# Patient Record
Sex: FEMALE | Race: BLACK OR AFRICAN AMERICAN | NOT HISPANIC OR LATINO | Employment: OTHER | URBAN - METROPOLITAN AREA
[De-identification: names, ages, dates, MRNs, and addresses within clinical notes are randomized per-mention and may not be internally consistent; named-entity substitution may affect disease eponyms.]

---

## 2017-01-24 ENCOUNTER — TELEPHONE (OUTPATIENT)
Dept: INTERNAL MEDICINE | Facility: CLINIC | Age: 57
End: 2017-01-24

## 2017-01-24 NOTE — TELEPHONE ENCOUNTER
----- Message from Romel Haddad sent at 1/24/2017  8:29 AM CST -----  Contact: pt [phone]  States she is following up on messages left on yesterday rg getting an appt for lab results and pain in heel. I did offer to schedule and pt wanted another day prior to first avail and req to leave a message with nurse and can be reached at [phone]//thanks/[email or web address]

## 2017-01-25 ENCOUNTER — LAB VISIT (OUTPATIENT)
Dept: LAB | Facility: HOSPITAL | Age: 57
End: 2017-01-25
Attending: FAMILY MEDICINE
Payer: COMMERCIAL

## 2017-01-25 ENCOUNTER — OFFICE VISIT (OUTPATIENT)
Dept: INTERNAL MEDICINE | Facility: CLINIC | Age: 57
End: 2017-01-25
Payer: COMMERCIAL

## 2017-01-25 VITALS
BODY MASS INDEX: 30.55 KG/M2 | SYSTOLIC BLOOD PRESSURE: 100 MMHG | OXYGEN SATURATION: 99 % | TEMPERATURE: 98 F | HEIGHT: 62 IN | WEIGHT: 166 LBS | HEART RATE: 75 BPM | DIASTOLIC BLOOD PRESSURE: 80 MMHG

## 2017-01-25 DIAGNOSIS — M76.62 LEFT ACHILLES TENDINITIS: ICD-10-CM

## 2017-01-25 DIAGNOSIS — I10 ESSENTIAL HYPERTENSION: ICD-10-CM

## 2017-01-25 DIAGNOSIS — I10 ESSENTIAL HYPERTENSION: Primary | ICD-10-CM

## 2017-01-25 LAB
ANION GAP SERPL CALC-SCNC: 7 MMOL/L
BUN SERPL-MCNC: 17 MG/DL
CALCIUM SERPL-MCNC: 10.4 MG/DL
CHLORIDE SERPL-SCNC: 104 MMOL/L
CO2 SERPL-SCNC: 30 MMOL/L
CREAT SERPL-MCNC: 0.9 MG/DL
EST. GFR  (AFRICAN AMERICAN): >60 ML/MIN/1.73 M^2
EST. GFR  (NON AFRICAN AMERICAN): >60 ML/MIN/1.73 M^2
GLUCOSE SERPL-MCNC: 94 MG/DL
POTASSIUM SERPL-SCNC: 4.6 MMOL/L
SODIUM SERPL-SCNC: 141 MMOL/L

## 2017-01-25 PROCEDURE — 99999 PR PBB SHADOW E&M-EST. PATIENT-LVL III: CPT | Mod: PBBFAC,,, | Performed by: FAMILY MEDICINE

## 2017-01-25 PROCEDURE — 3079F DIAST BP 80-89 MM HG: CPT | Mod: S$GLB,,, | Performed by: FAMILY MEDICINE

## 2017-01-25 PROCEDURE — 36415 COLL VENOUS BLD VENIPUNCTURE: CPT

## 2017-01-25 PROCEDURE — 80048 BASIC METABOLIC PNL TOTAL CA: CPT

## 2017-01-25 PROCEDURE — 3074F SYST BP LT 130 MM HG: CPT | Mod: S$GLB,,, | Performed by: FAMILY MEDICINE

## 2017-01-25 PROCEDURE — 1159F MED LIST DOCD IN RCRD: CPT | Mod: S$GLB,,, | Performed by: FAMILY MEDICINE

## 2017-01-25 PROCEDURE — 99213 OFFICE O/P EST LOW 20 MIN: CPT | Mod: S$GLB,,, | Performed by: FAMILY MEDICINE

## 2017-01-25 RX ORDER — NAPROXEN 500 MG/1
500 TABLET ORAL 2 TIMES DAILY WITH MEALS
Qty: 28 TABLET | Refills: 0 | Status: SHIPPED | OUTPATIENT
Start: 2017-01-25 | End: 2017-02-08

## 2017-01-25 NOTE — PROGRESS NOTES
Subjective:       Patient ID: Clare Whitley is a 56 y.o. female.    Chief Complaint: Foot Pain    Foot Pain   This is a new problem. The current episode started in the past 7 days. The problem occurs constantly. The problem has been gradually worsening. Pertinent negatives include no abdominal pain, anorexia, arthralgias, change in bowel habit, chest pain, chills, congestion, coughing, diaphoresis, fatigue, fever, headaches, joint swelling, myalgias, nausea, neck pain, numbness, rash, sore throat, swollen glands, urinary symptoms, vertigo, visual change, vomiting or weakness. The symptoms are aggravated by walking. She has tried nothing for the symptoms.     Review of Systems   Constitutional: Negative for chills, diaphoresis, fatigue and fever.   HENT: Negative for congestion and sore throat.    Respiratory: Negative for cough.    Cardiovascular: Negative for chest pain.   Gastrointestinal: Negative for abdominal pain, anorexia, change in bowel habit, nausea and vomiting.   Musculoskeletal: Negative for arthralgias, joint swelling, myalgias and neck pain.   Skin: Negative for rash.   Neurological: Negative for vertigo, weakness, numbness and headaches.       Objective:      Physical Exam   Constitutional: She is oriented to person, place, and time. She appears well-developed and well-nourished. No distress.   HENT:   Head: Normocephalic and atraumatic.   Eyes: Conjunctivae and EOM are normal. Pupils are equal, round, and reactive to light. No scleral icterus.   Pulmonary/Chest: Effort normal.   Musculoskeletal:        Left ankle: Achilles tendon exhibits pain.        Feet:    Neurological: She is alert and oriented to person, place, and time. No cranial nerve deficit. Gait normal.   Psychiatric: She has a normal mood and affect.   Vitals reviewed.      Assessment:       1. Essential hypertension    2. Left Achilles tendinitis        Plan:   Clare was seen today for foot pain.    Diagnoses and all orders for  this visit:    Essential hypertension  Comments:  controlled, continue tenoretic  Orders:  -     Basic metabolic panel; Standing    Left Achilles tendinitis  Comments:  course of naproxen, if worse/persistent advised to call for podiatry referral  Orders:  -     naproxen (NAPROSYN) 500 MG tablet; Take 1 tablet (500 mg total) by mouth 2 (two) times daily with meals.

## 2017-01-25 NOTE — MR AVS SNAPSHOT
[facility]  [address] [address] [address]  Phone: [phone]  Fax: [phone]                  Clare Whitley   2017 8:40 AM   Office Visit    Description:  Female : 1960   Provider:  Chhaya Jennings MD   Department:  [facility]           Reason for Visit     Foot Pain           Diagnoses this Visit        Comments    Essential hypertension    -  Primary     Left Achilles tendinitis     course of naproxen, if worse/persistent advised to call for podiatry referral           To Do List           Future Appointments        Provider Department Dept Phone    2017 11:00 AM LAB, SAME DAY [facility] [phone]    2017 7:40 AM Chhaya Jennings MD [facility] [phone]      Goals (5 Years of Data)     None      Follow-Up and Disposition     Return in about 6 months (around 2017), or if symptoms worsen or fail to improve, for annual wellness exam.       These Medications        Disp Refills Start End    naproxen (NAPROSYN) 500 MG tablet 28 tablet 0 2017    Take 1 tablet (500 mg total) by mouth 2 (two) times daily with meals. - Oral    Pharmacy: [id number] [place], [place] - [address] [facility]. Ph #: [phone]         [facility]     [facility] On Call Nurse Care Line -  Assistance  Registered nurses in the [facility] provide clinical advisement, health education, appointment booking, and other advisory services.  Call for this free service at [phone].             Medications           Message regarding Medications     Verify the changes and/or additions to your medication regime listed below are the same as discussed with your clinician today.  If any of these changes or additions are incorrect, please notify your healthcare provider.        START taking these NEW medications        Refills    naproxen  "(NAPROSYN) 500 MG tablet 0    Sig: Take 1 tablet (500 mg total) by mouth 2 (two) times daily with meals.    Class: Normal    Route: Oral      STOP taking these medications     ibuprofen (ADVIL,MOTRIN) 800 MG tablet Take 1 tablet (800 mg total) by mouth 3 (three) times daily.           Verify that the below list of medications is an accurate representation of the medications you are currently taking.  If none reported, the list may be blank. If incorrect, please contact your healthcare provider. Carry this list with you in case of emergency.           Current Medications     atenolol-chlorthalidone (TENORETIC) 100-25 mg per tablet Take 1 tablet by mouth once daily. 1 Tablet Oral Every day    spironolactone (ALDACTONE) 50 MG tablet Take 1 tablet (50 mg total) by mouth once daily.    naproxen (NAPROSYN) 500 MG tablet Take 1 tablet (500 mg total) by mouth 2 (two) times daily with meals.           Clinical Reference Information           Vital Signs - Last Recorded  Most recent update: 1/25/2017  9:11 AM by Jacque Joaquin    BP Pulse Temp Ht Wt SpO2    100/80 (BP Location: Left arm, Patient Position: Sitting) 75 98 °F (36.7 °C) (Tympanic) 5' 2" (1.575 m) 75.3 kg (166 lb 0.1 oz) 99%    BMI                30.36 kg/m2          Blood Pressure          Most Recent Value    BP  100/80      Allergies as of 1/25/2017     No Known Drug Allergies      Immunizations Administered on Date of Encounter - 1/25/2017     None      Orders Placed During Today's Visit     Recurring Lab Work Interval Expires    Basic metabolic panel   6/12/2044      [facility] Sign-Up     Activating your [facility] account is as easy as 1-2-3!     1) Visit [email or web address], select Sign Up Now, enter this activation code and your date of birth, then select Next.  [email or web address]  Expires: 3/11/2017 10:01 AM      2) Create a username and password to use when you visit [facility] in the future and select a security question in case you lose your password and select " Next.    3) Enter your e-mail address and click Sign Up!    Additional Information  If you have questions, please e-mail [email or web address] or call [phone] to talk to our [facility] staff. Remember, [facility] is NOT to be used for urgent needs. For medical emergencies, dial 911.

## 2017-01-27 ENCOUNTER — TELEPHONE (OUTPATIENT)
Dept: INTERNAL MEDICINE | Facility: CLINIC | Age: 57
End: 2017-01-27

## 2017-01-27 NOTE — TELEPHONE ENCOUNTER
----- Message from Alicia Echeverria NP sent at 1/26/2017  2:09 PM CST -----  Please call/notify patient BMP within acceptable limits.

## 2017-03-09 ENCOUNTER — OFFICE VISIT (OUTPATIENT)
Dept: URGENT CARE | Facility: CLINIC | Age: 57
End: 2017-03-09
Payer: COMMERCIAL

## 2017-03-09 VITALS
SYSTOLIC BLOOD PRESSURE: 110 MMHG | WEIGHT: 171.75 LBS | HEART RATE: 88 BPM | DIASTOLIC BLOOD PRESSURE: 70 MMHG | OXYGEN SATURATION: 98 % | RESPIRATION RATE: 20 BRPM | TEMPERATURE: 98 F | BODY MASS INDEX: 31.6 KG/M2 | HEIGHT: 62 IN

## 2017-03-09 DIAGNOSIS — S39.012D STRAIN OF LUMBAR PARASPINAL MUSCLE, SUBSEQUENT ENCOUNTER: ICD-10-CM

## 2017-03-09 DIAGNOSIS — M76.62 ACHILLES TENDINITIS OF LEFT LOWER EXTREMITY: Primary | ICD-10-CM

## 2017-03-09 PROCEDURE — 99999 PR PBB SHADOW E&M-EST. PATIENT-LVL IV: CPT | Mod: PBBFAC,,, | Performed by: PHYSICIAN ASSISTANT

## 2017-03-09 PROCEDURE — 3074F SYST BP LT 130 MM HG: CPT | Mod: S$GLB,,, | Performed by: PHYSICIAN ASSISTANT

## 2017-03-09 PROCEDURE — 1160F RVW MEDS BY RX/DR IN RCRD: CPT | Mod: S$GLB,,, | Performed by: PHYSICIAN ASSISTANT

## 2017-03-09 PROCEDURE — 3078F DIAST BP <80 MM HG: CPT | Mod: S$GLB,,, | Performed by: PHYSICIAN ASSISTANT

## 2017-03-09 PROCEDURE — 99214 OFFICE O/P EST MOD 30 MIN: CPT | Mod: S$GLB,,, | Performed by: PHYSICIAN ASSISTANT

## 2017-03-09 RX ORDER — NAPROXEN 500 MG/1
500 TABLET ORAL 2 TIMES DAILY PRN
Qty: 30 TABLET | Refills: 0 | Status: SHIPPED | OUTPATIENT
Start: 2017-03-09 | End: 2017-03-19

## 2017-03-09 RX ORDER — CYCLOBENZAPRINE HCL 10 MG
10 TABLET ORAL NIGHTLY PRN
Qty: 10 TABLET | Refills: 0 | Status: SHIPPED | OUTPATIENT
Start: 2017-03-09 | End: 2017-03-29
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
## 2017-03-09 NOTE — PATIENT INSTRUCTIONS
Rest, Ice, Compression, Elevation as needed  Follow up with Ortho or Primary Care Physician if any worsening in symptoms or no improvement after 2 weeks.            Understanding Achilles Tendonitis    Achilles tendonitis is an overuse injury. It results in inflammation of the Achilles tendon. This tendon is found on the back of the ankle. It links the calf muscle to the heel bone. It helps you do pushing-off movements like running or standing on your toes.     How to say it  [id number]   What causes Achilles tendonitis?  Achilles tendonitis can happen if you do an activity like running, walking, or jumping too much. This overuse can strain, or pull, the tendon. It may lead to minor tearing of the tendon. An injury to the lower leg or foot can also cause it.  If you dont warm up before taking part in sports such as basketball, you are more likely to suffer from this condition. You are also more prone to it if you do too much of such an activity too quickly. Proper training and rest can help prevent it.  Symptoms of Achilles tendonitis  The main symptom of Achilles tendonitis is pain. This pain mostly happens when you move the ankle. The tendon may also feel stiff after a period of no activity, such as sleeping. It may also become swollen. You may hear a crackling sound when you move your ankle.  Treatment for Achilles tendonitis  Symptoms often get better after starting treatment. A full recovery may take several months. Treatments include:  · Rest. You should stop or change the activity that caused the injury. The tendon will then have time to heal.  · Cold or heat pack. These help reduce pain and swelling.  · Prescription or over-the-counter pain medicines. These help reduce pain and swelling.  · Shoe inserts. These devices can reduce strain on the Achilles tendon when you move. You may then feel less pain.  · Stretching and strengthening exercises. Certain exercises can help you regain  flexibility and strength in your Achilles tendon.  · Surgery. This option can fix the injured tendon. But you dont often need it unless other treatments dont work.     When to call your healthcare provider   Call your healthcare provider right away if you have any of these:  · Fever of 100.4°F (38°C) or higher, or as directed  · Pain that gets worse  · Symptoms that dont get better, or get worse  · New symptoms    Date Last Reviewed: 3/10/2016  © 2516-4207 [employer]. [address] [place] [address]. All rights reserved. This information is not intended as a substitute for professional medical care. Always follow your healthcare professional's instructions.        Back Pain (Acute or Chronic)    Back pain is one of the most common problems. The good news is that most people feel better in 1 to 2 weeks, and most of the rest in 1 to 2 months. Most people can remain active.  People experience and describe pain differently; not everyone is the same.  · The pain can be sharp, stabbing, shooting, aching, cramping or burning.  · Movement, standing, bending, lifting, sitting, or walking may worsen pain.  · It can be localized to one spot or area, or it can be more generalized.  · It can spread or radiate upwards, to the front, or go down your arms or legs (sciatica).  · It can cause muscle spasm.  Most of the time, mechanical problems with the muscles or spine cause the pain. Mechanical problems are usually caused by an injury to the muscles or ligaments. While illness can cause back pain, it is usually not caused by a serious illness. Mechanical problems include:   · Physical activity such as sports, exercise, work, or normal activity  · Overexertion, lifting, pushing, pulling incorrectly or too aggressively  · Sudden twisting, bending, or stretching from an accident, or accidental movement  · Poor posture  · Stretching or moving wrong, without noticing pain at the time  · Poor coordination,  lack of regular exercise (check with your doctor about this)  · Spinal disc disease or arthritis  · Stress  Pain can also be related to pregnancy, or illness like appendicitis, bladder or kidney infections, pelvic infections, and many other things.  Acute back pain usually gets better in 1 to 2 weeks. Back pain related to disk disease, arthritis in the spinal joints or spinal stenosis (narrowing of the spinal canal) can become chronic and last for months or years.  Unless you had a physical injury (for example, a car accident or fall) X-rays are usually not needed for the initial evaluation of back pain. If pain continues and does not respond to medical treatment, X-rays and other tests may be needed.  Home care  Try these home care recommendations:  · When in bed, try to find a position of comfort. A firm mattress is best. Try lying flat on your back with pillows under your knees. You can also try lying on your side with your knees bent up towards your chest and a pillow between your knees.  · At first, do not try to stretch out the sore spots. If there is a strain, it is not like the good soreness you get after exercising without an injury. In this case, stretching may make it worse.  · Avoid prolong sitting, long car rides, or travel. This puts more stress on the lower back than standing or walking.  · During the first 24 to 72 hours after an acute injury or flare up of chronic back pain, apply an ice pack to the painful area for 20 minutes and then remove it for 20 minutes. Do this over a period of 60 to 90 minutes or several times a day. This will reduce swelling and pain. Wrap the ice pack in a thin towel or plastic to protect your skin.  · You can start with ice, then switch to heat. Heat (hot shower, hot bath, or heating pad) reduces pain and works well for muscle spasms. Heat can be applied to the painful area for 20 minutes then remove it for 20 minutes. Do this over a period of 60 to 90 minutes or several  times a day. Do not sleep on a heating pad. It can lead to skin burns or tissue damage.  · You can alternate ice and heat therapy. Talk with your doctor about the best treatment for your back pain.  · Therapeutic massage can help relax the back muscles without stretching them.  · Be aware of safe lifting methods and do not lift anything without stretching first.  Medicines  Talk to your doctor before using medicine, especially if you have other medical problems or are taking other medicines.  · You may use over-the-counter medicine as directed on the bottle to control pain, unless another pain medicine was prescribed. If you have chronic conditions like diabetes, liver or kidney disease, stomach ulcers, or gastrointestinal bleeding, or are taking blood thinners, talk to your doctor before taking any medicine.  · Be careful if you are given a prescription medicines, narcotics, or medicine for muscle spasms. They can cause drowsiness, affect your coordination, reflexes, and judgement. Do not drive or operate heavy machinery.  Follow-up care  Follow up with your healthcare provider, or as advised.   A radiologist will review any X-rays that were taken. Your provide will notify you of any new findings that may affect your care.  Call 911  Call emergency services if any of the following occur:  · Trouble breathing  · Confusion  · Very drowsy or trouble awakening  · Fainting or loss of consciousness  · Rapid or very slow heart rate  · Loss of bowel or bladder control  When to seek medical advice  Call your healthcare provider right away if any of these occur:   · Pain becomes worse or spreads to your legs  · Weakness or numbness in one or both legs  · Numbness in the groin or genital area  Date Last Reviewed: 7/1/2016  © 9823-5642 [employer]. [address] [place] [address], [place] [address]. All rights reserved. This information is not intended as a substitute for professional medical care. Always follow your  healthcare professional's instructions.

## 2017-03-09 NOTE — MR AVS SNAPSHOT
[facility]  [address] [facility] [place] [address]  Phone: [phone]  Fax: [phone]                  Clare Whitley   3/9/2017 12:10 PM   Office Visit    Description:  Female : 1960   Provider:  Barb Nolan PA-C   Department:  [facility]           Reason for Visit     Back Pain           Diagnoses this Visit        Comments    Achilles tendinitis of left lower extremity    -  Primary     Strain of lumbar paraspinal muscle, subsequent encounter                To Do List           Future Appointments        Provider Department Dept Phone    2017 7:40 AM Chhaya Jennings MD [facility] [phone]      Goals (5 Years of Data)     None       These Medications        Disp Refills Start End    naproxen (NAPROSYN) 500 MG tablet 30 tablet 0 3/9/2017 3/19/2017    Take 1 tablet (500 mg total) by mouth 2 (two) times daily as needed (pain). - Oral    [facility] [facility] [place] [facility] [address] [facility]. Ph #: [phone]       cyclobenzaprine (FLEXERIL) 10 MG tablet 10 tablet 0 3/9/2017     Take 1 tablet (10 mg total) by mouth nightly as needed for Muscle spasms. - Oral    [id number] [facility] [place] [facility] [address] [facility]. Ph #: [phone]         [facility] On Call     [facility] On Call Nurse Care Line -  Assistance  Registered nurses in the [facility] provide clinical advisement, health education, appointment booking, and other advisory services.  Call for this free service at [phone].             Medications           Message regarding Medications     Verify the changes and/or additions to your medication regime listed below are the same as discussed with your clinician today.  If any of these changes or additions are incorrect, please notify your healthcare provider.        START taking these NEW medications        Refills    naproxen (NAPROSYN) 500 MG tablet  "0    Sig: Take 1 tablet (500 mg total) by mouth 2 (two) times daily as needed (pain).    Class: Normal    Route: Oral    cyclobenzaprine (FLEXERIL) 10 MG tablet 0    Sig: Take 1 tablet (10 mg total) by mouth nightly as needed for Muscle spasms.    Class: Normal    Route: Oral           Verify that the below list of medications is an accurate representation of the medications you are currently taking.  If none reported, the list may be blank. If incorrect, please contact your healthcare provider. Carry this list with you in case of emergency.           Current Medications     atenolol-chlorthalidone (TENORETIC) 100-25 mg per tablet Take 1 tablet by mouth once daily. 1 Tablet Oral Every day    spironolactone (ALDACTONE) 50 MG tablet Take 1 tablet (50 mg total) by mouth once daily.    cyclobenzaprine (FLEXERIL) 10 MG tablet Take 1 tablet (10 mg total) by mouth nightly as needed for Muscle spasms.    naproxen (NAPROSYN) 500 MG tablet Take 1 tablet (500 mg total) by mouth 2 (two) times daily as needed (pain).           Clinical Reference Information           Your Vitals Were     BP Pulse Temp Resp    110/70 (BP Location: Right arm, Patient Position: Sitting, BP Method: Manual) 88 98.2 °F (36.8 °C) (Tympanic) 20    Height Weight SpO2 BMI    5' 2.4" (1.585 m) 77.9 kg (171 lb 11.8 oz) 98% 31.01 kg/m2      Blood Pressure          Most Recent Value    BP  110/70      Allergies as of 3/9/2017     No Known Drug Allergies      Immunizations Administered on Date of Encounter - 3/9/2017     None      Orders Placed During Today's Visit      Normal Orders This Visit    Ambulatory referral to Orthopedics       [facility] Sign-Up     Activating your [facility] account is as easy as 1-2-3!     1) Visit [email or web address], select Sign Up Now, enter this activation code and your date of birth, then select Next.  [email or web address]  Expires: 3/11/2017 10:01 AM      2) Create a username and password to use when you visit [facility] in the future " and select a security question in case you lose your password and select Next.    3) Enter your e-mail address and click Sign Up!    Additional Information  If you have questions, please e-mail [email or web address] or call [phone] to talk to our [facility] staff. Remember, [facility] is NOT to be used for urgent needs. For medical emergencies, dial 911.         Instructions        Rest, Ice, Compression, Elevation as needed  Follow up with Ortho or Primary Care Physician if any worsening in symptoms or no improvement after 2 weeks.            Understanding Achilles Tendonitis    Achilles tendonitis is an overuse injury. It results in inflammation of the Achilles tendon. This tendon is found on the back of the ankle. It links the calf muscle to the heel bone. It helps you do pushing-off movements like running or standing on your toes.     How to say it  [id number]-[id number]-[id number]   What causes Achilles tendonitis?  Achilles tendonitis can happen if you do an activity like running, walking, or jumping too much. This overuse can strain, or pull, the tendon. It may lead to minor tearing of the tendon. An injury to the lower leg or foot can also cause it.  If you dont warm up before taking part in sports such as basketball, you are more likely to suffer from this condition. You are also more prone to it if you do too much of such an activity too quickly. Proper training and rest can help prevent it.  Symptoms of Achilles tendonitis  The main symptom of Achilles tendonitis is pain. This pain mostly happens when you move the ankle. The tendon may also feel stiff after a period of no activity, such as sleeping. It may also become swollen. You may hear a crackling sound when you move your ankle.  Treatment for Achilles tendonitis  Symptoms often get better after starting treatment. A full recovery may take several months. Treatments include:  · Rest. You should stop or change the activity that caused the injury. The  tendon will then have time to heal.  · Cold or heat pack. These help reduce pain and swelling.  · Prescription or over-the-counter pain medicines. These help reduce pain and swelling.  · Shoe inserts. These devices can reduce strain on the Achilles tendon when you move. You may then feel less pain.  · Stretching and strengthening exercises. Certain exercises can help you regain flexibility and strength in your Achilles tendon.  · Surgery. This option can fix the injured tendon. But you dont often need it unless other treatments dont work.     When to call your healthcare provider   Call your healthcare provider right away if you have any of these:  · Fever of 100.4°F (38°C) or higher, or as directed  · Pain that gets worse  · Symptoms that dont get better, or get worse  · New symptoms    Date Last Reviewed: 3/10/2016  © 6511-4005 [employer]. [address] [place] [address]. All rights reserved. This information is not intended as a substitute for professional medical care. Always follow your healthcare professional's instructions.        Back Pain (Acute or Chronic)    Back pain is one of the most common problems. The good news is that most people feel better in 1 to 2 weeks, and most of the rest in 1 to 2 months. Most people can remain active.  People experience and describe pain differently; not everyone is the same.  · The pain can be sharp, stabbing, shooting, aching, cramping or burning.  · Movement, standing, bending, lifting, sitting, or walking may worsen pain.  · It can be localized to one spot or area, or it can be more generalized.  · It can spread or radiate upwards, to the front, or go down your arms or legs (sciatica).  · It can cause muscle spasm.  Most of the time, mechanical problems with the muscles or spine cause the pain. Mechanical problems are usually caused by an injury to the muscles or ligaments. While illness can cause back pain, it is usually not caused by a  serious illness. Mechanical problems include:   · Physical activity such as sports, exercise, work, or normal activity  · Overexertion, lifting, pushing, pulling incorrectly or too aggressively  · Sudden twisting, bending, or stretching from an accident, or accidental movement  · Poor posture  · Stretching or moving wrong, without noticing pain at the time  · Poor coordination, lack of regular exercise (check with your doctor about this)  · Spinal disc disease or arthritis  · Stress  Pain can also be related to pregnancy, or illness like appendicitis, bladder or kidney infections, pelvic infections, and many other things.  Acute back pain usually gets better in 1 to 2 weeks. Back pain related to disk disease, arthritis in the spinal joints or spinal stenosis (narrowing of the spinal canal) can become chronic and last for months or years.  Unless you had a physical injury (for example, a car accident or fall) X-rays are usually not needed for the initial evaluation of back pain. If pain continues and does not respond to medical treatment, X-rays and other tests may be needed.  Home care  Try these home care recommendations:  · When in bed, try to find a position of comfort. A firm mattress is best. Try lying flat on your back with pillows under your knees. You can also try lying on your side with your knees bent up towards your chest and a pillow between your knees.  · At first, do not try to stretch out the sore spots. If there is a strain, it is not like the good soreness you get after exercising without an injury. In this case, stretching may make it worse.  · Avoid prolong sitting, long car rides, or travel. This puts more stress on the lower back than standing or walking.  · During the first 24 to 72 hours after an acute injury or flare up of chronic back pain, apply an ice pack to the painful area for 20 minutes and then remove it for 20 minutes. Do this over a period of 60 to 90 minutes or several times a day.  This will reduce swelling and pain. Wrap the ice pack in a thin towel or plastic to protect your skin.  · You can start with ice, then switch to heat. Heat (hot shower, hot bath, or heating pad) reduces pain and works well for muscle spasms. Heat can be applied to the painful area for 20 minutes then remove it for 20 minutes. Do this over a period of 60 to 90 minutes or several times a day. Do not sleep on a heating pad. It can lead to skin burns or tissue damage.  · You can alternate ice and heat therapy. Talk with your doctor about the best treatment for your back pain.  · Therapeutic massage can help relax the back muscles without stretching them.  · Be aware of safe lifting methods and do not lift anything without stretching first.  Medicines  Talk to your doctor before using medicine, especially if you have other medical problems or are taking other medicines.  · You may use over-the-counter medicine as directed on the bottle to control pain, unless another pain medicine was prescribed. If you have chronic conditions like diabetes, liver or kidney disease, stomach ulcers, or gastrointestinal bleeding, or are taking blood thinners, talk to your doctor before taking any medicine.  · Be careful if you are given a prescription medicines, narcotics, or medicine for muscle spasms. They can cause drowsiness, affect your coordination, reflexes, and judgement. Do not drive or operate heavy machinery.  Follow-up care  Follow up with your healthcare provider, or as advised.   A radiologist will review any X-rays that were taken. Your provide will notify you of any new findings that may affect your care.  Call 911  Call emergency services if any of the following occur:  · Trouble breathing  · Confusion  · Very drowsy or trouble awakening  · Fainting or loss of consciousness  · Rapid or very slow heart rate  · Loss of bowel or bladder control  When to seek medical advice  Call your healthcare provider right away if any of  these occur:   · Pain becomes worse or spreads to your legs  · Weakness or numbness in one or both legs  · Numbness in the groin or genital area  Date Last Reviewed: 7/1/2016 © 2000-2016 [employer]. [address] [place] [address], [place] [address]. All rights reserved. This information is not intended as a substitute for professional medical care. Always follow your healthcare professional's instructions.             Language Assistance Services     ATTENTION: Language assistance services are available, free of charge. Please call [phone].      ATENCIÓN: Si ashleyla paul, tiene a nix disposición servicios gratuitos de asistencia lingüística. Llame al [phone].     CHÚ Ý: N?u b?n nói Ti?ng Vi?t, có các d?ch v? h? tr? ngôn ng? mi?n phí dành cho b?n. G?i s? [phone].         [facility] complies with applicable Federal civil rights laws and does not discriminate on the basis of race, color, national origin, age, disability, or sex.

## 2017-03-29 ENCOUNTER — OFFICE VISIT (OUTPATIENT)
Dept: INTERNAL MEDICINE | Facility: CLINIC | Age: 57
End: 2017-03-29
Payer: COMMERCIAL

## 2017-03-29 ENCOUNTER — HOSPITAL ENCOUNTER (OUTPATIENT)
Dept: RADIOLOGY | Facility: HOSPITAL | Age: 57
Discharge: HOME | End: 2017-03-29
Attending: FAMILY MEDICINE
Payer: COMMERCIAL

## 2017-03-29 VITALS
WEIGHT: 173.31 LBS | BODY MASS INDEX: 31.89 KG/M2 | DIASTOLIC BLOOD PRESSURE: 88 MMHG | OXYGEN SATURATION: 98 % | SYSTOLIC BLOOD PRESSURE: 138 MMHG | HEART RATE: 84 BPM | HEIGHT: 62 IN | TEMPERATURE: 98 F

## 2017-03-29 DIAGNOSIS — R07.89 RIGHT-SIDED CHEST WALL PAIN: Primary | ICD-10-CM

## 2017-03-29 DIAGNOSIS — R07.89 RIGHT-SIDED CHEST WALL PAIN: ICD-10-CM

## 2017-03-29 DIAGNOSIS — B02.9 HERPES ZOSTER WITHOUT COMPLICATION: ICD-10-CM

## 2017-03-29 PROCEDURE — 3079F DIAST BP 80-89 MM HG: CPT | Mod: S$GLB,,, | Performed by: FAMILY MEDICINE

## 2017-03-29 PROCEDURE — 71020 XR CHEST PA AND LATERAL: CPT | Mod: TC,PO

## 2017-03-29 PROCEDURE — 99214 OFFICE O/P EST MOD 30 MIN: CPT | Mod: S$GLB,,, | Performed by: FAMILY MEDICINE

## 2017-03-29 PROCEDURE — 3075F SYST BP GE 130 - 139MM HG: CPT | Mod: S$GLB,,, | Performed by: FAMILY MEDICINE

## 2017-03-29 PROCEDURE — 71020 XR CHEST PA AND LATERAL: CPT | Mod: 26,,, | Performed by: RADIOLOGY

## 2017-03-29 PROCEDURE — 99999 PR PBB SHADOW E&M-EST. PATIENT-LVL III: CPT | Mod: PBBFAC,,, | Performed by: FAMILY MEDICINE

## 2017-03-29 PROCEDURE — 1160F RVW MEDS BY RX/DR IN RCRD: CPT | Mod: S$GLB,,, | Performed by: FAMILY MEDICINE

## 2017-03-29 RX ORDER — VALACYCLOVIR HYDROCHLORIDE 1 G/1
1000 TABLET, FILM COATED ORAL 3 TIMES DAILY
Qty: 21 TABLET | Refills: 0 | Status: SHIPPED | OUTPATIENT
Start: 2017-03-29 | End: 2017-04-03

## 2017-03-29 RX ORDER — TRAMADOL HYDROCHLORIDE 50 MG/1
50 TABLET ORAL 3 TIMES DAILY PRN
Qty: 30 TABLET | Refills: 0 | Status: SHIPPED | OUTPATIENT
Start: 2017-03-29 | End: 2017-04-08

## 2017-03-29 NOTE — PROGRESS NOTES
Subjective:   Patient ID: Clare Whitley is a 56 y.o. female.  Chief Complaint:  Chest Pain (pain rt rib cage)    HPI Comments: Presents for evaluation of unilateral right-sided chest/rib pain started this morning.  No fever.  No rash.  No shortness of breath.  Feels deep in chest.  No change with inspiration or expiration.  Note tenderness to palpation, just more sensitive.  No change with movement.  History of chickenpox.  Denies history of shingles.  No previous Zostavax.    Chest Pain    This is a new problem. The current episode started today. The onset quality is sudden. The problem occurs constantly. The problem has been unchanged. The pain is present in the lateral region. The pain is at a severity of 10/10. The pain is severe. The quality of the pain is described as stabbing and numbness. The pain does not radiate. Associated symptoms include nausea. Pertinent negatives include no abdominal pain, back pain, claudication, cough, diaphoresis, dizziness, exertional chest pressure, fever, headaches, hemoptysis, irregular heartbeat, leg pain, lower extremity edema, malaise/fatigue, near-syncope, numbness, orthopnea, palpitations, PND, shortness of breath, sputum production, syncope, vomiting or weakness. The pain is aggravated by nothing. She has tried nothing for the symptoms.   Her past medical history is significant for anxiety/panic attacks, hypertension and mitral valve prolapse.     Review of Systems   Constitutional: Negative for diaphoresis, fever and malaise/fatigue.   HENT: Negative.    Respiratory: Negative for cough, hemoptysis, sputum production and shortness of breath.    Cardiovascular: Positive for chest pain. Negative for palpitations, orthopnea, claudication, syncope, PND and near-syncope.   Gastrointestinal: Positive for nausea. Negative for abdominal pain and vomiting.   Genitourinary: Negative for flank pain.   Musculoskeletal: Negative for back pain.   Skin: Negative for rash.  "  Neurological: Negative for dizziness, weakness, numbness and headaches.       Current Outpatient Prescriptions:     atenolol-chlorthalidone (TENORETIC) 100-25 mg per tablet, Take 1 tablet by mouth once daily. 1 Tablet Oral Every day, Disp: 90 tablet, Rfl: 3    spironolactone (ALDACTONE) 50 MG tablet, Take 1 tablet (50 mg total) by mouth once daily., Disp: 90 tablet, Rfl: 3    tramadol (ULTRAM) 50 mg tablet, Take 1 tablet (50 mg total) by mouth 3 (three) times daily as needed for Pain., Disp: 30 tablet, Rfl: 0    valacyclovir (VALTREX) 1000 MG tablet, Take 1 tablet (1,000 mg total) by mouth 3 (three) times daily., Disp: 21 tablet, Rfl: 0    Objective:   /88  Pulse 84  Temp 97.5 °F (36.4 °C) (Tympanic)   Ht 5' 2" (1.575 m)  Wt 78.6 kg (173 lb 4.5 oz)  SpO2 98%  BMI 31.69 kg/m2    Physical Exam   Constitutional: She is oriented to person, place, and time. Vital signs are normal. She appears well-developed and well-nourished.  Non-toxic appearance. She does not have a sickly appearance. She does not appear ill. No distress.   Uncomfortable from pain.   Neck: No JVD present. No thyroid mass and no thyromegaly present.   Cardiovascular: Normal rate, regular rhythm and normal heart sounds.  Exam reveals no gallop and no friction rub.    No murmur heard.  Pulses:       Radial pulses are 2+ on the right side, and 2+ on the left side.   Pulmonary/Chest: Effort normal and breath sounds normal. She has no wheezes. She has no rhonchi. She has no rales. Chest wall is not dull to percussion. She exhibits no mass, no tenderness, no bony tenderness, no crepitus, no edema, no deformity, no swelling and no retraction.   Abdominal: Soft. She exhibits no distension. There is no tenderness. There is no rebound, no guarding and no CVA tenderness.   Musculoskeletal: Normal range of motion. She exhibits no edema.   Neurological: She is oriented to person, place, and time. She displays a negative Romberg sign. Coordination " and gait normal.   Skin: Skin is warm and dry. No rash noted.   Psychiatric: Her mood appears anxious.   Nursing note and vitals reviewed.    Assessment:     1. Right-sided chest wall pain    2. Herpes zoster without complication      Plan:   -     X-Ray Chest PA And Lateral; Future; Expected date: 3/29/17  -     valacyclovir (VALTREX) 1000 MG tablet; Take 1 tablet (1,000 mg total) by mouth 3 (three) times daily.  Dispense: 21 tablet; Refill: 0  -     tramadol (ULTRAM) 50 mg tablet; Take 1 tablet (50 mg total) by mouth 3 (three) times daily as needed for Pain.  Dispense: 30 tablet; Refill: 0    Pain in single dermatome pattern on right.  No rash present.  Pain unchanged with respiration or movement.  No respiratory difficulty.  Normal vitals.  Most likely diagnosis is early shingles.    Chest x-ray for any possible underlying pathology.   Image was negative.  No acute process or abnormality.  Valtrex for shingles.  Tramadol for pain.  Patient education on shingles.  Discussed the rash may develop.  Return to clinic next week as needed.

## 2017-03-29 NOTE — MR AVS SNAPSHOT
[facility]  [address]  [place] [address]  Phone: [phone]  Fax: [phone]                  Clare Whitley   3/29/2017 9:20 AM   Office Visit    Description:  Female : 1960   Provider:  Jason Stanley MD   Department:  [facility]           Reason for Visit     Chest Pain           Diagnoses this Visit        Comments    Right-sided chest wall pain    -  Primary     Herpes zoster without complication                To Do List           Future Appointments        Provider Department Dept Phone    3/29/2017 10:00 AM [facility] [phone]    2017 7:40 AM Chhaya Jennings MD [facility] Internal [facility] [phone]      Goals (5 Years of Data)     None       These Medications        Disp Refills Start End    valacyclovir (VALTREX) 1000 MG tablet 21 tablet 0 3/29/2017 2017    Take 1 tablet (1,000 mg total) by mouth 3 (three) times daily. - Oral    [id number] [place] [facility] [address] [facility]. Ph #: [phone]       tramadol (ULTRAM) 50 mg tablet 30 tablet 0 3/29/2017 2017    Take 1 tablet (50 mg total) by mouth 3 (three) times daily as needed for Pain. - Oral    [id number] [facility] [place] [facility] [address] [facility]. Ph #: [phone]         [facility] On Call     [facility] Nurse Care Line -  Assistance  Registered nurses in the [facility] provide clinical advisement, health education, appointment booking, and other advisory services.  Call for this free service at [phone].             Medications           Message regarding Medications     Verify the changes and/or additions to your medication regime listed below are the same as discussed with your clinician today.  If any of these changes or additions are incorrect, please notify your healthcare provider.        START taking these NEW medications         "Refills    valacyclovir (VALTREX) 1000 MG tablet 0    Sig: Take 1 tablet (1,000 mg total) by mouth 3 (three) times daily.    Class: Normal    Route: Oral    tramadol (ULTRAM) 50 mg tablet 0    Sig: Take 1 tablet (50 mg total) by mouth 3 (three) times daily as needed for Pain.    Class: Print    Route: Oral      STOP taking these medications     cyclobenzaprine (FLEXERIL) 10 MG tablet Take 1 tablet (10 mg total) by mouth nightly as needed for Muscle spasms.           Verify that the below list of medications is an accurate representation of the medications you are currently taking.  If none reported, the list may be blank. If incorrect, please contact your healthcare provider. Carry this list with you in case of emergency.           Current Medications     atenolol-chlorthalidone (TENORETIC) 100-25 mg per tablet Take 1 tablet by mouth once daily. 1 Tablet Oral Every day    spironolactone (ALDACTONE) 50 MG tablet Take 1 tablet (50 mg total) by mouth once daily.    tramadol (ULTRAM) 50 mg tablet Take 1 tablet (50 mg total) by mouth 3 (three) times daily as needed for Pain.    valacyclovir (VALTREX) 1000 MG tablet Take 1 tablet (1,000 mg total) by mouth 3 (three) times daily.           Clinical Reference Information           Your Vitals Were     BP Pulse Temp Height Weight BMI    138/88 84 97.5 °F (36.4 °C) (Tympanic) 5' 2" (1.575 m) 78.6 kg (173 lb 4.5 oz) 31.69 kg/m2      Blood Pressure          Most Recent Value    BP  138/88      Allergies as of 3/29/2017     No Known Drug Allergies      Immunizations Administered on Date of Encounter - 3/29/2017     None      Orders Placed During Today's Visit     Future Labs/Procedures Expected by Expires    X-Ray Chest PA And Lateral  3/29/2017 3/29/2018      [facility] Sign-Up     Activating your [facility] account is as easy as 1-2-3!     1) Visit [email or web address], select Sign Up Now, enter this activation code and your date of birth, then select Next.  [email or web address]  Expires: " 5/13/2017  9:29 AM      2) Create a username and password to use when you visit [facility] in the future and select a security question in case you lose your password and select Next.    3) Enter your e-mail address and click Sign Up!    Additional Information  If you have questions, please e-mail [email or web address] or call [phone] to talk to our [facility] staff. Remember, [facility] is NOT to be used for urgent needs. For medical emergencies, dial 911.         Language Assistance Services     ATTENTION: Language assistance services are available, free of charge. Please call [phone].      ATENCIÓN: Si habla paul, tiene a nix disposición servicios gratuitos de asistencia lingüística. Llame al [phone].     CHÚ Ý: N?u b?n nói Ti?ng Vi?t, có các d?ch v? h? tr? ngôn ng? mi?n phí dành cho b?n. G?i s? [phone].         [facility] complies with applicable Federal civil rights laws and does not discriminate on the basis of race, color, national origin, age, disability, or sex.

## 2017-03-29 NOTE — LETTER
March 29, 2017      [facility]  [address]  [place] [address]  Phone: [phone]  Fax: [phone]       Patient: Clare Whitley   YOB: 1960  Date of Visit: 03/29/2017    To Whom It May Concern:    Clare Fatima was at [facility] on 03/29/2017. She may return to work/school on 03/31/2017 with no restrictions. If you have any questions or concerns, or if I can be of further assistance, please do not hesitate to contact me.    Sincerely,    Jason Stanley MD

## 2017-03-31 ENCOUNTER — OFFICE VISIT (OUTPATIENT)
Dept: URGENT CARE | Facility: CLINIC | Age: 57
End: 2017-03-31
Payer: COMMERCIAL

## 2017-03-31 ENCOUNTER — TELEPHONE (OUTPATIENT)
Dept: INTERNAL MEDICINE | Facility: CLINIC | Age: 57
End: 2017-03-31

## 2017-03-31 ENCOUNTER — HOSPITAL ENCOUNTER (OUTPATIENT)
Dept: RADIOLOGY | Facility: HOSPITAL | Age: 57
Discharge: HOME | End: 2017-03-31
Attending: NURSE PRACTITIONER
Payer: COMMERCIAL

## 2017-03-31 VITALS
RESPIRATION RATE: 18 BRPM | BODY MASS INDEX: 29.48 KG/M2 | WEIGHT: 160.19 LBS | HEART RATE: 96 BPM | DIASTOLIC BLOOD PRESSURE: 74 MMHG | TEMPERATURE: 98 F | OXYGEN SATURATION: 98 % | HEIGHT: 62 IN | SYSTOLIC BLOOD PRESSURE: 110 MMHG

## 2017-03-31 DIAGNOSIS — R10.11 RIGHT UPPER QUADRANT ABDOMINAL PAIN: ICD-10-CM

## 2017-03-31 DIAGNOSIS — K81.0 ACUTE CHOLECYSTITIS: Primary | ICD-10-CM

## 2017-03-31 PROCEDURE — 3078F DIAST BP <80 MM HG: CPT | Mod: S$GLB,,, | Performed by: NURSE PRACTITIONER

## 2017-03-31 PROCEDURE — 99213 OFFICE O/P EST LOW 20 MIN: CPT | Mod: S$GLB,,, | Performed by: NURSE PRACTITIONER

## 2017-03-31 PROCEDURE — 1160F RVW MEDS BY RX/DR IN RCRD: CPT | Mod: S$GLB,,, | Performed by: NURSE PRACTITIONER

## 2017-03-31 PROCEDURE — 76705 ECHO EXAM OF ABDOMEN: CPT | Mod: TC

## 2017-03-31 PROCEDURE — 3074F SYST BP LT 130 MM HG: CPT | Mod: S$GLB,,, | Performed by: NURSE PRACTITIONER

## 2017-03-31 PROCEDURE — 99999 PR PBB SHADOW E&M-EST. PATIENT-LVL IV: CPT | Mod: PBBFAC,,, | Performed by: NURSE PRACTITIONER

## 2017-03-31 RX ORDER — AMOXICILLIN AND CLAVULANATE POTASSIUM 875; 125 MG/1; MG/1
1 TABLET, FILM COATED ORAL 2 TIMES DAILY
Qty: 20 TABLET | Refills: 0 | Status: SHIPPED | OUTPATIENT
Start: 2017-03-31 | End: 2017-04-10

## 2017-03-31 NOTE — MR AVS SNAPSHOT
[facility]  [address] [facility] [place] [address]  Phone: [phone]  Fax: [phone]                  Clare Whitley   3/31/2017 11:00 AM   Office Visit    Description:  Female : 1960   Provider:  Linwood Greco NP   Department:  [facility]           Reason for Visit     Flank Pain           Diagnoses this Visit        Comments    Right upper quadrant abdominal pain    -  Primary            To Do List           Future Appointments        Provider Department Dept Phone    2017 7:40 AM Chhaya Jennings MD '[facility] [phone]      Goals (5 Years of Data)     None      Follow-Up and Disposition     Return if symptoms worsen or fail to improve.      [facility]     [facility] On Call Nurse Care Line -  Assistance  Unless otherwise directed by your provider, please contact [facility], our nurse care line that is available for  assistance.     Registered nurses in the [facility] On Call Center provide: appointment scheduling, clinical advisement, health education, and other advisory services.  Call: [phone] (toll free)               Medications           Message regarding Medications     Verify the changes and/or additions to your medication regime listed below are the same as discussed with your clinician today.  If any of these changes or additions are incorrect, please notify your healthcare provider.             Verify that the below list of medications is an accurate representation of the medications you are currently taking.  If none reported, the list may be blank. If incorrect, please contact your healthcare provider. Carry this list with you in case of emergency.           Current Medications     atenolol-chlorthalidone (TENORETIC) 100-25 mg per tablet Take 1 tablet by mouth once daily. 1 Tablet Oral Every day    spironolactone (ALDACTONE) 50 MG tablet Take 1 tablet (50 mg total) by mouth once daily.    tramadol (ULTRAM) 50 mg  "tablet Take 1 tablet (50 mg total) by mouth 3 (three) times daily as needed for Pain.    valacyclovir (VALTREX) 1000 MG tablet Take 1 tablet (1,000 mg total) by mouth 3 (three) times daily.           Clinical Reference Information           Your Vitals Were     BP Pulse Temp Resp    110/74 (BP Location: Right arm, Patient Position: Sitting, BP Method: Manual) 96 97.6 °F (36.4 °C) (Tympanic) 18    Height Weight SpO2 BMI    5' 2.4" (1.585 m) 72.7 kg (160 lb 2.6 oz) 98% 28.92 kg/m2      Blood Pressure          Most Recent Value    BP  110/74      Allergies as of 3/31/2017     No Known Drug Allergies      Immunizations Administered on Date of Encounter - 3/31/2017     None      Orders Placed During Today's Visit     Future Labs/Procedures Expected by Expires    CBC W/ AUTO DIFFERENTIAL  3/31/2017 5/30/2018    Comprehensive metabolic panel  3/31/2017 5/30/2018    US Abdomen Limited  3/31/2017 3/31/2018      [facility] Sign-Up     Activating your [facility] account is as easy as 1-2-3!     1) Visit [email or web address], select Sign Up Now, enter this activation code and your date of birth, then select Next.  [email or web address]  Expires: 5/13/2017  9:29 AM      2) Create a username and password to use when you visit [facility] in the future and select a security question in case you lose your password and select Next.    3) Enter your e-mail address and click Sign Up!    Additional Information  If you have questions, please e-mail [email or web address] or call [phone] to talk to our [facility] staff. Remember, [facility] is NOT to be used for urgent needs. For medical emergencies, dial 911.         Instructions      Unknown Causes of Abdominal Pain (Female)    The exact cause of your abdominal (stomach) pain is not clear. This does not mean that this is something to worry about. Everyone likes to know the exact cause of the problem, but sometimes with abdominal pain, there is no clear-cut cause, and this could be a good thing. " The good news is that your symptoms can be treated, and you will feel better.   Your condition does not seem serious now; however, sometimes the signs of a serious problem may take more time to appear. For this reason, it is important for you to watch for any new symptoms, problems, or worsening of your condition.  Over the next few days, the abdominal pain may come and go, or be continuous. Other common symptoms can include nausea and vomiting. Sometimes it can be difficult to tell if you feel nauseous, you may just feel bad and not associate that feeling with nausea. Constipation, diarrhea, and a fever may go along with the pain.  The pain may continue even if treated correctly over the following days. Depending on how things go, sometimes the cause can become clear and may require further or different treatment. Additional evaluations, medications, or tests may also be needed.  Home care  Your healthcare provider may prescribe medicine for pain, symptoms, or an infection.  Follow the healthcare provider's instructions for taking these medicines.  General care  · Rest as much as you can until your next exam. No strenuous activities.  · Try to find positions that ease discomfort. A small pillow placed on the abdomen may help relieve pain.  · Something warm on your abdomen (such as a heating pad) may help, but be careful not to burn yourself.  Diet  · Do not force yourself to eat, especially if having cramps, vomiting, or diarrhea.  · Water is important so you do not get dehydrated. Soup may also be good. Sports drinks may also help, especially if they are not too acidic. Make sure you don't drink sugary drinks as this can make things worse. Take liquids in small amounts. Do not guzzle them.  · Caffeine sometimes makes the pain and cramping worse.  · Avoid dairy products if you have vomiting or diarrhea.  · Don't eat large amounts at a time. Wait a few minutes between bites.  · Eat a diet low in fiber (called a  low-residue diet). Foods allowed include refined breads, white rice, fruit and vegetable juices without pulp, tender meats. These foods will pass more easily through the intestine.  · Avoid whole-grain foods, whole fruits and vegetables, meats, seeds and nuts, fried or fatty foods, dairy, alcohol and spicy foods until your symptoms go away.  Follow-up care  Follow up with your healthcare provider, or as advised, if your pain does not begin to improve in the next 24 hours.  Call 911  Call 911 if any of these occur:  · Trouble breathing  · Confusion  · Fainting or loss of consciousness  · Rapid heart rate  · Seizure  When to seek medical advice  Call your healthcare provider right away if any of these occur:  · Pain gets worse or moves to the right lower abdomen  · New or worsening vomiting or diarrhea  · Swelling of the abdomen  · Unable to pass stool for more than 3 days  · Fever of 100.4ºF (38ºC) or higher, or as directed by your healthcare provider.  · Blood in vomit or bowel movements (dark red or black color)  · Jaundice (yellow color of eyes and skin)  · Weakness, dizziness  · Chest, arm, back, neck or jaw pain  · Unexpected vaginal bleeding or missed period  · Can't keep down liquids or water and are getting dehydrated  Date Last Reviewed: 12/30/2015  © 0033-8382 [employer]. [address] [place] [address], [place] [address]. All rights reserved. This information is not intended as a substitute for professional medical care. Always follow your healthcare professional's instructions.        What Is Appendicitis?    Your side may hurt so much that you called your healthcare provider. Or maybe you went straight to the hospital emergency room. If the symptoms came on quickly, you may have appendicitis. This is an infection of the appendix. Surgery can remove the infection and relieve your symptoms. Read on to learn more.  Your appendix  The appendix is a hollow structure about the size of your little finger.  It opens off the colon (large intestine). The purpose of the appendix is unclear. But if it becomes blocked, it may become infected.  Symptoms of appendicitis  Symptoms tend to appear quickly, often over a day or two. Symptoms can include:  · Pain that starts in the center of your belly and moves to your lower right side  · Increased pain and pressure on your side when you walk  · Vomiting, nausea, or decreased appetite  · Fever or fatigue  · Either diarrhea or constipation  How surgery helps  Medicine cant cure appendicitis. Surgery is needed to remove an infected appendix (an appendectomy). This is a very common procedure. Removing the appendix should not affect your long-term health. Its best to remove the appendix before it bursts. If an infected or burst appendix is not removed, it can cause severe health problems.   Date Last Reviewed: 7/1/2016  © 0377-1410 [employer] [address] [place] [address]. All rights reserved. This information is not intended as a substitute for professional medical care. Always follow your healthcare professional's instructions.             Language Assistance Services     ATTENTION: Language assistance services are available, free of charge. Please call [phone].      ATENCIÓN: Si habla español, tiene a nix disposición servicios gratuitos de asistencia lingüística. Llame al [phone].     DONTAE Ý: N?u b?n nói Ti?ng Vi?t, có các d?ch v? h? tr? ngôn ng? mi?n phí dành cho b?n. G?i s? [phone].         [facility] complies with applicable Federal civil rights laws and does not discriminate on the basis of race, color, national origin, age, disability, or sex.

## 2017-03-31 NOTE — TELEPHONE ENCOUNTER
Pt stated that she is still having pain but now it's lower and under her ribs. She stated that she never broke out in rash from shingles. Pt is in lobby to schedule an appointment. Offered appt with [facility] for this afternoon because no morning appts are available. Pt declined and will check in for urgent care.

## 2017-03-31 NOTE — PATIENT INSTRUCTIONS
Unknown Causes of Abdominal Pain (Female)    The exact cause of your abdominal (stomach) pain is not clear. This does not mean that this is something to worry about. Everyone likes to know the exact cause of the problem, but sometimes with abdominal pain, there is no clear-cut cause, and this could be a good thing. The good news is that your symptoms can be treated, and you will feel better.   Your condition does not seem serious now; however, sometimes the signs of a serious problem may take more time to appear. For this reason, it is important for you to watch for any new symptoms, problems, or worsening of your condition.  Over the next few days, the abdominal pain may come and go, or be continuous. Other common symptoms can include nausea and vomiting. Sometimes it can be difficult to tell if you feel nauseous, you may just feel bad and not associate that feeling with nausea. Constipation, diarrhea, and a fever may go along with the pain.  The pain may continue even if treated correctly over the following days. Depending on how things go, sometimes the cause can become clear and may require further or different treatment. Additional evaluations, medications, or tests may also be needed.  Home care  Your healthcare provider may prescribe medicine for pain, symptoms, or an infection.  Follow the healthcare provider's instructions for taking these medicines.  General care  · Rest as much as you can until your next exam. No strenuous activities.  · Try to find positions that ease discomfort. A small pillow placed on the abdomen may help relieve pain.  · Something warm on your abdomen (such as a heating pad) may help, but be careful not to burn yourself.  Diet  · Do not force yourself to eat, especially if having cramps, vomiting, or diarrhea.  · Water is important so you do not get dehydrated. Soup may also be good. Sports drinks may also help, especially if they are not too acidic. Make sure you don't drink  sugary drinks as this can make things worse. Take liquids in small amounts. Do not guzzle them.  · Caffeine sometimes makes the pain and cramping worse.  · Avoid dairy products if you have vomiting or diarrhea.  · Don't eat large amounts at a time. Wait a few minutes between bites.  · Eat a diet low in fiber (called a low-residue diet). Foods allowed include refined breads, white rice, fruit and vegetable juices without pulp, tender meats. These foods will pass more easily through the intestine.  · Avoid whole-grain foods, whole fruits and vegetables, meats, seeds and nuts, fried or fatty foods, dairy, alcohol and spicy foods until your symptoms go away.  Follow-up care  Follow up with your healthcare provider, or as advised, if your pain does not begin to improve in the next 24 hours.  Call 911  Call 911 if any of these occur:  · Trouble breathing  · Confusion  · Fainting or loss of consciousness  · Rapid heart rate  · Seizure  When to seek medical advice  Call your healthcare provider right away if any of these occur:  · Pain gets worse or moves to the right lower abdomen  · New or worsening vomiting or diarrhea  · Swelling of the abdomen  · Unable to pass stool for more than 3 days  · Fever of 100.4ºF (38ºC) or higher, or as directed by your healthcare provider.  · Blood in vomit or bowel movements (dark red or black color)  · Jaundice (yellow color of eyes and skin)  · Weakness, dizziness  · Chest, arm, back, neck or jaw pain  · Unexpected vaginal bleeding or missed period  · Can't keep down liquids or water and are getting dehydrated  Date Last Reviewed: 12/30/2015  © 4127-5852 [employer]. [address] [place] [address], [place] [address]. All rights reserved. This information is not intended as a substitute for professional medical care. Always follow your healthcare professional's instructions.        What Is Appendicitis?    Your side may hurt so much that you called your healthcare provider. Or maybe  you went straight to the hospital emergency room. If the symptoms came on quickly, you may have appendicitis. This is an infection of the appendix. Surgery can remove the infection and relieve your symptoms. Read on to learn more.  Your appendix  The appendix is a hollow structure about the size of your little finger. It opens off the colon (large intestine). The purpose of the appendix is unclear. But if it becomes blocked, it may become infected.  Symptoms of appendicitis  Symptoms tend to appear quickly, often over a day or two. Symptoms can include:  · Pain that starts in the center of your belly and moves to your lower right side  · Increased pain and pressure on your side when you walk  · Vomiting, nausea, or decreased appetite  · Fever or fatigue  · Either diarrhea or constipation  How surgery helps  Medicine cant cure appendicitis. Surgery is needed to remove an infected appendix (an appendectomy). This is a very common procedure. Removing the appendix should not affect your long-term health. Its best to remove the appendix before it bursts. If an infected or burst appendix is not removed, it can cause severe health problems.   Date Last Reviewed: 7/1/2016  © 6048-9670 [employer]. [address] [place] [address]. All rights reserved. This information is not intended as a substitute for professional medical care. Always follow your healthcare professional's instructions.

## 2017-03-31 NOTE — TELEPHONE ENCOUNTER
----- Message from Cat Hui sent at 3/31/2017  8:09 AM CDT -----  states that she hasn't had a shingle breakout yet altho given that reason for pain at appt, pls adv..[phone]

## 2017-03-31 NOTE — PROGRESS NOTES
Subjective:       Patient ID: Clare Whitley is a 56 y.o. female.    Chief Complaint: Flank Pain (right)    HPI Comments: Pt is a 56 year old female to clinic today with complaints of continued, but improving right flank pain that began last Wednesday. States she also experienced multiple episodes of emesis of yellow and brown substances. She was seen by Dr. Stanley and dx with shingles and rx'ed valtrex and ultram for symptoms. States she is here today for second opinion and would like lab work.     Flank Pain   This is a new problem. The current episode started in the past 7 days (wednesday morning). The problem occurs constantly. The problem is unchanged. Pain location: right flank. The quality of the pain is described as aching. The pain does not radiate. The pain is at a severity of 8/10. The pain is moderate. The pain is the same all the time. Associated symptoms include abdominal pain. Pertinent negatives include no bladder incontinence, bowel incontinence, chest pain, dysuria, fever, headaches, leg pain, numbness, paresis, paresthesias, pelvic pain, tingling, weakness or weight loss. Treatments tried: currently on valtrex and ultram for shingles per Dr. Stanley. The treatment provided mild relief.     Review of Systems   Constitutional: Negative for chills, diaphoresis, fatigue, fever and weight loss.   HENT: Negative for congestion and sore throat.    Eyes: Negative for pain.   Respiratory: Negative for cough, chest tightness, shortness of breath and wheezing.    Cardiovascular: Negative for chest pain and palpitations.   Gastrointestinal: Positive for abdominal pain, nausea and vomiting. Negative for abdominal distention, bowel incontinence, constipation and diarrhea.   Genitourinary: Positive for flank pain. Negative for bladder incontinence, dysuria and pelvic pain.   Musculoskeletal: Negative for back pain, myalgias and neck pain.   Skin: Negative for rash.   Neurological: Negative for dizziness,  tingling, weakness, light-headedness, numbness, headaches and paresthesias.       Objective:      Physical Exam   Constitutional: She is oriented to person, place, and time. She appears well-developed and well-nourished. No distress.   HENT:   Head: Normocephalic.   Right Ear: External ear normal.   Left Ear: External ear normal.   Nose: Nose normal.   Eyes: Pupils are equal, round, and reactive to light.   Cardiovascular: Normal rate, regular rhythm and normal heart sounds.  Exam reveals no gallop and no friction rub.    No murmur heard.  Pulmonary/Chest: Effort normal and breath sounds normal. No respiratory distress. She has no wheezes. She has no rales.   Abdominal: Soft. Bowel sounds are normal. She exhibits no distension and no mass. There is tenderness in the right upper quadrant. There is tenderness at McBurney's point. There is no rigidity, no rebound, no guarding and negative Currie's sign.   Musculoskeletal: Normal range of motion.   Neurological: She is alert and oriented to person, place, and time.   Skin: Skin is warm. No rash noted. She is not diaphoretic.   Psychiatric: She has a normal mood and affect. Her speech is normal and behavior is normal.   Nursing note and vitals reviewed.      Negative obturator and psoas signs.    Assessment:       1. Acute cholecystitis    2. Right upper quadrant abdominal pain        Plan:   Acute cholecystitis  -     Ambulatory referral to General Surgery  -     amoxicillin-clavulanate 875-125mg (AUGMENTIN) 875-125 mg per tablet; Take 1 tablet by mouth 2 (two) times daily.  Dispense: 20 tablet; Refill: 0    Right upper quadrant abdominal pain  -     US Abdomen Limited; Future; Expected date: 3/31/17  -     CBC W/ AUTO DIFFERENTIAL; Future; Expected date: 3/31/17  -     Comprehensive metabolic panel; Future; Expected date: 3/31/17      Recommend abd US to rule out appendix or gallbladder related pathology.  Recommend continue valtrex as symptoms were improving while on  medication.  Will notify of abnormal lab and test results.     Follow prescribed treatment plan as directed.  Stay hydrated and rest.  Report to ER if symptoms worsen.  Follow up with PCP in 2-3 days or sooner if symptoms do not improve.      Spoke with radiologist about US results.   Radiologist to hold pt in office until call back.  Spoke with Dr. Stanley about US findings and he advised putting pt on augmentin and having her F/U with general sx on Monday is pain controlled and stable. If pain uncontrolled, advised pt to go to ER.  Spoke with radiologist and explained to him to have pt give office a call to discuss tx plan.  Called and left message on pt cell phone at approximately 1350. Awaiting call back.   1415- called and spoke with pt. Explained use of augmentin and general sx consult as long as pain was under control and pt was not febrile or unable to tolerate symptoms, or for pt to go to ED for further eval and management.   Pt states she was not in a lot of pain and she would like to try the augmentin and appt was made with general sx for Monday at 0930. Pt states she would take abx and go to appt.  Encouraged pt to go to ED for any worsening sx or if unable to tolerate pain.   Pt understood and agreed.

## 2017-04-03 ENCOUNTER — OFFICE VISIT (OUTPATIENT)
Dept: SURGERY | Facility: CLINIC | Age: 57
DRG: 416 | End: 2017-04-03
Payer: COMMERCIAL

## 2017-04-03 ENCOUNTER — CLINICAL SUPPORT (OUTPATIENT)
Dept: CARDIOLOGY | Facility: CLINIC | Age: 57
DRG: 416 | End: 2017-04-03
Payer: COMMERCIAL

## 2017-04-03 ENCOUNTER — ANESTHESIA EVENT (OUTPATIENT)
Dept: SURGERY | Facility: HOSPITAL | Age: 57
DRG: 416 | End: 2017-04-03
Payer: COMMERCIAL

## 2017-04-03 VITALS
TEMPERATURE: 99 F | BODY MASS INDEX: 30.77 KG/M2 | DIASTOLIC BLOOD PRESSURE: 78 MMHG | HEART RATE: 100 BPM | WEIGHT: 170.44 LBS | SYSTOLIC BLOOD PRESSURE: 125 MMHG

## 2017-04-03 DIAGNOSIS — K80.00 CALCULUS OF GALLBLADDER WITH ACUTE CHOLECYSTITIS WITHOUT OBSTRUCTION: ICD-10-CM

## 2017-04-03 DIAGNOSIS — R10.11 ABDOMINAL PAIN, RIGHT UPPER QUADRANT: ICD-10-CM

## 2017-04-03 DIAGNOSIS — K80.00 CALCULUS OF GALLBLADDER WITH ACUTE CHOLECYSTITIS WITHOUT OBSTRUCTION: Primary | ICD-10-CM

## 2017-04-03 PROCEDURE — 93000 ELECTROCARDIOGRAM COMPLETE: CPT | Mod: S$GLB,,, | Performed by: NUCLEAR MEDICINE

## 2017-04-03 PROCEDURE — 99999 PR PBB SHADOW E&M-EST. PATIENT-LVL IV: CPT | Mod: PBBFAC,,, | Performed by: NURSE PRACTITIONER

## 2017-04-03 PROCEDURE — 99204 OFFICE O/P NEW MOD 45 MIN: CPT | Mod: 57,S$GLB,, | Performed by: NURSE PRACTITIONER

## 2017-04-03 NOTE — PRE ADMISSION SCREENING
Pre op instructions reviewed with patient per phone:    To confirm, Your surgeon has instructed you:  Surgery is scheduled 04/04/17 at 1300.      Please report to [facility] main lobby by 1130.      INSTRUCTIONS IMPORTANT!!!  ¨ Do not eat, drink, or smoke after 12 midnight, may have water and clear juice 3h prior to sx  . OK to brush teeth, no gum, candy or mints!    ¨ Take only these medicines with a small swallow of water-morning of surgery.  N/A    ____  Do not wear makeup, including mascara.  ____  No powder, lotions or creams to surgical area.  ____  Please remove all jewelry, including piercings and leave at home.  ____  No money or valuables needed. Please leave at home.  ____  Please bring identification and insurance information to hospital.  ____  If going home the same day, arrange for a ride home. You will not be able to   drive if Anesthesia was used.  ____  Children, under 12 years old, must remain in the waiting room with an adult.  They are not allowed in patient areas.  ____  Wear loose fitting clothing. Allow for dressings, bandages.  ____  Stop Aspirin, Ibuprofen, Motrin and Aleve at least 5-7 days before surgery, unless otherwise instructed by your doctor, or the nurse.   You MAY use Tylenol/acetaminophen until day of surgery.  ____  If you take diabetic medication, do not take am of surgery unless instructed by   Doctor.  ____ Stop taking any Fish Oil supplement or any Vitamins that contain Vitamin E at least 5 days prior to surgery.          Bathing Instructions-- The night before surgery and the morning prior to coming to the hospital:   -Do not shave the surgical area.   -Shower and wash your hair and body as usual with anti-bacterial  soap and shampoo.   -Rinse your hair and body completely.   -Use one packet of hibiclens to wash the surgical site (using your hand) gently for 5 minutes.  Do not scrub you skin too hard.   -Do not use hibiclens on your head,  face, or genitals.   -Do not wash with anti-bacterial soap after you use the hibiclens.   -Rinse your body thoroughly.   -Dry with clean, soft towel.  Do not use lotion, cream, deodorant, or powders on   the surgical site.    Use antibacterial soap in place of hibiclens if your surgery is on the head, face or genitals.         Surgical Site Infection    Prevention of surgical site infections:     -Keep incisions clean and dry.   -Do not soak/submerge incisions in water until completely healed.   -Do not apply lotions, powders, creams, or deodorants to site.   -Always make sure hands are cleaned with antibacterial soap/ alcohol-based   prior to touching the surgical site.  (This includes doctors, nurses, staff, and yourself.)    Signs and symptoms:   -Redness and pain around the area where you had surgery   -Drainage of cloudy fluid from your surgical wound   -Fever over 100.4  I have read or had read and explained to me, and understand the above information.

## 2017-04-03 NOTE — LETTER
April 3, 2017      Linwood Greco, MARIA DEL ROSARIO  [address]  [facility] [address]           [facility] Surgery  [address] [place] [address]  Phone: [phone]  Fax: [phone]          Patient: Clare Whitley   MR Number: [id number]   YOB: 1960   Date of Visit: 4/3/2017       Dear Linwood Greco:    Thank you for referring Clare Whitley to me for evaluation. Attached you will find relevant portions of my assessment and plan of care.    If you have questions, please do not hesitate to call me. I look forward to following Clare Whitley along with you.    Sincerely,    Baudilio Garcia MD    Enclosure  CC:  No Recipients    If you would like to receive this communication electronically, please contact [email or web address] or [phone] to request more information on [employer] access.    For providers and/or their staff who would like to refer a patient to [facility], please contact us through our one-stop-shop provider referral line, Heike Hudson, at [phone].    If you feel you have received this communication in error or would no longer like to receive these types of communications, please e-mail [email or web address]

## 2017-04-03 NOTE — PATIENT INSTRUCTIONS
Discharge Instructions for Gallstones  Gallstones form when liquid stored in the gallbladder hardens into pieces of stone-like material. Stones in the gallbladder may or may not cause symptoms. They can cause pain or infection. You and your healthcare provider will decide on the best treatment for you. Here's what you can do.  Home care  These home care steps can help you after being diagnosed with gallstones:  · Eat a low-fat diet.  ¨ Read food labels to be sure the foods you are choosing are low in fat.  ¨ Limit the use of high-fat meats, dairy products, animal fats, and vegetable oils.  · Keep all appointments with your healthcare provider. Your healthcare provider needs to monitor your condition.  · Discuss your treatment choices with your healthcare provider, including:  ¨ Surgery to remove the gallbladder and gallstones  ¨ Medicine to dissolve the stones. This is mainly for people who cannot have surgery. Take your medicines exactly as directed. Don't skip doses. Remember, it takes time for the medicine to take effect. Unfortunately, once the medicine is stopped, the gallstones usually come back.   ¨ ERCP (endoscopic retrograde cholangiopancreatography). A healthcare provider uses a thin tube with video and X-rays to locate stones and remove them from the common bile duct.  Follow-up care  Make a follow-up appointment as directed by our staff.     When to call your healthcare provider  Call your healthcare provider right away if you have any of the following:  · Severe pain in the upper belly, shoulder, or back  · Fever of 100.4°F (38°C) or higher, or as directed by your healthcare provider  · Nausea or vomiting  · Yellowing of your skin or eyes (jaundice)   Date Last Reviewed: 7/1/2016 © 2000-2016 [employer]. [address] [place] [address]. All rights reserved. This information is not intended as a substitute for professional medical care. Always follow your healthcare  professional's instructions.

## 2017-04-03 NOTE — MR AVS SNAPSHOT
[facility] General Surgery  [address] [facility] [address]  Phone: [phone]  Fax: [phone]                  Clare Whitley   4/3/2017 9:30 AM   Office Visit    Description:  Female : 1960   Provider:  Alia Yin NP   Department:  [facility] - General Surgery           Reason for Visit     Cholelithiasis           Diagnoses this Visit        Comments    Calculus of gallbladder with acute cholecystitis without obstruction    -  Primary     Abdominal pain, right upper quadrant                To Do List           Future Appointments        Provider Department Dept Phone    4/3/2017 11:15 AM EKG, [facility] CARDIO [facility] Special Cardiology [phone]    4/3/2017 2:30 PM LAB, SAME DAY [facility] [phone]    2017 9:40 AM Baudilio Garcia MD [facility] General Surgery [phone]    2017 7:40 AM Chhaya Jennings MD [facility] - Internal Medicine [phone]      Your Future Surgeries/Procedures     2017   Surgery with Baudilio Garcia MD   [facility]  ([facility])    [address] [facility] [address] [phone]              Goals (5 Years of Data)     None      [facility]     [facility] Nurse Care Line - 24/ Assistance  Unless otherwise directed by your provider, please contact [facility], our nurse care line that is available for  assistance.     Registered nurses in the [facility] provide: appointment scheduling, clinical advisement, health education, and other advisory services.  Call: [phone] (toll free)               Medications           Message regarding Medications     Verify the changes and/or additions to your medication regime listed below are the same as discussed with your clinician today.  If any of these changes or additions are incorrect, please notify your healthcare provider.        STOP taking these medications      valacyclovir (VALTREX) 1000 MG tablet Take 1 tablet (1,000 mg total) by mouth 3 (three) times daily.           Verify that the below list of medications is an accurate representation of the medications you are currently taking.  If none reported, the list may be blank. If incorrect, please contact your healthcare provider. Carry this list with you in case of emergency.           Current Medications     amoxicillin-clavulanate 875-125mg (AUGMENTIN) 875-125 mg per tablet Take 1 tablet by mouth 2 (two) times daily.    atenolol-chlorthalidone (TENORETIC) 100-25 mg per tablet Take 1 tablet by mouth once daily. 1 Tablet Oral Every day    spironolactone (ALDACTONE) 50 MG tablet Take 1 tablet (50 mg total) by mouth once daily.    tramadol (ULTRAM) 50 mg tablet Take 1 tablet (50 mg total) by mouth 3 (three) times daily as needed for Pain.           Clinical Reference Information           Your Vitals Were     BP Pulse Temp Weight BMI    125/78 100 99 °F (37.2 °C) (Oral) 77.3 kg (170 lb 6.7 oz) 30.77 kg/m2      Blood Pressure          Most Recent Value    BP  125/78      Allergies as of 4/3/2017     No Known Drug Allergies      Immunizations Administered on Date of Encounter - 4/3/2017     None      Orders Placed During Today's Visit      Normal Orders This Visit    Case Request Operating Room: CHOLECYSTECTOMY-LAPAROSCOPIC     Future Labs/Procedures Expected by Expires    CBC auto differential  4/3/2017 6/2/2018    Comprehensive metabolic panel  4/3/2017 6/2/2018    X-Ray Chest PA And Lateral  4/3/2017 4/3/2018    ECG 12 lead  As directed 4/3/2018      [facility] Sign-Up     Activating your [facility] account is as easy as 1-2-3!     1) Visit [email or web address], select Sign Up Now, enter this activation code and your date of birth, then select Next.  [email or web address]  Expires: 5/13/2017  9:29 AM      2) Create a username and password to use when you visit [facility] in the future and select a security question in case you lose your  password and select Next.    3) Enter your e-mail address and click Sign Up!    Additional Information  If you have questions, please e-mail [email or web address] or call [phone] to talk to our [facility] staff. Remember, [facility] is NOT to be used for urgent needs. For medical emergencies, dial 911.         Instructions      Discharge Instructions for Gallstones  Gallstones form when liquid stored in the gallbladder hardens into pieces of stone-like material. Stones in the gallbladder may or may not cause symptoms. They can cause pain or infection. You and your healthcare provider will decide on the best treatment for you. Here's what you can do.  Home care  These home care steps can help you after being diagnosed with gallstones:  · Eat a low-fat diet.  ¨ Read food labels to be sure the foods you are choosing are low in fat.  ¨ Limit the use of high-fat meats, dairy products, animal fats, and vegetable oils.  · Keep all appointments with your healthcare provider. Your healthcare provider needs to monitor your condition.  · Discuss your treatment choices with your healthcare provider, including:  ¨ Surgery to remove the gallbladder and gallstones  ¨ Medicine to dissolve the stones. This is mainly for people who cannot have surgery. Take your medicines exactly as directed. Don't skip doses. Remember, it takes time for the medicine to take effect. Unfortunately, once the medicine is stopped, the gallstones usually come back.   ¨ ERCP (endoscopic retrograde cholangiopancreatography). A healthcare provider uses a thin tube with video and X-rays to locate stones and remove them from the common bile duct.  Follow-up care  Make a follow-up appointment as directed by our staff.     When to call your healthcare provider  Call your healthcare provider right away if you have any of the following:  · Severe pain in the upper belly, shoulder, or back  · Fever of 100.4°F (38°C) or higher, or as directed by your healthcare  provider  · Nausea or vomiting  · Yellowing of your skin or eyes (jaundice)   Date Last Reviewed: 7/1/2016 © 2000-2016 [employer]. [address] [place] [address], [place] [address]. All rights reserved. This information is not intended as a substitute for professional medical care. Always follow your healthcare professional's instructions.             Language Assistance Services     ATTENTION: Language assistance services are available, free of charge. Please call [phone].      ATENCIÓN: Si habla michelleañol, tiene a nix disposición servicios gratuitos de asistencia lingüística. Llame al [phone].     CHÚ Ý: N?u b?n nói Ti?ng Vi?t, có các d?ch v? h? tr? ngôn ng? mi?n phí dành cho b?n. G?i s? [phone].         [facility] complies with applicable Federal civil rights laws and does not discriminate on the basis of race, color, national origin, age, disability, or sex.

## 2017-04-03 NOTE — PROGRESS NOTES
Patient ID: Clare Whitley is a 56 y.o. female.    Chief Complaint: Cholelithiasis    HPI: Patient presents for the evaluation of right upper quadrant abdominal pain and an abnormal ultrasound of the gallbladder.    Review of Systems   Constitutional: Positive for activity change, appetite change and unexpected weight change. Negative for chills, diaphoresis, fatigue and fever.   HENT: Negative.    Eyes: Negative.    Respiratory: Negative.    Cardiovascular: Negative.    Gastrointestinal: Positive for abdominal distention, abdominal pain, diarrhea, nausea and vomiting. Negative for anal bleeding, blood in stool, constipation and rectal pain.        Patient relates onset of right upper quadrant abdominal pain 5 days ago after eating ribs the night before. Rated the pain as a 10 and it radiated to the right upper back. Had bloating and dyspepsia, nausea and vomiting that day. Evaluated and told had shingles. No rash. Pain persisted intermittently RUQ. 8-10 range. Exacerbated by eating. Went back to clinic and had ultrasound that verified gallstones. Ate fried chicken last night and states has been up all night with RUQ pain and nausea. Has had nothing to eat today. No fever. Loose bowels intermittently.    Endocrine: Negative.    Genitourinary: Positive for flank pain and hematuria. Negative for decreased urine volume, difficulty urinating, dysuria, frequency, pelvic pain, urgency, vaginal bleeding, vaginal discharge and vaginal pain.   Musculoskeletal: Positive for back pain. Negative for arthralgias, gait problem, joint swelling, myalgias, neck pain and neck stiffness.   Skin: Negative.    Allergic/Immunologic: Negative.    Neurological: Negative.    Hematological: Negative.    Psychiatric/Behavioral: Negative.        Current Outpatient Prescriptions   Medication Sig Dispense Refill    amoxicillin-clavulanate 875-125mg (AUGMENTIN) 875-125 mg per tablet Take 1 tablet by mouth 2 (two) times daily. 20 tablet 0     "atenolol-chlorthalidone (TENORETIC) 100-25 mg per tablet Take 1 tablet by mouth once daily. 1 Tablet Oral Every day 90 tablet 3    spironolactone (ALDACTONE) 50 MG tablet Take 1 tablet (50 mg total) by mouth once daily. 90 tablet 3    tramadol (ULTRAM) 50 mg tablet Take 1 tablet (50 mg total) by mouth 3 (three) times daily as needed for Pain. 30 tablet 0     No current facility-administered medications for this visit.        Review of patient's allergies indicates:   Allergen Reactions    No known drug allergies        Past Medical History:   Diagnosis Date    Hypertension     Mitral regurgitation     "mild"; followed by Dr. Blackmon ([facility])    MVP (mitral valve prolapse)     "trivial"; followed by Dr. Blackmon ([facility])       Past Surgical History:   Procedure Laterality Date    LYMPH NODE BIOPSY Left     left neck     UTERINE FIBROID EMBOLIZATION  01/01/2010       Family History   Problem Relation Age of Onset    Hypertension Mother        Social History     Social History    Marital status:      Spouse name: Johnnie Whitley    Number of children: 3    Years of education: N/A     Occupational History    Rehab counselor associate [employer]     Works with disabled people     Social History Main Topics    Smoking status: Passive Smoke Exposure - Never Smoker    Smokeless tobacco: Never Used    Alcohol use No    Drug use: No    Sexual activity: Yes     Partners: Male     Birth control/ protection: None, Post-menopausal     Other Topics Concern    Not on file     Social History Narrative       Vitals:    04/03/17 0937   BP: 125/78   Pulse: 100   Temp: 99 °F (37.2 °C)       Physical Exam   Constitutional: She is oriented to person, place, and time. She appears well-developed and well-nourished.   Patient sitting on her left side with right side elevated in chair.    HENT:   Head: Normocephalic and atraumatic.   Right Ear: External ear normal.   Left Ear: External ear normal.   Eyes: Conjunctivae and EOM " are normal. Pupils are equal, round, and reactive to light. Right eye exhibits no discharge. Left eye exhibits no discharge. No scleral icterus.   Neck: Normal range of motion. Neck supple. No thyromegaly present.   Cardiovascular: Normal rate and regular rhythm.    Pulmonary/Chest: Effort normal and breath sounds normal.   Abdominal: Soft. She exhibits no mass. There is tenderness (right upper quadrant tenderness to palpation). There is guarding and positive Currie's sign. There is no rebound. No hernia.   Musculoskeletal: Normal range of motion. She exhibits no edema.   Lymphadenopathy:     She has no cervical adenopathy.   Neurological: She is alert and oriented to person, place, and time.   Skin: Skin is warm and dry. No rash noted. No erythema.   Psychiatric: She has a normal mood and affect. Her behavior is normal. Judgment and thought content normal.   Nursing note and vitals reviewed.    IMAGING: 3/31/17:  CLINICAL HISTORY:  rule out appendicitis and gall stones    FINDINGS: The liver demonstrates normal echotexture with no focal abnormality.  It measures 16.3 cm in length. The gallbladder contains shadowing stones near the neck.  The largest of these measures 2.5 x 1.6 x 1.7 cm.  There is pericholecystic fluid.  The gallbladder is distended.  Sonographic Currie's sign is positive.  The gallbladder wall thickness is 4.3 mm. The common bile duct is normal at 5.6 mm. The portal vein shows a normal waveform. The visualized portions of the pancreas are not unusual in appearance. The right kidney is normal at 9.1 cm. Focused images within the right lower quadrant demonstrate no findings to suggest acute appendicitis.   Impression    There are findings concerning for acute cholecystitis.    Encounter           CBC - 3/31/17- unremarkable, chemistries revealed mildly elevated bilirubin- 1.1 and total protein elevated.    Assessment & Plan:  1. Right upper abdominal pain X 5 days that has been intermittent but  persistent with nausea and intermittent emesis.  2. Abnormal gallbladder ultrasound- acute cholecystitis, largest gallstone 2.5 cm, Positive Currie's sign, pericholecystic fluid and gallbladder distention.   3. Explained imaging and clinical exam findings to pt using the krames lap mary book. Dr. Garcia explained again to pt.  4. Recommend laparoscopic cholecystectomy that may be an open cholecystectomy due to the potential for infection of the gallbladder per Dr. Garcia. 50% chance open vs laparoscopic  5. Explained risks vs benefits of the surgery using the pamphlet. infection, bleeding, bowel injury, common bile duct injury, diarrhea postop, persistent symptoms, hernia at trocar sites, blood clots in legs.   6. Explained pre, jie, and postop instructions. Discussed can shower 48 hours after surgery. Do not submerge wounds for at least 4-6 weeks after surgery. No heavy lifting or straining for 6 weeks after surgery. Encouraged pt to ambulate frequently and stay hydrated to avoid blood clots. Low fat diet for 2 weeks postop.   7. Consent reviewed and explained by Dr. Garcia. Pt verbalized understanding and was signed by pt after all questions answered. Scheduled for lap mary tomorrow.  8. EKG today and repeat cbc and chemistries. Contact information given incase of questions before tomorrow.

## 2017-04-04 ENCOUNTER — HOSPITAL ENCOUNTER (INPATIENT)
Facility: HOSPITAL | Age: 57
LOS: 4 days | Discharge: HOME | DRG: 416 | End: 2017-04-08
Attending: SURGERY | Admitting: SURGERY
Payer: COMMERCIAL

## 2017-04-04 ENCOUNTER — SURGERY (OUTPATIENT)
Age: 57
End: 2017-04-04

## 2017-04-04 ENCOUNTER — ANESTHESIA (OUTPATIENT)
Dept: SURGERY | Facility: HOSPITAL | Age: 57
DRG: 416 | End: 2017-04-04
Payer: COMMERCIAL

## 2017-04-04 DIAGNOSIS — K80.00 CALCULUS OF GALLBLADDER WITH ACUTE CHOLECYSTITIS WITHOUT OBSTRUCTION: ICD-10-CM

## 2017-04-04 PROCEDURE — 71000039 HC RECOVERY, EACH ADD'L HOUR: Performed by: SURGERY

## 2017-04-04 PROCEDURE — 47600 CHOLECYSTECTOMY: CPT | Mod: ,,, | Performed by: SURGERY

## 2017-04-04 PROCEDURE — 25000003 PHARM REV CODE 250: Performed by: SURGERY

## 2017-04-04 PROCEDURE — 25000003 PHARM REV CODE 250: Performed by: NURSE ANESTHETIST, CERTIFIED REGISTERED

## 2017-04-04 PROCEDURE — 27201423 OPTIME MED/SURG SUP & DEVICES STERILE SUPPLY: Performed by: SURGERY

## 2017-04-04 PROCEDURE — 37000008 HC ANESTHESIA 1ST 15 MINUTES: Performed by: SURGERY

## 2017-04-04 PROCEDURE — 63600175 PHARM REV CODE 636 W HCPCS: Performed by: ANESTHESIOLOGY

## 2017-04-04 PROCEDURE — 94799 UNLISTED PULMONARY SVC/PX: CPT

## 2017-04-04 PROCEDURE — 47600 CHOLECYSTECTOMY: CPT | Mod: 80,,, | Performed by: SURGERY

## 2017-04-04 PROCEDURE — 63600175 PHARM REV CODE 636 W HCPCS: Performed by: SURGERY

## 2017-04-04 PROCEDURE — 99900035 HC TECH TIME PER 15 MIN (STAT)

## 2017-04-04 PROCEDURE — 63600175 PHARM REV CODE 636 W HCPCS: Performed by: NURSE ANESTHETIST, CERTIFIED REGISTERED

## 2017-04-04 PROCEDURE — 0FT40ZZ RESECTION OF GALLBLADDER, OPEN APPROACH: ICD-10-PCS | Performed by: SURGERY

## 2017-04-04 PROCEDURE — 36000707: Performed by: SURGERY

## 2017-04-04 PROCEDURE — 94761 N-INVAS EAR/PLS OXIMETRY MLT: CPT

## 2017-04-04 PROCEDURE — 88304 TISSUE EXAM BY PATHOLOGIST: CPT | Mod: 26,,, | Performed by: PATHOLOGY

## 2017-04-04 PROCEDURE — 0FJ44ZZ INSPECTION OF GALLBLADDER, PERCUTANEOUS ENDOSCOPIC APPROACH: ICD-10-PCS | Performed by: SURGERY

## 2017-04-04 PROCEDURE — 94770 HC EXHALED C02 TEST: CPT

## 2017-04-04 PROCEDURE — 37000009 HC ANESTHESIA EA ADD 15 MINS: Performed by: SURGERY

## 2017-04-04 PROCEDURE — 36000708 HC OR TIME LEV III 1ST 15 MIN: Performed by: SURGERY

## 2017-04-04 PROCEDURE — 36000709 HC OR TIME LEV III EA ADD 15 MIN: Performed by: SURGERY

## 2017-04-04 PROCEDURE — 88304 TISSUE EXAM BY PATHOLOGIST: CPT | Performed by: PATHOLOGY

## 2017-04-04 PROCEDURE — 36000706: Performed by: SURGERY

## 2017-04-04 PROCEDURE — 71000033 HC RECOVERY, INTIAL HOUR: Performed by: SURGERY

## 2017-04-04 PROCEDURE — 25000003 PHARM REV CODE 250: Performed by: ANESTHESIOLOGY

## 2017-04-04 PROCEDURE — 27000221 HC OXYGEN, UP TO 24 HOURS

## 2017-04-04 PROCEDURE — 11000001 HC ACUTE MED/SURG PRIVATE ROOM

## 2017-04-04 RX ORDER — SODIUM CHLORIDE, SODIUM LACTATE, POTASSIUM CHLORIDE, CALCIUM CHLORIDE 600; 310; 30; 20 MG/100ML; MG/100ML; MG/100ML; MG/100ML
INJECTION, SOLUTION INTRAVENOUS CONTINUOUS
Status: DISCONTINUED | OUTPATIENT
Start: 2017-04-04 | End: 2017-04-04

## 2017-04-04 RX ORDER — GLYCOPYRROLATE 0.2 MG/ML
INJECTION INTRAMUSCULAR; INTRAVENOUS
Status: DISCONTINUED | OUTPATIENT
Start: 2017-04-04 | End: 2017-04-04

## 2017-04-04 RX ORDER — MEPERIDINE HYDROCHLORIDE 50 MG/ML
12.5 INJECTION INTRAMUSCULAR; INTRAVENOUS; SUBCUTANEOUS ONCE AS NEEDED
Status: DISCONTINUED | OUTPATIENT
Start: 2017-04-04 | End: 2017-04-04 | Stop reason: HOSPADM

## 2017-04-04 RX ORDER — HYDROMORPHONE HCL IN 0.9% NACL 6 MG/30 ML
PATIENT CONTROLLED ANALGESIA SYRINGE INTRAVENOUS CONTINUOUS
Status: DISCONTINUED | OUTPATIENT
Start: 2017-04-04 | End: 2017-04-06

## 2017-04-04 RX ORDER — LIDOCAINE HCL/PF 100 MG/5ML
SYRINGE (ML) INTRAVENOUS
Status: DISCONTINUED | OUTPATIENT
Start: 2017-04-04 | End: 2017-04-04

## 2017-04-04 RX ORDER — PROPOFOL 10 MG/ML
VIAL (ML) INTRAVENOUS
Status: DISCONTINUED | OUTPATIENT
Start: 2017-04-04 | End: 2017-04-04

## 2017-04-04 RX ORDER — FENTANYL CITRATE 50 UG/ML
INJECTION, SOLUTION INTRAMUSCULAR; INTRAVENOUS
Status: DISCONTINUED | OUTPATIENT
Start: 2017-04-04 | End: 2017-04-04

## 2017-04-04 RX ORDER — ROCURONIUM BROMIDE 10 MG/ML
INJECTION, SOLUTION INTRAVENOUS
Status: DISCONTINUED | OUTPATIENT
Start: 2017-04-04 | End: 2017-04-04

## 2017-04-04 RX ORDER — CLONIDINE HYDROCHLORIDE 0.1 MG/1
0.1 TABLET ORAL EVERY 6 HOURS PRN
Status: DISCONTINUED | OUTPATIENT
Start: 2017-04-04 | End: 2017-04-08 | Stop reason: HOSPADM

## 2017-04-04 RX ORDER — ONDANSETRON 2 MG/ML
INJECTION INTRAMUSCULAR; INTRAVENOUS
Status: DISCONTINUED | OUTPATIENT
Start: 2017-04-04 | End: 2017-04-04

## 2017-04-04 RX ORDER — OXYCODONE HYDROCHLORIDE 5 MG/1
5 TABLET ORAL
Status: DISCONTINUED | OUTPATIENT
Start: 2017-04-04 | End: 2017-04-04 | Stop reason: HOSPADM

## 2017-04-04 RX ORDER — DIPHENHYDRAMINE HYDROCHLORIDE 50 MG/ML
25 INJECTION INTRAMUSCULAR; INTRAVENOUS EVERY 4 HOURS PRN
Status: DISCONTINUED | OUTPATIENT
Start: 2017-04-04 | End: 2017-04-08 | Stop reason: HOSPADM

## 2017-04-04 RX ORDER — NALOXONE HCL 0.4 MG/ML
0.02 VIAL (ML) INJECTION
Status: DISCONTINUED | OUTPATIENT
Start: 2017-04-04 | End: 2017-04-06

## 2017-04-04 RX ORDER — ONDANSETRON 8 MG/1
8 TABLET, ORALLY DISINTEGRATING ORAL EVERY 8 HOURS PRN
Status: DISCONTINUED | OUTPATIENT
Start: 2017-04-04 | End: 2017-04-08 | Stop reason: HOSPADM

## 2017-04-04 RX ORDER — MORPHINE SULFATE 10 MG/ML
2 INJECTION INTRAMUSCULAR; INTRAVENOUS; SUBCUTANEOUS EVERY 5 MIN PRN
Status: DISCONTINUED | OUTPATIENT
Start: 2017-04-04 | End: 2017-04-04 | Stop reason: HOSPADM

## 2017-04-04 RX ORDER — PHENYLEPHRINE HYDROCHLORIDE 10 MG/ML
INJECTION INTRAVENOUS
Status: DISCONTINUED | OUTPATIENT
Start: 2017-04-04 | End: 2017-04-04

## 2017-04-04 RX ORDER — SODIUM CHLORIDE 9 MG/ML
3 INJECTION, SOLUTION INTRAMUSCULAR; INTRAVENOUS; SUBCUTANEOUS
Status: DISCONTINUED | OUTPATIENT
Start: 2017-04-04 | End: 2017-04-04 | Stop reason: HOSPADM

## 2017-04-04 RX ORDER — METOPROLOL TARTRATE 50 MG/1
50 TABLET ORAL 2 TIMES DAILY
Status: DISCONTINUED | OUTPATIENT
Start: 2017-04-04 | End: 2017-04-08 | Stop reason: HOSPADM

## 2017-04-04 RX ORDER — DEXAMETHASONE SODIUM PHOSPHATE 4 MG/ML
INJECTION, SOLUTION INTRA-ARTICULAR; INTRALESIONAL; INTRAMUSCULAR; INTRAVENOUS; SOFT TISSUE
Status: DISCONTINUED | OUTPATIENT
Start: 2017-04-04 | End: 2017-04-04

## 2017-04-04 RX ORDER — NEOSTIGMINE METHYLSULFATE 1 MG/ML
INJECTION, SOLUTION INTRAVENOUS
Status: DISCONTINUED | OUTPATIENT
Start: 2017-04-04 | End: 2017-04-04

## 2017-04-04 RX ORDER — ACETAMINOPHEN 10 MG/ML
1000 INJECTION, SOLUTION INTRAVENOUS EVERY 8 HOURS
Status: COMPLETED | OUTPATIENT
Start: 2017-04-04 | End: 2017-04-05

## 2017-04-04 RX ORDER — CHLORTHALIDONE 25 MG/1
25 TABLET ORAL DAILY
Status: DISCONTINUED | OUTPATIENT
Start: 2017-04-04 | End: 2017-04-08 | Stop reason: HOSPADM

## 2017-04-04 RX ORDER — SPIRONOLACTONE 25 MG/1
50 TABLET ORAL DAILY
Status: DISCONTINUED | OUTPATIENT
Start: 2017-04-05 | End: 2017-04-08 | Stop reason: HOSPADM

## 2017-04-04 RX ORDER — BUPIVACAINE HYDROCHLORIDE 2.5 MG/ML
INJECTION, SOLUTION INFILTRATION; PERINEURAL
Status: DISCONTINUED | OUTPATIENT
Start: 2017-04-04 | End: 2017-04-04 | Stop reason: HOSPADM

## 2017-04-04 RX ORDER — MIDAZOLAM HYDROCHLORIDE 1 MG/ML
INJECTION, SOLUTION INTRAMUSCULAR; INTRAVENOUS
Status: DISCONTINUED | OUTPATIENT
Start: 2017-04-04 | End: 2017-04-04

## 2017-04-04 RX ORDER — SODIUM CHLORIDE, SODIUM LACTATE, POTASSIUM CHLORIDE, CALCIUM CHLORIDE 600; 310; 30; 20 MG/100ML; MG/100ML; MG/100ML; MG/100ML
INJECTION, SOLUTION INTRAVENOUS CONTINUOUS
Status: DISCONTINUED | OUTPATIENT
Start: 2017-04-04 | End: 2017-04-08 | Stop reason: HOSPADM

## 2017-04-04 RX ORDER — CEFAZOLIN SODIUM 2 G/50ML
2 SOLUTION INTRAVENOUS
Status: COMPLETED | OUTPATIENT
Start: 2017-04-04 | End: 2017-04-04

## 2017-04-04 RX ORDER — ENOXAPARIN SODIUM 100 MG/ML
40 INJECTION SUBCUTANEOUS
Status: DISCONTINUED | OUTPATIENT
Start: 2017-04-05 | End: 2017-04-08 | Stop reason: HOSPADM

## 2017-04-04 RX ORDER — SUCCINYLCHOLINE CHLORIDE 20 MG/ML
INJECTION INTRAMUSCULAR; INTRAVENOUS
Status: DISCONTINUED | OUTPATIENT
Start: 2017-04-04 | End: 2017-04-04

## 2017-04-04 RX ADMIN — SUCCINYLCHOLINE CHLORIDE 100 MG: 20 INJECTION, SOLUTION INTRAMUSCULAR; INTRAVENOUS at 12:04

## 2017-04-04 RX ADMIN — PHENYLEPHRINE HYDROCHLORIDE 50 MCG: 10 INJECTION INTRAVENOUS at 12:04

## 2017-04-04 RX ADMIN — ROCURONIUM BROMIDE 35 MG: 10 INJECTION, SOLUTION INTRAVENOUS at 12:04

## 2017-04-04 RX ADMIN — BUPIVACAINE 266 MG: 13.3 INJECTION, SUSPENSION, LIPOSOMAL INFILTRATION at 01:04

## 2017-04-04 RX ADMIN — MORPHINE SULFATE 2 MG: 10 INJECTION, SOLUTION INTRAMUSCULAR; INTRAVENOUS at 02:04

## 2017-04-04 RX ADMIN — CEFAZOLIN SODIUM 2 G: 2 SOLUTION INTRAVENOUS at 11:04

## 2017-04-04 RX ADMIN — LIDOCAINE HYDROCHLORIDE 60 MG: 20 INJECTION, SOLUTION INTRAVENOUS at 12:04

## 2017-04-04 RX ADMIN — SODIUM CHLORIDE, SODIUM LACTATE, POTASSIUM CHLORIDE, AND CALCIUM CHLORIDE: .6; .31; .03; .02 INJECTION, SOLUTION INTRAVENOUS at 03:04

## 2017-04-04 RX ADMIN — ACETAMINOPHEN 1000 MG: 10 INJECTION, SOLUTION INTRAVENOUS at 10:04

## 2017-04-04 RX ADMIN — GLYCOPYRROLATE 0.8 MG: 0.2 INJECTION, SOLUTION INTRAMUSCULAR; INTRAVENOUS at 01:04

## 2017-04-04 RX ADMIN — ONDANSETRON 4 MG: 2 INJECTION, SOLUTION INTRAMUSCULAR; INTRAVENOUS at 01:04

## 2017-04-04 RX ADMIN — PROPOFOL 160 MG: 10 INJECTION, EMULSION INTRAVENOUS at 12:04

## 2017-04-04 RX ADMIN — DEXAMETHASONE SODIUM PHOSPHATE 4 MG: 4 INJECTION, SOLUTION INTRA-ARTICULAR; INTRALESIONAL; INTRAMUSCULAR; INTRAVENOUS; SOFT TISSUE at 12:04

## 2017-04-04 RX ADMIN — SODIUM CHLORIDE, SODIUM LACTATE, POTASSIUM CHLORIDE, AND CALCIUM CHLORIDE: 600; 310; 30; 20 INJECTION, SOLUTION INTRAVENOUS at 01:04

## 2017-04-04 RX ADMIN — MIDAZOLAM HYDROCHLORIDE 2 MG: 1 INJECTION, SOLUTION INTRAMUSCULAR; INTRAVENOUS at 11:04

## 2017-04-04 RX ADMIN — ROCURONIUM BROMIDE 5 MG: 10 INJECTION, SOLUTION INTRAVENOUS at 12:04

## 2017-04-04 RX ADMIN — Medication: at 03:04

## 2017-04-04 RX ADMIN — CHLORTHALIDONE 25 MG: 25 TABLET ORAL at 10:04

## 2017-04-04 RX ADMIN — BUPIVACAINE HYDROCHLORIDE 30 ML: 2.5 INJECTION, SOLUTION INFILTRATION; PERINEURAL at 12:04

## 2017-04-04 RX ADMIN — FENTANYL CITRATE 50 MCG: 50 INJECTION, SOLUTION INTRAMUSCULAR; INTRAVENOUS at 01:04

## 2017-04-04 RX ADMIN — METOPROLOL TARTRATE 50 MG: 50 TABLET ORAL at 10:04

## 2017-04-04 RX ADMIN — NEOSTIGMINE METHYLSULFATE 5 MG: 1 INJECTION INTRAVENOUS at 01:04

## 2017-04-04 RX ADMIN — SODIUM CHLORIDE, SODIUM LACTATE, POTASSIUM CHLORIDE, AND CALCIUM CHLORIDE: 600; 310; 30; 20 INJECTION, SOLUTION INTRAVENOUS at 11:04

## 2017-04-04 RX ADMIN — FENTANYL CITRATE 50 MCG: 50 INJECTION, SOLUTION INTRAMUSCULAR; INTRAVENOUS at 12:04

## 2017-04-04 NOTE — INTERVAL H&P NOTE
The patient has been examined and the H&P has been reviewed:    I concur with the findings and no changes have occurred since H&P was written.     Labs reviewed    The risks, benefits and complications of laparoscopic cholecystectomy were discussed.  These included infection, bleeding, abscess and bile leak.  The need for open conversion was dicussed.  The rare possibility of a common bile duct injury and the need for additional procedures and/or surgery was reviewed.  All question were answered.  The patient has agreed to proceed.  Consent was obtained.        Anesthesia/Surgery risks, benefits and alternative options discussed and understood by patient/family.          Active Hospital Problems    Diagnosis  POA    Calculus of gallbladder with acute cholecystitis without obstruction [K80.00]  Yes      Resolved Hospital Problems    Diagnosis Date Resolved POA   No resolved problems to display.

## 2017-04-04 NOTE — PLAN OF CARE
Pt resting on stretcher s/p lap mary turned open performed under general anesthesia by Dr. Garcia. Respirations even and unlabored on room air and tolerating well with O2 sats of 96%. Abd site remains c/d/i. Chadwick intact draining yellow urine. VSS. See flow sheet for detailed assessment. Will cont to monitor.

## 2017-04-04 NOTE — IP AVS SNAPSHOT
[facility]  [id number] [place] [address] [address] [place] [id number]           Patient Discharge Instructions   Our goal is to set you up for success. This packet includes information on your condition, medications, and your home care.  It will help you care for yourself to prevent having to return to the hospital.     Please ask your nurse if you have any questions.      There are many details to remember when preparing to leave the hospital. Here is what you will need to do:    1. Take your medicine. If you are prescribed medications, review your Medication List on the following pages. You may have new medications to  at the pharmacy and others that you'll need to stop taking. Review the instructions for how and when to take your medications. Talk with your doctor or nurses if you are unsure of what to do.     2. Go to your follow-up appointments. Specific follow-up information is listed in the following pages. Your may be contacted by a nurse or clinical provider about future appointments. Be sure we have all of the phone numbers to reach you. Please contact your provider's office if you are unable to make an appointment.     3. Watch for warning signs. Your doctor or nurse will give you detailed warning signs to watch for and when to call for assistance. These instructions may also include educational information about your condition. If you experience any of warning signs to your health, call your doctor.           [facility]  Unless otherwise directed by your provider, please   contact [facility], our nurse care line   that is available for 24/7 assistance.     [phone] (toll-free)     Registered nurses in the [facility]   provide: appointment scheduling, clinical advisement, health education, and other advisory services.                  ** Verify the list of medication(s) below is accurate and up to date. Carry this with you in case of emergency. If your  medications have changed, please notify your healthcare provider.             Medication List      START taking these medications        Additional Info                      hydrocodone-acetaminophen 5-325mg 5-325 mg per tablet   Commonly known as:  NORCO   Quantity:  30 tablet   Refills:  0   Dose:  1 tablet    Last time this was given:  1 tablet on 4/7/2017  3:22 PM   Instructions:  Take 1 tablet by mouth every 4 (four) hours as needed.     Begin Date    AM    Noon    PM    Bedtime         CONTINUE taking these medications        Additional Info                      amoxicillin-clavulanate 875-125mg 875-125 mg per tablet   Commonly known as:  AUGMENTIN   Quantity:  20 tablet   Refills:  0   Dose:  1 tablet    Instructions:  Take 1 tablet by mouth 2 (two) times daily.     Begin Date    AM    Noon    PM    Bedtime       atenolol-chlorthalidone 100-25 mg per tablet   Commonly known as:  TENORETIC   Quantity:  90 tablet   Refills:  3   Dose:  1 tablet    Instructions:  Take 1 tablet by mouth once daily. 1 Tablet Oral Every day     Begin Date    AM    Noon    PM    Bedtime       spironolactone 50 MG tablet   Commonly known as:  ALDACTONE   Quantity:  90 tablet   Refills:  3   Dose:  50 mg    Last time this was given:  50 mg on 4/8/2017  9:31 AM   Instructions:  Take 1 tablet (50 mg total) by mouth once daily.     Begin Date    AM    Noon    PM    Bedtime       tramadol 50 mg tablet   Commonly known as:  ULTRAM   Quantity:  30 tablet   Refills:  0   Dose:  50 mg    Instructions:  Take 1 tablet (50 mg total) by mouth 3 (three) times daily as needed for Pain.     Begin Date    AM    Noon    PM    Bedtime            Where to Get Your Medications      These medications were sent to [id number] [place] - [address]  [address] [place], [place] [address]     Phone:  [phone]     hydrocodone-acetaminophen 5-325mg 5-325 mg per tablet                  Please bring  to all follow up appointments:    1. A copy of your discharge instructions.  2. All medicines you are currently taking in their original bottles.  3. Identification and insurance card.    Please arrive 15 minutes ahead of scheduled appointment time.    Please call 24 hours in advance if you must reschedule your appointment and/or time.        Your Scheduled Appointments     Apr 24, 2017  9:40 AM CDT   Post OP with MD Ronaldo Sarmiento - General Surgery ([facility])    [address] [place] [id number] [phone]            Jul 26, 2017  7:40 AM CDT   Established Patient Visit with MD Ronaldo Lerner - Internal Medicine ([facility])    [address] [place] [id number] [phone]              Follow-up Information     Follow up with Baudilio Garcia MD.    Specialty:  General Surgery    Why:  post op appt, or Ms. Childress on a thursday    Contact information:    [address]  [facility] [id number] [phone]          Discharge Instructions     Future Orders    Call MD for:  persistent nausea and vomiting or diarrhea     Call MD for:  redness, tenderness, or signs of infection (pain, swelling, redness, odor or green/yellow discharge around incision site)     Call MD for:  severe uncontrolled pain     Call MD for:  temperature >100.4     Diet general     Questions:    Total calories:      Fat restriction, if any:      Protein restriction, if any:      Na restriction, if any:      Fluid restriction:      Additional restrictions:      Lifting restrictions     Comments:    No lifting more than 30 pounds for 4 weeks    No dressing needed     Comments:    Ok to shower        Discharge Instructions       Please call for any fever, increase in pain, nausea or vomiting or redness or drainage from incision(s).    No lifting more than 30 pounds for 4 weeks    May shower   If you become constipated from the pain medication you can use over the  "counter laxatives,  Miralax or Glycolax, or Magnesium Citrate for severe constipation.      Discharge References/Attachments     CHOLECYSTECTOMY (OPEN), DISCHARGE INSTRUCTIONS (ENGLISH)        Primary Diagnosis     Your primary diagnosis was:  Calculus Of Gallbladder With Acute Cholecystitis      Admission Information     Date & Time Provider Department CSN    4/4/2017 10:07 AM Baudilio Garcia MD [facility] [id number]      Care Providers     Provider Role Specialty Primary office phone    Baudilio Garcia MD Attending Provider General Surgery [phone]    Baudilio Garcia MD Surgeon  General Surgery [phone]      Your Vitals Were     BP Pulse Temp Resp Height Weight    119/69 (BP Location: Right arm, Patient Position: Sitting, BP Method: Automatic) 80 98.3 °F (36.8 °C) (Oral) 18 5' 2" (1.575 m) 76.8 kg (169 lb 5 oz)    SpO2 BMI             99% 30.97 kg/m2         Recent Lab Values        9/19/2007                           8:47 AM           A1C 4.9                       Allergies as of 4/8/2017        Reactions    No Known Drug Allergies       Advance Directives     An advance directive is a document which, in the event you are no longer able to make decisions for yourself, tells your healthcare team what kind of treatment you do or do not want to receive, or who you would like to make those decisions for you.  If you do not currently have an advance directive, [facility] encourages you to create one.  For more information call:  [phone]), [phone] ([phone]),  or log on to [email or web address].        Smoking Cessation     If you would like to quit smoking:   You may be eligible for free services if you are a [place] resident and started smoking cigarettes before September 1, 1988.  Call the Smoking Cessation Trust (SCT) toll free at (270) 863-5166 or (337) 840-1700.   Call 0-800-QUIT-NOW if you do not meet the above criteria.   Contact us via email: " [email or web address]   View our website for more information: [email or web address]        Language Assistance Services     ATTENTION: Language assistance services are available, free of charge. Please call [phone].      ATENCIÓN: Si sonja miller, tiene a nix disposición servicios gratuitos de asistencia lingüística. Llame al [phone].     CHÚ Ý: N?u b?n nói Ti?ng Vi?t, có các d?ch v? h? tr? ngôn ng? mi?n phí dành cho b?n. G?i s? [phone].        [facility] Sign-Up     Activating your [facility] account is as easy as 1-2-3!     1) Visit [email or web address], select Sign Up Now, enter this activation code and your date of birth, then select Next.  [email or web address]  Expires: 5/13/2017  9:29 AM      2) Create a username and password to use when you visit [facility] in the future and select a security question in case you lose your password and select Next.    3) Enter your e-mail address and click Sign Up!    Additional Information  If you have questions, please e-mail [email or web address] or call [phone] to talk to our [facility] staff. Remember, [facility] is NOT to be used for urgent needs. For medical emergencies, dial 911.          [facility] complies with applicable Federal civil rights laws and does not discriminate on the basis of race, color, national origin, age, disability, or sex.

## 2017-04-04 NOTE — ANESTHESIA RELEASE NOTE
"Anesthesia Release from PACU Note    Patient: Clare Whitley    Procedure(s) Performed: Procedure(s) (LRB):  CHOLECYSTECTOMY-LAPAROSCOPIC (N/A)  CHOLECYSTECTOMY (N/A)    Anesthesia type: general    Post pain: Adequate analgesia    Post assessment: no apparent anesthetic complications, tolerated procedure well and no evidence of recall    Last Vitals:   Visit Vitals    BP (!) 175/96 (BP Location: Right arm, Patient Position: Sitting, BP Method: Automatic)    Pulse 107    Temp 36.9 °C (98.4 °F) (Oral)    Resp 18    Ht 5' 2" (1.575 m)    Wt 76.8 kg (169 lb 5 oz)    SpO2 96%    Breastfeeding No    BMI 30.97 kg/m2       Post vital signs: stable    Level of consciousness: responds to stimulation    Nausea/Vomiting: no nausea/no vomiting    Complications: none    Airway Patency: patent    Respiratory: unassisted    Cardiovascular: stable and blood pressure at baseline    Hydration: euvolemic  "

## 2017-04-04 NOTE — PLAN OF CARE
Problem: Patient Care Overview  Goal: Plan of Care Review  Patient situated in room without incident. Patient is AAOx4. Patient's pain is being controlled by dilaudid PCA. 12hr chart check completed. Will continue to monitor.

## 2017-04-04 NOTE — ANESTHESIA PREPROCEDURE EVALUATION
04/04/2017  Clare Whitley is a 56 y.o., female.    [facility] Anesthesia Evaluation    I have reviewed the Patient Summary Reports.    I have reviewed the Nursing Notes.   I have reviewed the Medications.     Review of Systems  Anesthesia Hx:  History of prior surgery of interest to airway management or planning: Denies Family Hx of Anesthesia complications.   Denies Personal Hx of Anesthesia complications.   Social:  Non-Smoker    Cardiovascular:   Hypertension Mitral valve prolapse, MV regurg  Pt reports that she sees Dr. Hand yearly.  He said that she does not have MVP.   Pulmonary:  Pulmonary Normal        Physical Exam  General:  Well nourished    Airway/Jaw/Neck:  Airway Findings: Mallampati: I     Dental:  Dental Findings: Upper Dentures, Lower Dentures   Chest/Lungs:  Chest/Lungs Findings: Clear to auscultation, Normal Respiratory Rate     Heart/Vascular:  Heart Findings: Rate: Normal  Sounds: Normal  Other Heart Findings: No heart murmur.             Anesthesia Plan  Type of Anesthesia, risks & benefits discussed:  Anesthesia Type:  general  Patient's Preference:   Intra-op Monitoring Plan:   Intra-op Monitoring Plan Comments:   Post Op Pain Control Plan:   Post Op Pain Control Plan Comments:   Induction:   IV  Beta Blocker:  Patient is on a Beta-Blocker and has received one dose within the past 24 hours (No further documentation required).       Informed Consent: Patient understands risks and agrees with Anesthesia plan.  Questions answered.   ASA Score: 2     Day of Surgery Review of History & Physical: I have interviewed and examined the patient. I have reviewed the patient's H&P dated:  There are no significant changes.          Ready For Surgery From Anesthesia Perspective.

## 2017-04-04 NOTE — OP NOTE
[facility]  Surgery Department  Operative Note    SUMMARY     Date of Procedure: 4/4/2017     Procedure: Procedure(s) (LRB):  CHOLECYSTECTOMY-LAPAROSCOPIC (N/A)  CHOLECYSTECTOMY (N/A)     Surgeon(s) and Role:     * Baudilio Garcia MD - Primary        Assisting Surgeon: Dmitri Hurt M.D. first assist      Pre-Operative Diagnosis: Calculus of gallbladder with acute cholecystitis without obstruction [K80.00]    Post-Operative Diagnosis: Post-Op Diagnosis Codes:     * Calculus of gallbladder with acute cholecystitis without obstruction [K80.00]    Anesthesia: General    Technical Procedures Used: Attempted laparoscopic/open cholecystectomy    Description of the Findings of the Procedure:     She was brought into the operating room placed on the operative table supine position.  General endotracheal anesthesia was induced.  IV antibiotics were administered.  Pneumatic compression devices were placed on lower extremity.  The abdomen was clipped, prepped and draped in the standard manner.    A timeout was performed.    A small incision was made above the umbilicus.  The fascia was identified and elevated with Jewett clamps.  A varies needle was inserted and pneumoperitoneum established to 15 mmHg.  A 5 mm XL trocar was placed using a visualization technique.  Additional trochars placed as follows a 11 mm in the epigastrium and 25 mm in the right upper quadrant.    The gallbladder was noted to be inflamed and distended.  Was aspirated and 30 cc of clear bile was removed.  The fundus of the gallbladder was then grasped and retracted cephalad.  Adhesions to the omentum were taken down using blunt dissection electrocautery.  The infundibulum was difficult to identify as they know of the gallbladder was inflamed.  Attempts were made to dissect out the infundibulum however due to the amount of inflammation and the bleeding from the inflamed tissue and the decision was made to convert to an open procedure.  The  trochars were removed and the abdomen was desufflated.    A right subcostal incision was made.  Subcutaneous tissues were dissected using electrocautery.  The fascia was identified and opened.  The rectus muscle was divided and then the awl and external oblique as well as transversalis posterior fascia was opened.  A laparotomy pack was placed behind the liver.  A self-retaining retractor was inserted.  The right upper quadrant was irrigated.    The fundus the gallbladder was retracted inferiorly and the gallbladder was  from the liver.  This dissection was carried out laterally and then medially to allow us to separate the gallbladder completely from the liver bed.  The cystic artery was identified and clipped with 2 clips proximally 1 clip distally.    The remainder of the lower aspect of the gallbladder was dissected out such that the cystic duct was the only structure remaining.  Stones were milked from the cystic duct.  The cystic duct was noted to be dilated and as such was divided with the T5 stapler.    The right upper quadrant was irrigated.  All laparotomy packs were removed.  Culture of lidocaine and Marcaine was infiltrated The posterior fascia and peritoneum were closed with #1 PDS in a running fashion.  Fascia was closed with #1 PDS in a running fashion.  The skin was closed with staples.    Sponginess but counts were correct      Significant Surgical Tasks Conducted by the Assistant(s), if Applicable: Assistance with gallbladder removal and closure    Complications: No    Estimated Blood Loss (EBL): 150 mL  IV fluids 1100  Marcaine 0.25% 30 mL's  Experal 266 mg           Implants: * No implants in log *    Specimens:   Specimen (12h ago through future)    Start     Ordered    04/04/17 1308  Specimen to Pathology - Surgery  Once     Comments:  1) Gallbladder with Stones (perm)    Dx: Cholelithiasis    04/04/17 1312                  Condition: Stable    Disposition: PACU - hemodynamically  stable.    Attestation: I performed the procedure.

## 2017-04-04 NOTE — TRANSFER OF CARE
"Anesthesia Transfer of Care Note    Patient: Clare Whitley    Procedure(s) Performed: Procedure(s) (LRB):  CHOLECYSTECTOMY-LAPAROSCOPIC (N/A)    Patient location: PACU    Anesthesia Type: general    Transport from OR: Transported from OR on room air with adequate spontaneous ventilation    Post pain: adequate analgesia    Post assessment: no apparent anesthetic complications    Post vital signs: stable    Level of consciousness: responds to stimulation    Nausea/Vomiting: no nausea/vomiting    Complications: none          Last vitals:   Visit Vitals    BP (!) 175/96 (BP Location: Right arm, Patient Position: Sitting, BP Method: Automatic)    Pulse 107    Temp 36.9 °C (98.4 °F) (Oral)    Resp 18    Ht 5' 2" (1.575 m)    Wt 76.8 kg (169 lb 5 oz)    SpO2 96%    Breastfeeding No    BMI 30.97 kg/m2     "

## 2017-04-04 NOTE — H&P (VIEW-ONLY)
Patient ID: Clare Whitley is a 56 y.o. female.    Chief Complaint: Cholelithiasis    HPI: Patient presents for the evaluation of right upper quadrant abdominal pain and an abnormal ultrasound of the gallbladder.    Review of Systems   Constitutional: Positive for activity change, appetite change and unexpected weight change. Negative for chills, diaphoresis, fatigue and fever.   HENT: Negative.    Eyes: Negative.    Respiratory: Negative.    Cardiovascular: Negative.    Gastrointestinal: Positive for abdominal distention, abdominal pain, diarrhea, nausea and vomiting. Negative for anal bleeding, blood in stool, constipation and rectal pain.        Patient relates onset of right upper quadrant abdominal pain 5 days ago after eating ribs the night before. Rated the pain as a 10 and it radiated to the right upper back. Had bloating and dyspepsia, nausea and vomiting that day. Evaluated and told had shingles. No rash. Pain persisted intermittently RUQ. 8-10 range. Exacerbated by eating. Went back to clinic and had ultrasound that verified gallstones. Ate fried chicken last night and states has been up all night with RUQ pain and nausea. Has had nothing to eat today. No fever. Loose bowels intermittently.    Endocrine: Negative.    Genitourinary: Positive for flank pain and hematuria. Negative for decreased urine volume, difficulty urinating, dysuria, frequency, pelvic pain, urgency, vaginal bleeding, vaginal discharge and vaginal pain.   Musculoskeletal: Positive for back pain. Negative for arthralgias, gait problem, joint swelling, myalgias, neck pain and neck stiffness.   Skin: Negative.    Allergic/Immunologic: Negative.    Neurological: Negative.    Hematological: Negative.    Psychiatric/Behavioral: Negative.        Current Outpatient Prescriptions   Medication Sig Dispense Refill    amoxicillin-clavulanate 875-125mg (AUGMENTIN) 875-125 mg per tablet Take 1 tablet by mouth 2 (two) times daily. 20 tablet 0     "atenolol-chlorthalidone (TENORETIC) 100-25 mg per tablet Take 1 tablet by mouth once daily. 1 Tablet Oral Every day 90 tablet 3    spironolactone (ALDACTONE) 50 MG tablet Take 1 tablet (50 mg total) by mouth once daily. 90 tablet 3    tramadol (ULTRAM) 50 mg tablet Take 1 tablet (50 mg total) by mouth 3 (three) times daily as needed for Pain. 30 tablet 0     No current facility-administered medications for this visit.        Review of patient's allergies indicates:   Allergen Reactions    No known drug allergies        Past Medical History:   Diagnosis Date    Hypertension     Mitral regurgitation     "mild"; followed by Dr. Blackmon ([facility]    MVP (mitral valve prolapse)     "trivial"; followed by Dr. Blackmon ([facility]       Past Surgical History:   Procedure Laterality Date    LYMPH NODE BIOPSY Left     left neck     UTERINE FIBROID EMBOLIZATION  01/01/2010       Family History   Problem Relation Age of Onset    Hypertension Mother        Social History     Social History    Marital status:      Spouse name: Johnnie Whitley    Number of children: 3    Years of education: N/A     Occupational History    Rehab counselor associate [employer]     Works with disabled people     Social History Main Topics    Smoking status: Passive Smoke Exposure - Never Smoker    Smokeless tobacco: Never Used    Alcohol use No    Drug use: No    Sexual activity: Yes     Partners: Male     Birth control/ protection: None, Post-menopausal     Other Topics Concern    Not on file     Social History Narrative       Vitals:    04/03/17 0937   BP: 125/78   Pulse: 100   Temp: 99 °F (37.2 °C)       Physical Exam   Constitutional: She is oriented to person, place, and time. She appears well-developed and well-nourished.   Patient sitting on her left side with right side elevated in chair.    HENT:   Head: Normocephalic and atraumatic.   Right Ear: External ear normal.   Left Ear: External ear normal.   Eyes: Conjunctivae and EOM " are normal. Pupils are equal, round, and reactive to light. Right eye exhibits no discharge. Left eye exhibits no discharge. No scleral icterus.   Neck: Normal range of motion. Neck supple. No thyromegaly present.   Cardiovascular: Normal rate and regular rhythm.    Pulmonary/Chest: Effort normal and breath sounds normal.   Abdominal: Soft. She exhibits no mass. There is tenderness (right upper quadrant tenderness to palpation). There is guarding and positive Currie's sign. There is no rebound. No hernia.   Musculoskeletal: Normal range of motion. She exhibits no edema.   Lymphadenopathy:     She has no cervical adenopathy.   Neurological: She is alert and oriented to person, place, and time.   Skin: Skin is warm and dry. No rash noted. No erythema.   Psychiatric: She has a normal mood and affect. Her behavior is normal. Judgment and thought content normal.   Nursing note and vitals reviewed.    IMAGING: 3/31/17:  CLINICAL HISTORY:  rule out appendicitis and gall stones    FINDINGS: The liver demonstrates normal echotexture with no focal abnormality.  It measures 16.3 cm in length. The gallbladder contains shadowing stones near the neck.  The largest of these measures 2.5 x 1.6 x 1.7 cm.  There is pericholecystic fluid.  The gallbladder is distended.  Sonographic Currie's sign is positive.  The gallbladder wall thickness is 4.3 mm. The common bile duct is normal at 5.6 mm. The portal vein shows a normal waveform. The visualized portions of the pancreas are not unusual in appearance. The right kidney is normal at 9.1 cm. Focused images within the right lower quadrant demonstrate no findings to suggest acute appendicitis.   Impression    There are findings concerning for acute cholecystitis.    Encounter           CBC - 3/31/17- unremarkable, chemistries revealed mildly elevated bilirubin- 1.1 and total protein elevated.    Assessment & Plan:  1. Right upper abdominal pain X 5 days that has been intermittent but  persistent with nausea and intermittent emesis.  2. Abnormal gallbladder ultrasound- acute cholecystitis, largest gallstone 2.5 cm, Positive Currie's sign, pericholecystic fluid and gallbladder distention.   3. Explained imaging and clinical exam findings to pt using the krames lap mary book. Dr. Garcia explained again to pt.  4. Recommend laparoscopic cholecystectomy that may be an open cholecystectomy due to the potential for infection of the gallbladder per Dr. Garcia. 50% chance open vs laparoscopic  5. Explained risks vs benefits of the surgery using the pamphlet. infection, bleeding, bowel injury, common bile duct injury, diarrhea postop, persistent symptoms, hernia at trocar sites, blood clots in legs.   6. Explained pre, jie, and postop instructions. Discussed can shower 48 hours after surgery. Do not submerge wounds for at least 4-6 weeks after surgery. No heavy lifting or straining for 6 weeks after surgery. Encouraged pt to ambulate frequently and stay hydrated to avoid blood clots. Low fat diet for 2 weeks postop.   7. Consent reviewed and explained by Dr. Garcia. Pt verbalized understanding and was signed by pt after all questions answered. Scheduled for lap mary tomorrow.  8. EKG today and repeat cbc and chemistries. Contact information given incase of questions before tomorrow.

## 2017-04-05 LAB
ALBUMIN SERPL BCP-MCNC: 2.9 G/DL
ALP SERPL-CCNC: 45 U/L
ALT SERPL W/O P-5'-P-CCNC: 110 U/L
ANION GAP SERPL CALC-SCNC: 11 MMOL/L
AST SERPL-CCNC: 100 U/L
BASOPHILS # BLD AUTO: 0.01 K/UL
BASOPHILS NFR BLD: 0.1 %
BILIRUB SERPL-MCNC: 1.1 MG/DL
BUN SERPL-MCNC: 11 MG/DL
CALCIUM SERPL-MCNC: 9.4 MG/DL
CHLORIDE SERPL-SCNC: 100 MMOL/L
CO2 SERPL-SCNC: 27 MMOL/L
CREAT SERPL-MCNC: 0.8 MG/DL
DIFFERENTIAL METHOD: ABNORMAL
EOSINOPHIL # BLD AUTO: 0 K/UL
EOSINOPHIL NFR BLD: 0.1 %
ERYTHROCYTE [DISTWIDTH] IN BLOOD BY AUTOMATED COUNT: 14.2 %
EST. GFR  (AFRICAN AMERICAN): >60 ML/MIN/1.73 M^2
EST. GFR  (NON AFRICAN AMERICAN): >60 ML/MIN/1.73 M^2
GLUCOSE SERPL-MCNC: 80 MG/DL
HCT VFR BLD AUTO: 34.6 %
HGB BLD-MCNC: 12.6 G/DL
LYMPHOCYTES # BLD AUTO: 1.4 K/UL
LYMPHOCYTES NFR BLD: 19.9 %
MCH RBC QN AUTO: 28.4 PG
MCHC RBC AUTO-ENTMCNC: 36.4 %
MCV RBC AUTO: 78 FL
MONOCYTES # BLD AUTO: 0.8 K/UL
MONOCYTES NFR BLD: 10.7 %
NEUTROPHILS # BLD AUTO: 4.8 K/UL
NEUTROPHILS NFR BLD: 69.2 %
PLATELET # BLD AUTO: 192 K/UL
PMV BLD AUTO: 10.8 FL
POTASSIUM SERPL-SCNC: 4.3 MMOL/L
PROT SERPL-MCNC: 7.3 G/DL
RBC # BLD AUTO: 4.43 M/UL
SODIUM SERPL-SCNC: 138 MMOL/L
WBC # BLD AUTO: 6.98 K/UL

## 2017-04-05 PROCEDURE — 97161 PT EVAL LOW COMPLEX 20 MIN: CPT

## 2017-04-05 PROCEDURE — 11000001 HC ACUTE MED/SURG PRIVATE ROOM

## 2017-04-05 PROCEDURE — 94799 UNLISTED PULMONARY SVC/PX: CPT

## 2017-04-05 PROCEDURE — 36415 COLL VENOUS BLD VENIPUNCTURE: CPT

## 2017-04-05 PROCEDURE — G8978 MOBILITY CURRENT STATUS: HCPCS | Mod: CH

## 2017-04-05 PROCEDURE — 80053 COMPREHEN METABOLIC PANEL: CPT

## 2017-04-05 PROCEDURE — 99900035 HC TECH TIME PER 15 MIN (STAT)

## 2017-04-05 PROCEDURE — 94770 HC EXHALED C02 TEST: CPT

## 2017-04-05 PROCEDURE — 63600175 PHARM REV CODE 636 W HCPCS: Performed by: SURGERY

## 2017-04-05 PROCEDURE — G8980 MOBILITY D/C STATUS: HCPCS | Mod: CH

## 2017-04-05 PROCEDURE — 94761 N-INVAS EAR/PLS OXIMETRY MLT: CPT

## 2017-04-05 PROCEDURE — 97116 GAIT TRAINING THERAPY: CPT

## 2017-04-05 PROCEDURE — 85025 COMPLETE CBC W/AUTO DIFF WBC: CPT

## 2017-04-05 PROCEDURE — 96372 THER/PROPH/DIAG INJ SC/IM: CPT

## 2017-04-05 PROCEDURE — 27000221 HC OXYGEN, UP TO 24 HOURS

## 2017-04-05 PROCEDURE — G8979 MOBILITY GOAL STATUS: HCPCS | Mod: CH

## 2017-04-05 PROCEDURE — 25000003 PHARM REV CODE 250: Performed by: SURGERY

## 2017-04-05 RX ORDER — ACETAMINOPHEN 325 MG/1
650 TABLET ORAL EVERY 6 HOURS PRN
Status: DISCONTINUED | OUTPATIENT
Start: 2017-04-05 | End: 2017-04-08 | Stop reason: HOSPADM

## 2017-04-05 RX ADMIN — Medication: at 10:04

## 2017-04-05 RX ADMIN — ENOXAPARIN SODIUM 40 MG: 100 INJECTION SUBCUTANEOUS at 04:04

## 2017-04-05 RX ADMIN — METOPROLOL TARTRATE 50 MG: 50 TABLET ORAL at 08:04

## 2017-04-05 RX ADMIN — ACETAMINOPHEN 1000 MG: 10 INJECTION, SOLUTION INTRAVENOUS at 02:04

## 2017-04-05 RX ADMIN — SODIUM CHLORIDE, SODIUM LACTATE, POTASSIUM CHLORIDE, AND CALCIUM CHLORIDE: .6; .31; .03; .02 INJECTION, SOLUTION INTRAVENOUS at 10:04

## 2017-04-05 RX ADMIN — CHLORTHALIDONE 25 MG: 25 TABLET ORAL at 08:04

## 2017-04-05 RX ADMIN — ACETAMINOPHEN 1000 MG: 10 INJECTION, SOLUTION INTRAVENOUS at 05:04

## 2017-04-05 RX ADMIN — SPIRONOLACTONE 50 MG: 25 TABLET ORAL at 08:04

## 2017-04-05 NOTE — SUBJECTIVE & OBJECTIVE
Interval History: mild pain, no nausea  Pain controled with PCA    Medications:  Continuous Infusions:   hydromorphone in 0.9 % NaCl 6 mg/30 ml      lactated ringers 100 mL/hr at 04/04/17 1515     Scheduled Meds:   acetaminophen  1,000 mg Intravenous Q8H    chlorthalidone  25 mg Oral Daily    enoxparin  40 mg Subcutaneous Q24H    metoprolol tartrate  50 mg Oral BID    spironolactone  50 mg Oral Daily     PRN Meds:cloNIDine, diphenhydrAMINE, naloxone, ondansetron, promethazine (PHENERGAN) IVPB     Review of patient's allergies indicates:   Allergen Reactions    No known drug allergies      Objective:     Vital Signs (Most Recent):  Temp: 98.2 °F (36.8 °C) (04/05/17 0734)  Pulse: 60 (04/05/17 0815)  Resp: 15 (04/05/17 0734)  BP: 135/63 (04/05/17 0815)  SpO2: 97 % (04/05/17 0734) Vital Signs (24h Range):  Temp:  [97.6 °F (36.4 °C)-98.8 °F (37.1 °C)] 98.2 °F (36.8 °C)  Pulse:  [] 60  Resp:  [12-24] 15  SpO2:  [94 %-100 %] 97 %  BP: (117-175)/(58-96) 135/63     Weight: 76.8 kg (169 lb 5 oz)  Body mass index is 30.97 kg/(m^2).    Intake/Output - Last 3 Shifts       04/03 0700 - 04/04 0659 04/04 0700 - 04/05 0659 04/05 0700 - 04/06 0659    P.O.  0     I.V. (mL/kg)  2446 (31.8) 0 (0)    IV Piggyback  200     Total Intake(mL/kg)  2646 (34.5) 0 (0)    Urine (mL/kg/hr)  2000     Stool  0     Blood  150     Total Output   2150      Net   +496 0           Stool Occurrence  0 x           Physical Exam   Constitutional:   overweight   HENT:   Head: Normocephalic.   Eyes: No scleral icterus.   Neck: No JVD present. No tracheal deviation present. No thyromegaly present.   Cardiovascular: Normal rate, regular rhythm and normal heart sounds.    Pulmonary/Chest: Effort normal and breath sounds normal.   Abdominal: Soft. Bowel sounds are normal. She exhibits no distension. There is no tenderness.   ruq incision is dressed and dry   Neurological: She is alert.   Skin: Skin is warm and dry.   Psychiatric: She has a normal  mood and affect. Her behavior is normal. Judgment and thought content normal.   Vitals reviewed.      Significant Labs:  CBC:   Recent Labs  Lab 04/05/17  0725   WBC 6.98   RBC 4.43   HGB 12.6   HCT 34.6*      MCV 78*   MCH 28.4   MCHC 36.4*     CMP:   Recent Labs  Lab 04/03/17  1122   GLU 94   CALCIUM 9.4   ALBUMIN 3.6   PROT 8.6*      K 3.4*   CO2 27      BUN 11   CREATININE 0.8   ALKPHOS 26*   ALT 27   AST 29   BILITOT 0.7         Significant Diagnostics:  none

## 2017-04-05 NOTE — PLAN OF CARE
Problem: Patient Care Overview  Goal: Plan of Care Review  Outcome: Ongoing (interventions implemented as appropriate)  POC reviewed with patient. Indications for medications and possible side effects explained. Pt verbalized understanding. Dilaudid administered via PCA pump. Pt reports mild pain, rates at 2/10. Pt NPO, no nausea or vomiting reported. IVF infusing. Dressing dry and intact. Bowel sounds active. Pt doing IS independently. VS stable. Pt resting quietly. Respirations even and unlabored. Daughter at bedside. Will continue to monitor.

## 2017-04-05 NOTE — PROGRESS NOTES
[facility]  General Surgery  Progress Note    Subjective:     History of Present Illness:    Acute cholecystitis       Post-Op Info:  Procedure(s) (LRB):  CHOLECYSTECTOMY-LAPAROSCOPIC (N/A)  CHOLECYSTECTOMY (N/A)   1 Day Post-Op     Interval History: mild pain, no nausea  Pain controled with PCA    Medications:  Continuous Infusions:   hydromorphone in 0.9 % NaCl 6 mg/30 ml      lactated ringers 100 mL/hr at 04/04/17 1515     Scheduled Meds:   acetaminophen  1,000 mg Intravenous Q8H    chlorthalidone  25 mg Oral Daily    enoxparin  40 mg Subcutaneous Q24H    metoprolol tartrate  50 mg Oral BID    spironolactone  50 mg Oral Daily     PRN Meds:cloNIDine, diphenhydrAMINE, naloxone, ondansetron, promethazine (PHENERGAN) IVPB     Review of patient's allergies indicates:   Allergen Reactions    No known drug allergies      Objective:     Vital Signs (Most Recent):  Temp: 98.2 °F (36.8 °C) (04/05/17 0734)  Pulse: 60 (04/05/17 0815)  Resp: 15 (04/05/17 0734)  BP: 135/63 (04/05/17 0815)  SpO2: 97 % (04/05/17 0734) Vital Signs (24h Range):  Temp:  [97.6 °F (36.4 °C)-98.8 °F (37.1 °C)] 98.2 °F (36.8 °C)  Pulse:  [] 60  Resp:  [12-24] 15  SpO2:  [94 %-100 %] 97 %  BP: (117-175)/(58-96) 135/63     Weight: 76.8 kg (169 lb 5 oz)  Body mass index is 30.97 kg/(m^2).    Intake/Output - Last 3 Shifts       04/03 0700 - 04/04 0659 04/04 0700 - 04/05 0659 04/05 0700 - 04/06 0659    P.O.  0     I.V. (mL/kg)  2446 (31.8) 0 (0)    IV Piggyback  200     Total Intake(mL/kg)  2646 (34.5) 0 (0)    Urine (mL/kg/hr)  2000     Stool  0     Blood  150     Total Output   2150      Net   +496 0           Stool Occurrence  0 x           Physical Exam   Constitutional:   overweight   HENT:   Head: Normocephalic.   Eyes: No scleral icterus.   Neck: No JVD present. No tracheal deviation present. No thyromegaly present.   Cardiovascular: Normal rate, regular rhythm and normal heart sounds.    Pulmonary/Chest: Effort normal  and breath sounds normal.   Abdominal: Soft. Bowel sounds are normal. She exhibits no distension. There is no tenderness.   ruq incision is dressed and dry   Neurological: She is alert.   Skin: Skin is warm and dry.   Psychiatric: She has a normal mood and affect. Her behavior is normal. Judgment and thought content normal.   Vitals reviewed.      Significant Labs:  CBC:   Recent Labs  Lab 04/05/17  0725   WBC 6.98   RBC 4.43   HGB 12.6   HCT 34.6*      MCV 78*   MCH 28.4   MCHC 36.4*     CMP:   Recent Labs  Lab 04/03/17  1122   GLU 94   CALCIUM 9.4   ALBUMIN 3.6   PROT 8.6*      K 3.4*   CO2 27      BUN 11   CREATININE 0.8   ALKPHOS 26*   ALT 27   AST 29   BILITOT 0.7         Significant Diagnostics:  none    Assessment/Plan:     Essential hypertension  Continue home medicaiton    Calculus of gallbladder with acute cholecystitis without obstruction  Open cholecystectomy pod 1  Clear liquid  Decrease ivf  Remove chisholm  Consult physical therapy      Baudilio Garcia MD  General Surgery  [facility]

## 2017-04-05 NOTE — PT/OT/SLP EVAL
"Physical Therapy  Evaluation    Clare Whitley   MRN: [id number]   Admitting Diagnosis: <principal problem not specified>    PT Received On: 17  PT Start Time: 1046     PT Stop Time: 1110    PT Total Time (min): 24 min       Billable Minutes:  Evaluation 14 and Gait [id number]    Diagnosis: <principal problem not specified>      Past Medical History:   Diagnosis Date    Hypertension     Mitral regurgitation     "mild"; followed by Dr. Blackmon ([facility])    MVP (mitral valve prolapse)     "trivial"; followed by Dr. Blackmon ([facility])    Sickle cell trait       Past Surgical History:   Procedure Laterality Date    LYMPH NODE BIOPSY Left     left neck     UTERINE FIBROID EMBOLIZATION  2010       Referring physician: JACKSON  Date referred to PT: 2017      General Precautions: Standard,    Orthopedic Precautions:     Braces:              Patient History:  Lives With: spouse  Living Arrangements: house  Home Layout: Able to live on 1st floor  Transportation Available: car  Living Environment Comment: PT WAS IND AT HOME, WORKS AND DRIVES  Equipment Currently Used at Home: none  DME owned (not currently used): none    Previous Level of Function:  Ambulation Skills: independent  Transfer Skills: independent  ADL Skills: independent  Work/Leisure Activity: independent    Subjective:  Communicated with NURSE SHAKESIA AND EPIC CHART REVIEW prior to session.  PT AGREED TO EVAL AND TX  Chief Complaint: PAIN  Patient goals: GO HOME    Pain Ratin/10         Location: abdomen     Pain Rating Post-Intervention: 2/10    Objective:   Patient found with: peripheral IV, oxygen, PCA     Cognitive Exam:  Oriented to: Person, Place, Time and Situation    Follows Commands/attention: Follows multistep  commands  Communication: clear/fluent  Safety awareness/insight to disability: intact    Physical Exam:  Postural examination/scapula alignment: No postural abnormalities identified    Sensation:   Intact    Upper Extremity " Range of Motion:  Right Upper Extremity: WNL  Left Upper Extremity: WNL    Upper Extremity Strength:  Right Upper Extremity: WNL  Left Upper Extremity: WNL    Lower Extremity Range of Motion:  Right Lower Extremity: WNL  Left Lower Extremity: WNL    Lower Extremity Strength:  Right Lower Extremity: WNL  Left Lower Extremity: WNL      Functional Mobility:  Bed Mobility:  Scooting/Bridging: Independent  Supine to Sit: Independent  Sit to Supine: Independent    Transfers:  Sit <> Stand Assistance: Independent  Sit <> Stand Assistive Device: No Assistive Device  Bed <> Chair Technique: Stand Pivot  Bed <> Chair Assistance: Independent  Bed <> Chair Assistive Device: No Assistive Device    Gait:   Gait Distance: PT GT TRAINED IND X 450' WITH NO LOB  Assistance 1: Independent  Gait Assistive Device: No device  Gait Pattern: swing-through gait    Balance:   Static Sit: NORMAL: No deviations seen in posture held statically  Dynamic Sit: NORMAL: No deviations seen in posture held dynamically  Static Stand: NORMAL: No deviations seen in posture held statically  Dynamic stand: NORMAL: No deviations seen in posture held dynamically    Therapeutic Activities and Exercises:  PT IND WITH ALL GROSS FUNC MOBILITY AND WILL BE D/C TO MOBILITY PROGRAM    AM-PAC 6 CLICK MOBILITY  How much help from another person does this patient currently need?   1 = Unable, Total/Dependent Assistance  2 = A lot, Maximum/Moderate Assistance  3 = A little, Minimum/Contact Guard/Supervision  4 = None, Modified [place]/Independent          AM-PAC Raw Score CMS G-Code Modifier Level of Impairment Assistance   6 % Total / Unable   7 - 9 CM 80 - 100% Maximal Assist   10 - 14 CL 60 - 80% Moderate Assist   15 - 19 CK 40 - 60% Moderate Assist   20 - 22 CJ 20 - 40% Minimal Assist   23 CI 1-20% SBA / CGA   24 CH 0% Independent/ Mod I     Patient left supine with call button in reach.    Assessment:   Clare Whitley is a 56 y.o. female with a medical  diagnosis of <principal problem not specified> and presents with NO GROSS FUNC DEFICITS AND WILL BE D/C TO MOBILITY PROGRAM.    Discharge recommendations: Discharge Facility/Level Of Care Needs: home     Barriers to discharge:      Equipment recommendations: Equipment Needed After Discharge: none     PLAN:    D/C FROM P.T.     Functional Assessment Tool Used: MARLON  PAC  Score:   Functional Limitation: Mobility: Walking and moving around  Mobility: Walking and Moving Around Current Status ():   Mobility: Walking and Moving Around Goal Status ():   Mobility: Walking and Moving Around Discharge Status ():      Lilliana Garcia, PT  04/05/2017

## 2017-04-05 NOTE — PLAN OF CARE
Met with patient. Patient has been employed with vocational rehab for 36 years. No anticipated discharge needs.      04/05/17 1045   Discharge Assessment   Assessment Type Discharge Planning Assessment   Confirmed/corrected address and phone number on facesheet? Yes   Assessment information obtained from? Patient;Medical Record   Prior to hospitilization cognitive status: Alert/Oriented   Prior to hospitalization functional status: Independent   Current cognitive status: Alert/Oriented   Current Functional Status: Independent   Arrived From home or self-care   Lives With spouse   Able to Return to Prior Arrangements yes   Is patient able to care for self after discharge? Yes   Patient's perception of discharge disposition home or selfcare   Readmission Within The Last 30 Days no previous admission in last 30 days   Patient currently being followed by outpatient case management? No   Patient currently receives home health services? No   Does the patient currently use HME? No   Patient currently receives private duty nursing? No   Equipment Currently Used at Home none   Do you have any problems affording any of your prescribed medications? No   Is the patient taking medications as prescribed? yes   Do you have any financial concerns preventing you from receiving the healthcare you need? No   Does the patient have transportation to healthcare appointments? Yes   Transportation Available car   On Dialysis? No   Discharge Plan A Home   Discharge Plan B Home with family   Patient/Family In Agreement With Plan yes

## 2017-04-05 NOTE — ANESTHESIA POSTPROCEDURE EVALUATION
"Anesthesia Post Evaluation    Patient: Clare Whitley    Procedure(s) Performed: Procedure(s) (LRB):  CHOLECYSTECTOMY-LAPAROSCOPIC (N/A)  CHOLECYSTECTOMY (N/A)    Final Anesthesia Type: general  Patient location during evaluation: PACU  Post-procedure vital signs: reviewed and stable  Pain management: adequate  Airway patency: patent  PONV status at discharge: No PONV  Anesthetic complications: no      Cardiovascular status: blood pressure returned to baseline  Respiratory status: unassisted  Hydration status: euvolemic  Follow-up not needed.        Visit Vitals    BP (!) 117/58    Pulse 81    Temp 37.1 °C (98.8 °F) (Temporal)    Resp (!) 23    Ht 5' 2" (1.575 m)    Wt 76.8 kg (169 lb 5 oz)    SpO2 99%    Breastfeeding No    BMI 30.97 kg/m2       Pain/Ghassan Score: Pain Assessment Performed: Yes (4/4/2017  4:41 PM)  Presence of Pain: complains of pain/discomfort (4/4/2017  4:41 PM)  Pain Rating Prior to Med Admin: 5 (4/4/2017  4:20 PM)  Ghassan Score: 8 (4/4/2017  4:00 PM)      "

## 2017-04-06 PROCEDURE — 94799 UNLISTED PULMONARY SVC/PX: CPT

## 2017-04-06 PROCEDURE — 25000003 PHARM REV CODE 250: Performed by: SURGERY

## 2017-04-06 PROCEDURE — 96372 THER/PROPH/DIAG INJ SC/IM: CPT

## 2017-04-06 PROCEDURE — 99900035 HC TECH TIME PER 15 MIN (STAT)

## 2017-04-06 PROCEDURE — 11000001 HC ACUTE MED/SURG PRIVATE ROOM

## 2017-04-06 PROCEDURE — 63600175 PHARM REV CODE 636 W HCPCS: Performed by: SURGERY

## 2017-04-06 RX ORDER — HYDROCODONE BITARTRATE AND ACETAMINOPHEN 5; 325 MG/1; MG/1
1 TABLET ORAL EVERY 4 HOURS PRN
Status: DISCONTINUED | OUTPATIENT
Start: 2017-04-06 | End: 2017-04-08 | Stop reason: HOSPADM

## 2017-04-06 RX ORDER — HYDROCODONE BITARTRATE AND ACETAMINOPHEN 7.5; 325 MG/1; MG/1
1 TABLET ORAL EVERY 4 HOURS PRN
Status: DISCONTINUED | OUTPATIENT
Start: 2017-04-06 | End: 2017-04-08 | Stop reason: HOSPADM

## 2017-04-06 RX ORDER — MORPHINE SULFATE 2 MG/ML
3 INJECTION, SOLUTION INTRAMUSCULAR; INTRAVENOUS
Status: DISCONTINUED | OUTPATIENT
Start: 2017-04-06 | End: 2017-04-08 | Stop reason: HOSPADM

## 2017-04-06 RX ADMIN — METOPROLOL TARTRATE 50 MG: 50 TABLET ORAL at 08:04

## 2017-04-06 RX ADMIN — CHLORTHALIDONE 25 MG: 25 TABLET ORAL at 08:04

## 2017-04-06 RX ADMIN — SPIRONOLACTONE 50 MG: 25 TABLET ORAL at 08:04

## 2017-04-06 RX ADMIN — HYDROCODONE BITARTRATE AND ACETAMINOPHEN 1 TABLET: 7.5; 325 TABLET ORAL at 08:04

## 2017-04-06 RX ADMIN — ENOXAPARIN SODIUM 40 MG: 100 INJECTION SUBCUTANEOUS at 05:04

## 2017-04-06 RX ADMIN — HYDROCODONE BITARTRATE AND ACETAMINOPHEN 1 TABLET: 7.5; 325 TABLET ORAL at 05:04

## 2017-04-06 RX ADMIN — METOPROLOL TARTRATE 50 MG: 50 TABLET ORAL at 09:04

## 2017-04-06 RX ADMIN — SODIUM CHLORIDE, SODIUM LACTATE, POTASSIUM CHLORIDE, AND CALCIUM CHLORIDE: .6; .31; .03; .02 INJECTION, SOLUTION INTRAVENOUS at 05:04

## 2017-04-06 RX ADMIN — HYDROCODONE BITARTRATE AND ACETAMINOPHEN 1 TABLET: 5; 325 TABLET ORAL at 09:04

## 2017-04-06 NOTE — PLAN OF CARE
Problem: Patient Care Overview  Goal: Plan of Care Review  PT IS IND WITH GROSS FUNC MOBILITY AND WILL BE D/C TO MOBILITY PROGRAM.   Outcome: Ongoing (interventions implemented as appropriate)  Patient is AAOx4. Patient remained free from falls and injury throughout this shift. Patient educated on side effects and indications for medications ordered. Will continue to monitor. 12hr chart check completed.

## 2017-04-06 NOTE — PLAN OF CARE
Problem: Patient Care Overview  Goal: Plan of Care Review  PT IS IND WITH GROSS FUNC MOBILITY AND WILL BE D/C TO MOBILITY PROGRAM.   Outcome: Ongoing (interventions implemented as appropriate)  POC reviewed with patient. Pt reports adequate pain control with administration of ordered Dilaludid via PCA pump. Pt rates pain at 2-3/10. Dressing dry and intact. Pt on clear liquids; tolerating well. No c/o nausea, vomiting or abdominal pain. Passing flatus. VS stable. Respirations even and unlabored. Daughter at bedside. Will continue to monitor.     24 hour chart check complete.

## 2017-04-06 NOTE — SUBJECTIVE & OBJECTIVE
Interval History:   Incisional pain, tolerated clear liquids    Medications:  Continuous Infusions:   hydromorphone in 0.9 % NaCl 6 mg/30 ml      lactated ringers 75 mL/hr at 04/05/17 2210     Scheduled Meds:   chlorthalidone  25 mg Oral Daily    enoxparin  40 mg Subcutaneous Q24H    metoprolol tartrate  50 mg Oral BID    spironolactone  50 mg Oral Daily     PRN Meds:acetaminophen, cloNIDine, diphenhydrAMINE, naloxone, ondansetron, promethazine (PHENERGAN) IVPB     Review of patient's allergies indicates:   Allergen Reactions    No known drug allergies      Objective:     Vital Signs (Most Recent):  Temp: 98.1 °F (36.7 °C) (04/06/17 0325)  Pulse: 61 (04/06/17 0325)  Resp: 19 (04/06/17 0325)  BP: 114/73 (04/06/17 0325)  SpO2: 98 % (04/06/17 0325) Vital Signs (24h Range):  Temp:  [97.6 °F (36.4 °C)-98.2 °F (36.8 °C)] 98.1 °F (36.7 °C)  Pulse:  [53-75] 61  Resp:  [16-20] 19  SpO2:  [95 %-99 %] 98 %  BP: (100-135)/(59-73) 114/73     Weight: 76.8 kg (169 lb 5 oz)  Body mass index is 30.97 kg/(m^2).    Intake/Output - Last 3 Shifts       04/04 0700 - 04/05 0659 04/05 0700 - 04/06 0659 04/06 0700 - 04/07 0659    P.O. 0 1020     I.V. (mL/kg) 2446 (31.8) 1391.9 (18.1) 3 (0)    IV Piggyback 200      Total Intake(mL/kg) 2646 (34.5) 2411.9 (31.4) 3 (0)    Urine (mL/kg/hr) 3500 2700 (1.5)     Stool 0      Blood 150      Total Output 3650 2700      Net -1004 -288.1 +3           Urine Occurrence  3 x     Stool Occurrence 0 x 0 x           Physical Exam   Constitutional:   Over weight   HENT:   Head: Normocephalic.   Eyes: No scleral icterus.   Neck: Normal range of motion. Neck supple.   Cardiovascular: Normal rate.    Pulmonary/Chest: Effort normal and breath sounds normal.   Abdominal: Soft. Bowel sounds are normal. She exhibits no distension. There is tenderness (incisional).   Incision is dressed, no draiange   Neurological: She is alert.   Skin: Skin is warm and dry.   Vitals reviewed.      Significant  Labs:  none    Significant Diagnostics:  none

## 2017-04-06 NOTE — PLAN OF CARE
Problem: Patient Care Overview  Goal: Plan of Care Review  PT IS IND WITH GROSS FUNC MOBILITY AND WILL BE D/C TO MOBILITY PROGRAM.   Outcome: Ongoing (interventions implemented as appropriate)  Pt remain free from fall this shift. Pain controlled per dilaudid pca pump. Pt ambulated in hallway with pt tolerated well. 12 hr chart check completed.

## 2017-04-06 NOTE — ASSESSMENT & PLAN NOTE
Open cholecystectomy pod   full liquid diet  Discontinue pca and ivf  Oral and iv narcotics for pain control

## 2017-04-06 NOTE — NURSING
New PCA syringe administered. Previous syringe started in PACU, unable to waste in pyxis. Wasted 13.2 mLs, witnessed by YULIA Rizvi.

## 2017-04-06 NOTE — PROGRESS NOTES
[facility]  General Surgery  Progress Note    Subjective:     History of Present Illness:       Post-Op Info:  Procedure(s) (LRB):  CHOLECYSTECTOMY (N/A)  CHOLECYSTECTOMY-LAPAROSCOPIC (N/A)   2 Days Post-Op     Interval History:   Incisional pain, tolerated clear liquids    Medications:  Continuous Infusions:   hydromorphone in 0.9 % NaCl 6 mg/30 ml      lactated ringers 75 mL/hr at 04/05/17 2210     Scheduled Meds:   chlorthalidone  25 mg Oral Daily    enoxparin  40 mg Subcutaneous Q24H    metoprolol tartrate  50 mg Oral BID    spironolactone  50 mg Oral Daily     PRN Meds:acetaminophen, cloNIDine, diphenhydrAMINE, naloxone, ondansetron, promethazine (PHENERGAN) IVPB     Review of patient's allergies indicates:   Allergen Reactions    No known drug allergies      Objective:     Vital Signs (Most Recent):  Temp: 98.1 °F (36.7 °C) (04/06/17 0325)  Pulse: 61 (04/06/17 0325)  Resp: 19 (04/06/17 0325)  BP: 114/73 (04/06/17 0325)  SpO2: 98 % (04/06/17 0325) Vital Signs (24h Range):  Temp:  [97.6 °F (36.4 °C)-98.2 °F (36.8 °C)] 98.1 °F (36.7 °C)  Pulse:  [53-75] 61  Resp:  [16-20] 19  SpO2:  [95 %-99 %] 98 %  BP: (100-135)/(59-73) 114/73     Weight: 76.8 kg (169 lb 5 oz)  Body mass index is 30.97 kg/(m^2).    Intake/Output - Last 3 Shifts       04/04 0700 - 04/05 0659 04/05 0700 - 04/06 0659 04/06 0700 - 04/07 0659    P.O. 0 1020     I.V. (mL/kg) 2446 (31.8) 1391.9 (18.1) 3 (0)    IV Piggyback 200      Total Intake(mL/kg) 2646 (34.5) 2411.9 (31.4) 3 (0)    Urine (mL/kg/hr) 3500 2700 (1.5)     Stool 0      Blood 150      Total Output 3650 2700      Net -1004 -288.1 +3           Urine Occurrence  3 x     Stool Occurrence 0 x 0 x           Physical Exam   Constitutional:   Over weight   HENT:   Head: Normocephalic.   Eyes: No scleral icterus.   Neck: Normal range of motion. Neck supple.   Cardiovascular: Normal rate.    Pulmonary/Chest: Effort normal and breath sounds normal.   Abdominal: Soft. Bowel  sounds are normal. She exhibits no distension. There is tenderness (incisional).   Incision is dressed, no draiange   Neurological: She is alert.   Skin: Skin is warm and dry.   Vitals reviewed.      Significant Labs:  none    Significant Diagnostics:  none    Assessment/Plan:     * Calculus of gallbladder with acute cholecystitis without obstruction  Open cholecystectomy pod   full liquid diet  Discontinue pca and ivf  Oral and iv narcotics for pain control      Essential hypertension  Continue home medicaiton      Baudilio Garcia MD  General Surgery  [facility]

## 2017-04-07 PROCEDURE — 94799 UNLISTED PULMONARY SVC/PX: CPT

## 2017-04-07 PROCEDURE — 63600175 PHARM REV CODE 636 W HCPCS: Performed by: SURGERY

## 2017-04-07 PROCEDURE — 11000001 HC ACUTE MED/SURG PRIVATE ROOM

## 2017-04-07 PROCEDURE — 25000003 PHARM REV CODE 250: Performed by: SURGERY

## 2017-04-07 RX ORDER — BISACODYL 5 MG
10 TABLET, DELAYED RELEASE (ENTERIC COATED) ORAL 2 TIMES DAILY
Status: DISCONTINUED | OUTPATIENT
Start: 2017-04-07 | End: 2017-04-08 | Stop reason: HOSPADM

## 2017-04-07 RX ADMIN — SODIUM CHLORIDE, SODIUM LACTATE, POTASSIUM CHLORIDE, AND CALCIUM CHLORIDE: .6; .31; .03; .02 INJECTION, SOLUTION INTRAVENOUS at 05:04

## 2017-04-07 RX ADMIN — METOPROLOL TARTRATE 50 MG: 50 TABLET ORAL at 08:04

## 2017-04-07 RX ADMIN — HYDROCODONE BITARTRATE AND ACETAMINOPHEN 1 TABLET: 5; 325 TABLET ORAL at 03:04

## 2017-04-07 RX ADMIN — CHLORTHALIDONE 25 MG: 25 TABLET ORAL at 08:04

## 2017-04-07 RX ADMIN — ENOXAPARIN SODIUM 40 MG: 100 INJECTION SUBCUTANEOUS at 05:04

## 2017-04-07 RX ADMIN — SPIRONOLACTONE 50 MG: 25 TABLET ORAL at 08:04

## 2017-04-07 RX ADMIN — BISACODYL 10 MG: 5 TABLET, COATED ORAL at 08:04

## 2017-04-07 RX ADMIN — SODIUM CHLORIDE, SODIUM LACTATE, POTASSIUM CHLORIDE, AND CALCIUM CHLORIDE: .6; .31; .03; .02 INJECTION, SOLUTION INTRAVENOUS at 03:04

## 2017-04-07 NOTE — PROGRESS NOTES
[facility]  General Surgery  Progress Note    Subjective:     History of Present Illness:    admitted for cholecystectomy       Post-Op Info:  Procedure(s) (LRB):  CHOLECYSTECTOMY (N/A)  CHOLECYSTECTOMY-LAPAROSCOPIC (N/A)   3 Days Post-Op     Interval History: tolerated full liquids but feels a bit bloated    Medications:  Continuous Infusions:   lactated ringers 75 mL/hr at 04/07/17 0330     Scheduled Meds:   bisacodyl  10 mg Oral BID    chlorthalidone  25 mg Oral Daily    enoxparin  40 mg Subcutaneous Q24H    metoprolol tartrate  50 mg Oral BID    spironolactone  50 mg Oral Daily     PRN Meds:acetaminophen, cloNIDine, diphenhydrAMINE, hydrocodone-acetaminophen 5-325mg, hydrocodone-acetaminophen 7.5-325mg, morphine, ondansetron, promethazine (PHENERGAN) IVPB     Review of patient's allergies indicates:   Allergen Reactions    No known drug allergies      Objective:     Vital Signs (Most Recent):  Temp: 98.2 °F (36.8 °C) (04/07/17 0826)  Pulse: 85 (04/07/17 0826)  Resp: 18 (04/07/17 0826)  BP: 119/70 (04/07/17 0826)  SpO2: 95 % (04/07/17 0826) Vital Signs (24h Range):  Temp:  [97.9 °F (36.6 °C)-98.7 °F (37.1 °C)] 98.2 °F (36.8 °C)  Pulse:  [62-91] 85  Resp:  [16-18] 18  SpO2:  [92 %-99 %] 95 %  BP: (102-119)/(51-74) 119/70     Weight: 76.8 kg (169 lb 5 oz)  Body mass index is 30.97 kg/(m^2).    Intake/Output - Last 3 Shifts       04/05 0700 - 04/06 0659 04/06 0700 - 04/07 0659 04/07 0700 - 04/08 0659    P.O. 1020 720     I.V. (mL/kg) 1391.9 (18.1) 1916.1 (24.9)     IV Piggyback       Total Intake(mL/kg) 2411.9 (31.4) 2636.1 (34.3)     Urine (mL/kg/hr) 2700 (1.5) 4 (0)     Stool  0 (0)     Blood       Total Output 2700 4      Net -288.1 +2632.1             Urine Occurrence 3 x 2 x     Stool Occurrence 0 x 0 x           Physical Exam   Constitutional: No distress.   Over weight   HENT:   Head: Normocephalic and atraumatic.   Eyes: No scleral icterus.   Neck: Normal range of motion.    Cardiovascular: Normal rate, regular rhythm and normal heart sounds.    Pulmonary/Chest: Breath sounds normal.   Abdominal: Soft. Bowel sounds are normal. Distention: minimal. There is tenderness (inciaional).   Incision is clean   Neurological: She is alert.   Skin: Skin is warm and dry.   Psychiatric: She has a normal mood and affect. Her behavior is normal. Judgment and thought content normal.   Vitals reviewed.      Significant Labs:  none    Significant Diagnostics:  none    Assessment/Plan:     * Calculus of gallbladder with acute cholecystitis without obstruction  Open cholecystectomy pod 3  full liquid diet  Oral and iv narcotics for pain control  Dulcolax  Likely regular diet and discharge in am      Essential hypertension  Continue home medicaiton      Baudilio Garcia MD  General Surgery  [facility]

## 2017-04-07 NOTE — PLAN OF CARE
Problem: Patient Care Overview  Goal: Plan of Care Review  PT IS IND WITH GROSS FUNC MOBILITY AND WILL BE D/C TO MOBILITY PROGRAM.   Outcome: Ongoing (interventions implemented as appropriate)  Pt remained free of injury during shift, stable condition, pain adequately controlled with PRN oral medication, dressing CDI, receiving IV fluids, no acute distress, and will continue to monitor. 24hr chart review performed.

## 2017-04-07 NOTE — PLAN OF CARE
Problem: Patient Care Overview  Goal: Plan of Care Review  PT IS IND WITH GROSS FUNC MOBILITY AND WILL BE D/C TO MOBILITY PROGRAM.   Outcome: Ongoing (interventions implemented as appropriate)  Remains injury free. Pain managed appropriately. Tolerating diet. Ambulating without difficulty. Bowel movement today. Will monitor.

## 2017-04-07 NOTE — ASSESSMENT & PLAN NOTE
Open cholecystectomy pod 3  full liquid diet  Oral and iv narcotics for pain control  Dulcolax  Likely regular diet and discharge in am

## 2017-04-07 NOTE — SUBJECTIVE & OBJECTIVE
Interval History: tolerated full liquids but feels a bit bloated    Medications:  Continuous Infusions:   lactated ringers 75 mL/hr at 04/07/17 0330     Scheduled Meds:   bisacodyl  10 mg Oral BID    chlorthalidone  25 mg Oral Daily    enoxparin  40 mg Subcutaneous Q24H    metoprolol tartrate  50 mg Oral BID    spironolactone  50 mg Oral Daily     PRN Meds:acetaminophen, cloNIDine, diphenhydrAMINE, hydrocodone-acetaminophen 5-325mg, hydrocodone-acetaminophen 7.5-325mg, morphine, ondansetron, promethazine (PHENERGAN) IVPB     Review of patient's allergies indicates:   Allergen Reactions    No known drug allergies      Objective:     Vital Signs (Most Recent):  Temp: 98.2 °F (36.8 °C) (04/07/17 0826)  Pulse: 85 (04/07/17 0826)  Resp: 18 (04/07/17 0826)  BP: 119/70 (04/07/17 0826)  SpO2: 95 % (04/07/17 0826) Vital Signs (24h Range):  Temp:  [97.9 °F (36.6 °C)-98.7 °F (37.1 °C)] 98.2 °F (36.8 °C)  Pulse:  [62-91] 85  Resp:  [16-18] 18  SpO2:  [92 %-99 %] 95 %  BP: (102-119)/(51-74) 119/70     Weight: 76.8 kg (169 lb 5 oz)  Body mass index is 30.97 kg/(m^2).    Intake/Output - Last 3 Shifts       04/05 0700 - 04/06 0659 04/06 0700 - 04/07 0659 04/07 0700 - 04/08 0659    P.O. 1020 720     I.V. (mL/kg) 1391.9 (18.1) 1916.1 (24.9)     IV Piggyback       Total Intake(mL/kg) 2411.9 (31.4) 2636.1 (34.3)     Urine (mL/kg/hr) 2700 (1.5) 4 (0)     Stool  0 (0)     Blood       Total Output 2700 4      Net -288.1 +2632.1             Urine Occurrence 3 x 2 x     Stool Occurrence 0 x 0 x           Physical Exam   Constitutional: No distress.   Over weight   HENT:   Head: Normocephalic and atraumatic.   Eyes: No scleral icterus.   Neck: Normal range of motion.   Cardiovascular: Normal rate, regular rhythm and normal heart sounds.    Pulmonary/Chest: Breath sounds normal.   Abdominal: Soft. Bowel sounds are normal. Distention: minimal. There is tenderness (inciaional).   Incision is clean   Neurological: She is alert.   Skin:  Skin is warm and dry.   Psychiatric: She has a normal mood and affect. Her behavior is normal. Judgment and thought content normal.   Vitals reviewed.      Significant Labs:  none    Significant Diagnostics:  none

## 2017-04-08 VITALS
BODY MASS INDEX: 31.16 KG/M2 | SYSTOLIC BLOOD PRESSURE: 119 MMHG | HEIGHT: 62 IN | DIASTOLIC BLOOD PRESSURE: 69 MMHG | RESPIRATION RATE: 18 BRPM | OXYGEN SATURATION: 99 % | TEMPERATURE: 98 F | WEIGHT: 169.31 LBS | HEART RATE: 80 BPM

## 2017-04-08 PROCEDURE — 99900035 HC TECH TIME PER 15 MIN (STAT)

## 2017-04-08 PROCEDURE — 25000003 PHARM REV CODE 250: Performed by: SURGERY

## 2017-04-08 PROCEDURE — 94799 UNLISTED PULMONARY SVC/PX: CPT

## 2017-04-08 RX ORDER — HYDROCODONE BITARTRATE AND ACETAMINOPHEN 5; 325 MG/1; MG/1
1 TABLET ORAL EVERY 4 HOURS PRN
Qty: 30 TABLET | Refills: 0 | Status: SHIPPED | OUTPATIENT
Start: 2017-04-08 | End: 2017-06-05

## 2017-04-08 RX ADMIN — CHLORTHALIDONE 25 MG: 25 TABLET ORAL at 09:04

## 2017-04-08 RX ADMIN — SODIUM CHLORIDE, SODIUM LACTATE, POTASSIUM CHLORIDE, AND CALCIUM CHLORIDE: .6; .31; .03; .02 INJECTION, SOLUTION INTRAVENOUS at 05:04

## 2017-04-08 RX ADMIN — SPIRONOLACTONE 50 MG: 25 TABLET ORAL at 09:04

## 2017-04-08 RX ADMIN — HYDROCODONE BITARTRATE AND ACETAMINOPHEN 1 TABLET: 7.5; 325 TABLET ORAL at 02:04

## 2017-04-08 RX ADMIN — METOPROLOL TARTRATE 50 MG: 50 TABLET ORAL at 09:04

## 2017-04-08 NOTE — PROGRESS NOTES
[facility]  General Surgery  Progress Note    Subjective:     History of Present Illness:       Post-Op Info:  Procedure(s) (LRB):  CHOLECYSTECTOMY (N/A)  CHOLECYSTECTOMY-LAPAROSCOPIC (N/A)   4 Days Post-Op     Interval History:  Much less Bloated, pain improved  Tolerated her diet    Medications:  Continuous Infusions:   lactated ringers 75 mL/hr at 04/08/17 0529     Scheduled Meds:   bisacodyl  10 mg Oral BID    chlorthalidone  25 mg Oral Daily    enoxparin  40 mg Subcutaneous Q24H    metoprolol tartrate  50 mg Oral BID    spironolactone  50 mg Oral Daily     PRN Meds:acetaminophen, cloNIDine, diphenhydrAMINE, hydrocodone-acetaminophen 5-325mg, hydrocodone-acetaminophen 7.5-325mg, morphine, ondansetron, promethazine (PHENERGAN) IVPB     Review of patient's allergies indicates:   Allergen Reactions    No known drug allergies      Objective:     Vital Signs (Most Recent):  Temp: 98.8 °F (37.1 °C) (04/08/17 0433)  Pulse: 75 (04/08/17 0931)  Resp: 16 (04/08/17 0433)  BP: (!) 119/57 (04/08/17 0931)  SpO2: (!) 92 % (04/08/17 0433) Vital Signs (24h Range):  Temp:  [98.2 °F (36.8 °C)-99.2 °F (37.3 °C)] 98.8 °F (37.1 °C)  Pulse:  [75-88] 75  Resp:  [16-18] 16  SpO2:  [92 %-95 %] 92 %  BP: (107-130)/(57-76) 119/57     Weight: 76.8 kg (169 lb 5 oz)  Body mass index is 30.97 kg/(m^2).    Intake/Output - Last 3 Shifts       04/06 0700 - 04/07 0659 04/07 0700 - 04/08 0659 04/08 0700 - 04/09 0659    P.O. 720 480     I.V. (mL/kg) 1916.1 (24.9) 1762.5 (22.9)     Total Intake(mL/kg) 2636.1 (34.3) 2242.5 (29.2)     Urine (mL/kg/hr) 4 (0)      Stool 0 (0)      Total Output 4        Net +2632.1 +2242.5             Urine Occurrence 2 x 3 x     Stool Occurrence 0 x 1 x           Physical Exam    Significant Labs:  none    Significant Diagnostics:  none    Assessment/Plan:     * Calculus of gallbladder with acute cholecystitis without obstruction  Open cholecystectomy pod 3  Regular diet  Oral and iv narcotics for  pain control  Regular diet  Discharge if she tolerates her diet    Essential hypertension  Continue home medicaiton      Baudilio Garcia MD  General Surgery  [facility]

## 2017-04-08 NOTE — PROGRESS NOTES
[facility]  General Surgery  Progress Note    Subjective:     History of Present Illness:       Post-Op Info:  Procedure(s) (LRB):  CHOLECYSTECTOMY (N/A)  CHOLECYSTECTOMY-LAPAROSCOPIC (N/A)   4 Days Post-Op     Interval History: much less bloated, mild pain    Medications:  Continuous Infusions:   lactated ringers 75 mL/hr at 04/08/17 0529     Scheduled Meds:   bisacodyl  10 mg Oral BID    chlorthalidone  25 mg Oral Daily    enoxparin  40 mg Subcutaneous Q24H    metoprolol tartrate  50 mg Oral BID    spironolactone  50 mg Oral Daily     PRN Meds:acetaminophen, cloNIDine, diphenhydrAMINE, hydrocodone-acetaminophen 5-325mg, hydrocodone-acetaminophen 7.5-325mg, morphine, ondansetron, promethazine (PHENERGAN) IVPB     Review of patient's allergies indicates:   Allergen Reactions    No known drug allergies      Objective:     Vital Signs (Most Recent):  Temp: 98.8 °F (37.1 °C) (04/08/17 0433)  Pulse: 75 (04/08/17 0931)  Resp: 16 (04/08/17 0433)  BP: (!) 119/57 (04/08/17 0931)  SpO2: (!) 92 % (04/08/17 0433) Vital Signs (24h Range):  Temp:  [98.2 °F (36.8 °C)-99.2 °F (37.3 °C)] 98.8 °F (37.1 °C)  Pulse:  [75-88] 75  Resp:  [16-18] 16  SpO2:  [92 %-95 %] 92 %  BP: (107-130)/(57-76) 119/57     Weight: 76.8 kg (169 lb 5 oz)  Body mass index is 30.97 kg/(m^2).    Intake/Output - Last 3 Shifts       04/06 0700 - 04/07 0659 04/07 0700 - 04/08 0659 04/08 0700 - 04/09 0659    P.O. 720 480     I.V. (mL/kg) 1916.1 (24.9) 1762.5 (22.9)     Total Intake(mL/kg) 2636.1 (34.3) 2242.5 (29.2)     Urine (mL/kg/hr) 4 (0)      Stool 0 (0)      Total Output 4        Net +2632.1 +2242.5             Urine Occurrence 2 x 3 x     Stool Occurrence 0 x 1 x           Physical Exam   Constitutional: She is oriented to person, place, and time.   overweight   HENT:   Head: Normocephalic.   Eyes: No scleral icterus.   Neck: Normal range of motion.   Cardiovascular: Normal rate and normal heart sounds.    Pulmonary/Chest: Effort  normal and breath sounds normal.   Abdominal: Soft. Bowel sounds are normal. She exhibits no distension. There is tenderness (incisional).   incision is clean   Neurological: She is alert and oriented to person, place, and time.   Skin: Skin is warm and dry.   Psychiatric: She has a normal mood and affect. Her behavior is normal. Thought content normal.   Vitals reviewed.      Significant Labs:  none    Significant Diagnostics:  none    Assessment/Plan:     * Calculus of gallbladder with acute cholecystitis without obstruction  Open cholecystectomy pod 3  Regular diet  Oral and iv narcotics for pain control  Regular diet  Discharge if she tolerates her diet      Baudilio Garcia MD  General Surgery  [facility]

## 2017-04-08 NOTE — PLAN OF CARE
Problem: Patient Care Overview  Goal: Plan of Care Review  PT IS IND WITH GROSS FUNC MOBILITY AND WILL BE D/C TO MOBILITY PROGRAM.   Outcome: Outcome(s) achieved Date Met:  04/08/17  Pt being discharged today. Discharge instruction given verbalized understanding. Tolerate regular diet no c/o nausea or vomiting. Iv removed cath tip.  Intact. Denies pain. 12 hr chart check completed.

## 2017-04-08 NOTE — ASSESSMENT & PLAN NOTE
Open cholecystectomy pod 3  Regular diet  Oral and iv narcotics for pain control  Regular diet  Discharge if she tolerates her diet

## 2017-04-08 NOTE — SUBJECTIVE & OBJECTIVE
Interval History:  Much less Bloated, pain improved  Tolerated her diet    Medications:  Continuous Infusions:   lactated ringers 75 mL/hr at 04/08/17 0529     Scheduled Meds:   bisacodyl  10 mg Oral BID    chlorthalidone  25 mg Oral Daily    enoxparin  40 mg Subcutaneous Q24H    metoprolol tartrate  50 mg Oral BID    spironolactone  50 mg Oral Daily     PRN Meds:acetaminophen, cloNIDine, diphenhydrAMINE, hydrocodone-acetaminophen 5-325mg, hydrocodone-acetaminophen 7.5-325mg, morphine, ondansetron, promethazine (PHENERGAN) IVPB     Review of patient's allergies indicates:   Allergen Reactions    No known drug allergies      Objective:     Vital Signs (Most Recent):  Temp: 98.8 °F (37.1 °C) (04/08/17 0433)  Pulse: 75 (04/08/17 0931)  Resp: 16 (04/08/17 0433)  BP: (!) 119/57 (04/08/17 0931)  SpO2: (!) 92 % (04/08/17 0433) Vital Signs (24h Range):  Temp:  [98.2 °F (36.8 °C)-99.2 °F (37.3 °C)] 98.8 °F (37.1 °C)  Pulse:  [75-88] 75  Resp:  [16-18] 16  SpO2:  [92 %-95 %] 92 %  BP: (107-130)/(57-76) 119/57     Weight: 76.8 kg (169 lb 5 oz)  Body mass index is 30.97 kg/(m^2).    Intake/Output - Last 3 Shifts       04/06 0700 - 04/07 0659 04/07 0700 - 04/08 0659 04/08 0700 - 04/09 0659    P.O. 720 480     I.V. (mL/kg) 1916.1 (24.9) 1762.5 (22.9)     Total Intake(mL/kg) 2636.1 (34.3) 2242.5 (29.2)     Urine (mL/kg/hr) 4 (0)      Stool 0 (0)      Total Output 4        Net +2632.1 +2242.5             Urine Occurrence 2 x 3 x     Stool Occurrence 0 x 1 x           Physical Exam    Significant Labs:  none    Significant Diagnostics:  none

## 2017-04-08 NOTE — DISCHARGE INSTRUCTIONS
Please call for any fever, increase in pain, nausea or vomiting or redness or drainage from incision(s).    No lifting more than 30 pounds for 4 weeks    May shower   If you become constipated from the pain medication you can use over the counter laxatives,  Miralax or Glycolax, or Magnesium Citrate for severe constipation.

## 2017-04-08 NOTE — SUBJECTIVE & OBJECTIVE
Interval History: much less bloated, mild pain    Medications:  Continuous Infusions:   lactated ringers 75 mL/hr at 04/08/17 0529     Scheduled Meds:   bisacodyl  10 mg Oral BID    chlorthalidone  25 mg Oral Daily    enoxparin  40 mg Subcutaneous Q24H    metoprolol tartrate  50 mg Oral BID    spironolactone  50 mg Oral Daily     PRN Meds:acetaminophen, cloNIDine, diphenhydrAMINE, hydrocodone-acetaminophen 5-325mg, hydrocodone-acetaminophen 7.5-325mg, morphine, ondansetron, promethazine (PHENERGAN) IVPB     Review of patient's allergies indicates:   Allergen Reactions    No known drug allergies      Objective:     Vital Signs (Most Recent):  Temp: 98.8 °F (37.1 °C) (04/08/17 0433)  Pulse: 75 (04/08/17 0931)  Resp: 16 (04/08/17 0433)  BP: (!) 119/57 (04/08/17 0931)  SpO2: (!) 92 % (04/08/17 0433) Vital Signs (24h Range):  Temp:  [98.2 °F (36.8 °C)-99.2 °F (37.3 °C)] 98.8 °F (37.1 °C)  Pulse:  [75-88] 75  Resp:  [16-18] 16  SpO2:  [92 %-95 %] 92 %  BP: (107-130)/(57-76) 119/57     Weight: 76.8 kg (169 lb 5 oz)  Body mass index is 30.97 kg/(m^2).    Intake/Output - Last 3 Shifts       04/06 0700 - 04/07 0659 04/07 0700 - 04/08 0659 04/08 0700 - 04/09 0659    P.O. 720 480     I.V. (mL/kg) 1916.1 (24.9) 1762.5 (22.9)     Total Intake(mL/kg) 2636.1 (34.3) 2242.5 (29.2)     Urine (mL/kg/hr) 4 (0)      Stool 0 (0)      Total Output 4        Net +2632.1 +2242.5             Urine Occurrence 2 x 3 x     Stool Occurrence 0 x 1 x           Physical Exam   Constitutional: She is oriented to person, place, and time.   overweight   HENT:   Head: Normocephalic.   Eyes: No scleral icterus.   Neck: Normal range of motion.   Cardiovascular: Normal rate and normal heart sounds.    Pulmonary/Chest: Effort normal and breath sounds normal.   Abdominal: Soft. Bowel sounds are normal. She exhibits no distension. There is tenderness (incisional).   incision is clean   Neurological: She is alert and oriented to person, place, and time.    Skin: Skin is warm and dry.   Psychiatric: She has a normal mood and affect. Her behavior is normal. Thought content normal.   Vitals reviewed.      Significant Labs:  none    Significant Diagnostics:  none

## 2017-04-08 NOTE — PLAN OF CARE
Problem: Patient Care Overview  Goal: Plan of Care Review  PT IS IND WITH GROSS FUNC MOBILITY AND WILL BE D/C TO MOBILITY PROGRAM.   Outcome: Ongoing (interventions implemented as appropriate)  Pt verbalized understanding of plan of care. Hand washing/ hand sanitizers encouraged.Fall precautions maintained.  Bed locked and low.  Call light and personal items within reach. SR up x 2. Complains of mild discomfort, refused pain medication, distraction and relaxation used for pain control.

## 2017-04-09 PROBLEM — K80.00 CALCULUS OF GALLBLADDER WITH ACUTE CHOLECYSTITIS WITHOUT OBSTRUCTION: Status: RESOLVED | Noted: 2017-04-04 | Resolved: 2017-04-09

## 2017-04-09 NOTE — DISCHARGE SUMMARY
[facility] -   General Surgery  Discharge Summary      Patient Name: Clare Whitley  MRN: [id number]  Admission Date: 4/4/2017  Hospital Length of Stay: 4 days  Discharge Date and Time: 4/8/2017  3:45 PM  Attending Physician: No att. providers found   Discharging Provider: Baudilio Garcia MD  Primary Care Provider: Chhaya Jennings MD    HPI:        Procedure(s) (LRB):  CHOLECYSTECTOMY (N/A)  CHOLECYSTECTOMY-LAPAROSCOPIC (N/A)      Indwelling Lines/Drains at time of discharge:   Lines/Drains/Airways          No matching active lines, drains, or airways        Hospital Course: Patient was admitted by the general surgical service.  She was taken to the operating room where she underwent a attempted laparoscopic/open cholecystectomy.    Postop she was taken to the recovery room and then the floor.    The patient was nothing by mouth with a Chadwick catheter in place until postop day 1.  The Chadwick catheter was removed.  A clear liquid diet was as she did on postop day 2.    On postop day 3 she was advanced to a full liquid diet.  She has some mild bloating.  This was treated with Dulcolax.  The patient had improvement in her bloating, was evaluated and advanced to a regular diet.  With this being tolerated she was discharged home.    The patient's postoperative pain was controlled with pca, iv acetaminophen and Exparel      Consults:     Significant Diagnostic Studies: Labs: BMP: No results for input(s): GLU, NA, K, CL, CO2, BUN, CREATININE, CALCIUM, MG in the last 48 hours., CMP No results for input(s): NA, K, CL, CO2, GLU, BUN, CREATININE, CALCIUM, PROT, ALBUMIN, BILITOT, ALKPHOS, AST, ALT, ANIONGAP, ESTGFRAFRICA, EGFRNONAA in the last 48 hours. and CBC No results for input(s): WBC, HGB, HCT, PLT in the last 48 hours.  Radiology: ultrasound showing acute cholecystitis    Pending Diagnostic Studies:     None        Final Active Diagnoses:    Diagnosis Date Noted POA    PRINCIPAL PROBLEM:  Calculus of  gallbladder with acute cholecystitis without obstruction [K80.00] 04/04/2017 Yes    Essential hypertension [I10] 06/28/2013 Yes     Chronic      Problems Resolved During this Admission:    Diagnosis Date Noted Date Resolved POA      Discharged Condition: stable    Disposition: Home or Self Care    Follow Up:  Follow-up Information     Follow up with Baudilio Garcia MD.    Specialty:  General Surgery    Why:  post op appt, or Ms. Childress on a thursday    Contact information:    [address] [address] [phone]          Patient Instructions:     Diet general     Lifting restrictions   Order Comments: No lifting more than 30 pounds for 4 weeks     Call MD for:  temperature >100.4     Call MD for:  persistent nausea and vomiting or diarrhea     Call MD for:  severe uncontrolled pain     Call MD for:  redness, tenderness, or signs of infection (pain, swelling, redness, odor or green/yellow discharge around incision site)     No dressing needed   Order Comments: Ok to shower       Medications:  Reconciled Home Medications:   Discharge Medication List as of 4/8/2017  3:25 PM      START taking these medications    Details   hydrocodone-acetaminophen 5-325mg (NORCO) 5-325 mg per tablet Take 1 tablet by mouth every 4 (four) hours as needed., Starting 4/8/2017, Until Discontinued, Normal         CONTINUE these medications which have NOT CHANGED    Details   amoxicillin-clavulanate 875-125mg (AUGMENTIN) 875-125 mg per tablet Take 1 tablet by mouth 2 (two) times daily., Starting 3/31/2017, Until Mon 4/10/17, Normal      atenolol-chlorthalidone (TENORETIC) 100-25 mg per tablet Take 1 tablet by mouth once daily. 1 Tablet Oral Every day, Starting 7/5/2016, Until Wed 7/5/17, Normal      spironolactone (ALDACTONE) 50 MG tablet Take 1 tablet (50 mg total) by mouth once daily., Starting 7/5/2016, Until Discontinued, Normal      tramadol (ULTRAM) 50 mg tablet Take 1 tablet (50 mg total) by mouth 3 (three) times  daily as needed for Pain., Starting 3/29/2017, Until Sat 4/8/17, Print           Time spent on the discharge of patient: 5 minutes    Baudilio Garcia MD  General Surgery  [facility]

## 2017-04-24 ENCOUNTER — OFFICE VISIT (OUTPATIENT)
Dept: SURGERY | Facility: CLINIC | Age: 57
End: 2017-04-24
Payer: COMMERCIAL

## 2017-04-24 VITALS
TEMPERATURE: 99 F | BODY MASS INDEX: 30.24 KG/M2 | DIASTOLIC BLOOD PRESSURE: 68 MMHG | SYSTOLIC BLOOD PRESSURE: 106 MMHG | HEART RATE: 77 BPM | WEIGHT: 165.38 LBS

## 2017-04-24 DIAGNOSIS — Z90.49 STATUS POST CHOLECYSTECTOMY: Primary | ICD-10-CM

## 2017-04-24 PROCEDURE — 99024 POSTOP FOLLOW-UP VISIT: CPT | Mod: S$GLB,,, | Performed by: SURGERY

## 2017-04-24 PROCEDURE — 99999 PR PBB SHADOW E&M-EST. PATIENT-LVL III: CPT | Mod: PBBFAC,,, | Performed by: SURGERY

## 2017-04-24 NOTE — MR AVS SNAPSHOT
[facility]  [address] [facility] [address]  Phone: [phone]  Fax: [phone]                  Clare Whitley   2017 9:40 AM   Office Visit    Description:  Female : 1960   Provider:  Baudilio Garcia MD   Department:  [facility]           Reason for Visit     Post-op Evaluation           Diagnoses this Visit        Comments    Status post cholecystectomy    -  Primary            To Do List           Future Appointments        Provider Department Dept Phone    2017 9:20 AM Baudilio Garcia MD [facility] [phone]    2017 7:40 AM Chhaya Jennings MD [facility] [phone]      Goals (5 Years of Data)     None      [facility]     [facility] On Call Nurse Care Line -  Assistance  Unless otherwise directed by your provider, please contact [facility], our nurse care line that is available for  assistance.     Registered nurses in the [facility] provide: appointment scheduling, clinical advisement, health education, and other advisory services.  Call: [phone] (toll free)               Medications           Message regarding Medications     Verify the changes and/or additions to your medication regime listed below are the same as discussed with your clinician today.  If any of these changes or additions are incorrect, please notify your healthcare provider.             Verify that the below list of medications is an accurate representation of the medications you are currently taking.  If none reported, the list may be blank. If incorrect, please contact your healthcare provider. Carry this list with you in case of emergency.           Current Medications     atenolol-chlorthalidone (TENORETIC) 100-25 mg per tablet Take 1 tablet by mouth once daily. 1 Tablet Oral Every day    hydrocodone-acetaminophen 5-325mg (NORCO) 5-325 mg per tablet Take 1 tablet by mouth every 4  (four) hours as needed.    spironolactone (ALDACTONE) 50 MG tablet Take 1 tablet (50 mg total) by mouth once daily.           Clinical Reference Information           Your Vitals Were     BP Pulse Temp Weight BMI    106/68 77 98.6 °F (37 °C) 75 kg (165 lb 5.5 oz) 30.24 kg/m2      Blood Pressure          Most Recent Value    BP  106/68      Allergies as of 4/24/2017     No Known Drug Allergies      Immunizations Administered on Date of Encounter - 4/24/2017     None      [facility] Sign-Up     Activating your [facility] account is as easy as 1-2-3!     1) Visit [email or web address], select Sign Up Now, enter this activation code and your date of birth, then select Next.  [email or web address]  Expires: 5/13/2017  9:29 AM      2) Create a username and password to use when you visit [facility] in the future and select a security question in case you lose your password and select Next.    3) Enter your e-mail address and click Sign Up!    Additional Information  If you have questions, please e-mail [email or web address] or call [phone] to talk to our [facility] staff. Remember, [facility] is NOT to be used for urgent needs. For medical emergencies, dial 911.         Instructions    It is okay to shower and get the area wet.  If after the little paper strips are removed there is adhesive remaining being you can use fingernail polish remover or mineral to get it off.    You may return to work on May 15, 2017 without restrictions.    We will see you back in 4-6 weeks.    It is okay to start driving as long as you're not using the pain medicine on a regular basis       Language Assistance Services     ATTENTION: Language assistance services are available, free of charge. Please call [phone].      ATENCIÓN: Si sonja paul, tiene a nix disposición servicios gratuitos de asistencia lingüística. Llame al [phone].     CHÚ Ý: N?u b?n nói Ti?ng Vi?t, có các d?ch v? h? tr? ngôn ng? mi?n phí dành cho b?n. G?i s?  [phone].         [facility] complies with applicable Federal civil rights laws and does not discriminate on the basis of race, color, national origin, age, disability, or sex.

## 2017-04-24 NOTE — PATIENT INSTRUCTIONS
It is okay to shower and get the area wet.  If after the little paper strips are removed there is adhesive remaining being you can use fingernail polish remover or mineral to get it off.    You may return to work on May 15, 2017 without restrictions.    We will see you back in 4-6 weeks.    It is okay to start driving as long as you're not using the pain medicine on a regular basis

## 2017-04-24 NOTE — PROGRESS NOTES
Subjective:       Patient ID: Clare Whitley is a 56 y.o. female.    Open cholecystectomy    Chief Complaint: Post-op Evaluation    HPI Comments: Returns after open cholecystectomy.  Having some mild incisional pain.  Tolerating a diet.  No issues with constipation or diarrhea.  A bit anxious about returning to work    Review of Systems   Gastrointestinal:        Incisional pain otherwise negative       Objective:      Physical Exam   Constitutional: She appears well-developed and well-nourished. No distress.   Abdominal: Soft. Bowel sounds are normal.   Right subcostal incision is healing well.  There is no erythema or drainage.  Staples removed, Steri-Strips placed   Vitals reviewed.      Pathology was consistent with cholelithiasis and chronic cholecystitis  Assessment:      recovering well after open cholecystectomy   Plan:     no lifting more than 20 pounds for another 2 weeks.  May return to work on May 15 without restriction.    Follow-up in 4-6 weeks

## 2017-04-27 ENCOUNTER — TELEPHONE (OUTPATIENT)
Dept: SURGERY | Facility: CLINIC | Age: 57
End: 2017-04-27

## 2017-04-27 NOTE — TELEPHONE ENCOUNTER
----- Message from Tobias Andrade sent at 4/27/2017 10:04 AM CDT -----  Contact: pt  She's calling in regards to the letter for her employer, please advise, [phone] (cell)

## 2017-05-01 ENCOUNTER — TELEPHONE (OUTPATIENT)
Dept: SURGERY | Facility: CLINIC | Age: 57
End: 2017-05-01

## 2017-05-01 NOTE — TELEPHONE ENCOUNTER
----- Message from Bree Tai sent at 5/1/2017  9:24 AM CDT -----  Contact: Patient  Patient called to speak with the nurse; she never received the letter to return to work that was supposed to be emailed to her last week.     Email: [email or web address]     She can be contacted at [phone].    Thanks,  Bree

## 2017-05-01 NOTE — TELEPHONE ENCOUNTER
Spoke to patient she is aware that her excuse was emailed to the provided e-mail address, she verbalized understanding.

## 2017-06-05 ENCOUNTER — OFFICE VISIT (OUTPATIENT)
Dept: SURGERY | Facility: CLINIC | Age: 57
End: 2017-06-05
Payer: COMMERCIAL

## 2017-06-05 VITALS
SYSTOLIC BLOOD PRESSURE: 110 MMHG | HEART RATE: 74 BPM | TEMPERATURE: 98 F | DIASTOLIC BLOOD PRESSURE: 68 MMHG | WEIGHT: 174.19 LBS | BODY MASS INDEX: 31.85 KG/M2

## 2017-06-05 DIAGNOSIS — Z90.49 STATUS POST CHOLECYSTECTOMY: Primary | ICD-10-CM

## 2017-06-05 PROCEDURE — 99999 PR PBB SHADOW E&M-EST. PATIENT-LVL III: CPT | Mod: PBBFAC,,, | Performed by: SURGERY

## 2017-06-05 PROCEDURE — 99024 POSTOP FOLLOW-UP VISIT: CPT | Mod: S$GLB,,, | Performed by: SURGERY

## 2017-06-05 NOTE — PROGRESS NOTES
Subjective:       Patient ID: Clare Whitley is a 56 y.o. female.    Returns approximately 2 months after an open cholecystectomy    Chief Complaint: No chief complaint on file.    Patient is status post open cholecystectomy for chronic cholecystitis.    She states that the incisional pain is resolved.  She has resumed her normal activity    She is tolerating a diet no diarrhea or constipation    She has some occasional back pain      Review of Systems   Gastrointestinal: Negative.        Objective:      Physical Exam   Constitutional: She appears well-developed and well-nourished. No distress.   Eyes: No scleral icterus.   Cardiovascular: Normal rate, regular rhythm and normal heart sounds.    Pulmonary/Chest: Effort normal and breath sounds normal.   Abdominal: Soft. Bowel sounds are normal. She exhibits no distension.   The right subcostal incision is healing well.  Is no evidence of a hernia   Vitals reviewed.      Assessment:     open cholecystectomy, patient has recovered from her surgery    Plan:       activity as tolerated, follow-up with surgery as needed

## 2017-06-05 NOTE — PATIENT INSTRUCTIONS
There are no limits on your activity.    We will see you back as needed    If your back is bothering you you can try over-the-counter medicines like Tylenol, Aleve or Motrin.  If he gets any worse please let your primary care doctor know

## 2017-07-12 ENCOUNTER — PATIENT OUTREACH (OUTPATIENT)
Dept: ADMINISTRATIVE | Facility: HOSPITAL | Age: 57
End: 2017-07-12

## 2017-07-12 NOTE — PROGRESS NOTES
Assisted to schedule lab, prior to dr orellana. States she has had pap, mammo in the past year. Requested copy of report.

## 2017-07-12 NOTE — LETTER
July 12, 2017    Dr Dank Joe MD  [address], [address], [place] [address]             [facility]  [address]  [place] [address]  Phone: [phone] July 12, 2017     Patient: Clare Whitley    YOB: 1960   Date of Visit: 7/12/2017         To whom it may concern       We are seeing Clare JONATHAN Castaneda.O.B is 1960, at [facility]. Chhaya Jennings MD is their primary care physician. To help with our [facility] maintenance records could you please send the following:     Most recent Mammogram Result.  Last exam received:     Please send fax to [phone].    Thank-you in advance for your assistance. If you have any questions or concerns, please don't hesitate to contact me at [phone].     Kimber CHENEY LPN Care Coordination   [facility]  Phone [phone] ext [phone],  Fax [phone]

## 2017-07-18 ENCOUNTER — LAB VISIT (OUTPATIENT)
Dept: LAB | Facility: HOSPITAL | Age: 57
End: 2017-07-18
Attending: FAMILY MEDICINE
Payer: COMMERCIAL

## 2017-07-18 DIAGNOSIS — Z13.29 THYROID DISORDER SCREEN: ICD-10-CM

## 2017-07-18 DIAGNOSIS — Z13.220 SCREENING FOR LIPOID DISORDERS: ICD-10-CM

## 2017-07-18 DIAGNOSIS — Z00.00 ROUTINE GENERAL MEDICAL EXAMINATION AT A HEALTH CARE FACILITY: ICD-10-CM

## 2017-07-18 LAB
ALBUMIN SERPL BCP-MCNC: 3.9 G/DL
ALP SERPL-CCNC: 29 U/L
ALT SERPL W/O P-5'-P-CCNC: 28 U/L
ANION GAP SERPL CALC-SCNC: 13 MMOL/L
AST SERPL-CCNC: 29 U/L
BILIRUB SERPL-MCNC: 0.9 MG/DL
BUN SERPL-MCNC: 17 MG/DL
CALCIUM SERPL-MCNC: 10 MG/DL
CHLORIDE SERPL-SCNC: 104 MMOL/L
CHOLEST/HDLC SERPL: 2.7 {RATIO}
CO2 SERPL-SCNC: 23 MMOL/L
CREAT SERPL-MCNC: 0.9 MG/DL
EST. GFR  (AFRICAN AMERICAN): >60 ML/MIN/1.73 M^2
EST. GFR  (NON AFRICAN AMERICAN): >60 ML/MIN/1.73 M^2
GLUCOSE SERPL-MCNC: 87 MG/DL
HDL/CHOLESTEROL RATIO: 37.5 %
HDLC SERPL-MCNC: 152 MG/DL
HDLC SERPL-MCNC: 57 MG/DL
LDLC SERPL CALC-MCNC: 83.8 MG/DL
NONHDLC SERPL-MCNC: 95 MG/DL
POTASSIUM SERPL-SCNC: 3.6 MMOL/L
PROT SERPL-MCNC: 8.7 G/DL
SODIUM SERPL-SCNC: 140 MMOL/L
TRIGL SERPL-MCNC: 56 MG/DL
TSH SERPL DL<=0.005 MIU/L-ACNC: 0.84 UIU/ML

## 2017-07-18 PROCEDURE — 80053 COMPREHEN METABOLIC PANEL: CPT

## 2017-07-18 PROCEDURE — 80061 LIPID PANEL: CPT

## 2017-07-18 PROCEDURE — 36415 COLL VENOUS BLD VENIPUNCTURE: CPT

## 2017-07-18 PROCEDURE — 84443 ASSAY THYROID STIM HORMONE: CPT

## 2017-07-26 ENCOUNTER — OFFICE VISIT (OUTPATIENT)
Dept: INTERNAL MEDICINE | Facility: CLINIC | Age: 57
End: 2017-07-26
Payer: COMMERCIAL

## 2017-07-26 VITALS
RESPIRATION RATE: 16 BRPM | WEIGHT: 179 LBS | BODY MASS INDEX: 32.94 KG/M2 | DIASTOLIC BLOOD PRESSURE: 84 MMHG | HEART RATE: 69 BPM | SYSTOLIC BLOOD PRESSURE: 125 MMHG | OXYGEN SATURATION: 99 % | HEIGHT: 62 IN | TEMPERATURE: 98 F

## 2017-07-26 DIAGNOSIS — M62.838 MUSCLE SPASMS OF NECK: ICD-10-CM

## 2017-07-26 DIAGNOSIS — Z12.11 COLON CANCER SCREENING: ICD-10-CM

## 2017-07-26 DIAGNOSIS — M62.830 MUSCLE SPASM OF BACK: ICD-10-CM

## 2017-07-26 DIAGNOSIS — I10 ESSENTIAL HYPERTENSION: ICD-10-CM

## 2017-07-26 DIAGNOSIS — Z00.00 ROUTINE GENERAL MEDICAL EXAMINATION AT A HEALTH CARE FACILITY: Primary | ICD-10-CM

## 2017-07-26 PROCEDURE — 99396 PREV VISIT EST AGE 40-64: CPT | Mod: S$GLB,,, | Performed by: FAMILY MEDICINE

## 2017-07-26 PROCEDURE — 99999 PR PBB SHADOW E&M-EST. PATIENT-LVL III: CPT | Mod: PBBFAC,,, | Performed by: FAMILY MEDICINE

## 2017-07-26 RX ORDER — SPIRONOLACTONE 50 MG/1
50 TABLET, FILM COATED ORAL DAILY
Qty: 90 TABLET | Refills: 3 | Status: SHIPPED | OUTPATIENT
Start: 2017-07-26 | End: 2018-12-11 | Stop reason: SDUPTHER

## 2017-07-26 RX ORDER — CYCLOBENZAPRINE HCL 5 MG
5-10 TABLET ORAL 3 TIMES DAILY PRN
Qty: 30 TABLET | Refills: 1 | Status: SHIPPED | OUTPATIENT
Start: 2017-07-26 | End: 2017-08-05

## 2017-07-26 RX ORDER — NAPROXEN 500 MG/1
500 TABLET ORAL 2 TIMES DAILY WITH MEALS
Qty: 28 TABLET | Refills: 0 | Status: SHIPPED | OUTPATIENT
Start: 2017-07-26 | End: 2017-08-09

## 2017-07-26 RX ORDER — ATENOLOL AND CHLORTHALIDONE TABLET 100; 25 MG/1; MG/1
1 TABLET ORAL DAILY
Qty: 90 TABLET | Refills: 3 | Status: SHIPPED | OUTPATIENT
Start: 2017-07-26 | End: 2018-12-11 | Stop reason: ALTCHOICE
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
## 2017-09-05 ENCOUNTER — TELEPHONE (OUTPATIENT)
Dept: INTERNAL MEDICINE | Facility: CLINIC | Age: 57
End: 2017-09-05

## 2017-09-05 NOTE — TELEPHONE ENCOUNTER
----- Message from Suzanna Levy sent at 9/5/2017 10:14 AM CDT -----  Contact: Pt  Pt called and stated she needed to speak to the nurse. She stated she had gallbladder surgery in April 2017 and now has itching in her hands and feet. She also stated she needs to know should she schedule an appt.  She can be reached at [phone].    Thanks,  TF

## 2017-09-05 NOTE — TELEPHONE ENCOUNTER
Called Clare Whitley and left a voicemail on 09/05/2017 at 10:41 AM to offer an appointment. Patient advised to return call

## 2017-10-16 ENCOUNTER — OFFICE VISIT (OUTPATIENT)
Dept: URGENT CARE | Facility: CLINIC | Age: 57
End: 2017-10-16
Payer: COMMERCIAL

## 2017-10-16 VITALS
WEIGHT: 178 LBS | BODY MASS INDEX: 32.76 KG/M2 | DIASTOLIC BLOOD PRESSURE: 74 MMHG | HEIGHT: 62 IN | HEART RATE: 84 BPM | TEMPERATURE: 98 F | SYSTOLIC BLOOD PRESSURE: 114 MMHG
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
## 2017-10-16 DIAGNOSIS — S61.451A HUMAN BITE OF RIGHT HAND, INITIAL ENCOUNTER: Primary | ICD-10-CM

## 2017-10-16 PROCEDURE — 99214 OFFICE O/P EST MOD 30 MIN: CPT | Mod: S$GLB,,, | Performed by: NURSE PRACTITIONER

## 2017-10-16 PROCEDURE — 99999 PR PBB SHADOW E&M-EST. PATIENT-LVL III: CPT | Mod: PBBFAC,,, | Performed by: NURSE PRACTITIONER

## 2017-10-16 RX ORDER — MUPIROCIN 20 MG/G
OINTMENT TOPICAL 3 TIMES DAILY
Qty: 30 G | Refills: 0 | Status: SHIPPED | OUTPATIENT
Start: 2017-10-16 | End: 2017-10-26

## 2017-10-16 RX ORDER — AMOXICILLIN AND CLAVULANATE POTASSIUM 875; 125 MG/1; MG/1
1 TABLET, FILM COATED ORAL 2 TIMES DAILY
Qty: 20 TABLET | Refills: 0 | Status: SHIPPED | OUTPATIENT
Start: 2017-10-16 | End: 2017-10-26

## 2017-10-16 NOTE — PATIENT INSTRUCTIONS
Human Bite  The mouth has bacteria (germs) that can cause a very severe infection. If the tooth of another person has cut your skin, there is a chance of a serious infection developing within the first few days. Bites to the hand are especially prone to infection of the skin (such as cellulitis). Diseases (such as hepatitis B or C, or herpes simplex virus) may also be transmitted through human bites.  Human bite wounds may be either sutured closed or left open to heal depending on location, length of time since the bite, severity, signs of infection, and other concerns. Your doctor may want to do blood tests, a wound culture, X-ray, ultrasound, or others. Your doctor will explain if you need any of these and discuss your results.  Home care  The following will help you care for your wound at home:  1. Most skin wounds heal within 10 days. However, a human bite wound has a higher risk of getting infected. Look at the bite area each day for the next 4 days for signs of infection (listed below).  2. For certain types of wounds, an antibiotic will be prescribed. This will depend on several factors such as severity, surrounding structure injury, depth, location, and others. Take all antibiotics and medicines as directed until they are all gone.  3. If the bite is on the hand, arm, foot, or leg, limit the use of that extremity and keep it elevated for the first 24 hours.  4. You may be given a tetanus shot if needed.  5. Don't suck on the wound. This may introduce more bacteria.  Follow-up care  Follow up with your healthcare provider, or this facility as directed.  When to seek medical advice  Call your healthcare provider right away if you have any of these:  · Spreading redness  · Increased pain or swelling  · Fever of 100.4º F (38º C) or higher, or as directed by your healthcare provider  · Colored fluid or pus draining from the wound  · Any signs of nerve or tendon damage, such as inability to bend a joint or feel  an area of skin  Date Last Reviewed: 6/1/2016  © 7745-4719 [employer]. [address] [place] [address], [place] [address]. All rights reserved. This information is not intended as a substitute for professional medical care. Always follow your healthcare professional's instructions.
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
## 2017-10-17 ENCOUNTER — TELEPHONE (OUTPATIENT)
Dept: URGENT CARE | Facility: CLINIC | Age: 57
End: 2017-10-17

## 2017-10-17 NOTE — TELEPHONE ENCOUNTER
----- Message from Treasure Pacheco sent at 10/17/2017  2:39 PM CDT -----  Pt at [phone]//states she was seen in Urgent Care yesterday//10/16/17//for a swollen eye//is calling to ask if possible to get a dr excuse for her employer//would like to return to work on Monday/10-23-17//will //please call//lei/nicolás

## 2017-10-17 NOTE — TELEPHONE ENCOUNTER
Spoke to patient. Notified her that the provider can write her an excuse for only 2 days. Informed her that If she feels that she needs to follow up or needs more time off to see her pcp. She agreed and I assisted in scheduling and appt with internal medicine. Voiced understanding.

## 2017-10-18 ENCOUNTER — OFFICE VISIT (OUTPATIENT)
Dept: INTERNAL MEDICINE | Facility: CLINIC | Age: 57
End: 2017-10-18
Payer: COMMERCIAL

## 2017-10-18 VITALS
SYSTOLIC BLOOD PRESSURE: 120 MMHG | OXYGEN SATURATION: 98 % | BODY MASS INDEX: 32.86 KG/M2 | DIASTOLIC BLOOD PRESSURE: 68 MMHG | HEART RATE: 80 BPM | WEIGHT: 178.56 LBS | TEMPERATURE: 98 F | HEIGHT: 62 IN

## 2017-10-18 DIAGNOSIS — M54.50 ACUTE BILATERAL LOW BACK PAIN WITHOUT SCIATICA: Primary | ICD-10-CM

## 2017-10-18 PROCEDURE — 99214 OFFICE O/P EST MOD 30 MIN: CPT | Mod: S$GLB,,, | Performed by: NURSE PRACTITIONER

## 2017-10-18 PROCEDURE — 99999 PR PBB SHADOW E&M-EST. PATIENT-LVL IV: CPT | Mod: PBBFAC,,, | Performed by: NURSE PRACTITIONER

## 2017-10-18 RX ORDER — NAPROXEN 500 MG/1
500 TABLET ORAL 2 TIMES DAILY WITH MEALS
Qty: 30 TABLET | Refills: 0 | Status: SHIPPED | OUTPATIENT
Start: 2017-10-18 | End: 2018-01-29

## 2017-10-18 RX ORDER — CYCLOBENZAPRINE HCL 5 MG
5 TABLET ORAL 3 TIMES DAILY PRN
Qty: 30 TABLET | Refills: 0 | Status: SHIPPED | OUTPATIENT
Start: 2017-10-18 | End: 2017-10-28
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
## 2017-10-18 NOTE — LETTER
October 18, 2017      [facility]  [id number] [place] [address]  Phone: [phone]  Fax: [phone]       Patient: Clare Whitley   YOB: 1960  Date of Visit: 10/18/2017    To Whom It May Concern:    Susana Whitley  was at [facility] on 10/18/2017. She may return to work/school on 10/23/17 with no restrictions. If you have any questions or concerns, or if I can be of further assistance, please do not hesitate to contact me.    Sincerely,    Alicia Echeverria NP

## 2017-11-13 ENCOUNTER — OFFICE VISIT (OUTPATIENT)
Dept: INTERNAL MEDICINE | Facility: CLINIC | Age: 57
End: 2017-11-13
Payer: COMMERCIAL

## 2017-11-13 VITALS
DIASTOLIC BLOOD PRESSURE: 80 MMHG | SYSTOLIC BLOOD PRESSURE: 122 MMHG | HEART RATE: 89 BPM | HEIGHT: 62 IN | TEMPERATURE: 99 F | WEIGHT: 181.69 LBS | BODY MASS INDEX: 33.43 KG/M2 | OXYGEN SATURATION: 97 %

## 2017-11-13 DIAGNOSIS — B96.89 ACUTE BACTERIAL SINUSITIS: Primary | ICD-10-CM

## 2017-11-13 DIAGNOSIS — J01.90 ACUTE BACTERIAL SINUSITIS: Primary | ICD-10-CM

## 2017-11-13 DIAGNOSIS — R05.9 COUGH: ICD-10-CM

## 2017-11-13 PROCEDURE — 99999 PR PBB SHADOW E&M-EST. PATIENT-LVL III: CPT | Mod: PBBFAC,,, | Performed by: FAMILY MEDICINE

## 2017-11-13 PROCEDURE — 99214 OFFICE O/P EST MOD 30 MIN: CPT | Mod: S$GLB,,, | Performed by: FAMILY MEDICINE

## 2017-11-13 RX ORDER — AMOXICILLIN 875 MG/1
875 TABLET, FILM COATED ORAL EVERY 12 HOURS
Qty: 28 TABLET | Refills: 0 | Status: SHIPPED | OUTPATIENT
Start: 2017-11-13 | End: 2017-11-27

## 2017-11-13 RX ORDER — BENZONATATE 100 MG/1
100 CAPSULE ORAL 3 TIMES DAILY PRN
Qty: 30 CAPSULE | Refills: 1 | Status: SHIPPED | OUTPATIENT
Start: 2017-11-13 | End: 2017-11-23

## 2017-11-13 NOTE — PROGRESS NOTES
Subjective:       Patient ID: Clare Whitley is a 57 y.o. female.    Chief Complaint: Cough (chest congestion.)    Cough   This is a new problem. The current episode started in the past 7 days. The problem has been gradually worsening. The cough is productive of sputum. Associated symptoms include chest pain (sore with coughing), nasal congestion and postnasal drip. Pertinent negatives include no chills, ear pain, fever, headaches, rhinorrhea, sore throat, shortness of breath or wheezing. Treatments tried: partial old antibiotic prescription.     Review of Systems   Constitutional: Negative for chills and fever.   HENT: Positive for postnasal drip and voice change (resolving). Negative for ear pain, rhinorrhea and sore throat.    Respiratory: Positive for cough. Negative for shortness of breath and wheezing.    Cardiovascular: Positive for chest pain (sore with coughing).   Neurological: Negative for headaches.       Objective:      Physical Exam   Constitutional: She is oriented to person, place, and time. She appears well-developed and well-nourished. No distress.   HENT:   Head: Normocephalic and atraumatic.   Right Ear: Tympanic membrane and ear canal normal.   Left Ear: Tympanic membrane and ear canal normal.   Nose: Mucosal edema and rhinorrhea present.   Mouth/Throat: Oropharynx is clear and moist and mucous membranes are normal.   Beefy, red appearance to nasal mucosa with yellow discharge.   Eyes: Conjunctivae and EOM are normal. Pupils are equal, round, and reactive to light.   Neck: Normal range of motion. Neck supple.   Cardiovascular: Normal rate and regular rhythm.  Exam reveals no gallop and no friction rub.    No murmur heard.  Pulmonary/Chest: Effort normal and breath sounds normal. No respiratory distress. She has no wheezes. She has no rales.   Lymphadenopathy:     She has no cervical adenopathy.   Neurological: She is alert and oriented to person, place, and time.   Skin: Skin is warm and dry. No  rash noted.   Vitals reviewed.      Assessment:       1. Acute bacterial sinusitis    2. Cough        Plan:     Problem List Items Addressed This Visit     None      Visit Diagnoses     Acute bacterial sinusitis    -  Primary    Relevant Medications    amoxicillin (AMOXIL) 875 MG tablet    Cough        Relevant Medications    benzonatate (TESSALON) 100 MG capsule

## 2017-11-13 NOTE — PATIENT INSTRUCTIONS
Acute Sinusitis  Acute sinusitis is inflammation (irritation and swelling) of the sinuses. It is often due to a bacterial or viral infection of the sinuses. This may follow a cold or other upper respiratory illness. Your doctor can help you find relief. Read on to learn more.    What Is Acute Sinusitis?  Sinuses are air-filled spaces in the skull behind the face. They are kept moist and clean by a lining of mucosa. Things such as pollen, smoke, and chemical fumes can irritate the mucosa. It can then become inflamed (swell up). As a response to irritation, the mucosa makes more mucus and other fluids. Tiny hairlike cilia cover the mucosa. Cilia help transport mucus toward the opening of the sinus. Too much mucus may cause the cilia to stop working. This blocks the sinus opening. A buildup of fluid in the sinuses then leads to symptoms such as pain and pressure. It an also encourage growth of bacteria in the sinuses.  Common Symptoms of Acute Sinusitis  You may have:  · Facial pain  · Headache  · Fever  · Postnasal drip  · Nasal congestion  · Redness of facial skin over sinus  Diagnosis of Acute Sinusitis  The doctor will ask about your symptoms and medical history. An evaluation will be done. A culture (sample of mucus) is sometimes taken to check for bacteria. X-rays may be taken to view fluid in the sinuses.  Treatment of Acute Sinusitis  Treatment is designed to unblock the sinus opening and help the cilia work again. Antihistamine and decongestant medications may be prescribed. These can reduce inflammation and decrease fluid production. If a bacterial infection is present, it can be treated with antibiotic medication. This medication should be taken until it is gone, even if you feel better. Note that antibiotics will not help a viral infection.  © 3825-4359 [employer], [address] [place] [address], [place] [address]. All rights reserved. This information is not intended as a substitute for professional medical  care. Always follow your healthcare professional's instructions.

## 2018-01-15 ENCOUNTER — PATIENT OUTREACH (OUTPATIENT)
Dept: ADMINISTRATIVE | Facility: HOSPITAL | Age: 58
End: 2018-01-15

## 2018-01-25 ENCOUNTER — LAB VISIT (OUTPATIENT)
Dept: LAB | Facility: HOSPITAL | Age: 58
End: 2018-01-25
Attending: FAMILY MEDICINE
Payer: COMMERCIAL

## 2018-01-25 DIAGNOSIS — Z00.00 ROUTINE GENERAL MEDICAL EXAMINATION AT A HEALTH CARE FACILITY: ICD-10-CM

## 2018-01-25 DIAGNOSIS — Z13.29 THYROID DISORDER SCREEN: ICD-10-CM

## 2018-01-25 DIAGNOSIS — Z13.220 SCREENING FOR LIPOID DISORDERS: ICD-10-CM

## 2018-01-25 LAB
ALBUMIN SERPL BCP-MCNC: 3.9 G/DL
ALP SERPL-CCNC: 25 U/L
ALT SERPL W/O P-5'-P-CCNC: 26 U/L
ANION GAP SERPL CALC-SCNC: 10 MMOL/L
AST SERPL-CCNC: 24 U/L
BASOPHILS # BLD AUTO: 0.04 K/UL
BASOPHILS NFR BLD: 0.7 %
BILIRUB SERPL-MCNC: 0.9 MG/DL
BUN SERPL-MCNC: 14 MG/DL
CALCIUM SERPL-MCNC: 10 MG/DL
CHLORIDE SERPL-SCNC: 104 MMOL/L
CHOLEST SERPL-MCNC: 134 MG/DL
CHOLEST/HDLC SERPL: 2.3 {RATIO}
CO2 SERPL-SCNC: 27 MMOL/L
CREAT SERPL-MCNC: 0.8 MG/DL
DIFFERENTIAL METHOD: ABNORMAL
EOSINOPHIL # BLD AUTO: 0.6 K/UL
EOSINOPHIL NFR BLD: 10.9 %
ERYTHROCYTE [DISTWIDTH] IN BLOOD BY AUTOMATED COUNT: 14 %
EST. GFR  (AFRICAN AMERICAN): >60 ML/MIN/1.73 M^2
EST. GFR  (NON AFRICAN AMERICAN): >60 ML/MIN/1.73 M^2
GLUCOSE SERPL-MCNC: 96 MG/DL
HCT VFR BLD AUTO: 38.9 %
HDLC SERPL-MCNC: 58 MG/DL
HDLC SERPL: 43.3 %
HGB BLD-MCNC: 14 G/DL
IMM GRANULOCYTES # BLD AUTO: 0.01 K/UL
IMM GRANULOCYTES NFR BLD AUTO: 0.2 %
LDLC SERPL CALC-MCNC: 67.6 MG/DL
LYMPHOCYTES # BLD AUTO: 2.1 K/UL
LYMPHOCYTES NFR BLD: 39.2 %
MCH RBC QN AUTO: 27.7 PG
MCHC RBC AUTO-ENTMCNC: 36 G/DL
MCV RBC AUTO: 77 FL
MONOCYTES # BLD AUTO: 0.4 K/UL
MONOCYTES NFR BLD: 6.8 %
NEUTROPHILS # BLD AUTO: 2.3 K/UL
NEUTROPHILS NFR BLD: 42.2 %
NONHDLC SERPL-MCNC: 76 MG/DL
NRBC BLD-RTO: 0 /100 WBC
PLATELET # BLD AUTO: 168 K/UL
PMV BLD AUTO: 12.5 FL
POTASSIUM SERPL-SCNC: 3.9 MMOL/L
PROT SERPL-MCNC: 8.5 G/DL
RBC # BLD AUTO: 5.05 M/UL
SODIUM SERPL-SCNC: 141 MMOL/L
TRIGL SERPL-MCNC: 42 MG/DL
TSH SERPL DL<=0.005 MIU/L-ACNC: 0.46 UIU/ML
WBC # BLD AUTO: 5.41 K/UL

## 2018-01-25 PROCEDURE — 80061 LIPID PANEL: CPT

## 2018-01-25 PROCEDURE — 85025 COMPLETE CBC W/AUTO DIFF WBC: CPT

## 2018-01-25 PROCEDURE — 80053 COMPREHEN METABOLIC PANEL: CPT

## 2018-01-25 PROCEDURE — 36415 COLL VENOUS BLD VENIPUNCTURE: CPT

## 2018-01-25 PROCEDURE — 84443 ASSAY THYROID STIM HORMONE: CPT

## 2018-01-29 ENCOUNTER — OFFICE VISIT (OUTPATIENT)
Dept: INTERNAL MEDICINE | Facility: CLINIC | Age: 58
End: 2018-01-29
Payer: COMMERCIAL

## 2018-01-29 VITALS
HEART RATE: 76 BPM | SYSTOLIC BLOOD PRESSURE: 114 MMHG | BODY MASS INDEX: 32.17 KG/M2 | HEIGHT: 62 IN | WEIGHT: 174.81 LBS | TEMPERATURE: 99 F | DIASTOLIC BLOOD PRESSURE: 78 MMHG

## 2018-01-29 DIAGNOSIS — I10 ESSENTIAL HYPERTENSION: Primary | Chronic | ICD-10-CM

## 2018-01-29 DIAGNOSIS — Z12.11 COLON CANCER SCREENING: ICD-10-CM

## 2018-01-29 PROCEDURE — 99999 PR PBB SHADOW E&M-EST. PATIENT-LVL III: CPT | Mod: PBBFAC,,, | Performed by: FAMILY MEDICINE

## 2018-01-29 PROCEDURE — 99213 OFFICE O/P EST LOW 20 MIN: CPT | Mod: S$GLB,,, | Performed by: FAMILY MEDICINE

## 2018-01-29 NOTE — PROGRESS NOTES
Subjective:       Patient ID: Clare Whitley is a 57 y.o. female.    Chief Complaint: Follow-up    Patient presents to clinic today for followup of hypertension.      Review of Systems   Constitutional: Negative for chills, fatigue, fever and unexpected weight change.   Eyes: Negative for visual disturbance.   Respiratory: Negative for shortness of breath.    Cardiovascular: Negative for chest pain.   Musculoskeletal: Negative for myalgias.   Neurological: Negative for headaches.       Objective:      Physical Exam   Constitutional: She is oriented to person, place, and time. She appears well-developed and well-nourished. No distress.   HENT:   Head: Normocephalic and atraumatic.   Eyes: Conjunctivae and EOM are normal. Pupils are equal, round, and reactive to light. No scleral icterus.   Cardiovascular: Normal rate and regular rhythm.  Exam reveals no gallop and no friction rub.    No murmur heard.  Pulmonary/Chest: Effort normal and breath sounds normal.   Neurological: She is alert and oriented to person, place, and time. No cranial nerve deficit. Gait normal.   Psychiatric: She has a normal mood and affect.   Vitals reviewed.      Assessment:       1. Essential hypertension    2. Colon cancer screening        Plan:     Problem List Items Addressed This Visit     Essential hypertension - Primary (Chronic)    Current Assessment & Plan     Controlled, continue atenolol-chlorthalidone and spironolactone           Other Visit Diagnoses     Colon cancer screening        Relevant Orders    Fecal Immunochemical Test (iFOBT)          Health Maintenance reviewed/updated. Declines flu vaccine, expresses understanding of risks/benefits. Declines colonoscopy, but agrees to fitkit. Denies personal history of polyps.  Female health screenings per OB/GYN- Dr. Joe.  FitKit was given to patient on 1/29/2018 7:47 AM

## 2018-03-20 ENCOUNTER — OFFICE VISIT (OUTPATIENT)
Dept: INTERNAL MEDICINE | Facility: CLINIC | Age: 58
End: 2018-03-20
Payer: COMMERCIAL

## 2018-03-20 VITALS
OXYGEN SATURATION: 94 % | TEMPERATURE: 98 F | BODY MASS INDEX: 32.94 KG/M2 | HEIGHT: 62 IN | RESPIRATION RATE: 18 BRPM | HEART RATE: 89 BPM | SYSTOLIC BLOOD PRESSURE: 136 MMHG | WEIGHT: 179 LBS | DIASTOLIC BLOOD PRESSURE: 84 MMHG

## 2018-03-20 DIAGNOSIS — J02.0 ACUTE STREPTOCOCCAL PHARYNGITIS: Primary | ICD-10-CM

## 2018-03-20 PROCEDURE — 3079F DIAST BP 80-89 MM HG: CPT | Mod: CPTII,S$GLB,, | Performed by: FAMILY MEDICINE

## 2018-03-20 PROCEDURE — 99214 OFFICE O/P EST MOD 30 MIN: CPT | Mod: S$GLB,,, | Performed by: FAMILY MEDICINE

## 2018-03-20 PROCEDURE — 99999 PR PBB SHADOW E&M-EST. PATIENT-LVL III: CPT | Mod: PBBFAC,,, | Performed by: FAMILY MEDICINE

## 2018-03-20 PROCEDURE — 3075F SYST BP GE 130 - 139MM HG: CPT | Mod: CPTII,S$GLB,, | Performed by: FAMILY MEDICINE

## 2018-03-20 RX ORDER — AMOXICILLIN 875 MG/1
875 TABLET, FILM COATED ORAL EVERY 12 HOURS
Qty: 20 TABLET | Refills: 0 | Status: SHIPPED | OUTPATIENT
Start: 2018-03-20 | End: 2018-03-30

## 2018-03-20 NOTE — MEDICAL/APP STUDENT
"Subjective:       Patient ID: Clare Whitley is a 57 y.o. female.    Chief Complaint:   HPI   Sore throat since Sunday, headache began Sunday evening or Monday morning. Chills yesterday evening, does not own thermometer, could not take temp. Nausea this morning, no vomiting. Muscle aches "all over" yesterday evening. Daughter diagnosed with strep throat last week at urgent care clinic, currently taking antibiotics. Patient was exposed to daughter prior to her starting antibiotics.    Review of Systems    As above  Objective:      Physical Exam    Oropharynx beefy red, no exudate noted.  No cervical lymph nodes palpated.   Heart RRR  Bilateral breath sounds clear to auscultation, no dyspnea.    Assessment:       Streptococcus pharyngitis  Plan:     Amoxicillin 875 mg PO twice daily x 10 days  "

## 2018-03-20 NOTE — PATIENT INSTRUCTIONS
Pharyngitis: Strep (Presumed)    You have pharyngitis (sore throat). The cause is thought to be the streptococcus, or strep, bacterium. Strep throat infection can cause throat pain that is worse when swallowing, aching all over, headache, and fever. The infection may be spread by coughing, kissing, or touching others after touching your mouth or nose. Antibiotic medications are given to treat the infection.  Home care  · Rest at home. Drink plenty of fluids to avoid dehydration.  · No work or school for the first 2 days of taking the antibiotics. After this time, you will not be contagious. You can then return to work or school if you are feeling better.   · The antibiotic medication must be taken for the full 10 days, even if you feel better. This is very important to ensure the infection is treated. It is also important to prevent drug-resistant organisms from developing. If you were given an antibiotic shot, no more antibiotics are needed.  · You may use acetaminophen or ibuprofen to control pain or fever, unless another medicine was prescribed for this. If you have chronic liver or kidney disease or ever had a stomach ulcer or GI bleeding, talk with your doctor before using these medicines.  · Throat lozenges or a throat-numbing sprays can help reduce throat pain. Gargling with warm salt water can also help. Dissolve 1/2 teaspoon of salt in 1 8 ounce glass of warm water.   · Avoid salty or spicy foods, which can irritate the throat.  Follow-up care  Follow up with your healthcare provider or our staff if you are not improving over the next week.  When to seek medical advice  Call your healthcare provider right away if any of these occur:  · Fever as directed by your doctor.   · New or worsening ear pain, sinus pain, or headache  · Painful lumps in the back of neck  · Stiff neck  · Lymph nodes are getting larger  · Inability to swallow liquids, excessive drooling, or inability to open mouth wide due to throat  pain  · Signs of dehydration (very dark urine or no urine, sunken eyes, dizziness)  · Trouble breathing or noisy breathing  · Muffled voice  · New rash  Date Last Reviewed: 4/13/2015  © 9028-8878 [employer]. [address] [place] [address], [place] [address]. All rights reserved. This information is not intended as a substitute for professional medical care. Always follow your healthcare professional's instructions.        Self-Care for Sore Throats    Sore throats happen for many reasons, such as colds, allergies, and infections caused by viruses or bacteria. In any case, your throat becomes red and sore. Your goal for self-care is to reduce your discomfort while giving your throat a chance to heal.  Moisten and soothe your throat  Tips include the following:  · Try a sip of water first thing after waking up.  · Keep your throat moist by drinking 6 or more glasses of clear liquids every day.  · Run a cool-air humidifier in your room overnight.  · Avoid cigarette smoke.   · Suck on throat lozenges, cough drops, hard candy, ice chips, or frozen fruit-juice bars. Use the sugar-free versions if your diet or medical condition requires them.  Gargle to ease irritation  Gargling every hour or 2 can ease irritation. Try gargling with 1 of these solutions:  · 1/4 teaspoon of salt in 1/2 cup of warm water  · An over-the-counter anesthetic gargle  Use medicine for more relief  Over-the-counter medicine can reduce sore throat symptoms. Ask your pharmacist if you have questions about which medicine to use:  · Ease pain with anesthetic sprays. Aspirin or an aspirin substitute also helps. Remember, never give aspirin to anyone 18 or younger, or if you are already taking blood thinners.   · For sore throats caused by allergies, try antihistamines to block the allergic reaction.  · Remember: unless a sore throat is caused by a bacterial infection, antibiotics wont help you.  Prevent future sore throats  Prevention tips  include the following:  · Stop smoking or reduce contact with secondhand smoke. Smoke irritates the tender throat lining.  · Limit contact with pets and with allergy-causing substances, such as pollen and mold.  · When youre around someone with a sore throat or cold, wash your hands often to keep viruses or bacteria from spreading.  · Dont strain your vocal cords.  Call your healthcare provider  Contact your healthcare provider if you have:  · A temperature over 101°F (38.3°C)  · White spots on the throat  · Great difficulty swallowing  · Trouble breathing  · A skin rash  · Recent exposure to someone else with strep bacteria  · Severe hoarseness and swollen glands in the neck or jaw   Date Last Reviewed: 8/1/2016  © 1940-9370 [employer]. [address] [place] [address], [place] [address]. All rights reserved. This information is not intended as a substitute for professional medical care. Always follow your healthcare professional's instructions.

## 2018-03-20 NOTE — LETTER
March 20, 2018                   [facility]  Internal Medicine  [address] [place] [address]  Phone: [phone]  Fax: [phone]   March 20, 2018     Patient: Clare Whitley   YOB: 1960   Date of Visit: 3/20/2018       To Whom it May Concern:    Clare Whitley was seen in my clinic on 3/20/2018. She may return to work on Thursday, March 22nd.    If you have any questions or concerns, please don't hesitate to call.    Sincerely,         Chhaya Jennings MD

## 2018-03-26 NOTE — PROGRESS NOTES
Subjective:       Patient ID: Clare Whitley is a 57 y.o. female.    Chief Complaint: Sore Throat (Symptoms started Friday. Daughter being treated for Strep); Headache; and Back Pain    Patient presents to clinic today reporting sore throat, headache and bodyaches since Sunday. She reports nausea this morning and chills last night. +strep exposure.      Review of Systems   Constitutional: Positive for chills. Negative for fatigue, fever and unexpected weight change.   HENT: Positive for sore throat.    Eyes: Negative for visual disturbance.   Respiratory: Negative for shortness of breath.    Cardiovascular: Negative for chest pain.   Gastrointestinal: Positive for nausea. Negative for vomiting.   Musculoskeletal: Positive for back pain. Negative for myalgias.   Neurological: Positive for headaches.       Objective:      Physical Exam   Constitutional: She is oriented to person, place, and time. She appears well-developed and well-nourished. No distress.   HENT:   Head: Normocephalic and atraumatic.   Right Ear: Tympanic membrane and ear canal normal.   Left Ear: Tympanic membrane and ear canal normal.   Nose: No mucosal edema or rhinorrhea.   Mouth/Throat: Uvula is midline and mucous membranes are normal. Oropharyngeal exudate and posterior oropharyngeal erythema present. No posterior oropharyngeal edema.   Post nasal drip noted   Eyes: Conjunctivae and EOM are normal. Pupils are equal, round, and reactive to light. Right eye exhibits no discharge. Left eye exhibits no discharge.   Neck: Normal range of motion. Neck supple.   Cardiovascular: Normal rate and regular rhythm.  Exam reveals no gallop and no friction rub.    No murmur heard.  Pulmonary/Chest: Effort normal and breath sounds normal. No respiratory distress. She has no wheezes. She has no rales.   Lymphadenopathy:     She has no cervical adenopathy.   Neurological: She is alert and oriented to person, place, and time.   Skin: Skin is warm and dry. No rash  noted.   Vitals reviewed.      Assessment:       1. Acute streptococcal pharyngitis        Plan:     Problem List Items Addressed This Visit     None      Visit Diagnoses     Acute streptococcal pharyngitis    -  Primary    Relevant Medications    amoxicillin (AMOXIL) 875 MG tablet

## 2018-06-07 ENCOUNTER — OFFICE VISIT (OUTPATIENT)
Dept: INTERNAL MEDICINE | Facility: CLINIC | Age: 58
End: 2018-06-07
Payer: COMMERCIAL

## 2018-06-07 VITALS
HEIGHT: 62 IN | DIASTOLIC BLOOD PRESSURE: 86 MMHG | SYSTOLIC BLOOD PRESSURE: 112 MMHG | OXYGEN SATURATION: 95 % | HEART RATE: 89 BPM | WEIGHT: 179.88 LBS | BODY MASS INDEX: 33.1 KG/M2 | TEMPERATURE: 98 F

## 2018-06-07 DIAGNOSIS — B96.89 ACUTE BACTERIAL SINUSITIS: ICD-10-CM

## 2018-06-07 DIAGNOSIS — J01.90 ACUTE BACTERIAL SINUSITIS: ICD-10-CM

## 2018-06-07 DIAGNOSIS — J02.9 SORE THROAT: Primary | ICD-10-CM

## 2018-06-07 LAB — DEPRECATED S PYO AG THROAT QL EIA: NEGATIVE

## 2018-06-07 PROCEDURE — 3079F DIAST BP 80-89 MM HG: CPT | Mod: CPTII,S$GLB,, | Performed by: FAMILY MEDICINE

## 2018-06-07 PROCEDURE — 99999 PR PBB SHADOW E&M-EST. PATIENT-LVL III: CPT | Mod: PBBFAC,,, | Performed by: FAMILY MEDICINE

## 2018-06-07 PROCEDURE — 3008F BODY MASS INDEX DOCD: CPT | Mod: CPTII,S$GLB,, | Performed by: FAMILY MEDICINE

## 2018-06-07 PROCEDURE — 87081 CULTURE SCREEN ONLY: CPT

## 2018-06-07 PROCEDURE — 99214 OFFICE O/P EST MOD 30 MIN: CPT | Mod: S$GLB,,, | Performed by: FAMILY MEDICINE

## 2018-06-07 PROCEDURE — 87880 STREP A ASSAY W/OPTIC: CPT

## 2018-06-07 PROCEDURE — 3074F SYST BP LT 130 MM HG: CPT | Mod: CPTII,S$GLB,, | Performed by: FAMILY MEDICINE

## 2018-06-07 RX ORDER — AMOXICILLIN 875 MG/1
875 TABLET, FILM COATED ORAL EVERY 12 HOURS
Qty: 20 TABLET | Refills: 0 | Status: SHIPPED | OUTPATIENT
Start: 2018-06-07 | End: 2018-12-11 | Stop reason: ALTCHOICE

## 2018-06-07 NOTE — PROGRESS NOTES
Subjective:       Patient ID: Clare Whitley is a 57 y.o. female.    Chief Complaint: Sore Throat (wtih cough) and Headache    Patient presents to clinic reporting 2 day history of sore throat, headache and cough. + chills. No fever. +runny nose and congestion, patient reports this has been going on for several days to a few weeks. Tylenol helped.      Review of Systems   Constitutional: Positive for chills. Negative for fever.   HENT: Positive for congestion, rhinorrhea, sinus pressure and sore throat. Negative for ear pain.    Respiratory: Positive for cough. Negative for shortness of breath.    Skin: Negative for rash.   Neurological: Positive for headaches.       Objective:      Physical Exam   Constitutional: She is oriented to person, place, and time. She appears well-developed and well-nourished. No distress.   HENT:   Head: Normocephalic and atraumatic.   Right Ear: Tympanic membrane and ear canal normal.   Left Ear: Tympanic membrane and ear canal normal.   Nose: Mucosal edema and rhinorrhea present.   Mouth/Throat: Oropharynx is clear and moist and mucous membranes are normal.   Beefy, red appearance to nasal mucosa with yellow discharge.   Eyes: Conjunctivae and EOM are normal. Pupils are equal, round, and reactive to light.   Neck: Normal range of motion. Neck supple.   Cardiovascular: Normal rate and regular rhythm.  Exam reveals no gallop and no friction rub.    No murmur heard.  Pulmonary/Chest: Effort normal and breath sounds normal. No respiratory distress. She has no wheezes. She has no rales.   Lymphadenopathy:     She has no cervical adenopathy.   Neurological: She is alert and oriented to person, place, and time.   Skin: Skin is warm and dry. No rash noted.   Vitals reviewed.      Assessment:       1. Sore throat    2. Acute bacterial sinusitis        Plan:     Problem List Items Addressed This Visit     None      Visit Diagnoses     Sore throat    -  Primary    Relevant Orders    THROAT SCREEN,  RAPID (Completed)    Acute bacterial sinusitis        Relevant Medications    amoxicillin (AMOXIL) 875 MG tablet

## 2018-06-07 NOTE — PATIENT INSTRUCTIONS
Acute Sinusitis  Acute sinusitis is inflammation (irritation and swelling) of the sinuses. It is often due to a bacterial or viral infection of the sinuses. This may follow a cold or other upper respiratory illness. Your doctor can help you find relief. Read on to learn more.    What Is Acute Sinusitis?  Sinuses are air-filled spaces in the skull behind the face. They are kept moist and clean by a lining of mucosa. Things such as pollen, smoke, and chemical fumes can irritate the mucosa. It can then become inflamed (swell up). As a response to irritation, the mucosa makes more mucus and other fluids. Tiny hairlike cilia cover the mucosa. Cilia help transport mucus toward the opening of the sinus. Too much mucus may cause the cilia to stop working. This blocks the sinus opening. A buildup of fluid in the sinuses then leads to symptoms such as pain and pressure. It an also encourage growth of bacteria in the sinuses.  Common Symptoms of Acute Sinusitis  You may have:  · Facial pain  · Headache  · Fever  · Postnasal drip  · Nasal congestion  · Redness of facial skin over sinus  Diagnosis of Acute Sinusitis  The doctor will ask about your symptoms and medical history. An evaluation will be done. A culture (sample of mucus) is sometimes taken to check for bacteria. X-rays may be taken to view fluid in the sinuses.  Treatment of Acute Sinusitis  Treatment is designed to unblock the sinus opening and help the cilia work again. Antihistamine and decongestant medications may be prescribed. These can reduce inflammation and decrease fluid production. If a bacterial infection is present, it can be treated with antibiotic medication. This medication should be taken until it is gone, even if you feel better. Note that antibiotics will not help a viral infection.  © 2453-0544 [employer], [address] [place] [address], [place] [address]. All rights reserved. This information is not intended as a substitute for professional medical  care. Always follow your healthcare professional's instructions.

## 2018-06-09 LAB — BACTERIA THROAT CULT: NORMAL

## 2018-06-13 LAB
CHOL/HDLC RATIO: 2.5
CHOLEST SERPL-MSCNC: 130 MG/DL (ref 0–200)
HDLC SERPL-MCNC: 53 MG/DL
LDLC SERPL CALC-MCNC: 64 MG/DL
TRIGL SERPL-MCNC: 63 MG/DL (ref 40–160)
VLDL CHOLESTEROL: 13 MG/DL

## 2018-06-15 ENCOUNTER — DOCUMENTATION ONLY (OUTPATIENT)
Dept: ADMINISTRATIVE | Facility: HOSPITAL | Age: 58
End: 2018-06-15

## 2018-08-22 ENCOUNTER — PATIENT OUTREACH (OUTPATIENT)
Dept: ADMINISTRATIVE | Facility: HOSPITAL | Age: 58
End: 2018-08-22

## 2018-08-22 NOTE — PROGRESS NOTES
Attempted to call patient as a reminder to drop off fit kit. Patient not available, left voicemail.

## 2018-12-03 LAB — HIV AG/AB 4TH GEN: NON REACTIVE

## 2018-12-04 LAB
CHOLEST SERPL-MSCNC: 103 MG/DL (ref 0–200)
HDLC SERPL-MCNC: 41 MG/DL
LDLC SERPL CALC-MCNC: 53 MG/DL
NON HDL CHOL. (LDL+VLDL): 62
TRIGLYCERIDE, BF: 45

## 2018-12-11 ENCOUNTER — OFFICE VISIT (OUTPATIENT)
Dept: INTERNAL MEDICINE | Facility: CLINIC | Age: 58
End: 2018-12-11
Payer: COMMERCIAL

## 2018-12-11 VITALS
BODY MASS INDEX: 32.25 KG/M2 | OXYGEN SATURATION: 98 % | DIASTOLIC BLOOD PRESSURE: 74 MMHG | WEIGHT: 175.25 LBS | SYSTOLIC BLOOD PRESSURE: 110 MMHG | TEMPERATURE: 99 F | HEIGHT: 62 IN | HEART RATE: 84 BPM

## 2018-12-11 DIAGNOSIS — I10 ESSENTIAL HYPERTENSION: ICD-10-CM

## 2018-12-11 PROBLEM — E87.6 HYPOKALEMIA: Status: ACTIVE | Noted: 2018-12-03

## 2018-12-11 PROBLEM — R07.9 CHEST PAIN IN ADULT: Status: ACTIVE | Noted: 2018-12-03

## 2018-12-11 PROBLEM — R53.1 RIGHT SIDED WEAKNESS: Status: ACTIVE | Noted: 2018-12-03

## 2018-12-11 PROCEDURE — 3078F DIAST BP <80 MM HG: CPT | Mod: CPTII,S$GLB,, | Performed by: FAMILY MEDICINE

## 2018-12-11 PROCEDURE — 3008F BODY MASS INDEX DOCD: CPT | Mod: CPTII,S$GLB,, | Performed by: FAMILY MEDICINE

## 2018-12-11 PROCEDURE — 99214 OFFICE O/P EST MOD 30 MIN: CPT | Mod: S$GLB,,, | Performed by: FAMILY MEDICINE

## 2018-12-11 PROCEDURE — 99999 PR PBB SHADOW E&M-EST. PATIENT-LVL III: CPT | Mod: PBBFAC,,, | Performed by: FAMILY MEDICINE

## 2018-12-11 PROCEDURE — 3074F SYST BP LT 130 MM HG: CPT | Mod: CPTII,S$GLB,, | Performed by: FAMILY MEDICINE

## 2018-12-11 RX ORDER — SPIRONOLACTONE 50 MG/1
50 TABLET, FILM COATED ORAL DAILY
Qty: 90 TABLET | Refills: 1 | Status: SHIPPED | OUTPATIENT
Start: 2018-12-11 | End: 2021-01-07

## 2018-12-11 RX ORDER — SPIRONOLACTONE 25 MG/1
25 TABLET ORAL
COMMUNITY
End: 2018-12-11 | Stop reason: ALTCHOICE

## 2018-12-11 RX ORDER — ATENOLOL 25 MG/1
25 TABLET ORAL DAILY
Qty: 90 TABLET | Refills: 1 | Status: SHIPPED | OUTPATIENT
Start: 2018-12-11 | End: 2019-05-20 | Stop reason: ALTCHOICE

## 2018-12-11 RX ORDER — ATENOLOL 25 MG/1
25 TABLET ORAL
COMMUNITY
End: 2018-12-11 | Stop reason: SDUPTHER

## 2018-12-11 RX ORDER — ASPIRIN 81 MG/1
81 TABLET ORAL
COMMUNITY
Start: 2018-12-04 | End: 2019-01-03

## 2018-12-11 NOTE — PATIENT INSTRUCTIONS
For Caregivers: Coping Tips  Caregivers often feel they must tend to their loved ones needs full time. But burning yourself out doesnt help anyone and can negatively affect your own health. You cant take good care of someone else without taking good care of yourself as well. Its not selfish. Its essential. Take a break. Eat right. Get out and exercise. Most of all, accept that you cant do everything yourself.    Give yourself a break  All of the things you do are not equally important. Set priorities. That way you wont be busy all the time. Look after your health. Go for a walk each chance you get. Take a long bath. Lift your spirits by having lunch with a friend. Or do nothing for an hour. Just nap or relax.  Accept help  Knowing you can count on others can be a relief. Accept help when its offered. And be willing to ask for help when you need it. Those who care about you really do want to help.  If you feel depressed  Over time, after a serious health event, stress should gradually lessen. But your life may have changed. Realizing this may cause grief, both for you and your loved one. Contact your healthcare provider if either of you shows signs of depression. Treatment can help you find hope--even when you think nothing can help.  Common signs of depression  · Feeling down most of the time  · Feeling guilty or helpless  · Losing pleasure in things you used to enjoy, like reading, exercise, or social events  · Sleeping less or more than normal  · Having a big rise or fall in appetite or weight  · Feeling restless or irritable  · Feeling tired, weak, or low in energy  · Having trouble focusing, remembering, or making decisions  · Feeling angry or agitated can be a sign as well. This may be the only sign more common in men.   Date Last Reviewed: 3/1/2017  © 3861-9328 [employer]. [address] [place] [address], [place] [address]. All rights reserved. This information is not intended as a substitute  for professional medical care. Always follow your healthcare professional's instructions.

## 2018-12-17 NOTE — PROGRESS NOTES
Subjective:       Patient ID: Clare Whitley is a 58 y.o. female.    Chief Complaint: Follow-up (Hospital f/u)    Patient presents to clinic today for hospital follow up. She was recently admitted at [facility] for right sided numbness/weakness. Evaluation revealed negative head CT, negative MRI/MRA brain. Carotid U/S showed 0-19% stenosis bilaterally. Echo showed normal EF with no significant valvular abnormalities. . She was started on lipitor and high dose aspirin. At discharge she was advised to continue 81 mg aspirin and d/c lipitor. She was advised to follow up with Dr. Blackmon, Cardiology at discharged. She was advised to resume home atenolol and spironolactone at discharge and follow up with PCP. Patient reports she has been doing okay since discharge. She reports she has been under increased stress due to her  having had a stroke and he is in rehab. Patient is otherwise without concerns today.        Review of Systems   Constitutional: Negative for chills, fatigue, fever and unexpected weight change.   Eyes: Negative for visual disturbance.   Respiratory: Negative for shortness of breath.    Cardiovascular: Negative for chest pain.   Musculoskeletal: Negative for myalgias.   Neurological: Negative for headaches.       Objective:      Physical Exam   Constitutional: She is oriented to person, place, and time. She appears well-developed and well-nourished. No distress.   HENT:   Head: Normocephalic and atraumatic.   Eyes: Conjunctivae and EOM are normal. Pupils are equal, round, and reactive to light. No scleral icterus.   Cardiovascular: Normal rate and regular rhythm. Exam reveals no gallop and no friction rub.   No murmur heard.  Pulmonary/Chest: Effort normal and breath sounds normal.   Neurological: She is alert and oriented to person, place, and time. No cranial nerve deficit. Gait normal.   Psychiatric: She has a normal mood and affect.   Vitals reviewed.      Assessment:       1. Essential  hypertension        Plan:     Problem List Items Addressed This Visit     Essential hypertension (Chronic)    Current Assessment & Plan     Controlled, continue current meds         Relevant Medications    spironolactone (ALDACTONE) 50 MG tablet    atenolol (TENORMIN) 25 MG tablet    Other Relevant Orders    Hypertension Digital Medicine (HDMP) Enrollment Order (Completed)    Hypertension Digital Medicine (HDMP): Assign Onboarding Questionnaires (Completed)        [facility] records reviewed.  Discussed with patient that her work up was negative. Discussed it seems she had TIA vs anxiety induced condition. Advised importance of controlled BP, lipids to decrease risk of TIA/stroke. She will continue 81 mg aspirin as well. Also discussed importance of stress management. She will schedule follow up with Dr. Blackmon, Cardiology.   Health Maintenance reviewed/updated.  Female health screenings per OB/GYN- Dr. Joe, Release signed for records.

## 2019-02-25 ENCOUNTER — OFFICE VISIT (OUTPATIENT)
Dept: INTERNAL MEDICINE | Facility: CLINIC | Age: 59
End: 2019-02-25
Payer: COMMERCIAL

## 2019-02-25 VITALS
DIASTOLIC BLOOD PRESSURE: 70 MMHG | TEMPERATURE: 101 F | SYSTOLIC BLOOD PRESSURE: 138 MMHG | WEIGHT: 174.19 LBS | OXYGEN SATURATION: 96 % | RESPIRATION RATE: 18 BRPM | BODY MASS INDEX: 31.85 KG/M2 | HEART RATE: 105 BPM

## 2019-02-25 DIAGNOSIS — R50.9 FEVER, UNSPECIFIED FEVER CAUSE: ICD-10-CM

## 2019-02-25 DIAGNOSIS — R05.9 COUGH: ICD-10-CM

## 2019-02-25 DIAGNOSIS — R68.89 FLU-LIKE SYMPTOMS: Primary | ICD-10-CM

## 2019-02-25 PROCEDURE — 3075F PR MOST RECENT SYSTOLIC BLOOD PRESS GE 130-139MM HG: ICD-10-PCS | Mod: CPTII,S$GLB,, | Performed by: FAMILY MEDICINE

## 2019-02-25 PROCEDURE — 3008F PR BODY MASS INDEX (BMI) DOCUMENTED: ICD-10-PCS | Mod: CPTII,S$GLB,, | Performed by: FAMILY MEDICINE

## 2019-02-25 PROCEDURE — 3078F DIAST BP <80 MM HG: CPT | Mod: CPTII,S$GLB,, | Performed by: FAMILY MEDICINE

## 2019-02-25 PROCEDURE — 3078F PR MOST RECENT DIASTOLIC BLOOD PRESSURE < 80 MM HG: ICD-10-PCS | Mod: CPTII,S$GLB,, | Performed by: FAMILY MEDICINE

## 2019-02-25 PROCEDURE — 99213 OFFICE O/P EST LOW 20 MIN: CPT | Mod: S$GLB,,, | Performed by: FAMILY MEDICINE

## 2019-02-25 PROCEDURE — 99999 PR PBB SHADOW E&M-EST. PATIENT-LVL III: ICD-10-PCS | Mod: PBBFAC,,, | Performed by: FAMILY MEDICINE

## 2019-02-25 PROCEDURE — 3008F BODY MASS INDEX DOCD: CPT | Mod: CPTII,S$GLB,, | Performed by: FAMILY MEDICINE

## 2019-02-25 PROCEDURE — 99213 PR OFFICE/OUTPT VISIT, EST, LEVL III, 20-29 MIN: ICD-10-PCS | Mod: S$GLB,,, | Performed by: FAMILY MEDICINE

## 2019-02-25 PROCEDURE — 99999 PR PBB SHADOW E&M-EST. PATIENT-LVL III: CPT | Mod: PBBFAC,,, | Performed by: FAMILY MEDICINE

## 2019-02-25 PROCEDURE — 3075F SYST BP GE 130 - 139MM HG: CPT | Mod: CPTII,S$GLB,, | Performed by: FAMILY MEDICINE

## 2019-02-25 RX ORDER — OSELTAMIVIR PHOSPHATE 75 MG/1
75 CAPSULE ORAL 2 TIMES DAILY
Qty: 10 CAPSULE | Refills: 0 | Status: SHIPPED | OUTPATIENT
Start: 2019-02-25 | End: 2019-03-02

## 2019-02-25 NOTE — PROGRESS NOTES
"Clare Whitley  02/25/2019  [id number]    Chhaya Jennings MD  Patient Care Team:  Chhaya Jennings MD as PCP - General (Family Medicine)  Dank Joe MD (Obstetrics and Gynecology)  Delmy Myles LPN as Care Coordinator (Internal Medicine)  Garry Hand MD as Consulting Physician (Cardiology)  Has the patient seen any provider outside of the [facility] network since the last visit? (yes). If yes, HIPPA forms completed and records requested.        Visit Type:an urgent visit for a new problem    Chief Complaint:  Chief Complaint   Patient presents with    Generalized Body Aches    Headache    Cough       History of Present Illness:  Urgent visit  PCP Dr. Jennings, last visit in Dec 2018/    She is here c/o flu like illness.        History:  Past Medical History:   Diagnosis Date    Hypertension     Mitral regurgitation     "mild"; followed by Dr. Hand ([facility])    MVP (mitral valve prolapse)     "trivial"; followed by Dr. Hand ([facility])    Sickle cell trait      Past Surgical History:   Procedure Laterality Date    CHOLECYSTECTOMY      CHOLECYSTECTOMY N/A 4/4/2017    [facility]    CHOLECYSTECTOMY-LAPAROSCOPIC N/A 4/4/2017    [facility]    LYMPH NODE BIOPSY Left     left neck     UTERINE FIBROID EMBOLIZATION  01/01/2010     Family History   Problem Relation Age of Onset    Hypertension Mother      Social History     Socioeconomic History    Marital status:      Spouse name: Johnnie Whitley    Number of children: 3    Years of education: Not on file    Highest education level: Not on file   Social Needs    Financial resource strain: Not on file    Food insecurity - worry: Not on file    Food insecurity - inability: Not on file    Transportation needs - medical: Not on file    Transportation needs - non-medical: Not on file   Occupational History    Occupation: Rehab counselor associate     Employer: [employer] "     Comment: Works with disabled people   Tobacco Use    Smoking status: Passive Smoke Exposure - Never Smoker    Smokeless tobacco: Never Used   Substance and Sexual Activity    Alcohol use: No    Drug use: No    Sexual activity: Yes     Partners: Male     Birth control/protection: None, Post-menopausal   Other Topics Concern    Not on file   Social History Narrative    Not on file     Patient Active Problem List   Diagnosis    Essential hypertension    Chest pain in adult    Hypokalemia    Right sided weakness     Review of patient's allergies indicates:   Allergen Reactions    No known drug allergies        The following were reviewed at this visit: active problem list, medication list, allergies, family history, social history, and health maintenance.    Medications:  Current Outpatient Medications on File Prior to Visit   Medication Sig Dispense Refill    atenolol (TENORMIN) 25 MG tablet Take 1 tablet (25 mg total) by mouth once daily. 90 tablet 1    spironolactone (ALDACTONE) 50 MG tablet Take 1 tablet (50 mg total) by mouth once daily. 90 tablet 1     No current facility-administered medications on file prior to visit.        Medications have been reviewed and reconciled with patient at this visit.  Barriers to medications present (no)    Adverse reactions to current medications (no)    Over the counter medications reviewed (Yes ), and if needed added to active Medication list at this visit.     Exam:  Wt Readings from Last 3 Encounters:   02/25/19 79 kg (174 lb 2.6 oz)   12/11/18 79.5 kg (175 lb 4.3 oz)   06/07/18 81.6 kg (179 lb 14.3 oz)     Temp Readings from Last 3 Encounters:   02/25/19 (!) 101.1 °F (38.4 °C) (Tympanic)   12/11/18 98.8 °F (37.1 °C) (Tympanic)   06/07/18 98.4 °F (36.9 °C) (Tympanic)     BP Readings from Last 3 Encounters:   02/25/19 138/70   12/11/18 110/74   06/07/18 112/86     Pulse Readings from Last 3 Encounters:   02/25/19 105   12/11/18 84   06/07/18 89     Body mass  index is 31.85 kg/m².      Review of Systems   Constitutional: Positive for chills and fever.   HENT: Negative for sinus pain and sore throat.    Respiratory: Positive for cough and sputum production. Negative for stridor.    Neurological: Positive for weakness and headaches.     Physical Exam   Constitutional: She is oriented to person, place, and time. She appears well-developed and well-nourished. She appears distressed.   HENT:   Head: Normocephalic.   Right Ear: External ear normal.   Left Ear: External ear normal.   Mouth/Throat: Oropharynx is clear and moist.   Eyes: EOM are normal. Pupils are equal, round, and reactive to light.   Neck: Normal range of motion.   Cardiovascular: Normal rate, regular rhythm and normal heart sounds.   Pulmonary/Chest: Breath sounds normal. No respiratory distress.   Neurological: She is alert and oriented to person, place, and time.   Vitals reviewed.      Laboratory Reviewed ({N/A)  Lab Results   Component Value Date    WBC 5.41 01/25/2018    HGB 14.0 01/25/2018    HCT 38.9 01/25/2018     01/25/2018    CHOL 130 06/13/2018    TRIG 63 06/13/2018    HDL 53 06/13/2018    ALT 26 01/25/2018    AST 24 01/25/2018     01/25/2018    K 3.9 01/25/2018     01/25/2018    CREATININE 0.8 01/25/2018    BUN 14 01/25/2018    CO2 27 01/25/2018    TSH 0.458 01/25/2018    HGBA1C 4.9 09/19/2007       Clare was seen today for generalized body aches, headache and cough.    Diagnoses and all orders for this visit:    Flu-like symptoms  -     oseltamivir (TAMIFLU) 75 MG capsule; Take 1 capsule (75 mg total) by mouth 2 (two) times daily. for 5 days    Cough  -     oseltamivir (TAMIFLU) 75 MG capsule; Take 1 capsule (75 mg total) by mouth 2 (two) times daily. for 5 days    Fever, unspecified fever cause  -     oseltamivir (TAMIFLU) 75 MG capsule; Take 1 capsule (75 mg total) by mouth 2 (two) times daily. for 5 days    Patient sx less than 2 days  Fever and flu like Sx  WIll not swab,  will go ahead and Tx with tamiflu  Encouraged fluids, and OTC cold and flu meds    Restart ASA daily once better per Dr. Jennings last notes                Care Plan/Goals: Reviewed  (N/A)  Goals     None          Follow up: No Follow-up on file.    After visit summary was printed and given to patient upon discharge today.  Patient goals and care plan are included in After Visit Summary.

## 2019-02-26 ENCOUNTER — TELEPHONE (OUTPATIENT)
Dept: INTERNAL MEDICINE | Facility: CLINIC | Age: 59
End: 2019-02-26

## 2019-02-26 NOTE — TELEPHONE ENCOUNTER
----- Message from Patricia Thomas sent at 2/26/2019 12:29 PM CST -----  Contact: self  Type:  Needs Medical Advice    Who Called: pt  Symptoms (please be specific): cough, congestion   How long has patient had these symptoms: two days  Pharmacy name and phone #:  n/a  Would the patient rather a call back or a response via [facility]? Call back   Best Call Back Number: [phone]  Additional Information: pt states she needs excuse extended to return to work the week of 03/04.      Thanks,  Patricia Thomas

## 2019-02-26 NOTE — TELEPHONE ENCOUNTER
Called and spoke with patient she said she is still feeling bad. She has a pounding in her head that she has been experiencing and it won't go anyway. She said she didn't sleep well because of this. She asked if you could extend her excuse until 3/4. She said her boss doesn't want her to come into work until she is better because she doesn't want to chance someone else getting sick.

## 2019-02-26 NOTE — TELEPHONE ENCOUNTER
Her  has flu. She most likely have flu.  She can take the time off, until well. No fever for 24 hours without use of Tylenol or Motrin.

## 2019-03-01 ENCOUNTER — DOCUMENTATION ONLY (OUTPATIENT)
Dept: ADMINISTRATIVE | Facility: HOSPITAL | Age: 59
End: 2019-03-01

## 2019-05-07 LAB
CHOL/HDLC RATIO: 2.1
CHOLEST SERPL-MSCNC: 138 MG/DL (ref 0–200)
HDLC SERPL-MCNC: 66 MG/DL
LDLC SERPL CALC-MCNC: 60 MG/DL
TRIGL SERPL-MCNC: 62 MG/DL
VLDL CHOLESTEROL: 12 MG/DL

## 2019-05-08 ENCOUNTER — PATIENT OUTREACH (OUTPATIENT)
Dept: ADMINISTRATIVE | Facility: HOSPITAL | Age: 59
End: 2019-05-08

## 2019-05-16 ENCOUNTER — TELEPHONE (OUTPATIENT)
Dept: INTERNAL MEDICINE | Facility: CLINIC | Age: 59
End: 2019-05-16

## 2019-05-16 NOTE — TELEPHONE ENCOUNTER
Spoke with pt appt scheduled for 5/20/19.   Pt states since being on atenolol 25mg she started with finger swelling.

## 2019-05-16 NOTE — TELEPHONE ENCOUNTER
----- Message from Alexa Rojas sent at 5/16/2019 10:45 AM CDT -----  Contact: pt  She's calling in regards to medication       Pt wants to know if its the correct MG     pls call pt back at  [phone]

## 2019-05-20 ENCOUNTER — OFFICE VISIT (OUTPATIENT)
Dept: INTERNAL MEDICINE | Facility: CLINIC | Age: 59
End: 2019-05-20
Payer: COMMERCIAL

## 2019-05-20 VITALS
HEART RATE: 80 BPM | SYSTOLIC BLOOD PRESSURE: 120 MMHG | BODY MASS INDEX: 33.19 KG/M2 | OXYGEN SATURATION: 98 % | TEMPERATURE: 99 F | WEIGHT: 181.44 LBS | DIASTOLIC BLOOD PRESSURE: 86 MMHG

## 2019-05-20 DIAGNOSIS — I10 ESSENTIAL HYPERTENSION: ICD-10-CM

## 2019-05-20 DIAGNOSIS — Z12.11 COLON CANCER SCREENING: Primary | ICD-10-CM

## 2019-05-20 PROCEDURE — 3074F PR MOST RECENT SYSTOLIC BLOOD PRESSURE < 130 MM HG: ICD-10-PCS | Mod: CPTII,S$GLB,, | Performed by: FAMILY MEDICINE

## 2019-05-20 PROCEDURE — 3008F BODY MASS INDEX DOCD: CPT | Mod: CPTII,S$GLB,, | Performed by: FAMILY MEDICINE

## 2019-05-20 PROCEDURE — 99999 PR PBB SHADOW E&M-EST. PATIENT-LVL III: CPT | Mod: PBBFAC,,, | Performed by: FAMILY MEDICINE

## 2019-05-20 PROCEDURE — 3074F SYST BP LT 130 MM HG: CPT | Mod: CPTII,S$GLB,, | Performed by: FAMILY MEDICINE

## 2019-05-20 PROCEDURE — 99999 PR PBB SHADOW E&M-EST. PATIENT-LVL III: ICD-10-PCS | Mod: PBBFAC,,, | Performed by: FAMILY MEDICINE

## 2019-05-20 PROCEDURE — 99214 OFFICE O/P EST MOD 30 MIN: CPT | Mod: S$GLB,,, | Performed by: FAMILY MEDICINE

## 2019-05-20 PROCEDURE — 3079F DIAST BP 80-89 MM HG: CPT | Mod: CPTII,S$GLB,, | Performed by: FAMILY MEDICINE

## 2019-05-20 PROCEDURE — 99214 PR OFFICE/OUTPT VISIT, EST, LEVL IV, 30-39 MIN: ICD-10-PCS | Mod: S$GLB,,, | Performed by: FAMILY MEDICINE

## 2019-05-20 PROCEDURE — 3079F PR MOST RECENT DIASTOLIC BLOOD PRESSURE 80-89 MM HG: ICD-10-PCS | Mod: CPTII,S$GLB,, | Performed by: FAMILY MEDICINE

## 2019-05-20 PROCEDURE — 3008F PR BODY MASS INDEX (BMI) DOCUMENTED: ICD-10-PCS | Mod: CPTII,S$GLB,, | Performed by: FAMILY MEDICINE

## 2019-05-20 RX ORDER — ATENOLOL AND CHLORTHALIDONE TABLET 50; 25 MG/1; MG/1
1 TABLET ORAL DAILY
Qty: 30 TABLET | Refills: 1 | Status: SHIPPED | OUTPATIENT
Start: 2019-05-20 | End: 2019-07-23

## 2019-05-20 NOTE — PROGRESS NOTES
Subjective:       Patient ID: Clare Whitley is a 58 y.o. female.    Chief Complaint: hand swelling (blood pressure has been spiking and heart rate) and Palpitations (in the mornings mostly when doing things)    Patient presents to clinic today reporting bilateral hand swelling and palpitations in the morning since she stopped atenolol-chlorthalidone last week. She reports she had some left over so continued it after hospital stay in December 2018. She reports going to ER last Friday for these symptoms and had normal EKG, CXR. She has not seen Dr. Blackmon regarding these symptoms. She reports elevated blood pressure readings as well.    Review of Systems   Constitutional: Negative for chills, fatigue, fever and unexpected weight change.   Eyes: Negative for visual disturbance.   Respiratory: Negative for shortness of breath.    Cardiovascular: Positive for palpitations. Negative for chest pain.   Musculoskeletal: Negative for myalgias.   Neurological: Negative for headaches.       Objective:      Physical Exam   Constitutional: She is oriented to person, place, and time. She appears well-developed and well-nourished. No distress.   HENT:   Head: Normocephalic and atraumatic.   Eyes: Pupils are equal, round, and reactive to light. Conjunctivae and EOM are normal. No scleral icterus.   Cardiovascular: Normal rate and regular rhythm. Exam reveals no gallop and no friction rub.   No murmur heard.  Pulmonary/Chest: Effort normal and breath sounds normal.   Musculoskeletal: She exhibits no edema.   Neurological: She is alert and oriented to person, place, and time. No cranial nerve deficit. Gait normal.   Psychiatric: She has a normal mood and affect.   Vitals reviewed.      Assessment:       1. Colon cancer screening    2. Essential hypertension        Plan:     Problem List Items Addressed This Visit     Essential hypertension (Chronic)    Current Assessment & Plan     Controlled, patient reports elevated readings since  stopping atenolol-chlorthalidone 100-50 mg daily; BP normal today on atenolol 25 mg daily; after discussion, will resume lower dose of combination medication, which should improve hand edema and palpitations. She will see Dr. Blackmon regarding palpitations and return for routine visit in June.          Relevant Medications    atenolol-chlorthalidone (TENORETIC) 50-25 mg Tab      Other Visit Diagnoses     Colon cancer screening    -  Primary    Relevant Orders    Fecal Immunochemical Test (iFOBT)          Health Maintenance reviewed/updated.

## 2019-05-20 NOTE — PATIENT INSTRUCTIONS
STOP atenolol  Start atenolol-chlorthalidone  Follow up with me or Dr. Blackmon if your symptoms do not improve

## 2019-05-21 NOTE — ASSESSMENT & PLAN NOTE
Controlled, patient reports elevated readings since stopping atenolol-chlorthalidone 100-50 mg daily; BP normal today on atenolol 25 mg daily; after discussion, will resume lower dose of combination medication, which should improve hand edema and palpitations. She will see Dr. Blackmon regarding palpitations and return for routine visit in June.

## 2019-05-29 ENCOUNTER — PATIENT OUTREACH (OUTPATIENT)
Dept: ADMINISTRATIVE | Facility: HOSPITAL | Age: 59
End: 2019-05-29

## 2019-05-29 NOTE — PROGRESS NOTES
Chart audit completed.  Confirmed she does have fitkit. States she plans to complete and return it this week.

## 2019-06-03 ENCOUNTER — APPOINTMENT (OUTPATIENT)
Dept: LAB | Facility: HOSPITAL | Age: 59
End: 2019-06-03
Attending: FAMILY MEDICINE
Payer: COMMERCIAL

## 2019-06-12 ENCOUNTER — OFFICE VISIT (OUTPATIENT)
Dept: INTERNAL MEDICINE | Facility: CLINIC | Age: 59
End: 2019-06-12
Payer: COMMERCIAL

## 2019-06-12 ENCOUNTER — LAB VISIT (OUTPATIENT)
Dept: LAB | Facility: HOSPITAL | Age: 59
End: 2019-06-12
Attending: FAMILY MEDICINE
Payer: COMMERCIAL

## 2019-06-12 VITALS
BODY MASS INDEX: 33.1 KG/M2 | HEART RATE: 78 BPM | SYSTOLIC BLOOD PRESSURE: 128 MMHG | OXYGEN SATURATION: 96 % | DIASTOLIC BLOOD PRESSURE: 86 MMHG | WEIGHT: 181 LBS | TEMPERATURE: 99 F

## 2019-06-12 DIAGNOSIS — J30.2 SEASONAL ALLERGIC RHINITIS, UNSPECIFIED TRIGGER: ICD-10-CM

## 2019-06-12 DIAGNOSIS — Z13.29 THYROID DISORDER SCREEN: ICD-10-CM

## 2019-06-12 DIAGNOSIS — I10 ESSENTIAL HYPERTENSION: Chronic | ICD-10-CM

## 2019-06-12 DIAGNOSIS — Z00.00 ROUTINE GENERAL MEDICAL EXAMINATION AT A HEALTH CARE FACILITY: Primary | ICD-10-CM

## 2019-06-12 DIAGNOSIS — Z00.00 ROUTINE GENERAL MEDICAL EXAMINATION AT A HEALTH CARE FACILITY: ICD-10-CM

## 2019-06-12 LAB
ALBUMIN SERPL BCP-MCNC: 3.8 G/DL (ref 3.5–5.2)
ALP SERPL-CCNC: 29 U/L (ref 55–135)
ALT SERPL W/O P-5'-P-CCNC: 24 U/L (ref 10–44)
ANION GAP SERPL CALC-SCNC: 12 MMOL/L (ref 8–16)
AST SERPL-CCNC: 24 U/L (ref 10–40)
BASOPHILS # BLD AUTO: 0.02 K/UL (ref 0–0.2)
BASOPHILS NFR BLD: 0.4 % (ref 0–1.9)
BILIRUB SERPL-MCNC: 0.6 MG/DL (ref 0.1–1)
BUN SERPL-MCNC: 14 MG/DL (ref 6–20)
CALCIUM SERPL-MCNC: 9.9 MG/DL (ref 8.7–10.5)
CHLORIDE SERPL-SCNC: 107 MMOL/L (ref 95–110)
CO2 SERPL-SCNC: 24 MMOL/L (ref 23–29)
CREAT SERPL-MCNC: 0.8 MG/DL (ref 0.5–1.4)
DIFFERENTIAL METHOD: ABNORMAL
EOSINOPHIL # BLD AUTO: 0.3 K/UL (ref 0–0.5)
EOSINOPHIL NFR BLD: 5.5 % (ref 0–8)
ERYTHROCYTE [DISTWIDTH] IN BLOOD BY AUTOMATED COUNT: 14.5 % (ref 11.5–14.5)
EST. GFR  (AFRICAN AMERICAN): >60 ML/MIN/1.73 M^2
EST. GFR  (NON AFRICAN AMERICAN): >60 ML/MIN/1.73 M^2
GLUCOSE SERPL-MCNC: 96 MG/DL (ref 70–110)
HCT VFR BLD AUTO: 40.4 % (ref 37–48.5)
HGB BLD-MCNC: 13.9 G/DL (ref 12–16)
LYMPHOCYTES # BLD AUTO: 1.8 K/UL (ref 1–4.8)
LYMPHOCYTES NFR BLD: 34.4 % (ref 18–48)
MCH RBC QN AUTO: 27.4 PG (ref 27–31)
MCHC RBC AUTO-ENTMCNC: 34.4 G/DL (ref 32–36)
MCV RBC AUTO: 80 FL (ref 82–98)
MONOCYTES # BLD AUTO: 0.5 K/UL (ref 0.3–1)
MONOCYTES NFR BLD: 9.4 % (ref 4–15)
NEUTROPHILS # BLD AUTO: 2.6 K/UL (ref 1.8–7.7)
NEUTROPHILS NFR BLD: 50.3 % (ref 38–73)
PLATELET # BLD AUTO: 191 K/UL (ref 150–350)
PMV BLD AUTO: 11.6 FL (ref 9.2–12.9)
POTASSIUM SERPL-SCNC: 4.1 MMOL/L (ref 3.5–5.1)
PROT SERPL-MCNC: 8 G/DL (ref 6–8.4)
RBC # BLD AUTO: 5.08 M/UL (ref 4–5.4)
SODIUM SERPL-SCNC: 143 MMOL/L (ref 136–145)
TSH SERPL DL<=0.005 MIU/L-ACNC: 0.69 UIU/ML (ref 0.4–4)
WBC # BLD AUTO: 5.11 K/UL (ref 3.9–12.7)

## 2019-06-12 PROCEDURE — 99999 PR PBB SHADOW E&M-EST. PATIENT-LVL III: ICD-10-PCS | Mod: PBBFAC,,, | Performed by: FAMILY MEDICINE

## 2019-06-12 PROCEDURE — 99396 PR PREVENTIVE VISIT,EST,40-64: ICD-10-PCS | Mod: S$GLB,,, | Performed by: FAMILY MEDICINE

## 2019-06-12 PROCEDURE — 80053 COMPREHEN METABOLIC PANEL: CPT

## 2019-06-12 PROCEDURE — 36415 COLL VENOUS BLD VENIPUNCTURE: CPT

## 2019-06-12 PROCEDURE — 3079F PR MOST RECENT DIASTOLIC BLOOD PRESSURE 80-89 MM HG: ICD-10-PCS | Mod: CPTII,S$GLB,, | Performed by: FAMILY MEDICINE

## 2019-06-12 PROCEDURE — 3074F PR MOST RECENT SYSTOLIC BLOOD PRESSURE < 130 MM HG: ICD-10-PCS | Mod: CPTII,S$GLB,, | Performed by: FAMILY MEDICINE

## 2019-06-12 PROCEDURE — 84443 ASSAY THYROID STIM HORMONE: CPT

## 2019-06-12 PROCEDURE — 3074F SYST BP LT 130 MM HG: CPT | Mod: CPTII,S$GLB,, | Performed by: FAMILY MEDICINE

## 2019-06-12 PROCEDURE — 99396 PREV VISIT EST AGE 40-64: CPT | Mod: S$GLB,,, | Performed by: FAMILY MEDICINE

## 2019-06-12 PROCEDURE — 85025 COMPLETE CBC W/AUTO DIFF WBC: CPT

## 2019-06-12 PROCEDURE — 99999 PR PBB SHADOW E&M-EST. PATIENT-LVL III: CPT | Mod: PBBFAC,,, | Performed by: FAMILY MEDICINE

## 2019-06-12 PROCEDURE — 3079F DIAST BP 80-89 MM HG: CPT | Mod: CPTII,S$GLB,, | Performed by: FAMILY MEDICINE

## 2019-06-12 RX ORDER — LORATADINE 10 MG/1
10 TABLET ORAL DAILY
Refills: 0 | COMMUNITY
Start: 2019-06-12 | End: 2019-07-23

## 2019-06-12 RX ORDER — FLUTICASONE PROPIONATE 50 MCG
2 SPRAY, SUSPENSION (ML) NASAL DAILY
COMMUNITY
Start: 2019-06-12 | End: 2019-07-23

## 2019-06-12 NOTE — ASSESSMENT & PLAN NOTE
Advised to try Claritin and Flonase daily; if symptoms worsen or do not improve advised follow-up; patient expressed understanding

## 2019-06-12 NOTE — ASSESSMENT & PLAN NOTE
Controlled; has been taking atenolol 50 mg daily and spironolactone 50 mg daily; she plans to resume atenolol-chlorthalidone 50-25 daily as her diastolic is borderline and she is having swelling in her fingers

## 2019-07-22 ENCOUNTER — TELEPHONE (OUTPATIENT)
Dept: INTERNAL MEDICINE | Facility: CLINIC | Age: 59
End: 2019-07-22

## 2019-07-22 NOTE — TELEPHONE ENCOUNTER
----- Message from Khoi Weems sent at 7/22/2019  9:04 AM CDT -----  Contact: [phone]  .Type:  Needs Medical Advice    Who Called: Clare Whitley    Symptoms (please be specific): Swelling in fingers /blood pressure changes   How long has patient had these symptoms: Couple of weeks   Pharmacy name and phone #:  .    [id number] - [place] - [address] [facility]  [address] [address]  Phone: [phone] Fax: [phone]      Would the patient rather a call back or a response via [facility]? Call back   Best Call Back Number: . [phone]   Additional Information: pt states medication that she takes now for blood pressure is causing symptoms of swelling and pressure changes.

## 2019-07-23 ENCOUNTER — OFFICE VISIT (OUTPATIENT)
Dept: INTERNAL MEDICINE | Facility: CLINIC | Age: 59
End: 2019-07-23
Payer: COMMERCIAL

## 2019-07-23 VITALS
WEIGHT: 181.69 LBS | SYSTOLIC BLOOD PRESSURE: 128 MMHG | HEIGHT: 62 IN | HEART RATE: 89 BPM | BODY MASS INDEX: 33.43 KG/M2 | OXYGEN SATURATION: 97 % | DIASTOLIC BLOOD PRESSURE: 90 MMHG | TEMPERATURE: 99 F

## 2019-07-23 DIAGNOSIS — I10 ESSENTIAL HYPERTENSION: Primary | ICD-10-CM

## 2019-07-23 DIAGNOSIS — H53.9 TRANSIENT VISUAL DISTURBANCE: ICD-10-CM

## 2019-07-23 DIAGNOSIS — R00.2 PALPITATIONS: ICD-10-CM

## 2019-07-23 PROCEDURE — 99999 PR PBB SHADOW E&M-EST. PATIENT-LVL IV: ICD-10-PCS | Mod: PBBFAC,,, | Performed by: FAMILY MEDICINE

## 2019-07-23 PROCEDURE — 3074F SYST BP LT 130 MM HG: CPT | Mod: CPTII,S$GLB,, | Performed by: FAMILY MEDICINE

## 2019-07-23 PROCEDURE — 3080F DIAST BP >= 90 MM HG: CPT | Mod: CPTII,S$GLB,, | Performed by: FAMILY MEDICINE

## 2019-07-23 PROCEDURE — 99214 PR OFFICE/OUTPT VISIT, EST, LEVL IV, 30-39 MIN: ICD-10-PCS | Mod: S$GLB,,, | Performed by: FAMILY MEDICINE

## 2019-07-23 PROCEDURE — 3074F PR MOST RECENT SYSTOLIC BLOOD PRESSURE < 130 MM HG: ICD-10-PCS | Mod: CPTII,S$GLB,, | Performed by: FAMILY MEDICINE

## 2019-07-23 PROCEDURE — 3008F BODY MASS INDEX DOCD: CPT | Mod: CPTII,S$GLB,, | Performed by: FAMILY MEDICINE

## 2019-07-23 PROCEDURE — 3080F PR MOST RECENT DIASTOLIC BLOOD PRESSURE >= 90 MM HG: ICD-10-PCS | Mod: CPTII,S$GLB,, | Performed by: FAMILY MEDICINE

## 2019-07-23 PROCEDURE — 3008F PR BODY MASS INDEX (BMI) DOCUMENTED: ICD-10-PCS | Mod: CPTII,S$GLB,, | Performed by: FAMILY MEDICINE

## 2019-07-23 PROCEDURE — 99214 OFFICE O/P EST MOD 30 MIN: CPT | Mod: S$GLB,,, | Performed by: FAMILY MEDICINE

## 2019-07-23 PROCEDURE — 99999 PR PBB SHADOW E&M-EST. PATIENT-LVL IV: CPT | Mod: PBBFAC,,, | Performed by: FAMILY MEDICINE

## 2019-07-23 RX ORDER — ASPIRIN 81 MG/1
81 TABLET ORAL DAILY
COMMUNITY

## 2019-07-23 RX ORDER — ATENOLOL AND CHLORTHALIDONE TABLET 100; 25 MG/1; MG/1
1 TABLET ORAL DAILY
Qty: 30 TABLET | Refills: 5 | Status: SHIPPED | OUTPATIENT
Start: 2019-07-23 | End: 2019-08-23

## 2019-07-23 NOTE — PATIENT INSTRUCTIONS
Patient was advised to seek immediate medical attention for severe headache, vision changes, chest pain, shortness of breath, speech difficulty, altered mental status, tingling/numbness/weakness or other focal neurologic deficits.

## 2019-07-23 NOTE — PROGRESS NOTES
Subjective:       Patient ID: Clare Whitley is a 58 y.o. female.    Chief Complaint: Hypertension    Patient presents to clinic today reporting swelling in her hands over the last few days.  She does report that she ran out of her atenolol-chlorthalidone around the time that this started.  She has been taking leftover plain atenolol 50 mg daily.  She reports home blood pressure readings after last visit have been in the 140s over 90s.  She also has been having issues with palpitations and has seen Dr. Zamarripa, cardiology regarding this.  She reports recently having what sounds like a Holter monitor, but has not received results.  She reports in the past her blood pressure and palpitations were better controlled when she was on atenolol 100-chlorthalidone 25 mg daily.  She wishes to resume this.  In addition, patient reports being seen at the [facility] Emergency room about a week ago for visual disturbance.  She reports having a CT scan of the head.  She denies any recurrent symptoms.  She desires to follow up with Neurology.  Patient is otherwise without concerns today.    Review of Systems   Constitutional: Negative for chills, fatigue, fever and unexpected weight change.   Eyes: Negative for visual disturbance.   Respiratory: Negative for shortness of breath.    Cardiovascular: Negative for chest pain.   Musculoskeletal: Negative for myalgias.   Neurological: Negative for dizziness, syncope, facial asymmetry, speech difficulty, weakness, numbness and headaches.       Objective:      Physical Exam   Constitutional: She is oriented to person, place, and time. She appears well-developed and well-nourished. No distress.   HENT:   Head: Normocephalic and atraumatic.   Eyes: Pupils are equal, round, and reactive to light. Conjunctivae and EOM are normal. No scleral icterus.   Cardiovascular: Normal rate and regular rhythm. Exam reveals no gallop and no friction rub.   No murmur heard.  Pulmonary/Chest: Effort  normal and breath sounds normal.   Neurological: She is alert and oriented to person, place, and time. No cranial nerve deficit. Gait normal.   Psychiatric: She has a normal mood and affect.   Vitals reviewed.      Assessment:       1. Essential hypertension    2. Palpitations    3. Transient visual disturbance        Plan:     Problem List Items Addressed This Visit     Essential hypertension - Primary (Chronic)    Current Assessment & Plan     Above goal; d/c plain atenolol; resume atenolol-chlorthalidone at increased dose of 100-25 mg daily; discussed stroke precautions; she will follow up in 1 month for reassessment         Relevant Medications    atenolol-chlorthalidone (TENORETIC) 100-25 mg per tablet    Palpitations    Current Assessment & Plan     Followed by Dr. Blackmon, cardiology; patient reports recent workup, but has not yet received results; should have improvement with palpitations with increased dose of atenolol; advised patient to notify Dr. Blackmon regarding medication adjustment; she expressed understanding.         Transient visual disturbance    Current Assessment & Plan     KENNETH signed for ER records from [facility]; scheduled with Neurology; patient continues to take 81 mg aspirin daily; discussed stroke precautions; patient expressed understanding         Relevant Orders    Ambulatory referral to Neurology

## 2019-07-23 NOTE — ASSESSMENT & PLAN NOTE
Followed by Dr. Blackmon, cardiology; patient reports recent workup, but has not yet received results; should have improvement with palpitations with increased dose of atenolol; advised patient to notify Dr. Blackmon regarding medication adjustment; she expressed understanding.

## 2019-07-23 NOTE — ASSESSMENT & PLAN NOTE
KENNETH signed for ER records from [facility]; scheduled with Neurology; patient continues to take 81 mg aspirin daily; discussed stroke precautions; patient expressed understanding

## 2019-07-23 NOTE — ASSESSMENT & PLAN NOTE
Above goal; d/c plain atenolol; resume atenolol-chlorthalidone at increased dose of 100-25 mg daily; discussed stroke precautions; she will follow up in 1 month for reassessment

## 2019-08-23 ENCOUNTER — OFFICE VISIT (OUTPATIENT)
Dept: INTERNAL MEDICINE | Facility: CLINIC | Age: 59
End: 2019-08-23
Payer: COMMERCIAL

## 2019-08-23 VITALS
TEMPERATURE: 99 F | WEIGHT: 175.69 LBS | DIASTOLIC BLOOD PRESSURE: 84 MMHG | BODY MASS INDEX: 32.14 KG/M2 | HEART RATE: 65 BPM | OXYGEN SATURATION: 96 % | SYSTOLIC BLOOD PRESSURE: 110 MMHG

## 2019-08-23 DIAGNOSIS — R20.0 RIGHT ARM NUMBNESS: ICD-10-CM

## 2019-08-23 DIAGNOSIS — J30.2 SEASONAL ALLERGIC RHINITIS, UNSPECIFIED TRIGGER: ICD-10-CM

## 2019-08-23 DIAGNOSIS — I10 ESSENTIAL HYPERTENSION: Primary | Chronic | ICD-10-CM

## 2019-08-23 DIAGNOSIS — R00.2 PALPITATIONS: ICD-10-CM

## 2019-08-23 DIAGNOSIS — H53.9 TRANSIENT VISUAL DISTURBANCE: ICD-10-CM

## 2019-08-23 PROCEDURE — 99214 PR OFFICE/OUTPT VISIT, EST, LEVL IV, 30-39 MIN: ICD-10-PCS | Mod: S$GLB,,, | Performed by: FAMILY MEDICINE

## 2019-08-23 PROCEDURE — 99999 PR PBB SHADOW E&M-EST. PATIENT-LVL III: CPT | Mod: PBBFAC,,, | Performed by: FAMILY MEDICINE

## 2019-08-23 PROCEDURE — 99214 OFFICE O/P EST MOD 30 MIN: CPT | Mod: S$GLB,,, | Performed by: FAMILY MEDICINE

## 2019-08-23 PROCEDURE — 99999 PR PBB SHADOW E&M-EST. PATIENT-LVL III: ICD-10-PCS | Mod: PBBFAC,,, | Performed by: FAMILY MEDICINE

## 2019-08-23 PROCEDURE — 3008F PR BODY MASS INDEX (BMI) DOCUMENTED: ICD-10-PCS | Mod: CPTII,S$GLB,, | Performed by: FAMILY MEDICINE

## 2019-08-23 PROCEDURE — 3074F SYST BP LT 130 MM HG: CPT | Mod: CPTII,S$GLB,, | Performed by: FAMILY MEDICINE

## 2019-08-23 PROCEDURE — 3079F PR MOST RECENT DIASTOLIC BLOOD PRESSURE 80-89 MM HG: ICD-10-PCS | Mod: CPTII,S$GLB,, | Performed by: FAMILY MEDICINE

## 2019-08-23 PROCEDURE — 3079F DIAST BP 80-89 MM HG: CPT | Mod: CPTII,S$GLB,, | Performed by: FAMILY MEDICINE

## 2019-08-23 PROCEDURE — 3008F BODY MASS INDEX DOCD: CPT | Mod: CPTII,S$GLB,, | Performed by: FAMILY MEDICINE

## 2019-08-23 PROCEDURE — 3074F PR MOST RECENT SYSTOLIC BLOOD PRESSURE < 130 MM HG: ICD-10-PCS | Mod: CPTII,S$GLB,, | Performed by: FAMILY MEDICINE

## 2019-08-23 RX ORDER — LORATADINE 10 MG/1
10 TABLET ORAL DAILY
Refills: 0 | COMMUNITY
Start: 2019-08-23 | End: 2021-01-07

## 2019-08-23 RX ORDER — FLUTICASONE PROPIONATE 50 MCG
2 SPRAY, SUSPENSION (ML) NASAL DAILY
COMMUNITY
Start: 2019-08-23 | End: 2021-01-07

## 2019-08-23 RX ORDER — ATENOLOL AND CHLORTHALIDONE TABLET 50; 25 MG/1; MG/1
1 TABLET ORAL DAILY
Qty: 30 TABLET | Refills: 11 | Status: SHIPPED | OUTPATIENT
Start: 2019-08-23 | End: 2021-01-07 | Stop reason: SDUPTHER

## 2019-08-23 NOTE — PROGRESS NOTES
Subjective:       Patient ID: Clare Whitley is a 58 y.o. female.    Chief Complaint: Blood Pressure Check (coughing, and numbness on right side)    Patient presents to clinic today for follow-up of blood pressure.  Patient reports she had to take half a tablet of the medication as the full dose made her feel weak.  She has been taking half a tablet since last visit essentially.  Patient also reports 3 episodes over the last couple of weeks of her right arm feeling numb and heavy.  She denies current symptoms.  She denies neck pain. She is already scheduled with Neurology in mid October for further evaluation after experiencing transient visual changes.  She was seen at [facility] Emergency Room after that episode and reports her workup was negative.  Patient also reports mucus production that is bothersome.  Patient is otherwise without concerns today.    Review of Systems   Constitutional: Negative for chills, fatigue, fever and unexpected weight change.   HENT: Positive for congestion and postnasal drip. Negative for rhinorrhea, sneezing and sore throat.    Eyes: Negative for visual disturbance.   Respiratory: Positive for cough (occasionally due to mucous). Negative for shortness of breath.    Cardiovascular: Positive for palpitations (reports symptoms continue, she saw Dr. Blackmon and wore heart monitor for several days; reports work up okay). Negative for chest pain.   Musculoskeletal: Negative for myalgias.   Neurological: Positive for numbness. Negative for dizziness, tremors, syncope, facial asymmetry, speech difficulty, weakness, light-headedness and headaches.       Objective:      Physical Exam   Constitutional: She is oriented to person, place, and time. She appears well-developed and well-nourished. No distress.   HENT:   Head: Normocephalic and atraumatic.   Eyes: Pupils are equal, round, and reactive to light. Conjunctivae and EOM are normal. No scleral icterus.   Cardiovascular: Normal rate and  regular rhythm. Exam reveals no gallop and no friction rub.   No murmur heard.  Pulmonary/Chest: Effort normal and breath sounds normal.   Neurological: She is alert and oriented to person, place, and time. She has normal strength. No cranial nerve deficit or sensory deficit. Coordination and gait normal.   Psychiatric: She has a normal mood and affect.   Vitals reviewed.      Assessment:       1. Essential hypertension    2. Transient visual disturbance    3. Right arm numbness    4. Seasonal allergic rhinitis, unspecified trigger    5. Palpitations        Plan:     Problem List Items Addressed This Visit     Essential hypertension - Primary (Chronic)    Current Assessment & Plan     Controlled; change to atenolol 50-HCTZ 25 daily as patient reports still having some swelling.          Relevant Medications    atenolol-chlorthalidone (TENORETIC) 50-25 mg Tab    Palpitations    Current Assessment & Plan     Followed by Dr. Blackmon, Cardiology; advised avoidance of caffeine and stress management; if persistent notify Dr. Blackmon         Right arm numbness    Current Assessment & Plan     MRI brain ordered; advised to seek immediate medical attention in the ER if this occurs again; discussed stroke precautions; scheduled with Neurology in October.         Relevant Orders    MRI Brain W WO Contrast    Seasonal allergic rhinitis    Relevant Medications    loratadine (CLARITIN) 10 mg tablet    fluticasone propionate (FLONASE) 50 mcg/actuation nasal spray    Transient visual disturbance    Current Assessment & Plan     MRI ordered; keep appointment with Neurology; discussed stroke precautions         Relevant Orders    MRI Brain W WO Contrast

## 2019-08-23 NOTE — ASSESSMENT & PLAN NOTE
Followed by Dr. Blackmon, Cardiology; advised avoidance of caffeine and stress management; if persistent notify Dr. Blackmon

## 2019-08-23 NOTE — PATIENT INSTRUCTIONS
Seek immediate medical attention for severe headache, vision changes, chest pain, shortness of breath, speech difficulty, altered mental status, tingling/numbness/weakness or other focal neurologic deficits.    Nasal Allergy  Nasal Allergy, also called Allergic Rhinitis occurs after exposure to pollen, molds, mildew, animal dander (scales from animal skin, hair and feathers), dust, smoke and fumes. (These are called allergens). When pollen causes a nasal allergy it is commonly called Hay Fever.  When these particles contact the lining of the nose, eyes, eyelids, sinuses or throat, they cause the cells to release a chemical called histamine. Histamine may cause a watery discharge from the eyes or nose. It may also cause violent sneezing, nasal congestion, itching of the eyes, nose, throat and mouth.  Prevention:  Nasal allergy cannot be cured but symptoms can be reduced. Avoid or reduce exposure to the allergen when possible, by the following measures:  POLLEN  · Stay indoors on hot windy days during pollen season  · Keep windows and doors closed  · Use an air conditioner with an electrostatic filter  DUST, MOLD & MILDEW  Follow these measures, especially in the bedroom:  · When cleaning use vacuum , oiled mops and damp cloths; dont stir up the dust.  · Once a week clean the walls, woodwork and floors with a damp mop and vacuum carpets.  · Once a year clean the bed frame and springs (do this outside).  · Cover the box springs with plastic. Do not use mattress pads.  · Remove stuffed chairs and rugs from the bedroom.  · Discard old moldy books, furniture and bedding.  · Use synthetic fabrics for furniture, curtains and bedding. Avoid quilts, comforters, and stuffed toys.  ANIMAL DANDER  · Remove all indoor pets (except fish and reptiles).  · Avoid all contact with furry animals.  · Avoid down-stuffed pillows and coats.  · Some persons are also sensitive to wool and should avoid it.  OTHER  IRRITANTS  · Do not smoke and avoid the smoke of others.  · Some persons are sensitive to cosmetic powder, baby powder and powdered laundry detergents. Therefore, these powders should be avoided.  Home Care:  · DECONGESTANT pills and sprays (Sudafed, NeoSynephrine, Afrin), reduce tissue swelling and watery discharge. Overuse of nasal decongestant sprays may make symptoms worse. Do not use these more often than recommended.  · ANTIHISTAMINES block the release of histamine during the allergic response. Antihistamines are more effective when taken BEFORE symptoms develop. Unless a prescription antihistamine was prescribed, you may take CLARITIN (loratadine). (Claritin is an over-the-counter antihistamine that does not cause drowsiness.)  · STEROID nasal sprays (Beconase, Vancenase, Nasalide) or oral steroids (Prednisone) may also be prescribed for more severe symptoms. These help to reduce the local inflammation which adds to the allergic response.  · If you have ASTHMA, pollen season may make your asthma symptoms worse. It is important that you use your asthma medicines as directed during this time to prevent or treat attacks. Some persons with asthma have a worsening of their asthma symptoms when taking antihistamines. If you notice this, stop the antihistamines and notify your doctor.  Follow Up  with your doctor or as directed by our staff if your symptoms are not improving with the treatment advised.  Get Prompt Medical Attention  if any of the following occur:  · Facial or sinus pain or colored drainage from the nose  · Severe headache or ear pain  · Fever of 100.4°F (38ºC) or higher, or as directed by your healthcare provider  · Wheezing or trouble breathing (If you already know you have asthma, return if your asthma symptoms do not respond to the usual doses of your medicine)  · Cough with lots of colored sputum (mucus)  © 20007345-5554 [facility], [address] [place] [address], [place] [address]. All rights  reserved. This information is not intended as a substitute for professional medical care. Always follow your healthcare professional's instructions.

## 2019-08-23 NOTE — ASSESSMENT & PLAN NOTE
MRI brain ordered; advised to seek immediate medical attention in the ER if this occurs again; discussed stroke precautions; scheduled with Neurology in October.

## 2019-09-06 ENCOUNTER — HOSPITAL ENCOUNTER (OUTPATIENT)
Dept: RADIOLOGY | Facility: HOSPITAL | Age: 59
Discharge: HOME | End: 2019-09-06
Attending: FAMILY MEDICINE
Payer: COMMERCIAL

## 2019-09-06 ENCOUNTER — TELEPHONE (OUTPATIENT)
Dept: INTERNAL MEDICINE | Facility: CLINIC | Age: 59
End: 2019-09-06

## 2019-09-06 DIAGNOSIS — R20.0 RIGHT ARM NUMBNESS: ICD-10-CM

## 2019-09-06 DIAGNOSIS — H53.9 TRANSIENT VISUAL DISTURBANCE: ICD-10-CM

## 2019-09-06 DIAGNOSIS — R90.89 ABNORMAL FINDING ON MRI OF BRAIN: Primary | ICD-10-CM

## 2019-09-06 PROCEDURE — A9585 GADOBUTROL INJECTION: HCPCS | Performed by: FAMILY MEDICINE

## 2019-09-06 PROCEDURE — 25500020 PHARM REV CODE 255: Performed by: FAMILY MEDICINE

## 2019-09-06 PROCEDURE — 70553 MRI BRAIN STEM W/O & W/DYE: CPT | Mod: TC

## 2019-09-06 RX ORDER — GADOBUTROL 604.72 MG/ML
10 INJECTION INTRAVENOUS
Status: COMPLETED | OUTPATIENT
Start: 2019-09-06 | End: 2019-09-06

## 2019-09-06 RX ORDER — ACETAZOLAMIDE 250 MG/1
250 TABLET ORAL 2 TIMES DAILY
Qty: 60 TABLET | Refills: 0 | Status: SHIPPED | OUTPATIENT
Start: 2019-09-06 | End: 2019-10-09 | Stop reason: SDUPTHER

## 2019-09-06 RX ADMIN — GADOBUTROL 7 ML: 604.72 INJECTION INTRAVENOUS at 01:09

## 2019-09-06 NOTE — TELEPHONE ENCOUNTER
When you contact patient, also ask her if her vision is normal at present or if she is having a problem. I discussed with Dr. Harris, Neurology. He recommends she see Ophthalmology as well. I will enter referral, please assist with scheduling an appointment asap. Neurology also recommends starting diamox 250 mg bid if she is losing vision. Let me know and I'll send. In addition, he recommends MR venogram as well. Please check with Radiology on how to order MR venogram in Epic, so we can get study scheduled asap. Thank you.

## 2019-09-06 NOTE — TELEPHONE ENCOUNTER
Please notify patient that her MRI shows findings that may indicate idiopathic intracranial hypertension (increased pressure in the brain). She needs to see Neurology for further evaluation. If she has progressive neurologic symptoms, she needs to seek immediate medical attention in the emergency room. Please contact Neurology department to see which provider can see her soonest and ask provider to review MRI and let me know if there is anything else we need to do in the meantime.

## 2019-09-06 NOTE — TELEPHONE ENCOUNTER
Spoke with pt voiced understanding of results.   States she is having blurred vision.   Checked with radiology and they unsure what MR Venogram is.   Pt notified rx sent to pharmacy.

## 2019-09-09 ENCOUNTER — TELEPHONE (OUTPATIENT)
Dept: INTERNAL MEDICINE | Facility: CLINIC | Age: 59
End: 2019-09-09

## 2019-09-09 ENCOUNTER — OFFICE VISIT (OUTPATIENT)
Dept: OPHTHALMOLOGY | Facility: CLINIC | Age: 59
End: 2019-09-09
Payer: COMMERCIAL

## 2019-09-09 ENCOUNTER — TELEPHONE (OUTPATIENT)
Dept: NEUROLOGY | Facility: CLINIC | Age: 59
End: 2019-09-09

## 2019-09-09 DIAGNOSIS — H53.9 TRANSIENT VISUAL DISTURBANCE: Primary | ICD-10-CM

## 2019-09-09 DIAGNOSIS — H52.202 HYPEROPIA WITH ASTIGMATISM AND PRESBYOPIA, LEFT: ICD-10-CM

## 2019-09-09 DIAGNOSIS — H52.4 HYPEROPIA WITH ASTIGMATISM AND PRESBYOPIA, LEFT: ICD-10-CM

## 2019-09-09 DIAGNOSIS — H52.02 HYPEROPIA WITH ASTIGMATISM AND PRESBYOPIA, LEFT: ICD-10-CM

## 2019-09-09 PROCEDURE — 99999 PR PBB SHADOW E&M-EST. PATIENT-LVL I: CPT | Mod: PBBFAC,,, | Performed by: OPTOMETRIST

## 2019-09-09 PROCEDURE — 92004 PR EYE EXAM, NEW PATIENT,COMPREHESV: ICD-10-PCS | Mod: S$GLB,,, | Performed by: OPTOMETRIST

## 2019-09-09 PROCEDURE — 99999 PR PBB SHADOW E&M-EST. PATIENT-LVL I: ICD-10-PCS | Mod: PBBFAC,,, | Performed by: OPTOMETRIST

## 2019-09-09 PROCEDURE — 92015 PR REFRACTION: ICD-10-PCS | Mod: S$GLB,,, | Performed by: OPTOMETRIST

## 2019-09-09 PROCEDURE — 92004 COMPRE OPH EXAM NEW PT 1/>: CPT | Mod: S$GLB,,, | Performed by: OPTOMETRIST

## 2019-09-09 PROCEDURE — 92015 DETERMINE REFRACTIVE STATE: CPT | Mod: S$GLB,,, | Performed by: OPTOMETRIST

## 2019-09-09 NOTE — LETTER
September 9, 2019      Chhaya Jennings MD  [address] [place] [address] [address] [place] [address]           [facility]  [address] [place] [address] [address] [place] [address]  Phone: [phone]  Fax: [phone]          Patient: Clare Whitley   MR Number: [id number]   YOB: 1960   Date of Visit: 9/9/2019       Dear Dr. Chhaya Jennings:    Thank you for referring Clare Whitley to me for evaluation. Attached you will find relevant portions of my assessment and plan of care.    If you have questions, please do not hesitate to call me. I look forward to following Clare Whitley along with you.    Sincerely,    Saturnino Martinez, OD    Enclosure  CC:  No Recipients    If you would like to receive this communication electronically, please contact [email or web address] or [phone] to request more information on [employer] Link access.    For providers and/or their staff who would like to refer a patient to [facility], please contact us through our one-stop-shop provider referral line, [facility], at [phone].    If you feel you have received this communication in error or would no longer like to receive these types of communications, please e-mail [email or web address]

## 2019-09-09 NOTE — TELEPHONE ENCOUNTER
----- Message from China Jones sent at 9/9/2019  1:38 PM CDT -----  Contact: pt  Pt is calling staff regarding the new medication acetaZOLAMIDE (DIAMOX) 250 MG tablet    pt maybe causing a side affect for pt     Pt call back to be advised [phone]    Thanks

## 2019-09-09 NOTE — TELEPHONE ENCOUNTER
Have her take 1/2 tablet of atenolol-chlorthalidone while on the diamox. If symptoms persist, contact me.

## 2019-09-09 NOTE — TELEPHONE ENCOUNTER
Pt stated she spoke with someone in Dr. Harris office about getting a sooner appt . Advised her that his nurse was out and I didn't see any document . Will leave message for her to call . She verbalized understanding .

## 2019-09-09 NOTE — TELEPHONE ENCOUNTER
Spoke with pt states since starting the diamox and is having dizziness and tingling hands.   States when she checked her BP before taking it, it was 116/70. States she has been spacing taking the medication and isn't sure if its dropping her BP to low.     Spoke with Dr. Harris office - his nurse is out of the office today and will call pt in the morning to schedule an appt.

## 2019-09-09 NOTE — PROGRESS NOTES
HPI     Decrease near visual acuity.  New patient last eye exam 2 years.  Patient states she is here due pressure on brain causing transient blurred   vision.    Last edited by Saturnino Martinez, OD on 9/9/2019 10:27 AM. (History)            Assessment /Plan     For exam results, see Encounter Report.    Transient visual disturbance    Hyperopia with astigmatism and presbyopia, left      No evidence of vision changes due to intracranial hypertension.    Dispense Final Rx for glasses or may use OTC glasses.  RTC 1 year  Discussed above and answered questions.

## 2019-09-10 ENCOUNTER — TELEPHONE (OUTPATIENT)
Dept: NEUROLOGY | Facility: CLINIC | Age: 59
End: 2019-09-10

## 2019-09-10 NOTE — TELEPHONE ENCOUNTER
----- Message from Keshia Hidalgo sent at 9/10/2019  8:37 AM CDT -----  Contact: Self [phone]  Patient Returning Your Phone Call

## 2019-09-24 ENCOUNTER — OFFICE VISIT (OUTPATIENT)
Dept: NEUROLOGY | Facility: CLINIC | Age: 59
End: 2019-09-24
Payer: COMMERCIAL

## 2019-09-24 VITALS
HEART RATE: 88 BPM | HEIGHT: 62 IN | DIASTOLIC BLOOD PRESSURE: 78 MMHG | SYSTOLIC BLOOD PRESSURE: 110 MMHG | WEIGHT: 172.38 LBS | BODY MASS INDEX: 31.72 KG/M2

## 2019-09-24 DIAGNOSIS — H53.9 TRANSIENT VISUAL DISTURBANCE: Primary | ICD-10-CM

## 2019-09-24 DIAGNOSIS — R90.89 ABNORMAL FINDING ON MRI OF BRAIN: ICD-10-CM

## 2019-09-24 DIAGNOSIS — G08 DURAL VENOUS SINUS THROMBOSIS: ICD-10-CM

## 2019-09-24 PROCEDURE — 3074F SYST BP LT 130 MM HG: CPT | Mod: CPTII,S$GLB,, | Performed by: PSYCHIATRY & NEUROLOGY

## 2019-09-24 PROCEDURE — 3078F PR MOST RECENT DIASTOLIC BLOOD PRESSURE < 80 MM HG: ICD-10-PCS | Mod: CPTII,S$GLB,, | Performed by: PSYCHIATRY & NEUROLOGY

## 2019-09-24 PROCEDURE — 99999 PR PBB SHADOW E&M-EST. PATIENT-LVL III: CPT | Mod: PBBFAC,,, | Performed by: PSYCHIATRY & NEUROLOGY

## 2019-09-24 PROCEDURE — 3074F PR MOST RECENT SYSTOLIC BLOOD PRESSURE < 130 MM HG: ICD-10-PCS | Mod: CPTII,S$GLB,, | Performed by: PSYCHIATRY & NEUROLOGY

## 2019-09-24 PROCEDURE — 3008F BODY MASS INDEX DOCD: CPT | Mod: CPTII,S$GLB,, | Performed by: PSYCHIATRY & NEUROLOGY

## 2019-09-24 PROCEDURE — 3078F DIAST BP <80 MM HG: CPT | Mod: CPTII,S$GLB,, | Performed by: PSYCHIATRY & NEUROLOGY

## 2019-09-24 PROCEDURE — 99204 PR OFFICE/OUTPT VISIT, NEW, LEVL IV, 45-59 MIN: ICD-10-PCS | Mod: S$GLB,,, | Performed by: PSYCHIATRY & NEUROLOGY

## 2019-09-24 PROCEDURE — 3008F PR BODY MASS INDEX (BMI) DOCUMENTED: ICD-10-PCS | Mod: CPTII,S$GLB,, | Performed by: PSYCHIATRY & NEUROLOGY

## 2019-09-24 PROCEDURE — 99999 PR PBB SHADOW E&M-EST. PATIENT-LVL III: ICD-10-PCS | Mod: PBBFAC,,, | Performed by: PSYCHIATRY & NEUROLOGY

## 2019-09-24 PROCEDURE — 99204 OFFICE O/P NEW MOD 45 MIN: CPT | Mod: S$GLB,,, | Performed by: PSYCHIATRY & NEUROLOGY

## 2019-09-24 NOTE — LETTER
September 24, 2019      Chhaya Jennings MD  [address] [place] [address] [address] [place] [address]           [facility]  [address] [place] [address] [place] [address]  Phone: [phone]  Fax: [phone]          Patient: Clare Whitley   MR Number: [id number]   YOB: 1960   Date of Visit: 9/24/2019       Dear Dr. Chhaya Jennings:    Thank you for referring Clare Whitley to me for evaluation. Attached you will find relevant portions of my assessment and plan of care.    If you have questions, please do not hesitate to call me. I look forward to following Clare Whitley along with you.    Sincerely,    Tez Harris MD    Enclosure  CC:  No Recipients    If you would like to receive this communication electronically, please contact [email or web address] or [phone] to request more information on [employer] Link access.    For providers and/or their staff who would like to refer a patient to [facility], please contact us through our one-stop-shop provider referral line, [facility], at [phone].    If you feel you have received this communication in error or would no longer like to receive these types of communications, please e-mail [email or web address]

## 2019-09-24 NOTE — PROGRESS NOTES
Subjective:      Patient ID: Clare Whitley is a 59 y.o. female.    Chief Complaint: I have blurred vision intermittently and a pressure sensation in my head     This right-handed patient indicates that her history began when she had an episode of transient weakness of the right arm and hand and presented to [facility] in December, 2018. The patient states that she went in to the hospital emergency room without complaint and was evaluated with an overnight stay.  The patient states that she had an MRI of the brain done at that time which was reported as normal and also had other studies done which were reported as normal as she was worked up for possible TIA.    Following that hospitalization, the patient states that she began to experience more difficulty with control of her hypertension.  Despite numerous medication adjustments, she continued to have elevation of her blood pressure but then also began to have occasional episodes of what she describes as weakness of the right arm and hand.  The patient states that there was no obvious speech difficulty or visual difficulty that time but that she also sometimes felt confusion.    After several of these episodes, she had a repeat MRI done at [facility].  This repeat MRI of the brain however does show low-lying cerebellar tonsils and a partially empty sella.  This combination of findings suggested to the radiologist the possibility of increased intracranial pressure.    At about the same time however the patient became aware that she would have occasional blurred vision.  She denies definite diplopia or visual obscuration.  She describes this as does simply not being able to see clearly.  With that finding, she was seen by Optometry who did not find any significant abnormalities on the eye exam, specifically indicating that did not appear to be any evidence of increased intracranial pressure.  However, because of the suggestion of increased  "intracranial pressure from the MRI, and the symptoms of blurred vision, she was placed on Diamox 250 mg twice a day.  On this medication, the patient is of the opinion that the pressure sensation is "better" but has not been totally relieved.  Since being placed on Diamox she has had the expected side effects of Diamox with paresthesia in her fingers and lips.    The patient denies any further episodes of transient weakness of the right arm or right hand or right side of her face.  She denies any change in walking or standing balance.  She is not aware of any weakness in the right lower extremity.      ROS:  GENERAL: NO FEVER, CHILLS, FATIGABILITY OR WEIGHT LOSS.  SKIN: NO RASHES, ITCHING OR CHANGES IN COLOR OR TEXTURE OF SKIN.  HEAD: NO  RECENT HEAD TRAUMA.  EYES:  NO PHOTOPHOBIA, OCULAR PAIN OR DIPLOPIA.  EARS: DENIES EAR PAIN, DISCHARGE OR VERTIGO.  NOSE: NO LOSS OF SMELL, NO EPISTAXIS OR POSTNASAL DRIP.  MOUTH & THROAT: NO HOARSENESS OR CHANGE IN VOICE. NO EXCESSIVE GUM BLEEDING.  NODES: DENIES SWOLLEN GLANDS.  CHEST: DENIES SANDOVAL, CYANOSIS, WHEEZING, COUGH AND SPUTUM PRODUCTION.  CARDIOVASCULAR: DENIES CHEST PAIN, PND, ORTHOPNEA OR REDUCED EXERCISE TOLERANCE.  ABDOMEN: APPETITE FINE. NO WEIGHT LOSS. DENIES DIARRHEA, ABDOMINAL PAIN, HEMATEMESIS OR BLOOD IN STOOL.  URINARY: NO FLANK PAIN, DYSURIA OR HEMATURIA.  PERIPHERAL VASCULAR: NO CLAUDICATION OR CYANOSIS.  MUSCULOSKELETAL: NO JOINT STIFFNESS OR SWELLING. DENIES BACK PAIN.  NEUROLOGIC: NO HISTORY OF SEIZURES, PARALYSIS, ALTERATION OF GAIT OR COORDINATION.    Past Medical History:   Diagnosis Date    Hypertension     Mitral regurgitation     "mild"; followed by Dr. Blackmon ([facility])    MVP (mitral valve prolapse)     "trivial"; followed by Dr. Blackmon ([facility])    Sickle cell trait      Past Surgical History:   Procedure Laterality Date    CHOLECYSTECTOMY      laser SX Bilateral     LYMPH NODE BIOPSY Left     left neck     UTERINE FIBROID EMBOLIZATION  " 01/01/2010     Family History   Problem Relation Age of Onset    Hypertension Mother     Cataracts Mother     Hypertension Father     Hypertension Sister     Hypertension Brother      Social History     Socioeconomic History    Marital status:      Spouse name: Johnnie Whitley    Number of children: 3    Years of education: Not on file    Highest education level: Not on file   Occupational History    Occupation: Rehab counselor associate     Employer: [employer]     Comment: Works with disabled people   Social Needs    Financial resource strain: Not on file    Food insecurity:     Worry: Not on file     Inability: Not on file    Transportation needs:     Medical: Not on file     Non-medical: Not on file   Tobacco Use    Smoking status: Passive Smoke Exposure - Never Smoker    Smokeless tobacco: Never Used   Substance and Sexual Activity    Alcohol use: No    Drug use: No    Sexual activity: Yes     Partners: Male     Birth control/protection: None, Post-menopausal   Lifestyle    Physical activity:     Days per week: Not on file     Minutes per session: Not on file    Stress: Not on file   Relationships    Social connections:     Talks on phone: Not on file     Gets together: Not on file     Attends [employer] service: Not on file     Active member of club or organization: Not on file     Attends meetings of clubs or organizations: Not on file     Relationship status: Not on file   Other Topics Concern    Not on file   Social History Narrative    Not on file         Objective:   PE:   VITAL SIGNS:   Height 5 ft 2 in, weight 78.2 kg, BMI 31.53  Vitals:    09/24/19 1454   BP: 110/78   Pulse: 88     APPEARANCE: WELL NOURISHED, WELL DEVELOPED, IN NO ACUTE DISTRESS.    HEAD: NORMOCEPHALIC, ATRAUMATIC.  EYES: PERRL. EOMI.  NON-ICTERIC SCLERAE.     NOSE: MUCOSA PINK. AIRWAY CLEAR.  MOUTH & THROAT: NO TONSILLAR ENLARGEMENT. NO PHARYNGEAL ERYTHEMA OR EXUDATE. NO STRIDOR.  NECK: SUPPLE. NO  BRUITS.  CHEST: LUNGS CLEAR TO AUSCULTATION.  CARDIOVASCULAR: REGULAR RHYTHM WITHOUT SIGNIFICANT MURMURS.  ABDOMEN: BOWEL SOUNDS NORMAL. NOT DISTENDED. SOFT. NO TENDERNESS OR MASSES.  MUSCULOSKELETAL:  NO BONY DEFORMITY SEEN.  MUSCLE TONE  AND MUSCLE MASS ARE NORMAL IN BOTH UPPER AND BOTH LOWER EXTREMITIES.    NEUROLOGIC:     Mental status: The patient is oriented to person, place, time.  The patient is able to recall 3 unrelated words at 3 minutes without difficulty.  The patient is able to focus on the exam and follow 3-step commands without difficulty.  The patient has intact language function including naming and syntax.  The patient shows good awareness of current events and has a good fund of general knowledge.  The patient is pleasant and cooperative for the examination.    Cranial nerves:  II: visual fields are intact by confrontation.  The patient is able to perceive the color red as the same intensity in each eye.  Funduscopic examination does not show any evidence of papilledema, hemorrhage, or exudate.  Visual acuity with hand held chart is  20/30 left eye, right eye, hand 20/20 both eyes utilizing her glasses.  III, IV, VI: The extra ocular muscles are intact in the cardinal directions of gaze.  No ptosis is present.  Pupils are briskly reactive to light bilaterally.  V: Facial sensation is intact to light touch in all 3 divisions of the fifth cranial nerve.  Masseter function is equally strong.  Corneal reflexes are present and brisk.  VII: Facial features are symmetrical.  The patient has a symmetrical grimace, forced eyelid closure, and for head elevation.  VIII: Hearing is intact to voice and finger rub.  IX, X: The patient's uvula elevates in the midline.  Upon phonation, there is symmetrical elevation of the palate.  Gag reflex is intact bilaterally.  XI: The patient has a symmetrical shoulder shrug.  The sternocleidomastoid muscles are symmetrically strong.  No atrophy is present.  XII: The  patient's tongue protrudes in the midline.  There is no atrophy present.  Articulation is clear without dysarthria.    Gait and Station: Romberg is negative.  The patient ambulates with a good alternate arm swing.  The patient is able to heel walk and toe walk.    Motor: Manual muscle testing of both upper and lower extremities shows grade 5/5 strength bilaterally.  The patient's strength is normal for age and body habitus.  Examination of the extremities does not show any evidence of atrophy or other deformity.  Muscle tone is normal both upper and lower extremities.    Sensory: The patient has intact perception of pinprick, light touch, and vibration in both upper and lower extremities.  Position sense is intact in both upper and lower extremities.    Cerebellar: The patient is able to perform finger-to-nose and heel-to-shin without difficulty.  No involuntary movements are seen at rest.  Muscle tone is normal.  Rapid alternating movements are done without difficulty.  Coordination of alternating movements is normal.    Reflexes: Stretch reflexes including biceps, triceps, knees, and ankles is normal bilaterally.  Plantar stimulation is flexor bilaterally.  No pathological reflexes are seen.      Assessment:     Encounter Diagnoses   Name Primary?    Dural venous sinus thrombosis     Abnormal finding on MRI of brain     Transient visual disturbance Yes       Discussion:   I have reviewed the patient's MRI with her and demonstrated the findings on MRI of low lying cerebellar tonsils as well as a partially empty sella.  When this was discussed with the patient, she recalls being [facility] and being told that she had an MRI abnormality at that time.  She does not recall exactly what was said and this MRI was done perhaps 15 or 20 years ago.  There is no way to retrieve that information at this stage.  The patient probably did have a TIA in December, 2018 but had a very  adequate workup at that time.  She does have slight risk factors for vascular disease including hypertension and a family history of vascular disease.  For now, I would continue Diamox but we will go ahead and obtain MR venogram of the brain as part of the workup.  I do not think this patient should have lumbar puncture performed.  She has low-lying cerebellar tonsils and placing a needle in the lower part of the back would place her at undue risk.  She should continue her medical treatment for the TIA.    Plan:     1.  Schedule MR venogram of the brain  2.  Continue Diamox 250 mg twice a day and other medications for hypertension and aspirin 81 mg daily.    This was a 60 min visit with the patient with over 50% of the time spent counseling thing patient and reviewing medical records of the patient.  All questions were answered to her satisfaction.  This note is generated with speech recognition software and is subject to transcription error and sound alike phrases that may be missed by proofreading.

## 2019-10-02 ENCOUNTER — HOSPITAL ENCOUNTER (OUTPATIENT)
Dept: RADIOLOGY | Facility: HOSPITAL | Age: 59
Discharge: HOME | End: 2019-10-02
Attending: PSYCHIATRY & NEUROLOGY
Payer: COMMERCIAL

## 2019-10-02 DIAGNOSIS — G08 DURAL VENOUS SINUS THROMBOSIS: ICD-10-CM

## 2019-10-02 PROCEDURE — 70544 MR ANGIOGRAPHY HEAD W/O DYE: CPT | Mod: TC

## 2019-10-09 DIAGNOSIS — R90.89 ABNORMAL FINDING ON MRI OF BRAIN: ICD-10-CM

## 2019-10-09 NOTE — TELEPHONE ENCOUNTER
----- Message from Patricia Thomas sent at 10/9/2019  2:31 PM CDT -----  Contact: self  Type:  RX Refill Request    Who Called: pt  Refill or New Rx:refill  RX Name and Strength:acetavolamide-250mg  How is the patient currently taking it? (ex. 1XDay):2xday  Is this a 30 day or 90 day RX:60  Preferred Pharmacy with phone number:  [id number] - [place], [place] - [address] AT On [facility] of [facility]  [address] [place] [address]  Phone: [phone] Fax: [phone]  Local or Mail Order:local  Ordering Provider:isis  Would the patient rather a call back or a response via [facility]? Call back  Best Call Back Number:[phone]  Additional Information: pt has two dosages left.      Thanks,  Patricia Thomas

## 2019-10-10 RX ORDER — ACETAZOLAMIDE 250 MG/1
250 TABLET ORAL 2 TIMES DAILY
Qty: 60 TABLET | Refills: 5 | Status: SHIPPED | OUTPATIENT
Start: 2019-10-10 | End: 2020-10-12 | Stop reason: SDUPTHER

## 2019-12-18 ENCOUNTER — OFFICE VISIT (OUTPATIENT)
Dept: INTERNAL MEDICINE | Facility: CLINIC | Age: 59
End: 2019-12-18
Payer: COMMERCIAL

## 2019-12-18 VITALS
TEMPERATURE: 98 F | DIASTOLIC BLOOD PRESSURE: 82 MMHG | HEART RATE: 91 BPM | WEIGHT: 174.63 LBS | BODY MASS INDEX: 32.14 KG/M2 | SYSTOLIC BLOOD PRESSURE: 112 MMHG | OXYGEN SATURATION: 98 % | HEIGHT: 62 IN

## 2019-12-18 DIAGNOSIS — R06.81 WITNESSED EPISODE OF APNEA: Primary | ICD-10-CM

## 2019-12-18 DIAGNOSIS — I10 ESSENTIAL HYPERTENSION: Chronic | ICD-10-CM

## 2019-12-18 PROCEDURE — 90471 FLU VACCINE (QUAD) GREATER THAN OR EQUAL TO 3YO PRESERVATIVE FREE IM: ICD-10-PCS | Mod: S$GLB,,, | Performed by: FAMILY MEDICINE

## 2019-12-18 PROCEDURE — 99999 PR PBB SHADOW E&M-EST. PATIENT-LVL III: ICD-10-PCS | Mod: PBBFAC,,, | Performed by: FAMILY MEDICINE

## 2019-12-18 PROCEDURE — 99214 PR OFFICE/OUTPT VISIT, EST, LEVL IV, 30-39 MIN: ICD-10-PCS | Mod: 25,S$GLB,, | Performed by: FAMILY MEDICINE

## 2019-12-18 PROCEDURE — 3008F PR BODY MASS INDEX (BMI) DOCUMENTED: ICD-10-PCS | Mod: CPTII,S$GLB,, | Performed by: FAMILY MEDICINE

## 2019-12-18 PROCEDURE — 3079F DIAST BP 80-89 MM HG: CPT | Mod: CPTII,S$GLB,, | Performed by: FAMILY MEDICINE

## 2019-12-18 PROCEDURE — 3074F PR MOST RECENT SYSTOLIC BLOOD PRESSURE < 130 MM HG: ICD-10-PCS | Mod: CPTII,S$GLB,, | Performed by: FAMILY MEDICINE

## 2019-12-18 PROCEDURE — 99214 OFFICE O/P EST MOD 30 MIN: CPT | Mod: 25,S$GLB,, | Performed by: FAMILY MEDICINE

## 2019-12-18 PROCEDURE — 90471 IMMUNIZATION ADMIN: CPT | Mod: S$GLB,,, | Performed by: FAMILY MEDICINE

## 2019-12-18 PROCEDURE — 90686 FLU VACCINE (QUAD) GREATER THAN OR EQUAL TO 3YO PRESERVATIVE FREE IM: ICD-10-PCS | Mod: S$GLB,,, | Performed by: FAMILY MEDICINE

## 2019-12-18 PROCEDURE — 99999 PR PBB SHADOW E&M-EST. PATIENT-LVL III: CPT | Mod: PBBFAC,,, | Performed by: FAMILY MEDICINE

## 2019-12-18 PROCEDURE — 90686 IIV4 VACC NO PRSV 0.5 ML IM: CPT | Mod: S$GLB,,, | Performed by: FAMILY MEDICINE

## 2019-12-18 PROCEDURE — 3008F BODY MASS INDEX DOCD: CPT | Mod: CPTII,S$GLB,, | Performed by: FAMILY MEDICINE

## 2019-12-18 PROCEDURE — 3074F SYST BP LT 130 MM HG: CPT | Mod: CPTII,S$GLB,, | Performed by: FAMILY MEDICINE

## 2019-12-18 PROCEDURE — 3079F PR MOST RECENT DIASTOLIC BLOOD PRESSURE 80-89 MM HG: ICD-10-PCS | Mod: CPTII,S$GLB,, | Performed by: FAMILY MEDICINE

## 2019-12-18 RX ORDER — GABAPENTIN 800 MG/1
800 TABLET ORAL 3 TIMES DAILY PRN
Refills: 0 | COMMUNITY
Start: 2019-12-10 | End: 2019-12-18

## 2019-12-18 RX ORDER — OXYCODONE HYDROCHLORIDE 30 MG/1
30 TABLET ORAL 4 TIMES DAILY PRN
Refills: 0 | COMMUNITY
Start: 2019-12-10 | End: 2019-12-18

## 2019-12-18 RX ORDER — OXYCODONE AND ACETAMINOPHEN 10; 325 MG/1; MG/1
1 TABLET ORAL 4 TIMES DAILY
Refills: 0 | COMMUNITY
Start: 2019-11-07 | End: 2019-12-18

## 2019-12-18 RX ORDER — LISINOPRIL 10 MG/1
10 TABLET ORAL NIGHTLY
Refills: 0 | COMMUNITY
Start: 2019-12-10 | End: 2019-12-18

## 2019-12-18 NOTE — PROGRESS NOTES
Subjective:       Patient ID: Clare Whitley is a 59 y.o. female.    Chief Complaint: Follow-up    Patient presents to clinic today for followup of hypertension. She reports recent treatment for URI, resolving. Reports Dr. Blackmon, advised sleep apnea evaluation due to snoring and  reporting witnessing apnea.    Review of Systems   Constitutional: Negative for chills, fatigue, fever and unexpected weight change.   Eyes: Negative for visual disturbance.   Respiratory: Negative for shortness of breath.    Cardiovascular: Negative for chest pain.   Musculoskeletal: Negative for myalgias.   Neurological: Negative for headaches.       Objective:      Physical Exam   Constitutional: She is oriented to person, place, and time. She appears well-developed and well-nourished. No distress.   HENT:   Head: Normocephalic and atraumatic.   Eyes: Pupils are equal, round, and reactive to light. Conjunctivae and EOM are normal. No scleral icterus.   Cardiovascular: Normal rate and regular rhythm. Exam reveals no gallop and no friction rub.   No murmur heard.  Pulmonary/Chest: Effort normal and breath sounds normal.   Neurological: She is alert and oriented to person, place, and time. No cranial nerve deficit. Gait normal.   Psychiatric: She has a normal mood and affect.   Vitals reviewed.      Assessment:       1. Witnessed episode of apnea    2. Essential hypertension        Plan:     Problem List Items Addressed This Visit     Essential hypertension (Chronic)    Current Assessment & Plan     Controlled, continue current medications.           Other Visit Diagnoses     Witnessed episode of apnea    -  Primary    Relevant Orders    Ambulatory referral to Pulmonology          Health Maintenance reviewed/updated. Flu vaccine today.  Keep follow up with Dr. Harris as scheduled.

## 2019-12-27 ENCOUNTER — PATIENT MESSAGE (OUTPATIENT)
Dept: PULMONOLOGY | Facility: CLINIC | Age: 59
End: 2019-12-27

## 2019-12-27 ENCOUNTER — OFFICE VISIT (OUTPATIENT)
Dept: NEUROLOGY | Facility: CLINIC | Age: 59
End: 2019-12-27
Payer: COMMERCIAL

## 2019-12-27 VITALS
HEART RATE: 76 BPM | HEIGHT: 62 IN | SYSTOLIC BLOOD PRESSURE: 112 MMHG | DIASTOLIC BLOOD PRESSURE: 70 MMHG | WEIGHT: 176.13 LBS | BODY MASS INDEX: 32.41 KG/M2

## 2019-12-27 DIAGNOSIS — I10 ESSENTIAL HYPERTENSION: Chronic | ICD-10-CM

## 2019-12-27 DIAGNOSIS — R90.89 ABNORMAL FINDING ON MRI OF BRAIN: Primary | ICD-10-CM

## 2019-12-27 DIAGNOSIS — H53.9 TRANSIENT VISUAL DISTURBANCE: ICD-10-CM

## 2019-12-27 PROCEDURE — 99999 PR PBB SHADOW E&M-EST. PATIENT-LVL III: CPT | Mod: PBBFAC,,, | Performed by: PSYCHIATRY & NEUROLOGY

## 2019-12-27 PROCEDURE — 3074F SYST BP LT 130 MM HG: CPT | Mod: CPTII,S$GLB,, | Performed by: PSYCHIATRY & NEUROLOGY

## 2019-12-27 PROCEDURE — 3008F PR BODY MASS INDEX (BMI) DOCUMENTED: ICD-10-PCS | Mod: CPTII,S$GLB,, | Performed by: PSYCHIATRY & NEUROLOGY

## 2019-12-27 PROCEDURE — 99213 OFFICE O/P EST LOW 20 MIN: CPT | Mod: S$GLB,,, | Performed by: PSYCHIATRY & NEUROLOGY

## 2019-12-27 PROCEDURE — 99213 PR OFFICE/OUTPT VISIT, EST, LEVL III, 20-29 MIN: ICD-10-PCS | Mod: S$GLB,,, | Performed by: PSYCHIATRY & NEUROLOGY

## 2019-12-27 PROCEDURE — 3008F BODY MASS INDEX DOCD: CPT | Mod: CPTII,S$GLB,, | Performed by: PSYCHIATRY & NEUROLOGY

## 2019-12-27 PROCEDURE — 3078F PR MOST RECENT DIASTOLIC BLOOD PRESSURE < 80 MM HG: ICD-10-PCS | Mod: CPTII,S$GLB,, | Performed by: PSYCHIATRY & NEUROLOGY

## 2019-12-27 PROCEDURE — 3074F PR MOST RECENT SYSTOLIC BLOOD PRESSURE < 130 MM HG: ICD-10-PCS | Mod: CPTII,S$GLB,, | Performed by: PSYCHIATRY & NEUROLOGY

## 2019-12-27 PROCEDURE — 99999 PR PBB SHADOW E&M-EST. PATIENT-LVL III: ICD-10-PCS | Mod: PBBFAC,,, | Performed by: PSYCHIATRY & NEUROLOGY

## 2019-12-27 PROCEDURE — 3078F DIAST BP <80 MM HG: CPT | Mod: CPTII,S$GLB,, | Performed by: PSYCHIATRY & NEUROLOGY

## 2019-12-27 NOTE — PROGRESS NOTES
Subjective:      Patient ID: Clare Whitley is a 59 y.o. female.    Chief Complaint:   Follow-up for chronic daily headache and possible pseudotumor    The patient returns indicating that the addition of Diamox has reduced her headache frequency.  The patient states that she has not had any significant headache but does occasionally have a slight headache with blurred vision which comes and goes.  She does not experience any headache with changes in position such as bending over and does not experience headache with straining or coughing.  She is not experiencing any nausea or vomiting.  She denies any double vision but states occasionally that her vision will blur just slightly.    The patient's history is indicating that she had an abnormal MRI suggesting slightly low lying tonsils and other signs suggestive of increased intracranial pressure.  She had MR venogram done which did not show any abnormality.  Because of the position of the cerebellar tonsils, we elected not to perform lumbar puncture.  Instead we placed the patient on Diamox 250 mg twice a day and the patient has noted significant improvement.    The patient denies any noticed weakness of her arms or legs.  She denies any change in speech or swallowing.  She denies any numbness, tingling, or other paresthesia.  She denies any syncope or unexplained loss of consciousness.          ROS:  GENERAL: NO FEVER, CHILLS, FATIGABILITY OR WEIGHT LOSS.  SKIN: NO RASHES, ITCHING OR CHANGES IN COLOR OR TEXTURE OF SKIN.  HEAD: NO RECENT HEAD TRAUMA.  EYES: VISUAL ACUITY FINE. NO PHOTOPHOBIA, OCULAR PAIN OR DIPLOPIA.  EARS: DENIES EAR PAIN, DISCHARGE OR VERTIGO.  NOSE: NO LOSS OF SMELL, NO EPISTAXIS OR POSTNASAL DRIP.  MOUTH & THROAT: NO HOARSENESS OR CHANGE IN VOICE. NO EXCESSIVE GUM BLEEDING.  NODES: DENIES SWOLLEN GLANDS.  CHEST: DENIES SANDOVAL, CYANOSIS, WHEEZING, COUGH AND SPUTUM PRODUCTION.  CARDIOVASCULAR: DENIES CHEST PAIN, PND, ORTHOPNEA OR REDUCED EXERCISE  "TOLERANCE.  ABDOMEN: APPETITE FINE. NO WEIGHT LOSS. DENIES DIARRHEA, ABDOMINAL PAIN, HEMATEMESIS OR BLOOD IN STOOL.  URINARY: NO FLANK PAIN, DYSURIA OR HEMATURIA.  PERIPHERAL VASCULAR: NO CLAUDICATION OR CYANOSIS.  MUSCULOSKELETAL: NO JOINT STIFFNESS OR SWELLING. DENIES BACK PAIN.  NEUROLOGIC: NO HISTORY OF SEIZURES, PARALYSIS, ALTERATION OF GAIT OR COORDINATION.    Past Medical History:   Diagnosis Date    Hypertension     Mitral regurgitation     "mild"; followed by Dr. Blackmon ([facility])    MVP (mitral valve prolapse)     "trivial"; followed by Dr. Blackmon ([facility])    Sickle cell trait      Past Surgical History:   Procedure Laterality Date    CHOLECYSTECTOMY      laser SX Bilateral     LYMPH NODE BIOPSY Left     left neck     UTERINE FIBROID EMBOLIZATION  01/01/2010     Family History   Problem Relation Age of Onset    Hypertension Mother     Cataracts Mother     Hypertension Father     Hypertension Sister     Hypertension Brother      Social History     Socioeconomic History    Marital status:      Spouse name: Johnnie Whitley    Number of children: 3    Years of education: Not on file    Highest education level: Not on file   Occupational History    Occupation: Rehab counselor associate     Employer: [employer]     Comment: Works with disabled people   Social Needs    Financial resource strain: Not on file    Food insecurity:     Worry: Not on file     Inability: Not on file    Transportation needs:     Medical: Not on file     Non-medical: Not on file   Tobacco Use    Smoking status: Passive Smoke Exposure - Never Smoker    Smokeless tobacco: Never Used   Substance and Sexual Activity    Alcohol use: No    Drug use: No    Sexual activity: Yes     Partners: Male     Birth control/protection: None, Post-menopausal   Lifestyle    Physical activity:     Days per week: Not on file     Minutes per session: Not on file    Stress: Not on file   Relationships    Social connections:     Talks on " phone: Not on file     Gets together: Not on file     Attends Quaker service: Not on file     Active member of club or organization: Not on file     Attends meetings of clubs or organizations: Not on file     Relationship status: Not on file   Other Topics Concern    Not on file   Social History Narrative    Not on file         Objective:   PE:   VITAL SIGNS:   Height 5 ft 2 in, weight 79.9 kg, BMI 32.22  Vitals:    12/27/19 0821   BP: 112/70   Pulse: 76     APPEARANCE: WELL NOURISHED, WELL DEVELOPED, IN NO ACUTE DISTRESS.    HEAD: NORMOCEPHALIC, ATRAUMATIC.  EYES: PERRL. EOMI.  NON-ICTERIC SCLERAE.    NOSE: MUCOSA PINK. AIRWAY CLEAR.  MOUTH & THROAT: NO TONSILLAR ENLARGEMENT. NO PHARYNGEAL ERYTHEMA OR EXUDATE. NO STRIDOR.  NECK: SUPPLE. NO BRUITS.  CHEST: LUNGS CLEAR TO AUSCULTATION.  CARDIOVASCULAR: REGULAR RHYTHM WITHOUT SIGNIFICANT MURMURS.  ABDOMEN: BOWEL SOUNDS NORMAL. NOT DISTENDED.   MUSCULOSKELETAL:  NO BONY DEFORMITY SEEN.      NEUROLOGIC:   MENTAL STATUS:  THE PATIENT IS WELL ORIENTED TO PERSON, TIME, PLACE, AND SITUATION.  THE PATIENT IS ATTENTIVE TO THE ENVIRONMENT AND COOPERATIVE FOR THE EXAM.  CRANIAL NERVES: II-XII GROSSLY INTACT. FUNDOSCOPIC EXAM IS NORMAL.  NO HEMORRHAGE, EXUDATE OR PAPILLEDEMA IS PRESENT. THE EXTRAOCULAR MUSCLES ARE INTACT IN THE CARDINAL DIRECTIONS OF GAZE.  NO PTOSIS IS PRESENT. FACIAL FEATURES ARE SYMMETRICAL.  SPEECH IS NORMAL IN FLUENCY, DICTION, AND PHRASING.  TONGUE PROTRUDES IN THE MIDLINE.    GAIT AND STATION:  ROMBERG IS NEGATIVE.  GOOD ALTERNATE ARMSWING WITH NORMAL GAIT.  MOTOR:  NO DOWNDRIFT OF EITHER ARM WHEN HELD AT SHOULDER LEVEL.  MANUAL MUSCLE TESTING OF PROXIMAL AND DISTAL MUSCLES OF BOTH UPPER AND LOWER EXTREMITIES IS NORMAL. MUSCLE MASS AND MUSCLE TONE ARE NORMAL IN BOTH UPPER AND BOTH LOWER EXTREMITIES.  SENSORY:  INTACT BOTH UPPER AND LOWER EXTREMITIES TO PIN PRICK, TOUCH, AND VIBRATION.  CEREBELLAR:  FINGER TO NOSE DONE WELL.  ALTERNATING MOVEMENTS  INTACT.  NO INVOLUNTARY MOVEMENTS OR TREMOR SEEN.  REFLEXES:  STRETCH REFLEXES ARE 2+ BOTH UPPER AND LOWER EXTREMITIES.  PLANTAR STIMULATION IS FLEXOR BILATERALLY AND NO PATHOLOGICAL REFLEXES ARE SEEN              Assessment:   No diagnosis found.    1.  Chronic daily headache, possible pseudotumor  2.  Essential hypertension under good control    Plan:   1.  The patient is to continue Diamox  250 mg twice a day along with her other prescribed medications  2.  Return in 6 months at which time we will repeat her MRI.    This was a 25 min face-to-face visit with the patient with over 50% of the time spent counseling the patient regarding her history of chronic daily headache and possible pseudotumor.  This note is generated with speech recognition software and is subject to transcription error and sound alike phrases that may be missed by proofreading.

## 2020-01-02 NOTE — TELEPHONE ENCOUNTER
2nd Attempt    Left voicemail for patient asking for call back to schedule pulmonary appointment based on referral received by pulmonary department work queue.     Please direct any possible call back from patient to David Holland, patient care navigator, pulmonary services.

## 2020-01-09 NOTE — TELEPHONE ENCOUNTER
Letter encouraging patient to contact me to schedule appointment mailed to patient at address on file in patient demographics. Referral removed from department work queue as such. Referral is still, however, available for scheduling.    Any subsequent call backs from patient or emergency contact may be directed to David Holland patient care navigator, for pulmonary services department.

## 2020-01-09 NOTE — TELEPHONE ENCOUNTER
3rd Attempt    Left voicemail for patient asking for call back to schedule pulmonary appointment based on referral received by pulmonary department work queue.     Please direct any possible call back from patient to David Holland, patient care navigator, pulmonary services.

## 2020-02-19 PROBLEM — I34.1 MVP (MITRAL VALVE PROLAPSE): Status: ACTIVE | Noted: 2020-02-19

## 2020-03-12 LAB
CHOL/HDLC RATIO: 1.9
CHOLEST SERPL-MSCNC: 125 MG/DL (ref 0–200)
HDLC SERPL-MCNC: 65 MG/DL
LDLC SERPL CALC-MCNC: 51 MG/DL
TRIGL SERPL-MCNC: 47 MG/DL
VLDL CHOLESTEROL: 9 MG/DL

## 2020-03-13 ENCOUNTER — PATIENT OUTREACH (OUTPATIENT)
Dept: ADMINISTRATIVE | Facility: HOSPITAL | Age: 60
End: 2020-03-13

## 2020-04-29 ENCOUNTER — TELEPHONE (OUTPATIENT)
Dept: INTERNAL MEDICINE | Facility: CLINIC | Age: 60
End: 2020-04-29

## 2020-04-29 NOTE — TELEPHONE ENCOUNTER
----- Message from Sheldon Stone sent at 4/29/2020  1:50 PM CDT -----  Contact: self [phone]  Pt would like return call; pt states she is needing statement of medical condition for employer. Please call back at  [phone].  Crystal Nolasco

## 2020-04-29 NOTE — LETTER
[facility]  [address] [place] [id number]  Phone: [phone]  Fax: [phone] April 30, 2020     Patient: Clare Whitley   YOB: 1960   Date of Visit: 4/29/2020       To Whom It May Concern:    Patient has diagnosis of hypertension.    If you have any questions or concerns, please don't hesitate to contact my office.    Sincerely,        Chhaya Jennings MD

## 2020-04-29 NOTE — TELEPHONE ENCOUNTER
Patient needs a letter stating her medical conditions.  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  She says that there is way to social distance at her job. Patient would still like to work form home. /sl/

## 2020-04-29 NOTE — TELEPHONE ENCOUNTER
----- Message from Sheldon Stone sent at 4/29/2020  1:50 PM CDT -----  Contact: self [phone]  Pt would like return call; pt states she is needing statement of medical condition for employer. Please call back at  [phone].  Crystal Nolasco

## 2020-04-30 ENCOUNTER — TELEPHONE (OUTPATIENT)
Dept: INTERNAL MEDICINE | Facility: CLINIC | Age: 60
End: 2020-04-30

## 2020-04-30 NOTE — LETTER
[facility]  [address] [place] [id number]  Phone: [phone]  Fax: [phone] April 30, 2020     Patient: Clare Whitley   YOB: 1960   Date of Visit: 4/30/2020       To Whom It May Concern:    Patient has chronic health condition.    If you have any questions or concerns, please don't hesitate to contact my office.    Sincerely,        Chhaya Jennings MD

## 2020-04-30 NOTE — TELEPHONE ENCOUNTER
----- Message from Bruce Busby MA sent at 4/30/2020  1:24 PM CDT -----  Contact: Pt       ----- Message -----  From: Enrrique Ray  Sent: 4/30/2020   1:08 PM CDT  To: Michaela ANDREWS Staff    Pt called in regards to a letter to return to work. Pt stated that she does not want her diagnosis on her letter. Pt stated that she wanted it to say underline health problems. Pt can be reached at [phone] (home) [phone] (work)

## 2020-04-30 NOTE — TELEPHONE ENCOUNTER
Spoke with pt states that is what she is requesting a letter stating she has hypertension.   Would like the excuse thru May 30th.  [email or web address]

## 2020-07-08 ENCOUNTER — LAB VISIT (OUTPATIENT)
Dept: LAB | Facility: HOSPITAL | Age: 60
End: 2020-07-08
Attending: FAMILY MEDICINE
Payer: COMMERCIAL

## 2020-07-08 ENCOUNTER — OFFICE VISIT (OUTPATIENT)
Dept: INTERNAL MEDICINE | Facility: CLINIC | Age: 60
End: 2020-07-08
Payer: COMMERCIAL

## 2020-07-08 VITALS
WEIGHT: 168.44 LBS | HEIGHT: 62 IN | OXYGEN SATURATION: 98 % | TEMPERATURE: 100 F | SYSTOLIC BLOOD PRESSURE: 128 MMHG | BODY MASS INDEX: 31 KG/M2 | HEART RATE: 91 BPM | DIASTOLIC BLOOD PRESSURE: 82 MMHG

## 2020-07-08 DIAGNOSIS — I10 ESSENTIAL HYPERTENSION: Primary | ICD-10-CM

## 2020-07-08 DIAGNOSIS — F41.8 SITUATIONAL ANXIETY: ICD-10-CM

## 2020-07-08 DIAGNOSIS — I10 ESSENTIAL HYPERTENSION: ICD-10-CM

## 2020-07-08 DIAGNOSIS — Z12.11 COLON CANCER SCREENING: ICD-10-CM

## 2020-07-08 LAB
ANION GAP SERPL CALC-SCNC: 9 MMOL/L (ref 8–16)
BUN SERPL-MCNC: 17 MG/DL (ref 6–20)
CALCIUM SERPL-MCNC: 10 MG/DL (ref 8.7–10.5)
CHLORIDE SERPL-SCNC: 104 MMOL/L (ref 95–110)
CO2 SERPL-SCNC: 30 MMOL/L (ref 23–29)
CREAT SERPL-MCNC: 0.8 MG/DL (ref 0.5–1.4)
EST. GFR  (AFRICAN AMERICAN): >60 ML/MIN/1.73 M^2
EST. GFR  (NON AFRICAN AMERICAN): >60 ML/MIN/1.73 M^2
GLUCOSE SERPL-MCNC: 101 MG/DL (ref 70–110)
POTASSIUM SERPL-SCNC: 3.3 MMOL/L (ref 3.5–5.1)
SODIUM SERPL-SCNC: 143 MMOL/L (ref 136–145)

## 2020-07-08 PROCEDURE — 99999 PR PBB SHADOW E&M-EST. PATIENT-LVL IV: ICD-10-PCS | Mod: PBBFAC,,, | Performed by: FAMILY MEDICINE

## 2020-07-08 PROCEDURE — 3079F DIAST BP 80-89 MM HG: CPT | Mod: CPTII,S$GLB,, | Performed by: FAMILY MEDICINE

## 2020-07-08 PROCEDURE — 99213 PR OFFICE/OUTPT VISIT, EST, LEVL III, 20-29 MIN: ICD-10-PCS | Mod: S$GLB,,, | Performed by: FAMILY MEDICINE

## 2020-07-08 PROCEDURE — 99213 OFFICE O/P EST LOW 20 MIN: CPT | Mod: S$GLB,,, | Performed by: FAMILY MEDICINE

## 2020-07-08 PROCEDURE — 99999 PR PBB SHADOW E&M-EST. PATIENT-LVL IV: CPT | Mod: PBBFAC,,, | Performed by: FAMILY MEDICINE

## 2020-07-08 PROCEDURE — 80048 BASIC METABOLIC PNL TOTAL CA: CPT

## 2020-07-08 PROCEDURE — 3008F BODY MASS INDEX DOCD: CPT | Mod: CPTII,S$GLB,, | Performed by: FAMILY MEDICINE

## 2020-07-08 PROCEDURE — 36415 COLL VENOUS BLD VENIPUNCTURE: CPT

## 2020-07-08 PROCEDURE — 3008F PR BODY MASS INDEX (BMI) DOCUMENTED: ICD-10-PCS | Mod: CPTII,S$GLB,, | Performed by: FAMILY MEDICINE

## 2020-07-08 PROCEDURE — 3074F PR MOST RECENT SYSTOLIC BLOOD PRESSURE < 130 MM HG: ICD-10-PCS | Mod: CPTII,S$GLB,, | Performed by: FAMILY MEDICINE

## 2020-07-08 PROCEDURE — 3074F SYST BP LT 130 MM HG: CPT | Mod: CPTII,S$GLB,, | Performed by: FAMILY MEDICINE

## 2020-07-08 PROCEDURE — 3079F PR MOST RECENT DIASTOLIC BLOOD PRESSURE 80-89 MM HG: ICD-10-PCS | Mod: CPTII,S$GLB,, | Performed by: FAMILY MEDICINE

## 2020-07-08 NOTE — PROGRESS NOTES
Subjective:       Patient ID: Clare Whitley is a 59 y.o. female.    Chief Complaint: Follow-up (6 month f/u)    Patient presents to clinic today for followup of hypertension.     Review of Systems   Constitutional: Negative for chills, fatigue, fever and unexpected weight change.   Eyes: Negative for visual disturbance.   Respiratory: Negative for shortness of breath.    Cardiovascular: Negative for chest pain.   Musculoskeletal: Negative for myalgias.   Neurological: Negative for headaches.   Psychiatric/Behavioral: The patient is nervous/anxious.        Objective:      Physical Exam  Vitals signs reviewed.   Constitutional:       General: She is not in acute distress.     Appearance: She is well-developed.   HENT:      Head: Normocephalic and atraumatic.   Eyes:      General: No scleral icterus.     Conjunctiva/sclera: Conjunctivae normal.      Pupils: Pupils are equal, round, and reactive to light.   Pulmonary:      Effort: Pulmonary effort is normal.   Neurological:      Mental Status: She is alert and oriented to person, place, and time.      Cranial Nerves: No cranial nerve deficit.      Gait: Gait normal.         Assessment:       1. Essential hypertension    2. Colon cancer screening    3. Situational anxiety        Plan:     Problem List Items Addressed This Visit     Essential hypertension - Primary (Chronic)    Current Assessment & Plan     Controlled, continue current medications         Relevant Orders    Basic metabolic panel    Situational anxiety    Current Assessment & Plan     covid concerns with return to work; discussed precautions; encouraged guided meditation; follow up if worse/persistent           Other Visit Diagnoses     Colon cancer screening        Relevant Orders    Fecal Immunochemical Test (iFOBT)          Health Maintenance reviewed/updated. Discussed shingrix vaccine.

## 2020-07-08 NOTE — ASSESSMENT & PLAN NOTE
covid concerns with return to work; discussed precautions; encouraged guided meditation; follow up if worse/persistent

## 2020-08-06 ENCOUNTER — TELEPHONE (OUTPATIENT)
Dept: INTERNAL MEDICINE | Facility: CLINIC | Age: 60
End: 2020-08-06

## 2020-08-06 NOTE — TELEPHONE ENCOUNTER
----- Message from Akiko Ugarte sent at 8/6/2020  2:22 PM CDT -----  Please call pt @ [phone], pt states she is still having issues with anxiety, need the next step.

## 2020-08-10 ENCOUNTER — OFFICE VISIT (OUTPATIENT)
Dept: INTERNAL MEDICINE | Facility: CLINIC | Age: 60
End: 2020-08-10
Payer: COMMERCIAL

## 2020-08-10 VITALS
HEIGHT: 62 IN | TEMPERATURE: 99 F | OXYGEN SATURATION: 97 % | DIASTOLIC BLOOD PRESSURE: 78 MMHG | SYSTOLIC BLOOD PRESSURE: 110 MMHG | BODY MASS INDEX: 30.44 KG/M2 | WEIGHT: 165.44 LBS | HEART RATE: 94 BPM

## 2020-08-10 DIAGNOSIS — R51.9 CHRONIC DAILY HEADACHE: ICD-10-CM

## 2020-08-10 DIAGNOSIS — R07.9 CHEST PAIN IN ADULT: ICD-10-CM

## 2020-08-10 DIAGNOSIS — F41.8 SITUATIONAL ANXIETY: Primary | ICD-10-CM

## 2020-08-10 DIAGNOSIS — R00.2 PALPITATIONS: ICD-10-CM

## 2020-08-10 PROCEDURE — 99214 PR OFFICE/OUTPT VISIT, EST, LEVL IV, 30-39 MIN: ICD-10-PCS | Mod: S$GLB,,, | Performed by: FAMILY MEDICINE

## 2020-08-10 PROCEDURE — 99214 OFFICE O/P EST MOD 30 MIN: CPT | Mod: S$GLB,,, | Performed by: FAMILY MEDICINE

## 2020-08-10 PROCEDURE — 3074F SYST BP LT 130 MM HG: CPT | Mod: CPTII,S$GLB,, | Performed by: FAMILY MEDICINE

## 2020-08-10 PROCEDURE — 3074F PR MOST RECENT SYSTOLIC BLOOD PRESSURE < 130 MM HG: ICD-10-PCS | Mod: CPTII,S$GLB,, | Performed by: FAMILY MEDICINE

## 2020-08-10 PROCEDURE — 3008F BODY MASS INDEX DOCD: CPT | Mod: CPTII,S$GLB,, | Performed by: FAMILY MEDICINE

## 2020-08-10 PROCEDURE — 99999 PR PBB SHADOW E&M-EST. PATIENT-LVL IV: ICD-10-PCS | Mod: PBBFAC,,, | Performed by: FAMILY MEDICINE

## 2020-08-10 PROCEDURE — 3078F DIAST BP <80 MM HG: CPT | Mod: CPTII,S$GLB,, | Performed by: FAMILY MEDICINE

## 2020-08-10 PROCEDURE — 99999 PR PBB SHADOW E&M-EST. PATIENT-LVL IV: CPT | Mod: PBBFAC,,, | Performed by: FAMILY MEDICINE

## 2020-08-10 PROCEDURE — 3078F PR MOST RECENT DIASTOLIC BLOOD PRESSURE < 80 MM HG: ICD-10-PCS | Mod: CPTII,S$GLB,, | Performed by: FAMILY MEDICINE

## 2020-08-10 PROCEDURE — 3008F PR BODY MASS INDEX (BMI) DOCUMENTED: ICD-10-PCS | Mod: CPTII,S$GLB,, | Performed by: FAMILY MEDICINE

## 2020-08-10 RX ORDER — SERTRALINE HYDROCHLORIDE 25 MG/1
25 TABLET, FILM COATED ORAL DAILY
Qty: 30 TABLET | Refills: 5 | Status: SHIPPED | OUTPATIENT
Start: 2020-08-10 | End: 2021-01-07

## 2020-08-10 NOTE — PATIENT INSTRUCTIONS
Try 3-5 minutes daily of guided meditation  Headspace - has free 10 day introduction  Simply Being  Calm  Simple Habit    Contact Dr. Blackmon, Cardiology, for follow up regarding chest pain/palpitations

## 2020-08-10 NOTE — PROGRESS NOTES
Subjective:       Patient ID: Clare Whitley is a 59 y.o. female.    Chief Complaint: Anxiety    Anxiety  Presents for initial visit. Episode onset: 6 months. The problem has been gradually worsening. Symptoms include chest pain (occasionally), depressed mood, excessive worry, insomnia, nervous/anxious behavior, palpitations and panic. Patient reports no shortness of breath or suicidal ideas. Symptoms occur occasionally. The most recent episode lasted 30 minutes. The severity of symptoms is moderate. The symptoms are aggravated by work stress (COVID 19).         Review of Systems   Respiratory: Negative for shortness of breath.    Cardiovascular: Positive for chest pain (occasionally) and palpitations.   Psychiatric/Behavioral: Negative for suicidal ideas. The patient is nervous/anxious and has insomnia.        Objective:      Physical Exam  Vitals signs reviewed.   Constitutional:       General: She is not in acute distress.     Appearance: She is well-developed.   HENT:      Head: Normocephalic and atraumatic.   Eyes:      General: Lids are normal. No scleral icterus.     Extraocular Movements: Extraocular movements intact.      Conjunctiva/sclera: Conjunctivae normal.      Pupils: Pupils are equal, round, and reactive to light.   Pulmonary:      Effort: Pulmonary effort is normal.   Neurological:      Mental Status: She is alert and oriented to person, place, and time.      Cranial Nerves: No cranial nerve deficit.      Gait: Gait normal.   Psychiatric:         Mood and Affect: Mood and affect normal.               Assessment:       1. Situational anxiety    2. Chronic daily headache    3. Palpitations    4. Chest pain in adult        Plan:     Problem List Items Addressed This Visit     Chest pain in adult    Overview     Followed by Dr. Blackmon, Cardiology         Current Assessment & Plan     Encouraged follow up with Cardiology         Chronic daily headache    Relevant Orders    Ambulatory referral/consult  to Neurology    Palpitations    Overview     Followed by Dr. Blackmon, Cardiology         Current Assessment & Plan     Encouraged follow up with Cardiology         Situational anxiety - Primary    Current Assessment & Plan     Will start on zoloft 25 mg daily; reassess in 1 month; encouraged to try guided meditation.         Relevant Medications    sertraline (ZOLOFT) 25 MG tablet          Health Maintenance reviewed/updated.

## 2020-08-18 ENCOUNTER — PATIENT OUTREACH (OUTPATIENT)
Dept: ADMINISTRATIVE | Facility: OTHER | Age: 60
End: 2020-08-18

## 2020-08-18 NOTE — PROGRESS NOTES
Chart reviewed.   Immunizations: Triggered Imm Registry     Orders placed: n/a  Upcoming appts to satisfy JASWINDER topics: n/a

## 2020-08-19 ENCOUNTER — OFFICE VISIT (OUTPATIENT)
Dept: PULMONOLOGY | Facility: CLINIC | Age: 60
End: 2020-08-19
Payer: COMMERCIAL

## 2020-08-19 ENCOUNTER — TELEPHONE (OUTPATIENT)
Dept: PULMONOLOGY | Facility: CLINIC | Age: 60
End: 2020-08-19

## 2020-08-19 VITALS
HEIGHT: 62 IN | SYSTOLIC BLOOD PRESSURE: 120 MMHG | BODY MASS INDEX: 30.67 KG/M2 | RESPIRATION RATE: 18 BRPM | WEIGHT: 166.69 LBS | DIASTOLIC BLOOD PRESSURE: 80 MMHG | HEART RATE: 62 BPM | OXYGEN SATURATION: 97 %

## 2020-08-19 DIAGNOSIS — E66.9 OBESITY (BMI 30.0-34.9): ICD-10-CM

## 2020-08-19 DIAGNOSIS — J30.2 SEASONAL ALLERGIC RHINITIS, UNSPECIFIED TRIGGER: ICD-10-CM

## 2020-08-19 DIAGNOSIS — I10 ESSENTIAL HYPERTENSION: Chronic | ICD-10-CM

## 2020-08-19 DIAGNOSIS — R51.9 CHRONIC DAILY HEADACHE: ICD-10-CM

## 2020-08-19 DIAGNOSIS — G47.30 SLEEP-DISORDERED BREATHING: Primary | ICD-10-CM

## 2020-08-19 DIAGNOSIS — R06.83 SNORING: ICD-10-CM

## 2020-08-19 DIAGNOSIS — R06.81 WITNESSED EPISODE OF APNEA: ICD-10-CM

## 2020-08-19 DIAGNOSIS — R00.2 PALPITATIONS: ICD-10-CM

## 2020-08-19 DIAGNOSIS — F41.8 SITUATIONAL ANXIETY: ICD-10-CM

## 2020-08-19 DIAGNOSIS — R53.83 FEELING TIRED: ICD-10-CM

## 2020-08-19 PROBLEM — E66.811 OBESITY (BMI 30.0-34.9): Status: ACTIVE | Noted: 2020-08-19

## 2020-08-19 PROCEDURE — 3074F SYST BP LT 130 MM HG: CPT | Mod: CPTII,S$GLB,, | Performed by: NURSE PRACTITIONER

## 2020-08-19 PROCEDURE — 3008F PR BODY MASS INDEX (BMI) DOCUMENTED: ICD-10-PCS | Mod: CPTII,S$GLB,, | Performed by: NURSE PRACTITIONER

## 2020-08-19 PROCEDURE — 99204 OFFICE O/P NEW MOD 45 MIN: CPT | Mod: S$GLB,,, | Performed by: NURSE PRACTITIONER

## 2020-08-19 PROCEDURE — 99999 PR PBB SHADOW E&M-EST. PATIENT-LVL III: CPT | Mod: PBBFAC,,, | Performed by: NURSE PRACTITIONER

## 2020-08-19 PROCEDURE — 99204 PR OFFICE/OUTPT VISIT, NEW, LEVL IV, 45-59 MIN: ICD-10-PCS | Mod: S$GLB,,, | Performed by: NURSE PRACTITIONER

## 2020-08-19 PROCEDURE — 3074F PR MOST RECENT SYSTOLIC BLOOD PRESSURE < 130 MM HG: ICD-10-PCS | Mod: CPTII,S$GLB,, | Performed by: NURSE PRACTITIONER

## 2020-08-19 PROCEDURE — 3008F BODY MASS INDEX DOCD: CPT | Mod: CPTII,S$GLB,, | Performed by: NURSE PRACTITIONER

## 2020-08-19 PROCEDURE — 99999 PR PBB SHADOW E&M-EST. PATIENT-LVL III: ICD-10-PCS | Mod: PBBFAC,,, | Performed by: NURSE PRACTITIONER

## 2020-08-19 PROCEDURE — 3079F PR MOST RECENT DIASTOLIC BLOOD PRESSURE 80-89 MM HG: ICD-10-PCS | Mod: CPTII,S$GLB,, | Performed by: NURSE PRACTITIONER

## 2020-08-19 PROCEDURE — 3079F DIAST BP 80-89 MM HG: CPT | Mod: CPTII,S$GLB,, | Performed by: NURSE PRACTITIONER

## 2020-08-19 NOTE — ASSESSMENT & PLAN NOTE
Improved on zoloft, followed by primary care provider   Decided to retired from [employer] employee x 39 years, prison date 8/25/2020.

## 2020-08-19 NOTE — PATIENT INSTRUCTIONS
Continuous Positive Air Pressure (CPAP)     A mask over the nose gently directs air into the throat to keep the airway open.   Continuous positive air pressure (CPAP) uses gentle air pressure to hold the airway open. CPAP is often the most effective treatment for sleep apnea and severe snoring. It works very well for many people. But keep in mind that it can take several adjustments before the setup is right for you.  How CPAP works  The CPAP machine  is a small portable pump beside the bed. The pump sends air through a hose, which is held over your nose and mouth by a mask. Mild air pressure is gently pushed through your airway. The air pressure nudges sagging tissues aside. This widens the airway so you can breathe better. CPAP may be combined with other kinds of therapy for sleep apnea.  Types of air pressure treatments  There are different types of CPAP. Your doctor or CPAP technician will help you decide which type is best for you:  · Basic CPAP keeps the pressure constant all night long.  · A bilevel device (BiPAP) provides more pressure when you breathe in and less when you breathe out. A BiPAP machine also may be set to provide automatic breaths to maintain breathing if you stop breathing while sleeping.  · An autoCPAP device automatically adjusts pressure throughout the night and in response to changes such as body position, sleep stage, and snoring.  Date Last Reviewed: 8/10/2015  © 6027-8742 [employer]. [address] [place] [address]. All rights reserved. This information is not intended as a substitute for professional medical care. Always follow your healthcare professional's instructions.        What Are Snoring and Obstructive Sleep Apnea?  If youve ever had a stuffed-up nose, you know the feeling of trying to breathe through a very narrow passageway. This is what happens in your throat when you snore. While you sleep, structures in your throat partially block your air  passage, making the passage narrow and hard to breathe through. If the entire passage becomes blocked and you cant breathe at all, you have sleep apnea.      Snoring Obstructive sleep apnea   Snoring  If your throat structures are too large or the muscles relax too much during sleep, the air passage may be partially blocked. As air from the nose or mouth passes around this blockage, the throat structures vibrate, causing the familiar sound of snoring. At times, this sound can be so loud that snorers wake up others, or even themselves, during the night. Snoring gets worse as more and more of the air passage is blocked.  Obstructive sleep apnea  If the structures completely block the throat, air cant flow to the lungs at all. This is called apnea (meaning no breathing). Since the lungs arent getting fresh air, the brain tells the body to wake up just enough to tighten the muscles and unblock the air passage. With a loud gasp, breathing begins again. This process may be repeated over and over again throughout the night, making your sleep fragmented with a lighter stage of sleep. Even though you do not remember waking up many times during the night to a lighter sleep, you feel tired the next day. The lack of sleep and fresh air can also strain your lungs, heart, and other organs, leading to problems such as high blood pressure, heart attack, or stroke.  Problems in the nose and jaw  Problems in the structure of the nose may obstruct breathing. A crooked (deviated) septum or swollen turbinates can make snoring worse or lead to apnea. Also, a receding jaw may make the tongue sit too far back, so its more likely to block the airway when youre asleep.        Date Last Reviewed: 7/18/2015  © 2374-6612 [employer]. [address] [place] [address], [place] [address]. All rights reserved. This information is not intended as a substitute for professional medical care. Always follow your healthcare professional's  instructions.

## 2020-08-19 NOTE — ASSESSMENT & PLAN NOTE
Reported as stress related, noticed improvement since decided to retire.  Morning headache upon awakening about twice a week, proceed with eval obstructive sleep apnea with Home Sleep Study  2 night study.

## 2020-08-19 NOTE — PROGRESS NOTES
Subjective:      Patient ID: Clare Whitley is a 59 y.o. female.    Patient Active Problem List   Diagnosis    Essential hypertension    Chest pain in adult    Hypokalemia    Right sided weakness    Seasonal allergic rhinitis    Palpitations    Transient visual disturbance    Right arm numbness    Abnormal finding on MRI of brain    MVP (mitral valve prolapse)    Situational anxiety    Chronic daily headache    Obesity (BMI 30.0-34.9)     she has been referred by Chhaya Jennings MD for evaluation and management for   Chief Complaint   Patient presents with    Sleep Apnea     Chief Complaint: Sleep Apnea    HPI:  She presents for a sleep evaluation.   Patient has observed snoring, periods of not breathing witnessed by , feels sleepy during the day, take naps during the day.  Patient reports non restful sleep.  She reports morning headache.   She reports day time napping; duration 15 Minutes  She denies recent weight gain. Lost 15 lbs over past 1 year.   Cardiovascular risk factors: hypertension  Bed time is 1100  Wake time is 0700  Sleep onset is within 15 - 30 Minutes.  Sleep maintenance difficulties related to early morning awakening, frequent night time awakening and non-restful sleep  Wake after sleep onset occurs two times a night.  Nocturia occurs two times a night,   Sleep aids :  NO  Dry mouth :  NO  Sleep walking:  NO  Sleep talking :  NO  Sleep eating: NO  Vivid Dreams :  NO  Cataplexy :  NO    [place] sleepiness score was 8.  Neck circumference is 37cm. (14.5 inches).  Mallampati score 3    STOP - BANG Questionnaire:   1. Snoring : Do you snore loudly ?     YES  2. Tired : Do you often feel tired, fatigued, or sleepy during daytime?   YES  3. Observed: Has anyone observed you stop breathing during your sleep?     YES  4. Blood pressure : Do you have or are you being treated for high blood pressure?    YES  5. BMI :BMI more than 35 kg/m2?   NO  6. Age : Age over 50 yr old?     YES  7. Neck circumference: Neck circumference greater than 40 cm?   NO  8. Gender: Gender male?   NO    SCORE: 5    High risk of SINTIA: Yes 5 - 8  Intermediate risk of SINTIA: Yes 3 - 4  Low risk of SINTIA: Yes 0 - 2    Previous Report Reviewed: office notes     Past Medical History: The following portions of the patient's history were reviewed and updated as appropriate:   She  has a past surgical history that includes Uterine fibroid embolization (01/01/2010); Lymph node biopsy (Left); Cholecystectomy; and laser SX (Bilateral).  Her family history includes Cataracts in her mother; Hypertension in her brother, father, mother, and sister.  She  reports that she is a non-smoker but has been exposed to tobacco smoke. She has never used smokeless tobacco. She reports that she does not drink alcohol or use drugs.  She has a current medication list which includes the following prescription(s): acetazolamide, aspirin, atenolol-chlorthalidone, fluticasone propionate, loratadine, sertraline, and spironolactone.  She is allergic to no known drug allergies..    Review of Systems   Constitutional: Negative for fever, chills, weight loss, weight gain, activity change, appetite change, fatigue and night sweats.   HENT: Negative for postnasal drip, rhinorrhea, sinus pressure, voice change and congestion.    Eyes: Negative for redness and itching.   Respiratory: Negative for snoring, cough, sputum production, chest tightness, shortness of breath, wheezing, orthopnea, asthma nighttime symptoms, dyspnea on extertion, use of rescue inhaler and somnolence.    Cardiovascular: Negative.  Negative for chest pain, palpitations and leg swelling.   Genitourinary: Negative for difficulty urinating and hematuria.   Endocrine: Negative for cold intolerance and heat intolerance.    Musculoskeletal: Negative for arthralgias, gait problem, joint swelling and myalgias.   Skin: Negative.    Gastrointestinal: Negative for nausea, vomiting, abdominal pain  "and acid reflux.   Neurological: Negative for dizziness, weakness, light-headedness and headaches.   Hematological: Negative for adenopathy. No excessive bruising.   All other systems reviewed and are negative.     Objective:   /80   Pulse 62   Resp 18   Ht 5' 2" (1.575 m)   Wt 75.6 kg (166 lb 10.7 oz)   SpO2 97%   BMI 30.48 kg/m²   Physical Exam  Vitals signs and nursing note reviewed.   Constitutional:       General: She is awake.      Appearance: Normal appearance. She is well-developed and well-groomed. She is obese.   Neurological:      Mental Status: She is alert.   Psychiatric:         Behavior: Behavior is cooperative.       Personal Diagnostic Review  Review of labs, xray's, cardiology reports.     Assessment:     1. Sleep-disordered breathing    2. Snoring    3. Feeling tired    4. Witnessed episode of apnea    5. Essential hypertension    6. Obesity (BMI 30.0-34.9)    7. Seasonal allergic rhinitis, unspecified trigger    8. Situational anxiety    9. Chronic daily headache    10. Palpitations      Orders Placed This Encounter   Procedures    Home Sleep Studies     Standing Status:   Future     Standing Expiration Date:   8/19/2021     Scheduling Instructions:      2 night home sleep study     Plan:   Discussed diagnosis, its evaluation, treatment and usual course. All questions answered.  Problem List Items Addressed This Visit     Situational anxiety     Improved on zoloft, followed by primary care provider   Decided to retired from [employer] employee x 39 years, senior care date 8/25/2020.          Seasonal allergic rhinitis     Mild, Controlled on claritin and flonase         Palpitations     Followed by Dr. Blackmon, Cardiology          Obesity (BMI 30.0-34.9)     Encouraged calorie reduction and 30 minutes of exercise daily. Discussed impact of obesity on general health.  Lost 15 lbs over past 1 year with change of diet and exercise   Current weight 166 lbs.   Goal weight 150 lbs          " Relevant Orders    Home Sleep Studies    Essential hypertension (Chronic)     Controlled, managed by primary care provider          Relevant Orders    Home Sleep Studies    Chronic daily headache     Reported as stress related, noticed improvement since decided to retire.  Morning headache upon awakening about twice a week, proceed with eval obstructive sleep apnea with Home Sleep Study  2 night study.            Other Visit Diagnoses     Sleep-disordered breathing    -  Primary    Relevant Orders    Home Sleep Studies    Snoring        Relevant Orders    Home Sleep Studies    Feeling tired        Relevant Orders    Home Sleep Studies    Witnessed episode of apnea        Relevant Orders    Home Sleep Studies        Proceed with evaluate obstructive sleep apnea with Home Sleep Study  2 night study.     Follow up for auto cpap if tigist, fu for initial cpap download.    Thank you for the opportunity to participate in the care of this patient.

## 2020-08-19 NOTE — LETTER
August 19, 2020      Chhaya Jennings MD  [address] [place] [address] [address] [place] [id number]           [facility]  [address] [place] [address] [address]  [address] [address]  Phone: [phone]  Fax: [phone]          Patient: Clare Whitley   MR Number: [id number]   YOB: 1960   Date of Visit: 8/19/2020       Dear Dr. Chhaya Jennings:    Thank you for referring Clare Whitley to me for evaluation. Attached you will find relevant portions of my assessment and plan of care.    If you have questions, please do not hesitate to call me. I look forward to following Clare Whitley along with you.    Sincerely,    Caroline Ventura, NP    Enclosure  CC:  No Recipients    If you would like to receive this communication electronically, please contact [email or web address] or [phone] to request more information on [facility] access.    For providers and/or their staff who would like to refer a patient to [facility], please contact us through our one-stop-shop provider referral line, [facility] , at [phone].    If you feel you have received this communication in error or would no longer like to receive these types of communications, please e-mail [email or web address]

## 2020-08-19 NOTE — ASSESSMENT & PLAN NOTE
Encouraged calorie reduction and 30 minutes of exercise daily. Discussed impact of obesity on general health.  Lost 15 lbs over past 1 year with change of diet and exercise   Current weight 166 lbs.   Goal weight 150 lbs

## 2020-08-25 ENCOUNTER — PATIENT OUTREACH (OUTPATIENT)
Dept: ADMINISTRATIVE | Facility: HOSPITAL | Age: 60
End: 2020-08-25

## 2020-08-25 NOTE — PROGRESS NOTES
MAMMOGRAM REPORT: Attempting to contact pt to schedule over due HM:MAMMOGRAM & F/U. Unable to reach patient at this time. Left voicemail.

## 2020-09-09 ENCOUNTER — APPOINTMENT (OUTPATIENT)
Dept: LAB | Facility: HOSPITAL | Age: 60
End: 2020-09-09
Attending: FAMILY MEDICINE
Payer: COMMERCIAL

## 2020-09-14 ENCOUNTER — OFFICE VISIT (OUTPATIENT)
Dept: OPHTHALMOLOGY | Facility: CLINIC | Age: 60
End: 2020-09-14
Payer: COMMERCIAL

## 2020-09-14 DIAGNOSIS — I10 ESSENTIAL HYPERTENSION: ICD-10-CM

## 2020-09-14 DIAGNOSIS — H52.4 BILATERAL PRESBYOPIA: ICD-10-CM

## 2020-09-14 DIAGNOSIS — H25.013 CORTICAL AGE-RELATED CATARACT, BILATERAL: Primary | ICD-10-CM

## 2020-09-14 PROCEDURE — 99999 PR PBB SHADOW E&M-EST. PATIENT-LVL II: ICD-10-PCS | Mod: PBBFAC,,, | Performed by: OPTOMETRIST

## 2020-09-14 PROCEDURE — 92015 DETERMINE REFRACTIVE STATE: CPT | Mod: S$GLB,,, | Performed by: OPTOMETRIST

## 2020-09-14 PROCEDURE — 92014 PR EYE EXAM, EST PATIENT,COMPREHESV: ICD-10-PCS | Mod: S$GLB,,, | Performed by: OPTOMETRIST

## 2020-09-14 PROCEDURE — 92015 PR REFRACTION: ICD-10-PCS | Mod: S$GLB,,, | Performed by: OPTOMETRIST

## 2020-09-14 PROCEDURE — 92014 COMPRE OPH EXAM EST PT 1/>: CPT | Mod: S$GLB,,, | Performed by: OPTOMETRIST

## 2020-09-14 PROCEDURE — 99999 PR PBB SHADOW E&M-EST. PATIENT-LVL II: CPT | Mod: PBBFAC,,, | Performed by: OPTOMETRIST

## 2020-09-14 RX ORDER — CHLORTHALIDONE 25 MG/1
TABLET ORAL
COMMUNITY
Start: 2020-09-03 | End: 2023-05-11

## 2020-09-14 NOTE — PROGRESS NOTES
HPI     Eye Exam      Additional comments: yearly              Comments     Last Exam w/ TRF 9/09/2019  Pt states that her vision is a little blurred and she has been using   reading glasses to read.  No Pain          Last edited by Anamaria Em on 9/14/2020  9:04 AM. (History)            Assessment /Plan     For exam results, see Encounter Report.    Cortical age-related cataract, bilateral    Essential hypertension    Bilateral presbyopia      Moderate cataracts OU, not surgical  Myopic shift OU    No HTN Retinopathy    Dispense Final Rx for glasses.  RTC 1 year  Discussed above and answered questions.

## 2020-09-15 ENCOUNTER — PROCEDURE VISIT (OUTPATIENT)
Dept: SLEEP MEDICINE | Facility: CLINIC | Age: 60
End: 2020-09-15
Payer: COMMERCIAL

## 2020-09-15 ENCOUNTER — TELEPHONE (OUTPATIENT)
Dept: INTERNAL MEDICINE | Facility: CLINIC | Age: 60
End: 2020-09-15

## 2020-09-15 ENCOUNTER — PATIENT MESSAGE (OUTPATIENT)
Dept: PULMONOLOGY | Facility: CLINIC | Age: 60
End: 2020-09-15

## 2020-09-15 ENCOUNTER — OFFICE VISIT (OUTPATIENT)
Dept: INTERNAL MEDICINE | Facility: CLINIC | Age: 60
End: 2020-09-15
Payer: COMMERCIAL

## 2020-09-15 DIAGNOSIS — G47.33 OSA (OBSTRUCTIVE SLEEP APNEA): Primary | ICD-10-CM

## 2020-09-15 DIAGNOSIS — R06.83 SNORING: ICD-10-CM

## 2020-09-15 DIAGNOSIS — F41.8 SITUATIONAL ANXIETY: ICD-10-CM

## 2020-09-15 DIAGNOSIS — R53.83 FEELING TIRED: ICD-10-CM

## 2020-09-15 DIAGNOSIS — G47.33 OBSTRUCTIVE SLEEP APNEA: Primary | ICD-10-CM

## 2020-09-15 DIAGNOSIS — G47.30 SLEEP-DISORDERED BREATHING: ICD-10-CM

## 2020-09-15 DIAGNOSIS — E66.9 OBESITY (BMI 30.0-34.9): ICD-10-CM

## 2020-09-15 DIAGNOSIS — R06.81 WITNESSED EPISODE OF APNEA: ICD-10-CM

## 2020-09-15 DIAGNOSIS — I10 ESSENTIAL HYPERTENSION: Chronic | ICD-10-CM

## 2020-09-15 PROCEDURE — 95800 SLP STDY UNATTENDED: CPT | Mod: 26,,, | Performed by: INTERNAL MEDICINE

## 2020-09-15 PROCEDURE — 99213 OFFICE O/P EST LOW 20 MIN: CPT | Mod: 95,,, | Performed by: FAMILY MEDICINE

## 2020-09-15 PROCEDURE — 99213 PR OFFICE/OUTPT VISIT, EST, LEVL III, 20-29 MIN: ICD-10-PCS | Mod: 95,,, | Performed by: FAMILY MEDICINE

## 2020-09-15 PROCEDURE — 95800 PR SLEEP STUDY, UNATTENDED, RECORD HEART RATE/O2 SAT/RESP ANAL/SLEEP TIME: ICD-10-PCS | Mod: 26,,, | Performed by: INTERNAL MEDICINE

## 2020-09-15 PROCEDURE — 95800 SLP STDY UNATTENDED: CPT

## 2020-09-15 RX ORDER — ATENOLOL 50 MG/1
100 TABLET ORAL DAILY
COMMUNITY
Start: 2020-09-03 | End: 2021-10-20 | Stop reason: SDUPTHER

## 2020-09-15 NOTE — PROGRESS NOTES
Subjective:       Patient ID: Clare Whitley is a 60 y.o. female.    Chief Complaint: Anxiety      The patient location is: home  The chief complaint leading to consultation is: follow up anxiety    Visit type: audiovisual    Face to Face time with patient: 4 minutes (chart review started 12:06 pm; video started 12:08 pm; video ended 12:12 pm)  6 minutes of total time spent on the encounter, which includes face to face time and non-face to face time preparing to see the patient (eg, review of tests), Obtaining and/or reviewing separately obtained history, Documenting clinical information in the electronic or other health record, Independently interpreting results (not separately reported) and communicating results to the patient/family/caregiver, or Care coordination (not separately reported).     Each patient to whom he or she provides medical services by telemedicine is:  (1) informed of the relationship between the physician and patient and the respective role of any other health care provider with respect to management of the patient; and (2) notified that he or she may decline to receive medical services by telemedicine and may withdraw from such care at any time.    Follow-up for anxiety.  Patient reports she is doing well on Zoloft 25 mg daily she is without other particular concerns today.    Review of Systems   Constitutional: Negative for activity change and unexpected weight change.   HENT: Negative for hearing loss, rhinorrhea and trouble swallowing.    Eyes: Negative for discharge and visual disturbance.   Respiratory: Negative for chest tightness and wheezing.    Cardiovascular: Positive for palpitations. Negative for chest pain.   Gastrointestinal: Negative for blood in stool, constipation, diarrhea and vomiting.   Endocrine: Negative for polydipsia and polyuria.   Genitourinary: Negative for difficulty urinating, dysuria, hematuria and menstrual problem.   Musculoskeletal: Negative for arthralgias,  joint swelling and neck pain.   Neurological: Positive for headaches. Negative for weakness.   Psychiatric/Behavioral: Negative for confusion and dysphoric mood.         Objective:      Physical Exam  Constitutional:       General: She is not in acute distress.     Appearance: She is well-developed.   HENT:      Head: Normocephalic and atraumatic.   Eyes:      General: Lids are normal. No scleral icterus.     Extraocular Movements: Extraocular movements intact.      Conjunctiva/sclera: Conjunctivae normal.   Pulmonary:      Effort: Pulmonary effort is normal.   Neurological:      Mental Status: She is alert and oriented to person, place, and time.   Psychiatric:         Mood and Affect: Mood and affect normal.         Assessment:       1. Situational anxiety        Plan:     Problem List Items Addressed This Visit     Situational anxiety    Current Assessment & Plan     Improving, continue Zoloft 25 mg daily             Patient is scheduled to establish with Dr. Pride, Neurology.  Advised if she needs medication refills prior to establishing care with him to notify me.  Patient expressed understanding.  Patient planning to get her flu vaccine in October.

## 2020-09-15 NOTE — TELEPHONE ENCOUNTER
At initial visit patient request if obstructive sleep apnea on Home Sleep Study then beginning CPAP and follow up in 10-12 weeks for CPAP IDL.    CPAP goals reviewed at that visit: Reviewed therapeutic goals for positive airway pressure therapy Auto CPAP Ideal is usage 100% of nights for 6 - 8 hours per night. Minimum usage is 70% of night for at least 4 hours per night used.        Orders Placed This Encounter   Procedures    CPAP FOR HOME USE     Home Sleep Study  9/10/2020, 9/11/2020 2 nights. Findings are consistent with mild /Borderline, positional obstructive  sleep apnea (SINTIA). best night was night 1.: AHI 7.0 events per hour with 5.5 hr of sleep data. Loud snoring was recorded. Oxygen saturation shereen was 85.3%.     Order Specific Question:   Type:     Answer:   Auto CPAP     Order Specific Question:   Auto CPAP pressure setting range (cmH20):     Answer:   5-20     Order Specific Question:   Length of need (1-99 months):     Answer:   99     Order Specific Question:   Humidification:     Answer:   Heated     Order Specific Question:   Type of mask:     Answer:   Nasal     Order Specific Question:   Headgear?     Answer:   Yes     Order Specific Question:   Tubing?     Answer:   Yes     Order Specific Question:   Humidifier chamber?     Answer:   Yes     Order Specific Question:   Chin strap?     Answer:   Yes     Order Specific Question:   Filters?     Answer:   Yes     Order Specific Question:   Cushions?     Answer:   Yes     1. Obstructive sleep apnea  CPAP FOR HOME USE

## 2020-09-15 NOTE — Clinical Note
PHYSICIAN INTERPRETATION AND COMMENTS: Findings are consistent with mild /Borderline, positional obstructive  sleep apnea (SINTIA). best night was night 1.: AHI 7.0 events per hour with 5.5 hr of sleep data. Loud snoring was recorded. Oxygen  saturation shereen was 85.3%. Therapy intervention with auto PAP 5-20 cm water pressure is recommended. Close follow-up to  ensure resolution of symptoms.  CLINICAL HISTORY: 60 year old female presented with: 14 inch neck, BMI of 30, an [place] sleepiness score of 11, history of  hypertension and symptoms of nocturnal snoring and witnessed apneas. Based on the clinical history, the patient has a high pre-test  probability of having moderate SINTIA. Stop Bang score 5

## 2020-09-15 NOTE — PROCEDURES
Home Sleep Studies    Date/Time: 9/15/2020 9:30 AM  Performed by: Leonid Jones MD  Authorized by: Caroline Ventura NP       PHYSICIAN INTERPRETATION AND COMMENTS: Findings are consistent with mild /Borderline, positional obstructive  sleep apnea (SINTIA). best night was night 1.: AHI 7.0 events per hour with 5.5 hr of sleep data. Loud snoring was recorded. Oxygen  saturation shereen was 85.3%. Therapy intervention with auto PAP 5-20 cm water pressure is recommended. Close follow-up to  ensure resolution of symptoms.  CLINICAL HISTORY: 60 year old female presented with: 14 inch neck, BMI of 30, an [place] sleepiness score of 11, history of  hypertension and symptoms of nocturnal snoring and witnessed apneas. Based on the clinical history, the patient has a high pre-test  probability of having moderate SINTIA. Stop Bang score 5

## 2020-09-16 ENCOUNTER — TELEPHONE (OUTPATIENT)
Dept: PULMONOLOGY | Facility: CLINIC | Age: 60
End: 2020-09-16

## 2020-09-16 NOTE — TELEPHONE ENCOUNTER
Phoned pt, made video visit for 9/17/2020 to review sleep study with tracey mercedes.      ----- Message from Zion Reyes sent at 9/16/2020  7:40 AM CDT -----  moy Smomer f/u appt with Taye For results from home sleep study.    Thanks

## 2020-09-17 ENCOUNTER — TELEPHONE (OUTPATIENT)
Dept: PULMONOLOGY | Facility: CLINIC | Age: 60
End: 2020-09-17

## 2020-09-17 ENCOUNTER — OFFICE VISIT (OUTPATIENT)
Dept: PULMONOLOGY | Facility: CLINIC | Age: 60
End: 2020-09-17
Payer: COMMERCIAL

## 2020-09-17 VITALS — HEIGHT: 62 IN | WEIGHT: 166 LBS | BODY MASS INDEX: 30.55 KG/M2

## 2020-09-17 DIAGNOSIS — R51.9 CHRONIC DAILY HEADACHE: ICD-10-CM

## 2020-09-17 DIAGNOSIS — F41.8 SITUATIONAL ANXIETY: ICD-10-CM

## 2020-09-17 DIAGNOSIS — R00.2 PALPITATIONS: ICD-10-CM

## 2020-09-17 DIAGNOSIS — E66.9 OBESITY (BMI 30.0-34.9): ICD-10-CM

## 2020-09-17 DIAGNOSIS — J30.2 SEASONAL ALLERGIC RHINITIS, UNSPECIFIED TRIGGER: ICD-10-CM

## 2020-09-17 DIAGNOSIS — I10 ESSENTIAL HYPERTENSION: Chronic | ICD-10-CM

## 2020-09-17 DIAGNOSIS — G47.33 OBSTRUCTIVE SLEEP APNEA: Primary | ICD-10-CM

## 2020-09-17 PROCEDURE — 99213 OFFICE O/P EST LOW 20 MIN: CPT | Mod: 95,,, | Performed by: NURSE PRACTITIONER

## 2020-09-17 PROCEDURE — 99213 PR OFFICE/OUTPT VISIT, EST, LEVL III, 20-29 MIN: ICD-10-PCS | Mod: 95,,, | Performed by: NURSE PRACTITIONER

## 2020-09-17 PROCEDURE — 3008F PR BODY MASS INDEX (BMI) DOCUMENTED: ICD-10-PCS | Mod: CPTII,,, | Performed by: NURSE PRACTITIONER

## 2020-09-17 PROCEDURE — 3008F BODY MASS INDEX DOCD: CPT | Mod: CPTII,,, | Performed by: NURSE PRACTITIONER

## 2020-09-17 NOTE — PATIENT INSTRUCTIONS
What Are Snoring and Obstructive Sleep Apnea?  If youve ever had a stuffed-up nose, you know the feeling of trying to breathe through a very narrow passageway. This is what happens in your throat when you snore. While you sleep, structures in your throat partially block your air passage, making the passage narrow and hard to breathe through. If the entire passage becomes blocked and you cant breathe at all, you have sleep apnea.      Snoring Obstructive sleep apnea   Snoring  If your throat structures are too large or the muscles relax too much during sleep, the air passage may be partially blocked. As air from the nose or mouth passes around this blockage, the throat structures vibrate, causing the familiar sound of snoring. At times, this sound can be so loud that snorers wake up others, or even themselves, during the night. Snoring gets worse as more and more of the air passage is blocked.  Obstructive sleep apnea  If the structures completely block the throat, air cant flow to the lungs at all. This is called apnea (meaning no breathing). Since the lungs arent getting fresh air, the brain tells the body to wake up just enough to tighten the muscles and unblock the air passage. With a loud gasp, breathing begins again. This process may be repeated over and over again throughout the night, making your sleep fragmented with a lighter stage of sleep. Even though you do not remember waking up many times during the night to a lighter sleep, you feel tired the next day. The lack of sleep and fresh air can also strain your lungs, heart, and other organs, leading to problems such as high blood pressure, heart attack, or stroke.  Problems in the nose and jaw  Problems in the structure of the nose may obstruct breathing. A crooked (deviated) septum or swollen turbinates can make snoring worse or lead to apnea. Also, a receding jaw may make the tongue sit too far back, so its more likely to block the airway when  youre asleep.        Date Last Reviewed: 7/18/2015  © 0053-9232 [employer]. [address] [place] [address]. All rights reserved. This information is not intended as a substitute for professional medical care. Always follow your healthcare professional's instructions.        Continuous Positive Air Pressure (CPAP)     A mask over the nose gently directs air into the throat to keep the airway open.   Continuous positive air pressure (CPAP) uses gentle air pressure to hold the airway open. CPAP is often the most effective treatment for sleep apnea and severe snoring. It works very well for many people. But keep in mind that it can take several adjustments before the setup is right for you.  How CPAP works  The CPAP machine  is a small portable pump beside the bed. The pump sends air through a hose, which is held over your nose and mouth by a mask. Mild air pressure is gently pushed through your airway. The air pressure nudges sagging tissues aside. This widens the airway so you can breathe better. CPAP may be combined with other kinds of therapy for sleep apnea.  Types of air pressure treatments  There are different types of CPAP. Your doctor or CPAP technician will help you decide which type is best for you:  · Basic CPAP keeps the pressure constant all night long.  · A bilevel device (BiPAP) provides more pressure when you breathe in and less when you breathe out. A BiPAP machine also may be set to provide automatic breaths to maintain breathing if you stop breathing while sleeping.  · An autoCPAP device automatically adjusts pressure throughout the night and in response to changes such as body position, sleep stage, and snoring.  Date Last Reviewed: 8/10/2015  © 4169-7000 [employer]. [address] [place], [place] [id number]. All rights reserved. This information is not intended as a substitute for professional medical care. Always follow your healthcare professional's  instructions.

## 2020-09-17 NOTE — PROGRESS NOTES
The patient location is: home   The chief complaint leading to consultation is: sleep apnea    Visit type: audiovisual    Face to Face time with patient: [id number] [phone] [id number]   25 minutes of total time spent on the encounter, which includes face to face time and non-face to face time preparing to see the patient (eg, review of tests), Obtaining and/or reviewing separately obtained history, Documenting clinical information in the electronic or other health record, Independently interpreting results (not separately reported) and communicating results to the patient/family/caregiver, or Care coordination (not separately reported).     Each patient to whom he or she provides medical services by telemedicine is:  (1) informed of the relationship between the physician and patient and the respective role of any other health care provider with respect to management of the patient; and (2) notified that he or she may decline to receive medical services by telemedicine and may withdraw from such care at any time.    Subjective:      Patient ID: Clare Whitley is a 60 y.o. female.    Patient Active Problem List   Diagnosis    Essential hypertension    Chest pain in adult    Hypokalemia    Right sided weakness    Seasonal allergic rhinitis    Palpitations    Transient visual disturbance    Right arm numbness    Abnormal finding on MRI of brain    MVP (mitral valve prolapse)    Situational anxiety    Chronic daily headache    Obesity (BMI 30.0-34.9)    Obstructive sleep apnea        Chief Complaint   Patient presents with    Sleep Apnea     Chief Complaint: Sleep Apnea    HPI:  She presents for telemed video visit for obstructive sleep apnea with review Home Sleep Study  9/10/2020, 9/11/2020 2 nights.   Findings are consistent with mild /Borderline, positional obstructive sleep apnea (SINTIA). best night was night 1.: AHI 7.0 events per hour with 5.5 hr of sleep data. Loud snoring was recorded. Oxygen saturation shereen was  85.3%.  Orders placed 9/15/2020 for auto CPAP 5-20 cm with nasal mask.     Patient observed snoring, periods of not breathing witnessed by , feels sleepy during the day, take naps during the day.    Bed time is 1100  Wake time is 0700  Sleep onset is within 15 - 30 Minutes.    Warren sleepiness score was 9.  Neck circumference is 37cm. (14.5 inches).  Mallampati score 3    Occupational History:  Retired 8/25/2020 from [place], [place] work force.     Previous Report Reviewed: office notes     Past Medical History: The following portions of the patient's history were reviewed and updated as appropriate:   She  has a past surgical history that includes Uterine fibroid embolization (01/01/2010); Lymph node biopsy (Left); Cholecystectomy; and laser SX (Bilateral).  Her family history includes Cataracts in her mother; Hypertension in her brother, father, mother, and sister.  She  reports that she is a non-smoker but has been exposed to tobacco smoke. She has never used smokeless tobacco. She reports that she does not drink alcohol or use drugs.  She has a current medication list which includes the following prescription(s): acetazolamide, aspirin, atenolol, atenolol-chlorthalidone, chlorthalidone, fluticasone propionate, loratadine, sertraline, and spironolactone.  She is allergic to no known drug allergies.    Review of Systems   Constitutional: Negative for fever, chills, weight loss, weight gain, activity change, appetite change, fatigue and night sweats.   HENT: Negative for postnasal drip, rhinorrhea, sinus pressure, voice change and congestion.    Eyes: Negative for redness and itching.   Respiratory: Negative for snoring, cough, sputum production, chest tightness, shortness of breath, wheezing, orthopnea, asthma nighttime symptoms, dyspnea on extertion, use of rescue inhaler and somnolence.    Cardiovascular: Negative.  Negative for chest pain, palpitations and leg swelling.   Genitourinary: Negative for  "difficulty urinating and hematuria.   Endocrine: Negative for cold intolerance and heat intolerance.    Musculoskeletal: Negative for arthralgias, gait problem, joint swelling and myalgias.   Skin: Negative.    Gastrointestinal: Negative for nausea, vomiting, abdominal pain and acid reflux.   Neurological: Negative for dizziness, weakness, light-headedness and headaches.   Hematological: Negative for adenopathy. No excessive bruising.   All other systems reviewed and are negative.     Objective:   Ht 5' 2" (1.575 m)   Wt 75.3 kg (166 lb)   BMI 30.36 kg/m²   Physical Exam  Vitals signs and nursing note reviewed.   Constitutional:       General: She is awake.      Appearance: Normal appearance. She is well-developed and well-groomed.   HENT:      Head: Normocephalic.   Eyes:      Conjunctiva/sclera: Conjunctivae normal.   Pulmonary:      Effort: Pulmonary effort is normal.   Neurological:      Mental Status: She is alert.   Psychiatric:         Mood and Affect: Mood normal.         Behavior: Behavior normal. Behavior is cooperative.         Thought Content: Thought content normal.         Judgment: Judgment normal.       Personal Diagnostic Review  Home Sleep Study  9/10/2020, 9/11/2020 2 nights.   Findings are consistent with mild /Borderline, positional obstructive sleep apnea (SINTIA). best night was night 1.: AHI 7.0 events per hour with 5.5 hr of sleep data. Loud snoring was recorded. Oxygen saturation shereen was 85.3%.    Assessment:     1. Obstructive sleep apnea    2. Obesity (BMI 30.0-34.9)    3. Essential hypertension    4. Chronic daily headache    5. Palpitations    6. Seasonal allergic rhinitis, unspecified trigger    7. Situational anxiety      No orders of the defined types were placed in this encounter.    Plan:   Discussed diagnosis, its evaluation, treatment and usual course. All questions answered.  Problem List Items Addressed This Visit     Situational anxiety     Improved with Zoloft 25 mg daily   "       Seasonal allergic rhinitis     Mild, Controlled on claritin and flonase         Palpitations     Followed by Dr. Blackmon, Cardiology          Obstructive sleep apnea - Primary     Home Sleep Study  9/10/2020, 9/11/2020 2 nights.   Findings are consistent with mild /Borderline, positional obstructive sleep apnea (SINTIA). best night was night 1.: AHI 7.0 events per hour with 5.5 hr of sleep data. Loud snoring was recorded. Oxygen saturation shereen was 85.3%.  9/15/2020 orders for Auto CPAP 5-20 cm with nasal mask            Obesity (BMI 30.0-34.9)     Encouraged calorie reduction and 30 minutes of exercise daily. Discussed impact of obesity on general health.  Lost 15 lbs over past 1 year with change of diet and exercise   Elliptical, TM, rode bike. Also plays basketball with grandson.   Current weight 166 lbs.   Goal weight 150 lbs          Essential hypertension (Chronic)     Controlled, managed by primary care provider          Chronic daily headache     Morning headache upon awakening about twice a week  Beginning CPAP for mild obstructive sleep apnea AHI 7.0.              Follow up in about 3 months (around 12/9/2020) for CPAP compliance download after initial set up.

## 2020-09-17 NOTE — ASSESSMENT & PLAN NOTE
Encouraged calorie reduction and 30 minutes of exercise daily. Discussed impact of obesity on general health.  Lost 15 lbs over past 1 year with change of diet and exercise   Elliptical, TM, rode bike. Also plays basketball with grandson.   Current weight 166 lbs.   Goal weight 150 lbs

## 2020-09-17 NOTE — ASSESSMENT & PLAN NOTE
Morning headache upon awakening about twice a week  Beginning CPAP for mild obstructive sleep apnea AHI 7.0.

## 2020-09-17 NOTE — ASSESSMENT & PLAN NOTE
Home Sleep Study  9/10/2020, 9/11/2020 2 nights.   Findings are consistent with mild /Borderline, positional obstructive sleep apnea (SINTIA). best night was night 1.: AHI 7.0 events per hour with 5.5 hr of sleep data. Loud snoring was recorded. Oxygen saturation shereen was 85.3%.  9/15/2020 orders for Auto CPAP 5-20 cm with nasal mask

## 2020-10-12 DIAGNOSIS — R90.89 ABNORMAL FINDING ON MRI OF BRAIN: ICD-10-CM

## 2020-10-12 RX ORDER — ACETAZOLAMIDE 250 MG/1
250 TABLET ORAL 2 TIMES DAILY
Qty: 60 TABLET | Refills: 5 | Status: SHIPPED | OUTPATIENT
Start: 2020-10-12 | End: 2021-02-09

## 2020-10-14 ENCOUNTER — TELEPHONE (OUTPATIENT)
Dept: NEUROLOGY | Facility: CLINIC | Age: 60
End: 2020-10-14

## 2020-10-14 NOTE — TELEPHONE ENCOUNTER
----- Message from Marajludy Shlomo sent at 10/14/2020 12:34 PM CDT -----  Type:  Patient Returning Call    Who Called:Pt  Who Left Message for Patient: Nurse   Does the patient know what this is regarding?: sooner appt   Would the patient rather a call back or a response via [facility]? Call   Best Call Back Number:[phone] (home) [phone] (work)  Additional Information:

## 2020-11-20 ENCOUNTER — TELEPHONE (OUTPATIENT)
Dept: PULMONOLOGY | Facility: CLINIC | Age: 60
End: 2020-11-20

## 2020-11-20 ENCOUNTER — PATIENT MESSAGE (OUTPATIENT)
Dept: PULMONOLOGY | Facility: CLINIC | Age: 60
End: 2020-11-20

## 2020-11-20 NOTE — TELEPHONE ENCOUNTER
----- Message from Caroline Ventura NP sent at 11/20/2020 12:56 PM CST -----  Contact: Clare  Please let patient know I sent a [facility] message, please review with her is she has not seen or does not use [facility]. Thank you.   ----- Message -----  From: Velia Marcos MA  Sent: 11/19/2020   3:29 PM CST  To: Caroline Ventura NP    Called patient states  she's not resting well with cpap machine please check cpap dl. In media  ----- Message -----  From: Milvia Avalosderek  Sent: 11/19/2020  12:20 PM CST  To: Lorenzo GUZMAN Staff    Pt called in regards to questions about her cpap machine. Doesn't know if the pressure on it is correct. Please call back at [phone]. thanks jh

## 2020-12-09 ENCOUNTER — OFFICE VISIT (OUTPATIENT)
Dept: PULMONOLOGY | Facility: CLINIC | Age: 60
End: 2020-12-09
Payer: COMMERCIAL

## 2020-12-09 VITALS
BODY MASS INDEX: 31.87 KG/M2 | HEIGHT: 62 IN | HEART RATE: 78 BPM | SYSTOLIC BLOOD PRESSURE: 110 MMHG | RESPIRATION RATE: 18 BRPM | DIASTOLIC BLOOD PRESSURE: 60 MMHG | WEIGHT: 173.19 LBS

## 2020-12-09 DIAGNOSIS — F41.8 SITUATIONAL ANXIETY: ICD-10-CM

## 2020-12-09 DIAGNOSIS — G47.33 OSA ON CPAP: Primary | ICD-10-CM

## 2020-12-09 DIAGNOSIS — I10 ESSENTIAL HYPERTENSION: Chronic | ICD-10-CM

## 2020-12-09 DIAGNOSIS — R51.9 CHRONIC DAILY HEADACHE: ICD-10-CM

## 2020-12-09 DIAGNOSIS — E66.9 OBESITY (BMI 30.0-34.9): ICD-10-CM

## 2020-12-09 PROCEDURE — 99214 OFFICE O/P EST MOD 30 MIN: CPT | Mod: S$GLB,,, | Performed by: NURSE PRACTITIONER

## 2020-12-09 PROCEDURE — 3008F PR BODY MASS INDEX (BMI) DOCUMENTED: ICD-10-PCS | Mod: CPTII,S$GLB,, | Performed by: NURSE PRACTITIONER

## 2020-12-09 PROCEDURE — 3074F PR MOST RECENT SYSTOLIC BLOOD PRESSURE < 130 MM HG: ICD-10-PCS | Mod: CPTII,S$GLB,, | Performed by: NURSE PRACTITIONER

## 2020-12-09 PROCEDURE — 99214 PR OFFICE/OUTPT VISIT, EST, LEVL IV, 30-39 MIN: ICD-10-PCS | Mod: S$GLB,,, | Performed by: NURSE PRACTITIONER

## 2020-12-09 PROCEDURE — 3078F PR MOST RECENT DIASTOLIC BLOOD PRESSURE < 80 MM HG: ICD-10-PCS | Mod: CPTII,S$GLB,, | Performed by: NURSE PRACTITIONER

## 2020-12-09 PROCEDURE — 99999 PR PBB SHADOW E&M-EST. PATIENT-LVL III: CPT | Mod: PBBFAC,,, | Performed by: NURSE PRACTITIONER

## 2020-12-09 PROCEDURE — 99999 PR PBB SHADOW E&M-EST. PATIENT-LVL III: ICD-10-PCS | Mod: PBBFAC,,, | Performed by: NURSE PRACTITIONER

## 2020-12-09 PROCEDURE — 3074F SYST BP LT 130 MM HG: CPT | Mod: CPTII,S$GLB,, | Performed by: NURSE PRACTITIONER

## 2020-12-09 PROCEDURE — 3078F DIAST BP <80 MM HG: CPT | Mod: CPTII,S$GLB,, | Performed by: NURSE PRACTITIONER

## 2020-12-09 PROCEDURE — 3008F BODY MASS INDEX DOCD: CPT | Mod: CPTII,S$GLB,, | Performed by: NURSE PRACTITIONER

## 2020-12-09 NOTE — ASSESSMENT & PLAN NOTE
Benefits and compliant with Auto CPAP 5-20 cm  AHI 1.2   Nasal wisp mask  Changed in clinic to auto CPAP 5-10 cm   Follow up 6 months

## 2020-12-09 NOTE — PROGRESS NOTES
Subjective:      Patient ID: Clare Whitley is a 60 y.o. female.    Chief Complaint: Sleep Apnea    HPI: Clare Whitley presents to clinic for follow up for SINTIA with initial CPAP complaince assessment.  She is on Auto CPAP of 5-20 cmH2O pressure which is optimally controlling sleep apnea with apneic index (AHI) 1.2 events an hour.   She is compliant with CPAP use. Complaince download today reveals 100% of days with greater than 4 hours of device use.   Patient reports benefit from CPAP use, awakens more refreshed, no longer having morning headache.   Patient reports no complaints, has adapted since beginning use. Nasal wisp mask is tolerated.   [place] 1  Changed today in clinic to auto CPAP 5-10 cm     Previous Report Reviewed: lab reports and office notes     Past Medical History: The following portions of the patient's history were reviewed and updated as appropriate:   She  has a past surgical history that includes Uterine fibroid embolization (01/01/2010); Lymph node biopsy (Left); Cholecystectomy; and laser SX (Bilateral).  Her family history includes Cataracts in her mother; Hypertension in her brother, father, mother, and sister.  She  reports that she is a non-smoker but has been exposed to tobacco smoke. She has never used smokeless tobacco. She reports that she does not drink alcohol or use drugs.  She has a current medication list which includes the following prescription(s): acetazolamide, aspirin, atenolol, atenolol-chlorthalidone, chlorthalidone, fluticasone propionate, loratadine, sertraline, and spironolactone.  She is allergic to no known drug allergies..    The following portions of the patient's history were reviewed and updated as appropriate: allergies, current medications, past family history, past medical history, past social history, past surgical history and problem list.    Review of Systems   Constitutional: Negative for fever, chills, weight loss, weight gain, activity change,  "appetite change, fatigue and night sweats.   HENT: Negative for postnasal drip, rhinorrhea, sinus pressure, voice change and congestion.    Eyes: Negative for redness and itching.   Respiratory: Negative for snoring, cough, sputum production, chest tightness, shortness of breath, wheezing, orthopnea, asthma nighttime symptoms, dyspnea on extertion, use of rescue inhaler and somnolence.    Cardiovascular: Negative.  Negative for chest pain, palpitations and leg swelling.   Genitourinary: Negative for difficulty urinating and hematuria.   Endocrine: Negative for cold intolerance and heat intolerance.    Musculoskeletal: Negative for arthralgias, gait problem, joint swelling and myalgias.   Skin: Negative.    Gastrointestinal: Negative for nausea, vomiting, abdominal pain and acid reflux.   Neurological: Negative for dizziness, weakness, light-headedness and headaches.   Hematological: Negative for adenopathy. No excessive bruising.   All other systems reviewed and are negative.     Objective:   /60   Pulse 78   Resp 18   Ht 5' 2" (1.575 m)   Wt 78.5 kg (173 lb 2.7 oz)   BMI 31.67 kg/m²   Physical Exam  Vitals signs and nursing note reviewed.   Constitutional:       General: She is not in acute distress.     Appearance: She is well-developed. She is obese. She is not ill-appearing or toxic-appearing.   HENT:      Head: Normocephalic.      Right Ear: External ear normal.      Left Ear: External ear normal.      Nose: Nose normal.      Mouth/Throat:      Pharynx: No oropharyngeal exudate.   Eyes:      Conjunctiva/sclera: Conjunctivae normal.   Neck:      Musculoskeletal: Normal range of motion and neck supple.   Cardiovascular:      Rate and Rhythm: Normal rate and regular rhythm.      Heart sounds: Normal heart sounds.   Pulmonary:      Effort: Pulmonary effort is normal.      Breath sounds: Normal breath sounds. No stridor.   Abdominal:      Palpations: Abdomen is soft.   Musculoskeletal: Normal range of " "motion.   Lymphadenopathy:      Cervical: No cervical adenopathy.   Skin:     General: Skin is warm and dry.   Neurological:      Mental Status: She is alert and oriented to person, place, and time.   Psychiatric:         Behavior: Behavior normal. Behavior is cooperative.         Thought Content: Thought content normal.         Judgment: Judgment normal.       Personal Diagnostic Review  CPAP download  APAP 5-20 cm  Compliance Summary  11/3/2020 - 12/2/2020 (30 days)  Days with Device Usage 30 days  Days without Device Usage 0 days  Percent Days with Device Usage 100.0%  Cumulative Usage 7 days 11 hrs. 7 mins. 10 secs.  Maximum Usage (1 Day) 7 hrs. 27 mins. 47 secs.  Average Usage (All Days) 5 hrs. 58 mins. 14 secs.  Average Usage (Days Used) 5 hrs. 58 mins. 14 secs.  Minimum Usage (1 Day) 4 hrs. 8 mins. 7 secs.  Percent of Days with Usage >= 4 Hours 100.0%  Percent of Days with Usage < 4 Hours 0.0%  Date Range  Total Blower Time 7 days 15 hrs. 1 mins. 9 secs.  Average AHI 1.2  Auto-CPAP Summary  Auto-CPAP Mean Pressure 5.6 cmH2O  Auto-CPAP Peak Average Pressure 7.9 cmH2O  Average Device Pressure <= 90% of Time 6.7 cmH2O  Average Time in Large Leak Per Day 11 mins. 32 secs.    Assessment:     1. SINTIA on CPAP    2. Obesity (BMI 30.0-34.9)    3. Chronic daily headache    4. Situational anxiety    5. Essential hypertension        Orders Placed This Encounter   Procedures    HME - OTHER     Changed in clinic to auto CPAP 5-10 cm     Order Specific Question:   Type of Equipment:     Answer:   CPAP     Order Specific Question:   Height:     Answer:   5' 2" (1.575 m)     Order Specific Question:   Weight:     Answer:   78.5 kg (173 lb 2.7 oz)     Order Specific Question:   Does patient have medical equipment at home?     Answer:   CPAP     Plan:     Problem List Items Addressed This Visit     Situational anxiety     Improved with Zoloft 25 mg daily, managed by primary care provider          SINTIA on CPAP - Primary     " Benefits and compliant with Auto CPAP 5-20 cm  AHI 1.2   Nasal wisp mask  Changed in clinic to auto CPAP 5-10 cm   Follow up 6 months              Relevant Orders    HME - OTHER    Obesity (BMI 30.0-34.9)     Encouraged calorie reduction and 30 minutes of exercise daily. Discussed impact of obesity on general health.  Lost 15 lbs over past 1 year with change of diet and exercise   Elliptical, TM, rode bike. Also plays basketball with grandherbie.   Current weight 173lbs, 7 lb weight gain since 9/2020.    Goal weight 150 lbs          Essential hypertension (Chronic)     Controlled managed by primary care provider          RESOLVED: Chronic daily headache     Resolved since CPAP               (DME) - [facility]  Reviewed therapeutic goals for positive airway pressure therapy Auto CPAP  Ideal is usage 100% of nights for 6 - 8 hours per night. Minimum usage is 70% of night for at least 4 hours per night used.     Follow up in about 6 months (around 6/9/2021) for CPAP 6 mo compliance download.    TIME SPENT WITH PATIENT: Time spent:25 minutes in face to face  discussion concerning diagnosis, prognosis, review of lab and test results, benefits of treatment as well as management of disease, counseling of patient. Greater than half the time spent was used for coordination of care and counseling of patient.

## 2020-12-09 NOTE — ASSESSMENT & PLAN NOTE
Encouraged calorie reduction and 30 minutes of exercise daily. Discussed impact of obesity on general health.  Lost 15 lbs over past 1 year with change of diet and exercise   Elliptical, TM, rode bike. Also plays basketball with grandson.   Current weight 173lbs, 7 lb weight gain since 9/2020.    Goal weight 150 lbs

## 2020-12-29 LAB — HPV16+18+H RISK 12 DNA CVX-IMP: NEGATIVE

## 2021-01-04 ENCOUNTER — LAB VISIT (OUTPATIENT)
Dept: LAB | Facility: HOSPITAL | Age: 61
End: 2021-01-04
Attending: FAMILY MEDICINE
Payer: COMMERCIAL

## 2021-01-04 DIAGNOSIS — I10 ESSENTIAL HYPERTENSION: ICD-10-CM

## 2021-01-04 DIAGNOSIS — Z13.29 THYROID DISORDER SCREEN: ICD-10-CM

## 2021-01-04 DIAGNOSIS — Z00.00 ROUTINE GENERAL MEDICAL EXAMINATION AT A HEALTH CARE FACILITY: ICD-10-CM

## 2021-01-04 DIAGNOSIS — Z13.220 SCREENING FOR LIPOID DISORDERS: ICD-10-CM

## 2021-01-04 LAB
ALBUMIN SERPL BCP-MCNC: 3.8 G/DL (ref 3.5–5.2)
ALP SERPL-CCNC: 32 U/L (ref 55–135)
ALT SERPL W/O P-5'-P-CCNC: 19 U/L (ref 10–44)
ANION GAP SERPL CALC-SCNC: 8 MMOL/L (ref 8–16)
ANION GAP SERPL CALC-SCNC: 8 MMOL/L (ref 8–16)
AST SERPL-CCNC: 23 U/L (ref 10–40)
BASOPHILS # BLD AUTO: 0.04 K/UL (ref 0–0.2)
BASOPHILS NFR BLD: 1 % (ref 0–1.9)
BILIRUB SERPL-MCNC: 0.7 MG/DL (ref 0.1–1)
BUN SERPL-MCNC: 13 MG/DL (ref 6–20)
BUN SERPL-MCNC: 13 MG/DL (ref 6–20)
CALCIUM SERPL-MCNC: 9.1 MG/DL (ref 8.7–10.5)
CALCIUM SERPL-MCNC: 9.1 MG/DL (ref 8.7–10.5)
CHLORIDE SERPL-SCNC: 99 MMOL/L (ref 95–110)
CHLORIDE SERPL-SCNC: 99 MMOL/L (ref 95–110)
CHOLEST SERPL-MCNC: 128 MG/DL (ref 120–199)
CHOLEST/HDLC SERPL: 2.4 {RATIO} (ref 2–5)
CO2 SERPL-SCNC: 31 MMOL/L (ref 23–29)
CO2 SERPL-SCNC: 31 MMOL/L (ref 23–29)
CREAT SERPL-MCNC: 0.9 MG/DL (ref 0.5–1.4)
CREAT SERPL-MCNC: 0.9 MG/DL (ref 0.5–1.4)
DIFFERENTIAL METHOD: ABNORMAL
EOSINOPHIL # BLD AUTO: 0.3 K/UL (ref 0–0.5)
EOSINOPHIL NFR BLD: 8.6 % (ref 0–8)
ERYTHROCYTE [DISTWIDTH] IN BLOOD BY AUTOMATED COUNT: 14.2 % (ref 11.5–14.5)
EST. GFR  (AFRICAN AMERICAN): >60 ML/MIN/1.73 M^2
EST. GFR  (AFRICAN AMERICAN): >60 ML/MIN/1.73 M^2
EST. GFR  (NON AFRICAN AMERICAN): >60 ML/MIN/1.73 M^2
EST. GFR  (NON AFRICAN AMERICAN): >60 ML/MIN/1.73 M^2
GLUCOSE SERPL-MCNC: 104 MG/DL (ref 70–110)
GLUCOSE SERPL-MCNC: 104 MG/DL (ref 70–110)
HCT VFR BLD AUTO: 41.4 % (ref 37–48.5)
HDLC SERPL-MCNC: 53 MG/DL (ref 40–75)
HDLC SERPL: 41.4 % (ref 20–50)
HGB BLD-MCNC: 14.2 G/DL (ref 12–16)
IMM GRANULOCYTES # BLD AUTO: 0 K/UL (ref 0–0.04)
IMM GRANULOCYTES NFR BLD AUTO: 0 % (ref 0–0.5)
LDLC SERPL CALC-MCNC: 64.4 MG/DL (ref 63–159)
LYMPHOCYTES # BLD AUTO: 1.7 K/UL (ref 1–4.8)
LYMPHOCYTES NFR BLD: 43.1 % (ref 18–48)
MCH RBC QN AUTO: 27.8 PG (ref 27–31)
MCHC RBC AUTO-ENTMCNC: 34.3 G/DL (ref 32–36)
MCV RBC AUTO: 81 FL (ref 82–98)
MONOCYTES # BLD AUTO: 0.4 K/UL (ref 0.3–1)
MONOCYTES NFR BLD: 10.2 % (ref 4–15)
NEUTROPHILS # BLD AUTO: 1.5 K/UL (ref 1.8–7.7)
NEUTROPHILS NFR BLD: 37.1 % (ref 38–73)
NONHDLC SERPL-MCNC: 75 MG/DL
NRBC BLD-RTO: 0 /100 WBC
PLATELET # BLD AUTO: 152 K/UL (ref 150–350)
PMV BLD AUTO: 12.7 FL (ref 9.2–12.9)
POTASSIUM SERPL-SCNC: 3 MMOL/L (ref 3.5–5.1)
POTASSIUM SERPL-SCNC: 3 MMOL/L (ref 3.5–5.1)
PROT SERPL-MCNC: 7.9 G/DL (ref 6–8.4)
RBC # BLD AUTO: 5.1 M/UL (ref 4–5.4)
SODIUM SERPL-SCNC: 138 MMOL/L (ref 136–145)
SODIUM SERPL-SCNC: 138 MMOL/L (ref 136–145)
TRIGL SERPL-MCNC: 53 MG/DL (ref 30–150)
TSH SERPL DL<=0.005 MIU/L-ACNC: 1.88 UIU/ML (ref 0.4–4)
WBC # BLD AUTO: 3.94 K/UL (ref 3.9–12.7)

## 2021-01-04 PROCEDURE — 36415 COLL VENOUS BLD VENIPUNCTURE: CPT

## 2021-01-04 PROCEDURE — 80061 LIPID PANEL: CPT

## 2021-01-04 PROCEDURE — 80053 COMPREHEN METABOLIC PANEL: CPT

## 2021-01-04 PROCEDURE — 84443 ASSAY THYROID STIM HORMONE: CPT

## 2021-01-04 PROCEDURE — 85025 COMPLETE CBC W/AUTO DIFF WBC: CPT

## 2021-01-07 ENCOUNTER — OFFICE VISIT (OUTPATIENT)
Dept: INTERNAL MEDICINE | Facility: CLINIC | Age: 61
End: 2021-01-07
Payer: COMMERCIAL

## 2021-01-07 ENCOUNTER — LAB VISIT (OUTPATIENT)
Dept: LAB | Facility: HOSPITAL | Age: 61
End: 2021-01-07
Attending: FAMILY MEDICINE
Payer: COMMERCIAL

## 2021-01-07 VITALS
RESPIRATION RATE: 18 BRPM | BODY MASS INDEX: 30.81 KG/M2 | SYSTOLIC BLOOD PRESSURE: 118 MMHG | HEIGHT: 62 IN | HEART RATE: 70 BPM | DIASTOLIC BLOOD PRESSURE: 80 MMHG | WEIGHT: 167.44 LBS | OXYGEN SATURATION: 98 % | TEMPERATURE: 98 F

## 2021-01-07 DIAGNOSIS — E66.9 OBESITY (BMI 30.0-34.9): ICD-10-CM

## 2021-01-07 DIAGNOSIS — T50.2X5A DIURETIC-INDUCED HYPOKALEMIA: ICD-10-CM

## 2021-01-07 DIAGNOSIS — E87.6 DIURETIC-INDUCED HYPOKALEMIA: ICD-10-CM

## 2021-01-07 DIAGNOSIS — Z00.00 ROUTINE GENERAL MEDICAL EXAMINATION AT A HEALTH CARE FACILITY: Primary | ICD-10-CM

## 2021-01-07 DIAGNOSIS — I10 ESSENTIAL HYPERTENSION: Chronic | ICD-10-CM

## 2021-01-07 DIAGNOSIS — I10 ESSENTIAL HYPERTENSION: ICD-10-CM

## 2021-01-07 DIAGNOSIS — H53.8 BLURRY VISION: ICD-10-CM

## 2021-01-07 LAB
ANION GAP SERPL CALC-SCNC: 12 MMOL/L (ref 8–16)
BUN SERPL-MCNC: 15 MG/DL (ref 6–20)
CALCIUM SERPL-MCNC: 9.5 MG/DL (ref 8.7–10.5)
CHLORIDE SERPL-SCNC: 100 MMOL/L (ref 95–110)
CO2 SERPL-SCNC: 28 MMOL/L (ref 23–29)
CREAT SERPL-MCNC: 0.9 MG/DL (ref 0.5–1.4)
EST. GFR  (AFRICAN AMERICAN): >60 ML/MIN/1.73 M^2
EST. GFR  (NON AFRICAN AMERICAN): >60 ML/MIN/1.73 M^2
GLUCOSE SERPL-MCNC: 88 MG/DL (ref 70–110)
POTASSIUM SERPL-SCNC: 2.9 MMOL/L (ref 3.5–5.1)
POTASSIUM SERPL-SCNC: 2.9 MMOL/L (ref 3.5–5.1)
SODIUM SERPL-SCNC: 140 MMOL/L (ref 136–145)

## 2021-01-07 PROCEDURE — 99999 PR PBB SHADOW E&M-EST. PATIENT-LVL V: ICD-10-PCS | Mod: PBBFAC,,, | Performed by: FAMILY MEDICINE

## 2021-01-07 PROCEDURE — 99396 PR PREVENTIVE VISIT,EST,40-64: ICD-10-PCS | Mod: S$GLB,,, | Performed by: FAMILY MEDICINE

## 2021-01-07 PROCEDURE — 99396 PREV VISIT EST AGE 40-64: CPT | Mod: S$GLB,,, | Performed by: FAMILY MEDICINE

## 2021-01-07 PROCEDURE — 3008F PR BODY MASS INDEX (BMI) DOCUMENTED: ICD-10-PCS | Mod: CPTII,S$GLB,, | Performed by: FAMILY MEDICINE

## 2021-01-07 PROCEDURE — 99999 PR PBB SHADOW E&M-EST. PATIENT-LVL V: CPT | Mod: PBBFAC,,, | Performed by: FAMILY MEDICINE

## 2021-01-07 PROCEDURE — 80048 BASIC METABOLIC PNL TOTAL CA: CPT

## 2021-01-07 PROCEDURE — 36415 COLL VENOUS BLD VENIPUNCTURE: CPT

## 2021-01-07 PROCEDURE — 3074F PR MOST RECENT SYSTOLIC BLOOD PRESSURE < 130 MM HG: ICD-10-PCS | Mod: CPTII,S$GLB,, | Performed by: FAMILY MEDICINE

## 2021-01-07 PROCEDURE — 1126F AMNT PAIN NOTED NONE PRSNT: CPT | Mod: S$GLB,,, | Performed by: FAMILY MEDICINE

## 2021-01-07 PROCEDURE — 3079F PR MOST RECENT DIASTOLIC BLOOD PRESSURE 80-89 MM HG: ICD-10-PCS | Mod: CPTII,S$GLB,, | Performed by: FAMILY MEDICINE

## 2021-01-07 PROCEDURE — 3074F SYST BP LT 130 MM HG: CPT | Mod: CPTII,S$GLB,, | Performed by: FAMILY MEDICINE

## 2021-01-07 PROCEDURE — 1126F PR PAIN SEVERITY QUANTIFIED, NO PAIN PRESENT: ICD-10-PCS | Mod: S$GLB,,, | Performed by: FAMILY MEDICINE

## 2021-01-07 PROCEDURE — 3008F BODY MASS INDEX DOCD: CPT | Mod: CPTII,S$GLB,, | Performed by: FAMILY MEDICINE

## 2021-01-07 PROCEDURE — 3079F DIAST BP 80-89 MM HG: CPT | Mod: CPTII,S$GLB,, | Performed by: FAMILY MEDICINE

## 2021-01-07 RX ORDER — POTASSIUM CHLORIDE 750 MG/1
10 CAPSULE, EXTENDED RELEASE ORAL DAILY
Qty: 90 CAPSULE | Refills: 3 | Status: SHIPPED | OUTPATIENT
Start: 2021-01-07 | End: 2022-02-24

## 2021-01-08 DIAGNOSIS — E87.6 DIURETIC-INDUCED HYPOKALEMIA: Primary | ICD-10-CM

## 2021-01-08 DIAGNOSIS — T50.2X5A DIURETIC-INDUCED HYPOKALEMIA: Primary | ICD-10-CM

## 2021-01-08 DIAGNOSIS — I10 ESSENTIAL HYPERTENSION: ICD-10-CM

## 2021-01-13 DIAGNOSIS — Z12.31 OTHER SCREENING MAMMOGRAM: ICD-10-CM

## 2021-01-15 PROBLEM — T50.2X5A DIURETIC-INDUCED HYPOKALEMIA: Status: ACTIVE | Noted: 2018-12-03

## 2021-01-15 PROBLEM — H53.8 BLURRY VISION: Status: ACTIVE | Noted: 2021-01-15

## 2021-01-20 ENCOUNTER — PATIENT OUTREACH (OUTPATIENT)
Dept: ADMINISTRATIVE | Facility: OTHER | Age: 61
End: 2021-01-20

## 2021-01-21 ENCOUNTER — CLINICAL SUPPORT (OUTPATIENT)
Dept: INTERNAL MEDICINE | Facility: CLINIC | Age: 61
End: 2021-01-21
Payer: COMMERCIAL

## 2021-01-21 ENCOUNTER — OFFICE VISIT (OUTPATIENT)
Dept: OPHTHALMOLOGY | Facility: CLINIC | Age: 61
End: 2021-01-21
Payer: COMMERCIAL

## 2021-01-21 ENCOUNTER — PATIENT OUTREACH (OUTPATIENT)
Dept: ADMINISTRATIVE | Facility: HOSPITAL | Age: 61
End: 2021-01-21

## 2021-01-21 VITALS — DIASTOLIC BLOOD PRESSURE: 84 MMHG | SYSTOLIC BLOOD PRESSURE: 122 MMHG

## 2021-01-21 DIAGNOSIS — H52.202 HYPEROPIA WITH ASTIGMATISM AND PRESBYOPIA, LEFT: ICD-10-CM

## 2021-01-21 DIAGNOSIS — H53.8 BLURRY VISION: ICD-10-CM

## 2021-01-21 DIAGNOSIS — I10 ESSENTIAL HYPERTENSION: ICD-10-CM

## 2021-01-21 DIAGNOSIS — H25.013 CORTICAL AGE-RELATED CATARACT, BILATERAL: Primary | ICD-10-CM

## 2021-01-21 DIAGNOSIS — H52.02 HYPEROPIA WITH ASTIGMATISM AND PRESBYOPIA, LEFT: ICD-10-CM

## 2021-01-21 DIAGNOSIS — H52.4 HYPEROPIA WITH ASTIGMATISM AND PRESBYOPIA, LEFT: ICD-10-CM

## 2021-01-21 PROCEDURE — 92015 PR REFRACTION: ICD-10-PCS | Mod: S$GLB,,, | Performed by: OPTOMETRIST

## 2021-01-21 PROCEDURE — 99999 PR PBB SHADOW E&M-EST. PATIENT-LVL I: CPT | Mod: PBBFAC,,, | Performed by: OPTOMETRIST

## 2021-01-21 PROCEDURE — 92014 PR EYE EXAM, EST PATIENT,COMPREHESV: ICD-10-PCS | Mod: S$GLB,,, | Performed by: OPTOMETRIST

## 2021-01-21 PROCEDURE — 99999 PR PBB SHADOW E&M-EST. PATIENT-LVL I: ICD-10-PCS | Mod: PBBFAC,,, | Performed by: OPTOMETRIST

## 2021-01-21 PROCEDURE — 92014 COMPRE OPH EXAM EST PT 1/>: CPT | Mod: S$GLB,,, | Performed by: OPTOMETRIST

## 2021-01-21 PROCEDURE — 92015 DETERMINE REFRACTIVE STATE: CPT | Mod: S$GLB,,, | Performed by: OPTOMETRIST

## 2021-02-09 ENCOUNTER — OFFICE VISIT (OUTPATIENT)
Dept: NEUROLOGY | Facility: CLINIC | Age: 61
End: 2021-02-09
Payer: COMMERCIAL

## 2021-02-09 VITALS
BODY MASS INDEX: 31.4 KG/M2 | SYSTOLIC BLOOD PRESSURE: 122 MMHG | HEART RATE: 72 BPM | RESPIRATION RATE: 16 BRPM | HEIGHT: 62 IN | DIASTOLIC BLOOD PRESSURE: 84 MMHG | WEIGHT: 170.63 LBS

## 2021-02-09 DIAGNOSIS — H53.8 BLURRY VISION: ICD-10-CM

## 2021-02-09 DIAGNOSIS — F41.8 SITUATIONAL ANXIETY: ICD-10-CM

## 2021-02-09 DIAGNOSIS — R00.2 PALPITATIONS: ICD-10-CM

## 2021-02-09 DIAGNOSIS — J30.2 SEASONAL ALLERGIC RHINITIS, UNSPECIFIED TRIGGER: ICD-10-CM

## 2021-02-09 DIAGNOSIS — E87.6 DIURETIC-INDUCED HYPOKALEMIA: ICD-10-CM

## 2021-02-09 DIAGNOSIS — R07.9 CHEST PAIN IN ADULT: ICD-10-CM

## 2021-02-09 DIAGNOSIS — I34.1 MVP (MITRAL VALVE PROLAPSE): ICD-10-CM

## 2021-02-09 DIAGNOSIS — R90.89 ABNORMAL FINDING ON MRI OF BRAIN: ICD-10-CM

## 2021-02-09 DIAGNOSIS — R53.1 RIGHT SIDED WEAKNESS: ICD-10-CM

## 2021-02-09 DIAGNOSIS — R51.9 CHRONIC DAILY HEADACHE: ICD-10-CM

## 2021-02-09 DIAGNOSIS — G47.33 OSA ON CPAP: ICD-10-CM

## 2021-02-09 DIAGNOSIS — I10 ESSENTIAL HYPERTENSION: Chronic | ICD-10-CM

## 2021-02-09 DIAGNOSIS — E66.9 OBESITY (BMI 30.0-34.9): ICD-10-CM

## 2021-02-09 DIAGNOSIS — R20.0 RIGHT ARM NUMBNESS: ICD-10-CM

## 2021-02-09 DIAGNOSIS — H53.9 TRANSIENT VISUAL DISTURBANCE: ICD-10-CM

## 2021-02-09 DIAGNOSIS — G43.009 MIGRAINE WITHOUT AURA AND WITHOUT STATUS MIGRAINOSUS, NOT INTRACTABLE: Primary | ICD-10-CM

## 2021-02-09 DIAGNOSIS — T50.2X5A DIURETIC-INDUCED HYPOKALEMIA: ICD-10-CM

## 2021-02-09 PROCEDURE — 3008F PR BODY MASS INDEX (BMI) DOCUMENTED: ICD-10-PCS | Mod: CPTII,S$GLB,, | Performed by: PSYCHIATRY & NEUROLOGY

## 2021-02-09 PROCEDURE — 1125F PR PAIN SEVERITY QUANTIFIED, PAIN PRESENT: ICD-10-PCS | Mod: S$GLB,,, | Performed by: PSYCHIATRY & NEUROLOGY

## 2021-02-09 PROCEDURE — 1125F AMNT PAIN NOTED PAIN PRSNT: CPT | Mod: S$GLB,,, | Performed by: PSYCHIATRY & NEUROLOGY

## 2021-02-09 PROCEDURE — 99244 OFF/OP CNSLTJ NEW/EST MOD 40: CPT | Mod: S$GLB,,, | Performed by: PSYCHIATRY & NEUROLOGY

## 2021-02-09 PROCEDURE — 3008F BODY MASS INDEX DOCD: CPT | Mod: CPTII,S$GLB,, | Performed by: PSYCHIATRY & NEUROLOGY

## 2021-02-09 PROCEDURE — 99244 PR OFFICE CONSULTATION,LEVEL IV: ICD-10-PCS | Mod: S$GLB,,, | Performed by: PSYCHIATRY & NEUROLOGY

## 2021-02-09 PROCEDURE — 99999 PR PBB SHADOW E&M-EST. PATIENT-LVL IV: CPT | Mod: PBBFAC,,, | Performed by: PSYCHIATRY & NEUROLOGY

## 2021-02-09 PROCEDURE — 99999 PR PBB SHADOW E&M-EST. PATIENT-LVL IV: ICD-10-PCS | Mod: PBBFAC,,, | Performed by: PSYCHIATRY & NEUROLOGY

## 2021-02-09 RX ORDER — RIMEGEPANT SULFATE 75 MG/75MG
75 TABLET, ORALLY DISINTEGRATING ORAL ONCE AS NEEDED
Qty: 8 TABLET | Refills: 2 | Status: SHIPPED | OUTPATIENT
Start: 2021-02-09 | End: 2021-02-09

## 2021-03-04 ENCOUNTER — TELEPHONE (OUTPATIENT)
Dept: NEUROLOGY | Facility: CLINIC | Age: 61
End: 2021-03-04

## 2021-03-09 ENCOUNTER — HOSPITAL ENCOUNTER (OUTPATIENT)
Dept: RADIOLOGY | Facility: HOSPITAL | Age: 61
Discharge: HOME | End: 2021-03-09
Attending: PSYCHIATRY & NEUROLOGY
Payer: COMMERCIAL

## 2021-03-09 ENCOUNTER — TELEPHONE (OUTPATIENT)
Dept: NEUROLOGY | Facility: CLINIC | Age: 61
End: 2021-03-09

## 2021-03-09 DIAGNOSIS — R51.9 CHRONIC DAILY HEADACHE: ICD-10-CM

## 2021-03-09 LAB
CLARITY CSF: CLEAR
COLOR CSF: COLORLESS
GLUCOSE CSF-MCNC: 55 MG/DL (ref 40–70)
PROT CSF-MCNC: 42 MG/DL (ref 15–40)
RBC # CSF: 4 /CU MM
SPECIMEN VOL CSF: 5 ML
WBC # CSF: 0 /CU MM (ref 0–5)

## 2021-03-09 PROCEDURE — 62328 DX LMBR SPI PNXR W/FLUOR/CT: CPT | Mod: TC

## 2021-03-09 PROCEDURE — 86592 SYPHILIS TEST NON-TREP QUAL: CPT | Performed by: PSYCHIATRY & NEUROLOGY

## 2021-03-09 PROCEDURE — 82945 GLUCOSE OTHER FLUID: CPT | Performed by: PSYCHIATRY & NEUROLOGY

## 2021-03-09 PROCEDURE — 89051 BODY FLUID CELL COUNT: CPT | Performed by: PSYCHIATRY & NEUROLOGY

## 2021-03-09 PROCEDURE — 84157 ASSAY OF PROTEIN OTHER: CPT | Performed by: PSYCHIATRY & NEUROLOGY

## 2021-03-10 LAB — VDRL CSF QL: NORMAL

## 2021-03-12 ENCOUNTER — OFFICE VISIT (OUTPATIENT)
Dept: NEUROLOGY | Facility: CLINIC | Age: 61
End: 2021-03-12
Payer: COMMERCIAL

## 2021-03-12 VITALS
HEIGHT: 62 IN | RESPIRATION RATE: 16 BRPM | WEIGHT: 169.06 LBS | DIASTOLIC BLOOD PRESSURE: 82 MMHG | SYSTOLIC BLOOD PRESSURE: 122 MMHG | BODY MASS INDEX: 31.11 KG/M2 | HEART RATE: 72 BPM

## 2021-03-12 DIAGNOSIS — R20.0 RIGHT ARM NUMBNESS: ICD-10-CM

## 2021-03-12 DIAGNOSIS — I10 ESSENTIAL HYPERTENSION: Chronic | ICD-10-CM

## 2021-03-12 DIAGNOSIS — I34.1 MVP (MITRAL VALVE PROLAPSE): ICD-10-CM

## 2021-03-12 DIAGNOSIS — R90.89 ABNORMAL FINDING ON MRI OF BRAIN: ICD-10-CM

## 2021-03-12 DIAGNOSIS — G43.009 MIGRAINE WITHOUT AURA AND WITHOUT STATUS MIGRAINOSUS, NOT INTRACTABLE: Primary | ICD-10-CM

## 2021-03-12 DIAGNOSIS — H53.8 BLURRY VISION: ICD-10-CM

## 2021-03-12 DIAGNOSIS — R53.1 RIGHT SIDED WEAKNESS: ICD-10-CM

## 2021-03-12 DIAGNOSIS — R00.2 PALPITATIONS: ICD-10-CM

## 2021-03-12 DIAGNOSIS — T50.2X5A DIURETIC-INDUCED HYPOKALEMIA: ICD-10-CM

## 2021-03-12 DIAGNOSIS — J30.2 SEASONAL ALLERGIC RHINITIS, UNSPECIFIED TRIGGER: ICD-10-CM

## 2021-03-12 DIAGNOSIS — H53.9 TRANSIENT VISUAL DISTURBANCE: ICD-10-CM

## 2021-03-12 DIAGNOSIS — R51.9 CHRONIC DAILY HEADACHE: ICD-10-CM

## 2021-03-12 DIAGNOSIS — E66.9 OBESITY (BMI 30.0-34.9): ICD-10-CM

## 2021-03-12 DIAGNOSIS — R07.9 CHEST PAIN IN ADULT: ICD-10-CM

## 2021-03-12 DIAGNOSIS — F41.8 SITUATIONAL ANXIETY: ICD-10-CM

## 2021-03-12 DIAGNOSIS — G47.33 OSA ON CPAP: ICD-10-CM

## 2021-03-12 DIAGNOSIS — E87.6 DIURETIC-INDUCED HYPOKALEMIA: ICD-10-CM

## 2021-03-12 PROCEDURE — 3074F PR MOST RECENT SYSTOLIC BLOOD PRESSURE < 130 MM HG: ICD-10-PCS | Mod: CPTII,S$GLB,, | Performed by: NURSE PRACTITIONER

## 2021-03-12 PROCEDURE — 3008F PR BODY MASS INDEX (BMI) DOCUMENTED: ICD-10-PCS | Mod: CPTII,S$GLB,, | Performed by: NURSE PRACTITIONER

## 2021-03-12 PROCEDURE — 3079F PR MOST RECENT DIASTOLIC BLOOD PRESSURE 80-89 MM HG: ICD-10-PCS | Mod: CPTII,S$GLB,, | Performed by: NURSE PRACTITIONER

## 2021-03-12 PROCEDURE — 1126F AMNT PAIN NOTED NONE PRSNT: CPT | Mod: S$GLB,,, | Performed by: NURSE PRACTITIONER

## 2021-03-12 PROCEDURE — 99999 PR PBB SHADOW E&M-EST. PATIENT-LVL III: ICD-10-PCS | Mod: PBBFAC,,, | Performed by: NURSE PRACTITIONER

## 2021-03-12 PROCEDURE — 1126F PR PAIN SEVERITY QUANTIFIED, NO PAIN PRESENT: ICD-10-PCS | Mod: S$GLB,,, | Performed by: NURSE PRACTITIONER

## 2021-03-12 PROCEDURE — 3074F SYST BP LT 130 MM HG: CPT | Mod: CPTII,S$GLB,, | Performed by: NURSE PRACTITIONER

## 2021-03-12 PROCEDURE — 3079F DIAST BP 80-89 MM HG: CPT | Mod: CPTII,S$GLB,, | Performed by: NURSE PRACTITIONER

## 2021-03-12 PROCEDURE — 99214 OFFICE O/P EST MOD 30 MIN: CPT | Mod: S$GLB,,, | Performed by: NURSE PRACTITIONER

## 2021-03-12 PROCEDURE — 99999 PR PBB SHADOW E&M-EST. PATIENT-LVL III: CPT | Mod: PBBFAC,,, | Performed by: NURSE PRACTITIONER

## 2021-03-12 PROCEDURE — 99214 PR OFFICE/OUTPT VISIT, EST, LEVL IV, 30-39 MIN: ICD-10-PCS | Mod: S$GLB,,, | Performed by: NURSE PRACTITIONER

## 2021-03-12 PROCEDURE — 3008F BODY MASS INDEX DOCD: CPT | Mod: CPTII,S$GLB,, | Performed by: NURSE PRACTITIONER

## 2021-04-13 LAB
CHOL/HDLC RATIO: 2.1
CHOLEST SERPL-MSCNC: 121 MG/DL (ref 0–200)
HDLC SERPL-MCNC: 58 MG/DL
LDLC SERPL CALC-MCNC: 53 MG/DL
TRIGL SERPL-MCNC: 52 MG/DL
VLDLC SERPL-MCNC: 10 MG/DL

## 2021-05-03 ENCOUNTER — PATIENT OUTREACH (OUTPATIENT)
Dept: ADMINISTRATIVE | Facility: HOSPITAL | Age: 61
End: 2021-05-03

## 2021-06-09 ENCOUNTER — OFFICE VISIT (OUTPATIENT)
Dept: PULMONOLOGY | Facility: CLINIC | Age: 61
End: 2021-06-09
Payer: COMMERCIAL

## 2021-06-09 VITALS
BODY MASS INDEX: 31.4 KG/M2 | HEART RATE: 73 BPM | DIASTOLIC BLOOD PRESSURE: 90 MMHG | SYSTOLIC BLOOD PRESSURE: 130 MMHG | OXYGEN SATURATION: 96 % | HEIGHT: 62 IN | RESPIRATION RATE: 18 BRPM | WEIGHT: 170.63 LBS

## 2021-06-09 DIAGNOSIS — E66.9 OBESITY (BMI 30.0-34.9): ICD-10-CM

## 2021-06-09 DIAGNOSIS — F41.8 SITUATIONAL ANXIETY: ICD-10-CM

## 2021-06-09 DIAGNOSIS — J30.2 SEASONAL ALLERGIC RHINITIS, UNSPECIFIED TRIGGER: ICD-10-CM

## 2021-06-09 DIAGNOSIS — G47.33 OSA ON CPAP: Primary | ICD-10-CM

## 2021-06-09 DIAGNOSIS — I10 ESSENTIAL HYPERTENSION: Chronic | ICD-10-CM

## 2021-06-09 PROCEDURE — 99214 OFFICE O/P EST MOD 30 MIN: CPT | Mod: S$GLB,,, | Performed by: NURSE PRACTITIONER

## 2021-06-09 PROCEDURE — 99214 PR OFFICE/OUTPT VISIT, EST, LEVL IV, 30-39 MIN: ICD-10-PCS | Mod: S$GLB,,, | Performed by: NURSE PRACTITIONER

## 2021-06-09 PROCEDURE — 3008F BODY MASS INDEX DOCD: CPT | Mod: CPTII,S$GLB,, | Performed by: NURSE PRACTITIONER

## 2021-06-09 PROCEDURE — 3008F PR BODY MASS INDEX (BMI) DOCUMENTED: ICD-10-PCS | Mod: CPTII,S$GLB,, | Performed by: NURSE PRACTITIONER

## 2021-06-09 PROCEDURE — 99999 PR PBB SHADOW E&M-EST. PATIENT-LVL IV: CPT | Mod: PBBFAC,,, | Performed by: NURSE PRACTITIONER

## 2021-06-09 PROCEDURE — 99999 PR PBB SHADOW E&M-EST. PATIENT-LVL IV: ICD-10-PCS | Mod: PBBFAC,,, | Performed by: NURSE PRACTITIONER

## 2021-07-08 ENCOUNTER — TELEPHONE (OUTPATIENT)
Dept: INTERNAL MEDICINE | Facility: CLINIC | Age: 61
End: 2021-07-08

## 2021-07-08 ENCOUNTER — OFFICE VISIT (OUTPATIENT)
Dept: INTERNAL MEDICINE | Facility: CLINIC | Age: 61
End: 2021-07-08
Payer: COMMERCIAL

## 2021-07-08 DIAGNOSIS — M25.50 ARTHRALGIA, UNSPECIFIED JOINT: ICD-10-CM

## 2021-07-08 DIAGNOSIS — T50.2X5A DIURETIC-INDUCED HYPOKALEMIA: ICD-10-CM

## 2021-07-08 DIAGNOSIS — Z13.220 SCREENING FOR LIPOID DISORDERS: ICD-10-CM

## 2021-07-08 DIAGNOSIS — Z00.00 ROUTINE GENERAL MEDICAL EXAMINATION AT A HEALTH CARE FACILITY: Primary | ICD-10-CM

## 2021-07-08 DIAGNOSIS — I10 ESSENTIAL HYPERTENSION: Primary | ICD-10-CM

## 2021-07-08 DIAGNOSIS — Z13.29 THYROID DISORDER SCREEN: ICD-10-CM

## 2021-07-08 DIAGNOSIS — E87.6 DIURETIC-INDUCED HYPOKALEMIA: ICD-10-CM

## 2021-07-08 PROCEDURE — 99214 PR OFFICE/OUTPT VISIT, EST, LEVL IV, 30-39 MIN: ICD-10-PCS | Mod: 95,,, | Performed by: FAMILY MEDICINE

## 2021-07-08 PROCEDURE — 1126F PR PAIN SEVERITY QUANTIFIED, NO PAIN PRESENT: ICD-10-PCS | Mod: ,,, | Performed by: FAMILY MEDICINE

## 2021-07-08 PROCEDURE — 1126F AMNT PAIN NOTED NONE PRSNT: CPT | Mod: ,,, | Performed by: FAMILY MEDICINE

## 2021-07-08 PROCEDURE — 99214 OFFICE O/P EST MOD 30 MIN: CPT | Mod: 95,,, | Performed by: FAMILY MEDICINE

## 2021-07-13 ENCOUNTER — PATIENT MESSAGE (OUTPATIENT)
Dept: PULMONOLOGY | Facility: CLINIC | Age: 61
End: 2021-07-13

## 2021-07-13 ENCOUNTER — TELEPHONE (OUTPATIENT)
Dept: PULMONOLOGY | Facility: CLINIC | Age: 61
End: 2021-07-13

## 2021-07-13 NOTE — TELEPHONE ENCOUNTER
----- Message from Manuel Levy sent at 7/13/2021 10:00 AM CDT -----  Contact: self/ [phone]  Called in regards to receiving letter about recall on cpap machine. Would like a call back to discuss    Please advise

## 2021-07-13 NOTE — TELEPHONE ENCOUNTER
Spoke with pt about cpap recall. Pt states she will come to [facility] tomorrow to  the bacterial filters. Pt was also sent recall information.

## 2021-07-14 ENCOUNTER — LAB VISIT (OUTPATIENT)
Dept: LAB | Facility: HOSPITAL | Age: 61
End: 2021-07-14
Attending: FAMILY MEDICINE
Payer: COMMERCIAL

## 2021-07-14 DIAGNOSIS — Z13.29 THYROID DISORDER SCREEN: ICD-10-CM

## 2021-07-14 DIAGNOSIS — T50.2X5A DIURETIC-INDUCED HYPOKALEMIA: ICD-10-CM

## 2021-07-14 DIAGNOSIS — Z00.00 ROUTINE GENERAL MEDICAL EXAMINATION AT A HEALTH CARE FACILITY: ICD-10-CM

## 2021-07-14 DIAGNOSIS — I10 ESSENTIAL HYPERTENSION: ICD-10-CM

## 2021-07-14 DIAGNOSIS — Z13.220 SCREENING FOR LIPOID DISORDERS: ICD-10-CM

## 2021-07-14 DIAGNOSIS — E87.6 DIURETIC-INDUCED HYPOKALEMIA: ICD-10-CM

## 2021-07-14 LAB
BASOPHILS # BLD AUTO: 0.05 K/UL (ref 0–0.2)
BASOPHILS NFR BLD: 1.3 % (ref 0–1.9)
DIFFERENTIAL METHOD: ABNORMAL
EOSINOPHIL # BLD AUTO: 0.4 K/UL (ref 0–0.5)
EOSINOPHIL NFR BLD: 9.7 % (ref 0–8)
ERYTHROCYTE [DISTWIDTH] IN BLOOD BY AUTOMATED COUNT: 14.8 % (ref 11.5–14.5)
HCT VFR BLD AUTO: 38.7 % (ref 37–48.5)
HGB BLD-MCNC: 13.8 G/DL (ref 12–16)
IMM GRANULOCYTES # BLD AUTO: 0 K/UL (ref 0–0.04)
IMM GRANULOCYTES NFR BLD AUTO: 0 % (ref 0–0.5)
LYMPHOCYTES # BLD AUTO: 1.6 K/UL (ref 1–4.8)
LYMPHOCYTES NFR BLD: 40.8 % (ref 18–48)
MCH RBC QN AUTO: 27.8 PG (ref 27–31)
MCHC RBC AUTO-ENTMCNC: 35.7 G/DL (ref 32–36)
MCV RBC AUTO: 78 FL (ref 82–98)
MONOCYTES # BLD AUTO: 0.5 K/UL (ref 0.3–1)
MONOCYTES NFR BLD: 11.8 % (ref 4–15)
NEUTROPHILS # BLD AUTO: 1.4 K/UL (ref 1.8–7.7)
NEUTROPHILS NFR BLD: 36.4 % (ref 38–73)
NRBC BLD-RTO: 0 /100 WBC
PLATELET # BLD AUTO: 172 K/UL (ref 150–450)
PMV BLD AUTO: 12.5 FL (ref 9.2–12.9)
RBC # BLD AUTO: 4.96 M/UL (ref 4–5.4)
WBC # BLD AUTO: 3.8 K/UL (ref 3.9–12.7)

## 2021-07-14 PROCEDURE — 84443 ASSAY THYROID STIM HORMONE: CPT | Performed by: FAMILY MEDICINE

## 2021-07-14 PROCEDURE — 36415 COLL VENOUS BLD VENIPUNCTURE: CPT | Performed by: FAMILY MEDICINE

## 2021-07-14 PROCEDURE — 80053 COMPREHEN METABOLIC PANEL: CPT | Performed by: FAMILY MEDICINE

## 2021-07-14 PROCEDURE — 80061 LIPID PANEL: CPT | Performed by: FAMILY MEDICINE

## 2021-07-14 PROCEDURE — 85025 COMPLETE CBC W/AUTO DIFF WBC: CPT | Performed by: FAMILY MEDICINE

## 2021-07-15 LAB
ALBUMIN SERPL BCP-MCNC: 3.8 G/DL (ref 3.5–5.2)
ALP SERPL-CCNC: 26 U/L (ref 55–135)
ALT SERPL W/O P-5'-P-CCNC: 15 U/L (ref 10–44)
ANION GAP SERPL CALC-SCNC: 10 MMOL/L (ref 8–16)
ANION GAP SERPL CALC-SCNC: 10 MMOL/L (ref 8–16)
AST SERPL-CCNC: 22 U/L (ref 10–40)
BILIRUB SERPL-MCNC: 0.7 MG/DL (ref 0.1–1)
BUN SERPL-MCNC: 14 MG/DL (ref 6–20)
BUN SERPL-MCNC: 14 MG/DL (ref 6–20)
CALCIUM SERPL-MCNC: 9.7 MG/DL (ref 8.7–10.5)
CALCIUM SERPL-MCNC: 9.7 MG/DL (ref 8.7–10.5)
CHLORIDE SERPL-SCNC: 102 MMOL/L (ref 95–110)
CHLORIDE SERPL-SCNC: 102 MMOL/L (ref 95–110)
CHOLEST SERPL-MCNC: 135 MG/DL (ref 120–199)
CHOLEST/HDLC SERPL: 2.4 {RATIO} (ref 2–5)
CO2 SERPL-SCNC: 28 MMOL/L (ref 23–29)
CO2 SERPL-SCNC: 28 MMOL/L (ref 23–29)
CREAT SERPL-MCNC: 0.8 MG/DL (ref 0.5–1.4)
CREAT SERPL-MCNC: 0.8 MG/DL (ref 0.5–1.4)
EST. GFR  (AFRICAN AMERICAN): >60 ML/MIN/1.73 M^2
EST. GFR  (AFRICAN AMERICAN): >60 ML/MIN/1.73 M^2
EST. GFR  (NON AFRICAN AMERICAN): >60 ML/MIN/1.73 M^2
EST. GFR  (NON AFRICAN AMERICAN): >60 ML/MIN/1.73 M^2
GLUCOSE SERPL-MCNC: 85 MG/DL (ref 70–110)
GLUCOSE SERPL-MCNC: 85 MG/DL (ref 70–110)
HDLC SERPL-MCNC: 56 MG/DL (ref 40–75)
HDLC SERPL: 41.5 % (ref 20–50)
LDLC SERPL CALC-MCNC: 70 MG/DL (ref 63–159)
NONHDLC SERPL-MCNC: 79 MG/DL
POTASSIUM SERPL-SCNC: 3.5 MMOL/L (ref 3.5–5.1)
POTASSIUM SERPL-SCNC: 3.5 MMOL/L (ref 3.5–5.1)
PROT SERPL-MCNC: 8 G/DL (ref 6–8.4)
SODIUM SERPL-SCNC: 140 MMOL/L (ref 136–145)
SODIUM SERPL-SCNC: 140 MMOL/L (ref 136–145)
TRIGL SERPL-MCNC: 45 MG/DL (ref 30–150)
TSH SERPL DL<=0.005 MIU/L-ACNC: 1.14 UIU/ML (ref 0.4–4)

## 2021-09-14 ENCOUNTER — TELEPHONE (OUTPATIENT)
Dept: INTERNAL MEDICINE | Facility: CLINIC | Age: 61
End: 2021-09-14

## 2021-09-16 ENCOUNTER — TELEPHONE (OUTPATIENT)
Dept: INTERNAL MEDICINE | Facility: CLINIC | Age: 61
End: 2021-09-16

## 2021-09-16 ENCOUNTER — INFUSION (OUTPATIENT)
Dept: INFECTIOUS DISEASES | Facility: HOSPITAL | Age: 61
End: 2021-09-16
Attending: EMERGENCY MEDICINE
Payer: COMMERCIAL

## 2021-09-16 VITALS
OXYGEN SATURATION: 96 % | RESPIRATION RATE: 17 BRPM | SYSTOLIC BLOOD PRESSURE: 132 MMHG | TEMPERATURE: 98 F | HEART RATE: 89 BPM | DIASTOLIC BLOOD PRESSURE: 62 MMHG

## 2021-09-16 DIAGNOSIS — U07.1 COVID-19: Primary | ICD-10-CM

## 2021-09-16 PROCEDURE — 25000003 PHARM REV CODE 250: Performed by: INTERNAL MEDICINE

## 2021-09-16 PROCEDURE — 63600175 PHARM REV CODE 636 W HCPCS: Performed by: INTERNAL MEDICINE

## 2021-09-16 PROCEDURE — M0243 CASIRIVI AND IMDEVI INFUSION: HCPCS | Performed by: INTERNAL MEDICINE

## 2021-09-16 RX ORDER — EPINEPHRINE 0.3 MG/.3ML
0.3 INJECTION SUBCUTANEOUS
Status: DISCONTINUED | OUTPATIENT
Start: 2021-09-16 | End: 2021-10-20

## 2021-09-16 RX ORDER — ALBUTEROL SULFATE 90 UG/1
2 AEROSOL, METERED RESPIRATORY (INHALATION)
Status: DISCONTINUED | OUTPATIENT
Start: 2021-09-16 | End: 2021-10-20

## 2021-09-16 RX ORDER — ONDANSETRON 4 MG/1
4 TABLET, ORALLY DISINTEGRATING ORAL ONCE AS NEEDED
Status: DISCONTINUED | OUTPATIENT
Start: 2021-09-16 | End: 2021-10-20

## 2021-09-16 RX ORDER — ACETAMINOPHEN 325 MG/1
650 TABLET ORAL ONCE AS NEEDED
Status: DISCONTINUED | OUTPATIENT
Start: 2021-09-16 | End: 2021-10-20

## 2021-09-16 RX ORDER — DIPHENHYDRAMINE HYDROCHLORIDE 50 MG/ML
25 INJECTION INTRAMUSCULAR; INTRAVENOUS ONCE AS NEEDED
Status: DISCONTINUED | OUTPATIENT
Start: 2021-09-16 | End: 2021-10-20

## 2021-09-16 RX ORDER — SODIUM CHLORIDE 0.9 % (FLUSH) 0.9 %
10 SYRINGE (ML) INJECTION
Status: DISCONTINUED | OUTPATIENT
Start: 2021-09-16 | End: 2021-10-20

## 2021-09-16 RX ADMIN — CASIRIVIMAB AND IMDEVIMAB 600 MG: 600; 600 INJECTION, SOLUTION, CONCENTRATE INTRAVENOUS at 01:09

## 2021-09-25 ENCOUNTER — NURSE TRIAGE (OUTPATIENT)
Dept: ADMINISTRATIVE | Facility: CLINIC | Age: 61
End: 2021-09-25

## 2021-10-18 ENCOUNTER — HOSPITAL ENCOUNTER (EMERGENCY)
Facility: HOSPITAL | Age: 61
Discharge: HOME | End: 2021-10-18
Attending: EMERGENCY MEDICINE
Payer: COMMERCIAL

## 2021-10-18 VITALS
SYSTOLIC BLOOD PRESSURE: 143 MMHG | WEIGHT: 168.63 LBS | DIASTOLIC BLOOD PRESSURE: 64 MMHG | HEIGHT: 62 IN | TEMPERATURE: 98 F | HEART RATE: 70 BPM | RESPIRATION RATE: 15 BRPM | BODY MASS INDEX: 31.03 KG/M2 | OXYGEN SATURATION: 98 %

## 2021-10-18 DIAGNOSIS — R07.9 CHEST PAIN: ICD-10-CM

## 2021-10-18 DIAGNOSIS — M79.89 LEG SWELLING: ICD-10-CM

## 2021-10-18 DIAGNOSIS — J81.0 ACUTE PULMONARY EDEMA: Primary | ICD-10-CM

## 2021-10-18 LAB
ALBUMIN SERPL BCP-MCNC: 3.5 G/DL (ref 3.5–5.2)
ALP SERPL-CCNC: 23 U/L (ref 55–135)
ALT SERPL W/O P-5'-P-CCNC: 22 U/L (ref 10–44)
ANION GAP SERPL CALC-SCNC: 12 MMOL/L (ref 8–16)
AST SERPL-CCNC: 28 U/L (ref 10–40)
BASOPHILS # BLD AUTO: 0.04 K/UL (ref 0–0.2)
BASOPHILS NFR BLD: 0.7 % (ref 0–1.9)
BILIRUB SERPL-MCNC: 1.3 MG/DL (ref 0.1–1)
BILIRUB UR QL STRIP: NEGATIVE
BNP SERPL-MCNC: 36 PG/ML (ref 0–99)
BUN SERPL-MCNC: 9 MG/DL (ref 8–23)
CALCIUM SERPL-MCNC: 9.3 MG/DL (ref 8.7–10.5)
CHLORIDE SERPL-SCNC: 105 MMOL/L (ref 95–110)
CLARITY UR: CLEAR
CO2 SERPL-SCNC: 22 MMOL/L (ref 23–29)
COLOR UR: YELLOW
CREAT SERPL-MCNC: 0.7 MG/DL (ref 0.5–1.4)
DIFFERENTIAL METHOD: ABNORMAL
EOSINOPHIL # BLD AUTO: 0.3 K/UL (ref 0–0.5)
EOSINOPHIL NFR BLD: 5.1 % (ref 0–8)
ERYTHROCYTE [DISTWIDTH] IN BLOOD BY AUTOMATED COUNT: 14.6 % (ref 11.5–14.5)
EST. GFR  (AFRICAN AMERICAN): >60 ML/MIN/1.73 M^2
EST. GFR  (NON AFRICAN AMERICAN): >60 ML/MIN/1.73 M^2
GLUCOSE SERPL-MCNC: 88 MG/DL (ref 70–110)
GLUCOSE UR QL STRIP: NEGATIVE
HCT VFR BLD AUTO: 34.6 % (ref 37–48.5)
HCV AB SERPL QL IA: NEGATIVE
HEP C VIRUS HOLD SPECIMEN: NORMAL
HGB BLD-MCNC: 12.3 G/DL (ref 12–16)
HGB UR QL STRIP: NEGATIVE
HIV 1+2 AB+HIV1 P24 AG SERPL QL IA: NEGATIVE
IMM GRANULOCYTES # BLD AUTO: 0.02 K/UL (ref 0–0.04)
IMM GRANULOCYTES NFR BLD AUTO: 0.4 % (ref 0–0.5)
KETONES UR QL STRIP: NEGATIVE
LEUKOCYTE ESTERASE UR QL STRIP: NEGATIVE
LYMPHOCYTES # BLD AUTO: 1.5 K/UL (ref 1–4.8)
LYMPHOCYTES NFR BLD: 27 % (ref 18–48)
MCH RBC QN AUTO: 28.1 PG (ref 27–31)
MCHC RBC AUTO-ENTMCNC: 35.5 G/DL (ref 32–36)
MCV RBC AUTO: 79 FL (ref 82–98)
MONOCYTES # BLD AUTO: 0.7 K/UL (ref 0.3–1)
MONOCYTES NFR BLD: 12.8 % (ref 4–15)
NEUTROPHILS # BLD AUTO: 3.1 K/UL (ref 1.8–7.7)
NEUTROPHILS NFR BLD: 54 % (ref 38–73)
NITRITE UR QL STRIP: NEGATIVE
NRBC BLD-RTO: 0 /100 WBC
PH UR STRIP: 6 [PH] (ref 5–8)
PLATELET # BLD AUTO: 151 K/UL (ref 150–450)
PMV BLD AUTO: 11.7 FL (ref 9.2–12.9)
POCT GLUCOSE: 87 MG/DL (ref 70–110)
POTASSIUM SERPL-SCNC: 4 MMOL/L (ref 3.5–5.1)
PROT SERPL-MCNC: 8.1 G/DL (ref 6–8.4)
PROT UR QL STRIP: NEGATIVE
RBC # BLD AUTO: 4.38 M/UL (ref 4–5.4)
SODIUM SERPL-SCNC: 139 MMOL/L (ref 136–145)
SP GR UR STRIP: 1.01 (ref 1–1.03)
TROPONIN I SERPL DL<=0.01 NG/ML-MCNC: <0.006 NG/ML (ref 0–0.03)
URN SPEC COLLECT METH UR: NORMAL
UROBILINOGEN UR STRIP-ACNC: NEGATIVE EU/DL
WBC # BLD AUTO: 5.7 K/UL (ref 3.9–12.7)

## 2021-10-18 PROCEDURE — 93010 ELECTROCARDIOGRAM REPORT: CPT | Mod: ,,, | Performed by: INTERNAL MEDICINE

## 2021-10-18 PROCEDURE — 81003 URINALYSIS AUTO W/O SCOPE: CPT | Performed by: NURSE PRACTITIONER

## 2021-10-18 PROCEDURE — 83880 ASSAY OF NATRIURETIC PEPTIDE: CPT | Performed by: NURSE PRACTITIONER

## 2021-10-18 PROCEDURE — 87389 HIV-1 AG W/HIV-1&-2 AB AG IA: CPT | Performed by: EMERGENCY MEDICINE

## 2021-10-18 PROCEDURE — 84484 ASSAY OF TROPONIN QUANT: CPT | Performed by: NURSE PRACTITIONER

## 2021-10-18 PROCEDURE — 99285 EMERGENCY DEPT VISIT HI MDM: CPT | Mod: 25

## 2021-10-18 PROCEDURE — 80053 COMPREHEN METABOLIC PANEL: CPT | Performed by: NURSE PRACTITIONER

## 2021-10-18 PROCEDURE — 86803 HEPATITIS C AB TEST: CPT | Performed by: EMERGENCY MEDICINE

## 2021-10-18 PROCEDURE — 82962 GLUCOSE BLOOD TEST: CPT

## 2021-10-18 PROCEDURE — 93010 EKG 12-LEAD: ICD-10-PCS | Mod: ,,, | Performed by: INTERNAL MEDICINE

## 2021-10-18 PROCEDURE — 85025 COMPLETE CBC W/AUTO DIFF WBC: CPT | Performed by: NURSE PRACTITIONER

## 2021-10-18 PROCEDURE — 93005 ELECTROCARDIOGRAM TRACING: CPT

## 2021-10-19 ENCOUNTER — PATIENT OUTREACH (OUTPATIENT)
Dept: ADMINISTRATIVE | Facility: HOSPITAL | Age: 61
End: 2021-10-19

## 2021-10-20 ENCOUNTER — OFFICE VISIT (OUTPATIENT)
Dept: INTERNAL MEDICINE | Facility: CLINIC | Age: 61
End: 2021-10-20
Payer: COMMERCIAL

## 2021-10-20 ENCOUNTER — LAB VISIT (OUTPATIENT)
Dept: LAB | Facility: HOSPITAL | Age: 61
End: 2021-10-20
Attending: FAMILY MEDICINE
Payer: COMMERCIAL

## 2021-10-20 VITALS
OXYGEN SATURATION: 96 % | SYSTOLIC BLOOD PRESSURE: 118 MMHG | TEMPERATURE: 99 F | HEIGHT: 62 IN | WEIGHT: 160.63 LBS | HEART RATE: 95 BPM | BODY MASS INDEX: 29.56 KG/M2 | DIASTOLIC BLOOD PRESSURE: 80 MMHG

## 2021-10-20 DIAGNOSIS — M79.602 LEFT ARM PAIN: ICD-10-CM

## 2021-10-20 DIAGNOSIS — Z13.21 ENCOUNTER FOR VITAMIN DEFICIENCY SCREENING: ICD-10-CM

## 2021-10-20 DIAGNOSIS — Z86.16 HISTORY OF 2019 NOVEL CORONAVIRUS DISEASE (COVID-19): ICD-10-CM

## 2021-10-20 DIAGNOSIS — K59.00 CONSTIPATION, ACUTE: ICD-10-CM

## 2021-10-20 DIAGNOSIS — J30.2 SEASONAL ALLERGIC RHINITIS, UNSPECIFIED TRIGGER: ICD-10-CM

## 2021-10-20 DIAGNOSIS — J81.0 ACUTE PULMONARY EDEMA: Primary | ICD-10-CM

## 2021-10-20 LAB — 25(OH)D3+25(OH)D2 SERPL-MCNC: 28 NG/ML (ref 30–96)

## 2021-10-20 PROCEDURE — 99999 PR PBB SHADOW E&M-EST. PATIENT-LVL III: ICD-10-PCS | Mod: PBBFAC,,, | Performed by: FAMILY MEDICINE

## 2021-10-20 PROCEDURE — 3074F SYST BP LT 130 MM HG: CPT | Mod: CPTII,S$GLB,, | Performed by: FAMILY MEDICINE

## 2021-10-20 PROCEDURE — 99999 PR PBB SHADOW E&M-EST. PATIENT-LVL III: CPT | Mod: PBBFAC,,, | Performed by: FAMILY MEDICINE

## 2021-10-20 PROCEDURE — 1159F MED LIST DOCD IN RCRD: CPT | Mod: CPTII,S$GLB,, | Performed by: FAMILY MEDICINE

## 2021-10-20 PROCEDURE — 1159F PR MEDICATION LIST DOCUMENTED IN MEDICAL RECORD: ICD-10-PCS | Mod: CPTII,S$GLB,, | Performed by: FAMILY MEDICINE

## 2021-10-20 PROCEDURE — 99214 OFFICE O/P EST MOD 30 MIN: CPT | Mod: S$GLB,,, | Performed by: FAMILY MEDICINE

## 2021-10-20 PROCEDURE — 36415 COLL VENOUS BLD VENIPUNCTURE: CPT | Performed by: FAMILY MEDICINE

## 2021-10-20 PROCEDURE — 3079F DIAST BP 80-89 MM HG: CPT | Mod: CPTII,S$GLB,, | Performed by: FAMILY MEDICINE

## 2021-10-20 PROCEDURE — 99214 PR OFFICE/OUTPT VISIT, EST, LEVL IV, 30-39 MIN: ICD-10-PCS | Mod: S$GLB,,, | Performed by: FAMILY MEDICINE

## 2021-10-20 PROCEDURE — 1160F PR REVIEW ALL MEDS BY PRESCRIBER/CLIN PHARMACIST DOCUMENTED: ICD-10-PCS | Mod: CPTII,S$GLB,, | Performed by: FAMILY MEDICINE

## 2021-10-20 PROCEDURE — 3008F BODY MASS INDEX DOCD: CPT | Mod: CPTII,S$GLB,, | Performed by: FAMILY MEDICINE

## 2021-10-20 PROCEDURE — 3079F PR MOST RECENT DIASTOLIC BLOOD PRESSURE 80-89 MM HG: ICD-10-PCS | Mod: CPTII,S$GLB,, | Performed by: FAMILY MEDICINE

## 2021-10-20 PROCEDURE — 1160F RVW MEDS BY RX/DR IN RCRD: CPT | Mod: CPTII,S$GLB,, | Performed by: FAMILY MEDICINE

## 2021-10-20 PROCEDURE — 3074F PR MOST RECENT SYSTOLIC BLOOD PRESSURE < 130 MM HG: ICD-10-PCS | Mod: CPTII,S$GLB,, | Performed by: FAMILY MEDICINE

## 2021-10-20 PROCEDURE — 82306 VITAMIN D 25 HYDROXY: CPT | Performed by: FAMILY MEDICINE

## 2021-10-20 PROCEDURE — 3008F PR BODY MASS INDEX (BMI) DOCUMENTED: ICD-10-PCS | Mod: CPTII,S$GLB,, | Performed by: FAMILY MEDICINE

## 2021-10-20 RX ORDER — ATENOLOL 100 MG/1
100 TABLET ORAL DAILY
COMMUNITY
Start: 2021-08-12

## 2021-10-20 RX ORDER — LORATADINE 10 MG/1
10 TABLET ORAL DAILY
Refills: 0 | COMMUNITY
Start: 2021-10-20 | End: 2022-01-07

## 2021-10-20 RX ORDER — FLUTICASONE PROPIONATE 50 MCG
2 SPRAY, SUSPENSION (ML) NASAL DAILY
COMMUNITY
Start: 2021-10-20 | End: 2022-01-07

## 2021-10-20 RX ORDER — POLYETHYLENE GLYCOL 3350 17 G/17G
17 POWDER, FOR SOLUTION ORAL DAILY PRN
Refills: 0 | COMMUNITY
Start: 2021-10-20 | End: 2022-01-07

## 2021-10-20 RX ORDER — TIZANIDINE 2 MG/1
2-4 TABLET ORAL EVERY 8 HOURS PRN
Qty: 30 TABLET | Refills: 0 | Status: SHIPPED | OUTPATIENT
Start: 2021-10-20 | End: 2021-10-30

## 2021-10-21 DIAGNOSIS — E55.9 VITAMIN D DEFICIENCY: Primary | ICD-10-CM

## 2021-10-21 RX ORDER — VIT C/E/ZN/COPPR/LUTEIN/ZEAXAN 250MG-90MG
1000 CAPSULE ORAL DAILY
Refills: 0 | COMMUNITY
Start: 2021-10-21

## 2021-10-22 PROBLEM — J81.0 ACUTE PULMONARY EDEMA: Status: ACTIVE | Noted: 2021-10-22

## 2021-10-22 PROBLEM — M79.602 LEFT ARM PAIN: Status: ACTIVE | Noted: 2021-10-22

## 2021-10-22 PROBLEM — K59.00 CONSTIPATION, ACUTE: Status: ACTIVE | Noted: 2021-10-22

## 2021-10-26 ENCOUNTER — TELEPHONE (OUTPATIENT)
Dept: INTERNAL MEDICINE | Facility: CLINIC | Age: 61
End: 2021-10-26
Payer: COMMERCIAL

## 2021-11-10 ENCOUNTER — TELEPHONE (OUTPATIENT)
Dept: PHYSICAL MEDICINE AND REHAB | Facility: CLINIC | Age: 61
End: 2021-11-10
Payer: COMMERCIAL

## 2021-11-10 ENCOUNTER — OFFICE VISIT (OUTPATIENT)
Dept: INTERNAL MEDICINE | Facility: CLINIC | Age: 61
End: 2021-11-10
Payer: COMMERCIAL

## 2021-11-10 ENCOUNTER — HOSPITAL ENCOUNTER (OUTPATIENT)
Dept: RADIOLOGY | Facility: HOSPITAL | Age: 61
Discharge: HOME | End: 2021-11-10
Payer: COMMERCIAL

## 2021-11-10 ENCOUNTER — TELEPHONE (OUTPATIENT)
Dept: INTERNAL MEDICINE | Facility: CLINIC | Age: 61
End: 2021-11-10

## 2021-11-10 VITALS
BODY MASS INDEX: 29.38 KG/M2 | OXYGEN SATURATION: 97 % | HEART RATE: 113 BPM | DIASTOLIC BLOOD PRESSURE: 88 MMHG | SYSTOLIC BLOOD PRESSURE: 132 MMHG | TEMPERATURE: 99 F | WEIGHT: 160.63 LBS

## 2021-11-10 DIAGNOSIS — M79.602 LEFT ARM PAIN: ICD-10-CM

## 2021-11-10 DIAGNOSIS — J81.0 ACUTE PULMONARY EDEMA: ICD-10-CM

## 2021-11-10 DIAGNOSIS — R00.2 PALPITATIONS: ICD-10-CM

## 2021-11-10 DIAGNOSIS — M79.602 LEFT ARM PAIN: Primary | ICD-10-CM

## 2021-11-10 DIAGNOSIS — K59.00 CONSTIPATION, ACUTE: ICD-10-CM

## 2021-11-10 DIAGNOSIS — M54.16 LUMBAR BACK PAIN WITH RADICULOPATHY AFFECTING RIGHT LOWER EXTREMITY: ICD-10-CM

## 2021-11-10 DIAGNOSIS — G43.009 MIGRAINE WITHOUT AURA AND WITHOUT STATUS MIGRAINOSUS, NOT INTRACTABLE: ICD-10-CM

## 2021-11-10 PROCEDURE — 99214 OFFICE O/P EST MOD 30 MIN: CPT | Mod: S$GLB,,,

## 2021-11-10 PROCEDURE — 72110 XR LUMBAR SPINE COMPLETE 5 VIEW: ICD-10-PCS | Mod: 26,,, | Performed by: RADIOLOGY

## 2021-11-10 PROCEDURE — 99999 PR PBB SHADOW E&M-EST. PATIENT-LVL V: CPT | Mod: PBBFAC,,,

## 2021-11-10 PROCEDURE — 99999 PR PBB SHADOW E&M-EST. PATIENT-LVL V: ICD-10-PCS | Mod: PBBFAC,,,

## 2021-11-10 PROCEDURE — 73030 X-RAY EXAM OF SHOULDER: CPT | Mod: TC,LT

## 2021-11-10 PROCEDURE — 1160F RVW MEDS BY RX/DR IN RCRD: CPT | Mod: CPTII,S$GLB,,

## 2021-11-10 PROCEDURE — 3008F BODY MASS INDEX DOCD: CPT | Mod: CPTII,S$GLB,,

## 2021-11-10 PROCEDURE — 3079F PR MOST RECENT DIASTOLIC BLOOD PRESSURE 80-89 MM HG: ICD-10-PCS | Mod: CPTII,S$GLB,,

## 2021-11-10 PROCEDURE — 72110 X-RAY EXAM L-2 SPINE 4/>VWS: CPT | Mod: TC

## 2021-11-10 PROCEDURE — 73030 X-RAY EXAM OF SHOULDER: CPT | Mod: 26,LT,, | Performed by: RADIOLOGY

## 2021-11-10 PROCEDURE — 3075F PR MOST RECENT SYSTOLIC BLOOD PRESS GE 130-139MM HG: ICD-10-PCS | Mod: CPTII,S$GLB,,

## 2021-11-10 PROCEDURE — 3008F PR BODY MASS INDEX (BMI) DOCUMENTED: ICD-10-PCS | Mod: CPTII,S$GLB,,

## 2021-11-10 PROCEDURE — 73030 XR SHOULDER COMPLETE 2 OR MORE VIEWS LEFT: ICD-10-PCS | Mod: 26,LT,, | Performed by: RADIOLOGY

## 2021-11-10 PROCEDURE — 3075F SYST BP GE 130 - 139MM HG: CPT | Mod: CPTII,S$GLB,,

## 2021-11-10 PROCEDURE — 1159F MED LIST DOCD IN RCRD: CPT | Mod: CPTII,S$GLB,,

## 2021-11-10 PROCEDURE — 72110 X-RAY EXAM L-2 SPINE 4/>VWS: CPT | Mod: 26,,, | Performed by: RADIOLOGY

## 2021-11-10 PROCEDURE — 3079F DIAST BP 80-89 MM HG: CPT | Mod: CPTII,S$GLB,,

## 2021-11-10 PROCEDURE — 1160F PR REVIEW ALL MEDS BY PRESCRIBER/CLIN PHARMACIST DOCUMENTED: ICD-10-PCS | Mod: CPTII,S$GLB,,

## 2021-11-10 PROCEDURE — 1159F PR MEDICATION LIST DOCUMENTED IN MEDICAL RECORD: ICD-10-PCS | Mod: CPTII,S$GLB,,

## 2021-11-10 PROCEDURE — 99214 PR OFFICE/OUTPT VISIT, EST, LEVL IV, 30-39 MIN: ICD-10-PCS | Mod: S$GLB,,,

## 2021-11-10 RX ORDER — TIZANIDINE 4 MG/1
4 TABLET ORAL EVERY 6 HOURS PRN
Qty: 20 TABLET | Refills: 0 | Status: SHIPPED | OUTPATIENT
Start: 2021-11-10 | End: 2021-11-15

## 2021-11-10 RX ORDER — MELOXICAM 15 MG/1
15 TABLET ORAL DAILY
Qty: 30 TABLET | Refills: 0 | Status: CANCELLED | OUTPATIENT
Start: 2021-11-10

## 2021-11-10 RX ORDER — MELOXICAM 15 MG/1
15 TABLET ORAL DAILY
Qty: 15 TABLET | Refills: 0 | Status: SHIPPED | OUTPATIENT
Start: 2021-11-10 | End: 2022-01-07

## 2021-11-11 ENCOUNTER — TELEPHONE (OUTPATIENT)
Dept: INTERNAL MEDICINE | Facility: CLINIC | Age: 61
End: 2021-11-11
Payer: COMMERCIAL

## 2021-11-16 ENCOUNTER — PATIENT OUTREACH (OUTPATIENT)
Dept: ADMINISTRATIVE | Facility: OTHER | Age: 61
End: 2021-11-16
Payer: COMMERCIAL

## 2021-11-16 ENCOUNTER — TELEPHONE (OUTPATIENT)
Dept: PAIN MEDICINE | Facility: CLINIC | Age: 61
End: 2021-11-16
Payer: COMMERCIAL

## 2021-11-16 DIAGNOSIS — Z12.11 ENCOUNTER FOR FIT (FECAL IMMUNOCHEMICAL TEST) SCREENING: Primary | ICD-10-CM

## 2021-11-17 ENCOUNTER — OFFICE VISIT (OUTPATIENT)
Dept: PAIN MEDICINE | Facility: CLINIC | Age: 61
End: 2021-11-17
Payer: COMMERCIAL

## 2021-11-17 VITALS
HEIGHT: 62 IN | DIASTOLIC BLOOD PRESSURE: 72 MMHG | HEART RATE: 61 BPM | WEIGHT: 165.44 LBS | RESPIRATION RATE: 18 BRPM | BODY MASS INDEX: 30.44 KG/M2 | SYSTOLIC BLOOD PRESSURE: 119 MMHG

## 2021-11-17 DIAGNOSIS — M47.816 LUMBAR FACET ARTHROPATHY: Primary | ICD-10-CM

## 2021-11-17 DIAGNOSIS — M79.18 MYOFASCIAL PAIN: ICD-10-CM

## 2021-11-17 DIAGNOSIS — M54.16 LUMBAR RADICULITIS: ICD-10-CM

## 2021-11-17 DIAGNOSIS — M46.1 SACROILIITIS: ICD-10-CM

## 2021-11-17 DIAGNOSIS — M54.16 LUMBAR BACK PAIN WITH RADICULOPATHY AFFECTING RIGHT LOWER EXTREMITY: ICD-10-CM

## 2021-11-17 DIAGNOSIS — Z12.11 COLON CANCER SCREENING: Primary | ICD-10-CM

## 2021-11-17 PROCEDURE — 1159F MED LIST DOCD IN RCRD: CPT | Mod: CPTII,S$GLB,, | Performed by: ANESTHESIOLOGY

## 2021-11-17 PROCEDURE — 3074F SYST BP LT 130 MM HG: CPT | Mod: CPTII,S$GLB,, | Performed by: ANESTHESIOLOGY

## 2021-11-17 PROCEDURE — 3008F BODY MASS INDEX DOCD: CPT | Mod: CPTII,S$GLB,, | Performed by: ANESTHESIOLOGY

## 2021-11-17 PROCEDURE — 1159F PR MEDICATION LIST DOCUMENTED IN MEDICAL RECORD: ICD-10-PCS | Mod: CPTII,S$GLB,, | Performed by: ANESTHESIOLOGY

## 2021-11-17 PROCEDURE — 99204 PR OFFICE/OUTPT VISIT, NEW, LEVL IV, 45-59 MIN: ICD-10-PCS | Mod: S$GLB,,, | Performed by: ANESTHESIOLOGY

## 2021-11-17 PROCEDURE — 99204 OFFICE O/P NEW MOD 45 MIN: CPT | Mod: S$GLB,,, | Performed by: ANESTHESIOLOGY

## 2021-11-17 PROCEDURE — 99999 PR PBB SHADOW E&M-EST. PATIENT-LVL V: CPT | Mod: PBBFAC,,, | Performed by: ANESTHESIOLOGY

## 2021-11-17 PROCEDURE — 3074F PR MOST RECENT SYSTOLIC BLOOD PRESSURE < 130 MM HG: ICD-10-PCS | Mod: CPTII,S$GLB,, | Performed by: ANESTHESIOLOGY

## 2021-11-17 PROCEDURE — 3078F PR MOST RECENT DIASTOLIC BLOOD PRESSURE < 80 MM HG: ICD-10-PCS | Mod: CPTII,S$GLB,, | Performed by: ANESTHESIOLOGY

## 2021-11-17 PROCEDURE — 3078F DIAST BP <80 MM HG: CPT | Mod: CPTII,S$GLB,, | Performed by: ANESTHESIOLOGY

## 2021-11-17 PROCEDURE — 3008F PR BODY MASS INDEX (BMI) DOCUMENTED: ICD-10-PCS | Mod: CPTII,S$GLB,, | Performed by: ANESTHESIOLOGY

## 2021-11-17 PROCEDURE — 99999 PR PBB SHADOW E&M-EST. PATIENT-LVL V: ICD-10-PCS | Mod: PBBFAC,,, | Performed by: ANESTHESIOLOGY

## 2021-11-17 RX ORDER — DICLOFENAC SODIUM 10 MG/G
2 GEL TOPICAL 4 TIMES DAILY
Qty: 20 G | Refills: 1 | Status: SHIPPED | OUTPATIENT
Start: 2021-11-17 | End: 2021-11-18 | Stop reason: SDUPTHER

## 2021-11-18 ENCOUNTER — TELEPHONE (OUTPATIENT)
Dept: PAIN MEDICINE | Facility: CLINIC | Age: 61
End: 2021-11-18
Payer: COMMERCIAL

## 2021-11-18 RX ORDER — DICLOFENAC SODIUM 10 MG/G
2 GEL TOPICAL 4 TIMES DAILY
Qty: 20 G | Refills: 1 | Status: SHIPPED | OUTPATIENT
Start: 2021-11-18 | End: 2022-01-07

## 2021-11-30 ENCOUNTER — CLINICAL SUPPORT (OUTPATIENT)
Dept: REHABILITATION | Facility: HOSPITAL | Age: 61
End: 2021-11-30
Attending: ANESTHESIOLOGY
Payer: COMMERCIAL

## 2021-11-30 DIAGNOSIS — M79.18 MYOFASCIAL PAIN: ICD-10-CM

## 2021-11-30 DIAGNOSIS — M47.816 LUMBAR FACET ARTHROPATHY: ICD-10-CM

## 2021-11-30 DIAGNOSIS — M54.16 LUMBAR RADICULITIS: ICD-10-CM

## 2021-11-30 DIAGNOSIS — R29.898 DECREASED STRENGTH OF TRUNK AND BACK: ICD-10-CM

## 2021-11-30 PROCEDURE — 97161 PT EVAL LOW COMPLEX 20 MIN: CPT

## 2021-11-30 PROCEDURE — 97110 THERAPEUTIC EXERCISES: CPT

## 2021-12-06 ENCOUNTER — CLINICAL SUPPORT (OUTPATIENT)
Dept: REHABILITATION | Facility: HOSPITAL | Age: 61
End: 2021-12-06
Payer: COMMERCIAL

## 2021-12-06 DIAGNOSIS — R29.898 DECREASED STRENGTH OF TRUNK AND BACK: ICD-10-CM

## 2021-12-06 PROCEDURE — 97110 THERAPEUTIC EXERCISES: CPT

## 2021-12-14 ENCOUNTER — LAB VISIT (OUTPATIENT)
Dept: LAB | Facility: HOSPITAL | Age: 61
End: 2021-12-14
Attending: NURSE PRACTITIONER
Payer: COMMERCIAL

## 2021-12-14 ENCOUNTER — CLINICAL SUPPORT (OUTPATIENT)
Dept: REHABILITATION | Facility: HOSPITAL | Age: 61
End: 2021-12-14
Payer: COMMERCIAL

## 2021-12-14 DIAGNOSIS — I34.0 MITRAL INSUFFICIENCY: ICD-10-CM

## 2021-12-14 DIAGNOSIS — J81.0 POSTOPERATIVE PULMONARY EDEMA: ICD-10-CM

## 2021-12-14 DIAGNOSIS — I10 ESSENTIAL HYPERTENSION, MALIGNANT: Primary | ICD-10-CM

## 2021-12-14 DIAGNOSIS — I34.1 MITRAL VALVE PROLAPSE: ICD-10-CM

## 2021-12-14 DIAGNOSIS — R29.898 DECREASED STRENGTH OF TRUNK AND BACK: ICD-10-CM

## 2021-12-14 LAB
ANION GAP SERPL CALC-SCNC: 11 MMOL/L (ref 8–16)
BUN SERPL-MCNC: 14 MG/DL (ref 8–23)
CALCIUM SERPL-MCNC: 9.8 MG/DL (ref 8.7–10.5)
CHLORIDE SERPL-SCNC: 104 MMOL/L (ref 95–110)
CO2 SERPL-SCNC: 25 MMOL/L (ref 23–29)
CREAT SERPL-MCNC: 0.9 MG/DL (ref 0.5–1.4)
EST. GFR  (AFRICAN AMERICAN): >60 ML/MIN/1.73 M^2
EST. GFR  (NON AFRICAN AMERICAN): >60 ML/MIN/1.73 M^2
GLUCOSE SERPL-MCNC: 90 MG/DL (ref 70–110)
POTASSIUM SERPL-SCNC: 4.1 MMOL/L (ref 3.5–5.1)
SODIUM SERPL-SCNC: 140 MMOL/L (ref 136–145)

## 2021-12-14 PROCEDURE — 80048 BASIC METABOLIC PNL TOTAL CA: CPT | Performed by: NURSE PRACTITIONER

## 2021-12-14 PROCEDURE — 36415 COLL VENOUS BLD VENIPUNCTURE: CPT | Performed by: NURSE PRACTITIONER

## 2021-12-14 PROCEDURE — 97110 THERAPEUTIC EXERCISES: CPT

## 2021-12-20 ENCOUNTER — CLINICAL SUPPORT (OUTPATIENT)
Dept: REHABILITATION | Facility: HOSPITAL | Age: 61
End: 2021-12-20
Payer: COMMERCIAL

## 2021-12-20 DIAGNOSIS — R29.898 DECREASED STRENGTH OF TRUNK AND BACK: ICD-10-CM

## 2021-12-20 PROCEDURE — 97110 THERAPEUTIC EXERCISES: CPT

## 2021-12-28 ENCOUNTER — CLINICAL SUPPORT (OUTPATIENT)
Dept: REHABILITATION | Facility: HOSPITAL | Age: 61
End: 2021-12-28
Payer: COMMERCIAL

## 2021-12-28 ENCOUNTER — TELEPHONE (OUTPATIENT)
Dept: PAIN MEDICINE | Facility: CLINIC | Age: 61
End: 2021-12-28
Payer: COMMERCIAL

## 2021-12-28 DIAGNOSIS — R29.898 DECREASED STRENGTH OF TRUNK AND BACK: Primary | ICD-10-CM

## 2021-12-28 PROCEDURE — 97110 THERAPEUTIC EXERCISES: CPT | Mod: CQ

## 2021-12-30 ENCOUNTER — CLINICAL SUPPORT (OUTPATIENT)
Dept: REHABILITATION | Facility: HOSPITAL | Age: 61
End: 2021-12-30
Payer: COMMERCIAL

## 2021-12-30 DIAGNOSIS — R29.898 DECREASED STRENGTH OF TRUNK AND BACK: ICD-10-CM

## 2021-12-30 PROCEDURE — 97110 THERAPEUTIC EXERCISES: CPT | Mod: CQ

## 2022-01-06 ENCOUNTER — CLINICAL SUPPORT (OUTPATIENT)
Dept: REHABILITATION | Facility: HOSPITAL | Age: 62
End: 2022-01-06
Payer: COMMERCIAL

## 2022-01-06 DIAGNOSIS — R29.898 DECREASED STRENGTH OF TRUNK AND BACK: Primary | ICD-10-CM

## 2022-01-06 PROCEDURE — 97110 THERAPEUTIC EXERCISES: CPT

## 2022-01-06 NOTE — PROGRESS NOTES
"[facility]   Physical Therapist Assistant Treatment Note     Name: Clare Whitley  Clinic Number: [id number]    Therapy Diagnosis:   Encounter Diagnosis   Name Primary?    Decreased strength of trunk and back Yes     Physician: Henri Azul MD    Visit Date: 1/6/2022  Physician Orders: PT Eval and Treat   Medical Diagnosis from Referral: M79.602 (ICD-10-CM) - Left arm pain M54.16 (ICD-10-CM) - Lumbar back pain with radiculopathy affecting right lower extremity   M47.816 (ICD-10-CM) - Lumbar facet arthropathy M54.16 (ICD-10-CM) - Lumbar radiculitis M79.18 (ICD-10-CM) - Myofascial pain      Evaluation Date: 11/30/2021  Authorization Period Expiration: 12/31/2021  Plan of Care Expiration: 01/15/2022  Progress Note Due: NA  Visit # / Visits authorized: 1/20      ;5/20 + Eval    FOTO: 1/3      PTA Visit #: 3/5     Time In:   Time Out:   Total Billable Time: 48 minutes    SUBJECTIVE     Pt reports:   She was compliant with home exercise program.  Response to previous treatment: no issues  Functional change: more active due to pain relief    Pain: 0/10  Location: ---     OBJECTIVE     Objective Measures updated at progress report unless specified.     Treatment     Clare received the treatments listed below:      therapeutic exercises to develop strength, endurance, ROM, flexibility, posture and core stabilization for 48 minutes including:  Bike 6' for mobility  Dynamic warm up HSS  Shuttle DL 6B 2', SL 4B 1' ea--NP  PPT 20x  PPT w/90-90 legs and arms elevated: alternating tap downs---cues!!  PPT w/alt knee ext from 90-90 legs and upper body up  x10  Bridging x 10  Sustained bridge with 10 pulses into ball x  2 sets  Sustained bridge with 10 clams GTB x 2 sets  Sustained 90-90 legs w/arms raise and lower GTB cues to keep shoulder down 2x10  Dead Bug (legs only) 2x5, then arms x10  Forearm planks 3x15"  Active side planks w/knees bent x 5 ea  Side plank holds w/top leg straight and elevated " "10" x3 ea  Ana Maria w/RTB 20x--NP  S/L hip BW circles 2x10  Goblet Squats 10# 2x10  Pilates star lunges 2/2# DB 2x10  Walk holding 14# ball chest height   Sled push 2 laps  Supine frogs 2x6--cues!  Supine B legs straight up: crunch with ball rolls up the legs 2x6  Modified figure 4 hip stretch  Supine cross body stretch  Wind shield wiper stretch w/opposite head turn  Dynamic HSS     manual therapy techniques: Joint mobilizations, Manual traction and Soft tissue Mobilization were applied to the: -- for -- minutes, including:  --    supervised modalities after being cleared for contradictions: IFC Electrical Stimulation:  Clare received IFC Electrical Stimulation for pain control applied to the --. Pt received stimulation at for -- minutes. Clare tolderated treatment well without any adverse effects.      hot pack for -- minutes to --.    cold pack for -- minutes to --.        Patient Education and Home Exercises     Home Exercises Provided and Patient Education Provided     Education provided:   - HEP and plan of care    Written Home Exercises Provided: Patient instructed to cont prior HEP. Exercises were reviewed and Clare was able to demonstrate them prior to the end of the session.  Clare demonstrated good  understanding of the education provided. See EMR under Patient Instructions for exercises provided during therapy sessions    ASSESSMENT     Clare presented without back pain today. Continued with previous activities with addition of some new exercises. She is very challenged with activities that require multi directional or reciprocal work with her extremities. Max tactile cueing required. She has moderately impaired core strength in her anterior chain; glutes are stronger. We increased anterior core work today with patient fatiguing easily. Clare demonstrated a positive response to today's session as evidenced with activity progression and no complaints of pain. Her program will be " progressed as tolerated for improved mobility w/stability for her daily activities.   Clare is progressing well towards her goals.   Pt prognosis is Good.     Pt will continue to benefit from skilled outpatient physical therapy to address the deficits listed in the problem list box on initial evaluation, provide pt/family education and to maximize pt's level of independence in the home and community environment.     Pt's spiritual, cultural and educational needs considered and pt agreeable to plan of care and goals.     Anticipated barriers to physical therapy: none    Short Term Goals:   1. Pt will be 50% independent with HEP. MET     Long Term Goals: 6 weeks   1. Pt will be 100% independent with HEP. Progressing, not met  2. Pt will be able to walk 1 mile without pain. Progressing, not met  3. Pt will have 5/5 strength in the R hip. Progressing, not met  4. Pt will be able to perform her usual house/work duties without difficulty. Progressing, not met  5. Pt will be able to ascend/descend stairs without difficulty. Progressing, not met     PLAN   Plan of care Certification: 11/30/2021 to 01/15/2022.     Outpatient Physical Therapy weekly for 6 weeks to include the following interventions: Cervical/Lumbar Traction, Electrical Stimulation PRN, Gait Training, Manual Therapy, Moist Heat/ Ice, Neuromuscular Re-ed, Patient Education, Self Care, Therapeutic Activites, Therapeutic Exercise and FDN.       Deepthi Tee, PTA

## 2022-01-06 NOTE — PROGRESS NOTES
[facility]   Physical Therapist Treatment Note     Name: Clare Whitley  Clinic Number: [id number]    Therapy Diagnosis:   Encounter Diagnosis   Name Primary?    Decreased strength of trunk and back Yes     Physician: Henri Azul MD    Visit Date: 1/6/2022  Physician Orders: PT Eval and Treat   Medical Diagnosis from Referral: M79.602 (ICD-10-CM) - Left arm pain M54.16 (ICD-10-CM) - Lumbar back pain with radiculopathy affecting right lower extremity   M47.816 (ICD-10-CM) - Lumbar facet arthropathy M54.16 (ICD-10-CM) - Lumbar radiculitis M79.18 (ICD-10-CM) - Myofascial pain      Evaluation Date: 11/30/2021  Authorization Period Expiration: 12/1/2022  Plan of Care Expiration: 01/15/2022  Visit # / Visits authorized: 6/20 + Eval    FOTO: 2/3    PTA Visit #: -/5     Time In: 10:15 am  Time Out: 11:15 am  Total Billable Time: 60 minutes    SUBJECTIVE     Pt reports: that she is continuing to feel good.  She was compliant with home exercise program.  Response to previous treatment: no issues  Functional change: more active due to pain relief    Pain: 0/10  Location: ---     OBJECTIVE     Objective Measures updated at progress report unless specified.     CMS Impairment/Limitation/Restriction for FOTO Lumbar Survey    Therapist reviewed FOTO scores for Clare Whitley on 1/6/2022.   FOTO documents entered into [employer] - see Media section.    Limitation Score: 6%       Treatment     Clare received the treatments listed below:      Therapeutic exercises to develop strength, endurance, ROM, flexibility, posture and core stabilization for 55 minutes including:  Bike 6' for mobility and joint nutrition  Bird dog LE pull to knee, knee, and leg back x 20 reps  Thread the needle  Standing radial nerve glides  Mid trap against wall with ER against G TB  Sherman pose  Supine LTR with feet up x 20 reps  Supine LTR with 4# ball and side to side x 20 reps  Sit ups with 4# press 3 x 8 reps  Sled push added  25# x 2 laps  Deadlift 35# 3 x 10  Downward dog- painful  Front plank 3 x 20 seconds- pain with the 3rd rep    Manual therapy techniques: Joint mobilizations, Manual traction and Soft tissue Mobilization were applied to the: L UE for 5 minutes, including:  Assessment of L arm pain    Patient Education and Home Exercises     Home Exercises Provided and Patient Education Provided     Education provided:   - HEP and plan of care    Written Home Exercises Provided: yes. Exercises were reviewed and Clare was able to demonstrate them prior to the end of the session.  Clare demonstrated good  understanding of the education provided. See EMR under Patient Instructions for exercises provided during therapy sessions    ASSESSMENT   Patient demonstrated great tolerance to strengthening and core control this session with minimal increase in adverse symptoms. Assessed patient's L UE symptoms and seem to be radial nerve tension, so updated HEP to help address that as well.    Clare is progressing well towards her goals.   Pt prognosis is Good.     Pt will continue to benefit from skilled outpatient physical therapy to address the deficits listed in the problem list box on initial evaluation, provide pt/family education and to maximize pt's level of independence in the home and community environment.     Pt's spiritual, cultural and educational needs considered and pt agreeable to plan of care and goals.     Anticipated barriers to physical therapy: none    Short Term Goals:   1. Pt will be 50% independent with HEP. MET     Long Term Goals: 6 weeks   1. Pt will be 100% independent with HEP. Progressing, not met  2. Pt will be able to walk 1 mile without pain. MET  3. Pt will have 5/5 strength in the R hip. Progressing, not met  4. Pt will be able to perform her usual house/work duties without difficulty. Progressing, not met  5. Pt will be able to ascend/descend stairs without difficulty. MET     PLAN   Plan of care  Certification: 11/30/2021 to 01/15/2022.     Outpatient Physical Therapy weekly for 6 weeks to include the following interventions: Cervical/Lumbar Traction, Electrical Stimulation PRN, Gait Training, Manual Therapy, Moist Heat/ Ice, Neuromuscular Re-ed, Patient Education, Self Care, Therapeutic Activites, Therapeutic Exercise and FDN.       Tiki Sosa, PT

## 2022-01-08 ENCOUNTER — PATIENT OUTREACH (OUTPATIENT)
Dept: ADMINISTRATIVE | Facility: OTHER | Age: 62
End: 2022-01-08
Payer: COMMERCIAL

## 2022-01-08 NOTE — PROGRESS NOTES
Health Maintenance Due   Topic Date Due    COVID-19 Vaccine (1) Never done    Pneumococcal Vaccines (Age 0-64) (1 of 2 - PPSV23) Never done    Shingles Vaccine (1 of 2) Never done    Colorectal Cancer Screening  09/09/2021    Mammogram  12/29/2021     Updates were requested from care everywhere.  Chart was reviewed for overdue Proactive [facility] Encounters ([facility]) topics (CRS, Breast Cancer Screening, Eye exam)  Health Maintenance has been updated.  LINKS immunization registry triggered.  Immunizations were reconciled.
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
## 2022-01-11 ENCOUNTER — OFFICE VISIT (OUTPATIENT)
Dept: PAIN MEDICINE | Facility: CLINIC | Age: 62
End: 2022-01-11
Payer: COMMERCIAL

## 2022-01-11 VITALS
WEIGHT: 159.5 LBS | HEIGHT: 62 IN | HEART RATE: 77 BPM | BODY MASS INDEX: 29.35 KG/M2 | RESPIRATION RATE: 18 BRPM | DIASTOLIC BLOOD PRESSURE: 73 MMHG | SYSTOLIC BLOOD PRESSURE: 120 MMHG

## 2022-01-11 DIAGNOSIS — M46.1 SACROILIITIS: ICD-10-CM

## 2022-01-11 DIAGNOSIS — M54.16 LUMBAR RADICULITIS: ICD-10-CM

## 2022-01-11 DIAGNOSIS — M47.816 LUMBAR FACET ARTHROPATHY: Primary | ICD-10-CM

## 2022-01-11 DIAGNOSIS — M79.18 MYOFASCIAL PAIN: ICD-10-CM

## 2022-01-11 PROCEDURE — 1159F PR MEDICATION LIST DOCUMENTED IN MEDICAL RECORD: ICD-10-PCS | Mod: CPTII,S$GLB,, | Performed by: ANESTHESIOLOGY

## 2022-01-11 PROCEDURE — 99999 PR PBB SHADOW E&M-EST. PATIENT-LVL III: CPT | Mod: PBBFAC,,, | Performed by: ANESTHESIOLOGY

## 2022-01-11 PROCEDURE — 1160F PR REVIEW ALL MEDS BY PRESCRIBER/CLIN PHARMACIST DOCUMENTED: ICD-10-PCS | Mod: CPTII,S$GLB,, | Performed by: ANESTHESIOLOGY

## 2022-01-11 PROCEDURE — 3078F DIAST BP <80 MM HG: CPT | Mod: CPTII,S$GLB,, | Performed by: ANESTHESIOLOGY

## 2022-01-11 PROCEDURE — 99999 PR PBB SHADOW E&M-EST. PATIENT-LVL III: ICD-10-PCS | Mod: PBBFAC,,, | Performed by: ANESTHESIOLOGY

## 2022-01-11 PROCEDURE — 99213 PR OFFICE/OUTPT VISIT, EST, LEVL III, 20-29 MIN: ICD-10-PCS | Mod: S$GLB,,, | Performed by: ANESTHESIOLOGY

## 2022-01-11 PROCEDURE — 3074F PR MOST RECENT SYSTOLIC BLOOD PRESSURE < 130 MM HG: ICD-10-PCS | Mod: CPTII,S$GLB,, | Performed by: ANESTHESIOLOGY

## 2022-01-11 PROCEDURE — 99213 OFFICE O/P EST LOW 20 MIN: CPT | Mod: S$GLB,,, | Performed by: ANESTHESIOLOGY

## 2022-01-11 PROCEDURE — 3078F PR MOST RECENT DIASTOLIC BLOOD PRESSURE < 80 MM HG: ICD-10-PCS | Mod: CPTII,S$GLB,, | Performed by: ANESTHESIOLOGY

## 2022-01-11 PROCEDURE — 3074F SYST BP LT 130 MM HG: CPT | Mod: CPTII,S$GLB,, | Performed by: ANESTHESIOLOGY

## 2022-01-11 PROCEDURE — 3008F PR BODY MASS INDEX (BMI) DOCUMENTED: ICD-10-PCS | Mod: CPTII,S$GLB,, | Performed by: ANESTHESIOLOGY

## 2022-01-11 PROCEDURE — 1160F RVW MEDS BY RX/DR IN RCRD: CPT | Mod: CPTII,S$GLB,, | Performed by: ANESTHESIOLOGY

## 2022-01-11 PROCEDURE — 1159F MED LIST DOCD IN RCRD: CPT | Mod: CPTII,S$GLB,, | Performed by: ANESTHESIOLOGY

## 2022-01-11 PROCEDURE — 3008F BODY MASS INDEX DOCD: CPT | Mod: CPTII,S$GLB,, | Performed by: ANESTHESIOLOGY

## 2022-01-11 RX ORDER — DICLOFENAC SODIUM 10 MG/G
2 GEL TOPICAL 4 TIMES DAILY PRN
COMMUNITY
End: 2023-05-11

## 2022-01-11 NOTE — PATIENT INSTRUCTIONS
General Neck and Back Pain    Both neck and back pain are usually caused by injury to the muscles or ligaments of the spine. Sometimes the disks that separate each bone of the spine may cause pain by pressing on a nearby nerve. Back and neck pain may appear after a sudden twisting or bending force (such as in a car accident), or sometimes after a simple awkward movement. In either case, muscle spasm is often present and adds to the pain.  Acute neck and back pain usually gets better in 1 to 2 weeks. Pain related to disk disease, arthritis in the spinal joints or spinal stenosis (narrowing of the spinal canal) can become chronic and last for months or years.  Back and neck pain are common problems. Most people feel better in 1 or 2 weeks, and most of the rest in 1 to 2 months. Most people can remain active.  People experience and describe pain differently.  · Pain can be sharp, stabbing, shooting, aching, cramping, or burning  · Movement, standing, bending, lifting, sitting, or walking may worsen the pain  · Pain can be localized to one spot or area, or it can be more generalized  · Pain can spread or radiate upwards, downwards, to the front, or go down your arms  · Muscle spasm may occur.  Most of the time mechanical problems with the muscles or spine cause the pain. it is usually caused by an injury, whether known or not, to the muscles or ligaments. While illnesses can cause back pain, it is usually not caused by a serious illness. Pain is usually related to physical activity, whether sports, exercise, work, or normal activity. Sometimes it can occur without an identifiable cause. This can happen simply by stretching or moving wrong, without noting pain at the time. Other causes include:  · Overexertion, lifting, pushing, pulling incorrectly or too aggressively.  · Sudden twisting, bending or stretching from an accident (car or fall), or accidental movement.  · Poor posture  · Poor conditioning, lack of regular  exercise  · Spinal disc disease or arthritis  · Stress  · Pregnancy, or illness like appendicitis, bladder or kidney infection, pelvic infections   Home care  · For neck pain: Use a comfortable pillow that supports the head and keeps the spine in a neutral position. The position of the head should not be tilted forward or backward.  · When in bed, try to find a position of comfort. A firm mattress is best. Try lying flat on your back with pillows under your knees. You can also try lying on your side with your knees bent up towards your chest and a pillow between your knees.  · At first, do not try to stretch out the sore spots. If there is a strain, it is not like the good soreness you get after exercising without an injury. In this case, stretching may make it worse.  · Avoid prolonged sitting, long car rides or travel. This puts more stress on the lower back than standing or walking.  · During the first 24 to 72 hours after an injury, apply an ice pack to the painful area for 20 minutes and then remove it for 20 minutes over a period of 60 to 90 minutes or several times a day.   · You can alternate ice and heat therapies. Talk with your healthcare provider about the best treatment for your back or neck pain. As a safety precaution, do not use a heating pad at bedtime. Sleeping with a heating pad can lead to skin burns or tissue damage.  · Therapeutic massage can help relax the back and neck muscles without stretching them.  · Be aware of safe lifting methods and do not lift anything over 15 pounds until all the pain is gone.  Medications  Talk to your healthcare provider before using medicine, especially if you have other medical problems or are taking other medicines.  · You may use over-the-counter medicine to control pain, unless another pain medicine was prescribed. If you have chronic conditions like diabetes, liver or kidney disease, stomach ulcers,  gastrointestinal bleeding, or are taking blood thinner  medicines.  · Be careful if you are given pain medicines, narcotics, or medicine for muscle spasm. They can cause drowsiness, and can affect your coordination, reflexes, and judgment. Do not drive or operate heavy machinery.  Follow-up care  Follow up with your healthcare provider, or as advised. Physical therapy or further tests may be needed.  If X-rays were taken, you will be notified of any new findings that may affect your care.  Call 911  Seek emergency medical care if any of the following occur:  · Trouble breathing  · Confusion  · Very drowsy or trouble awakening  · Fainting or loss of consciousness  · Rapid or very slow heart rate  · Loss of bowel or bladder control  When to seek medical advice  Call your healthcare provider right away if any of these occur:  · Pain becomes worse or spreads into your arms or legs  · Weakness, numbness or pain in one or both arms or legs  · Numbness in the groin area  · Difficulty walking  · Fever of 100.4ºF (38ºC) or higher, or as directed by your healthcare provider  Date Last Reviewed: 7/1/2016 © 2000-2017 [employer]. [address] [place] [address]. All rights reserved. This information is not intended as a substitute for professional medical care. Always follow your healthcare professional's instructions.          Understanding Lumbar Radiculopathy    Lumbar radiculopathy is irritation or inflammation of a nerve root in the low back. It causes symptoms that spread out from the back down one or both legs. To understand this condition, it helps to understand the parts of the spine:  · Vertebrae. These are bones that stack to form the spine. The lumbar spine contains the 5 bottom vertebrae.  · Disks. These are soft pads of tissue between the vertebrae. They act as shock absorbers for the spine.  · Spinal canal. This is a tunnel formed within the stacked vertebrae. In the lumbar spine, nerves run through this canal.  · Nerves. These branch off and  leave the spinal canal, traveling out to parts of the body. As they leave the spinal canal, nerves pass through openings between the vertebrae. The nerve root is the part of the nerve that is closest to the spinal canal.  · Sciatic nerve. This is a large nerve formed from several nerve roots in the low back. This nerve extends down the back of the leg to the foot.  With lumbar radiculopathy, nerve roots in the low back become irritated. This leads to pain and symptoms. The sciatic nerve is commonly involved, so the condition is often called sciatica.  What causes lumbar radiculopathy?  Aging, injury, poor posture, extra body weight, and other issues can lead to problems in the low back. These problems may then irritate nerve roots. They include:  · Damage to a disk in the lumbar spine. The damaged disk may then press on nearby nerve roots.  · Degeneration from wear and tear, and aging. This can lead to narrowing (stenosis) of the openings between the vertebrae. The narrowed openings press on nerve roots as they leave the spinal canal.  · Unstable spine. This is when a vertebra slips forward. It can then press on a nerve root.  Other, less common things can put pressure on nerves in the low back. These include diabetes, infection, or a tumor.  Symptoms of lumbar radiculopathy  These include:  · Pain in the low back  · Pain, numbness, tingling, or weakness that travels into the buttocks, hip, groin, or leg  · Muscle spasms  Treatment for lumbar radiculopathy  In most cases, your healthcare provider will first try treatments that help relieve symptoms. These may include:  · Prescription and over-the-counter pain medicines. These help relieve pain, swelling, and irritation.  · Limits on positions and activities that increase pain. But lying in bed or avoiding all movement is only recommended for a short period of time.  · Physical therapy, including exercises and stretches. This helps decrease pain and increase movement  and function.  · Steroid shots into the lower back. This may help relieve symptoms for a time.  · Weight-loss program. If you are overweight, losing extra pounds may help relieve symptoms.  In some cases, you may need surgery to fix the underlying problem. This depends on the cause, the symptoms, and how long the pain has lasted.  Possible complications  Over time, an irritated and inflamed nerve may become damaged. This may lead to long-lasting (permanent) numbness or weakness in your legs and feet. If symptoms change suddenly or get worse, be sure to let your healthcare provider know.  When to call your healthcare provider  Call your healthcare provider right away if you have any of these:  · New pain or pain that gets worse  · New or increasing weakness, tingling, or numbness in your leg or foot  · Problems controlling your bladder or bowel   Date Last Reviewed: 3/10/2016  © 6246-3942 [employer]. [address] [place] [address]. All rights reserved. This information is not intended as a substitute for professional medical care. Always follow your healthcare professional's instructions.          Exercises to Strengthen Your Lower Back  Strong lower back and abdominal muscles work together to support your spine. The exercises below will help strengthen the lower back. It is important that you begin exercising slowly and increase levels gradually.  Always begin any exercise program with stretching. If you feel pain while doing any of these exercises, stop and talk to your doctor about a more specific exercise program that better suits your condition.   Low back stretch  The point of stretching is to make you more flexible and increase your range of motion. Stretch only as much as you are able. Stretch slowly. Do not push your stretch to the limit. If at any point you feel pain while stretching, this is your (temporary) limit.  · Lie on your back with your knees bent and both feet on the  ground.  · Slowly raise your left knee to your chest as you flatten your lower back against the floor. Hold for 5 seconds.  · Relax and repeat the exercise with your right knee.  · Do 10 of these exercises for each leg.  · Repeat hugging both knees to your chest at the same time.  Building lower back strength  Start your exercise routine with 10 to 30 minutes a day, 1 to 3 times a day.  Initial exercises  Lying on your back:  1. Ankle pumps: Move your foot up and down, towards your head, and then away. Repeat 10 times with each foot.  2. Heel slides: Slowly bend your knee, drawing the heel of your foot towards you. Then slide your heel/foot from you, straightening your knee. Do not lift your foot off the floor (this is not a leg lift).  3. Abdominal contraction: Bend your knees and put your hands on your stomach. Tighten your stomach muscles. Hold for 5 seconds, then relax. Repeat 10 times.  4. Straight leg raise: Bend one leg at the knee and keep the other leg straight. Tighten your stomach muscles. Slowly lift your straight leg 6 to 12 inches off the floor and hold for up to 5 seconds. Repeat 10 times on each side.  Standin. Wall squats: Stand with your back against the wall. Move your feet about 12 inches away from the wall. Tighten your stomach muscles, and slowly bend your knees until they are at about a 45 degree angle. Do not go down too far. Hold about 5 seconds. Then slowly return to your starting position. Repeat 10 times.  2. Heel raises: Stand facing the wall. Slowly raise the heels of your feet up and down, while keeping your toes on the floor. If you have trouble balancing, you can touch the wall with your hands. Repeat 10 times.  More advanced exercises  When you feel comfortable enough, try these exercises.  1. Kneeling lumbar extension: Begin on your hands and knees. At the same time, raise and straighten your right arm and left leg until they are parallel to the ground. Hold for 2 seconds and  come back slowly to a starting position. Repeat with left arm and right leg, alternating 10 times.  2. Prone lumbar extension: Lie face down, arms extended overhead, palms on the floor. At the same time, raise your right arm and left leg as high as comfortably possible. Hold for 10 seconds and slowly return to start. Repeat with left arm and right leg, alternating 10 times. Gradually build up to 20 times. (Advanced: Repeat this exercise raising both arms and both legs a few inches off the floor at the same time. Hold for 5 seconds and release.)  3. Pelvic tilt: Lie on the floor on your back with your knees bent at 90 degrees. Your feet should be flat on the floor. Inhale, exhale, then slowly contract your abdominal muscles bringing your navel toward your spine. Let your pelvis rock back until your lower back is flat on the floor. Hold for 10 seconds while breathing smoothly.  4. Abdominal crunch: Perform a pelvic tilt (above) flattening your lower back against the floor. Holding the tension in your abdominal muscles, take another breath and raise your shoulder blades off the ground (this is not a full sit-up). Keep your head in line with your body (dont bend your neck forward). Hold for 2 seconds, then slowly lower.  Date Last Reviewed: 6/1/2016  © 4805-3814 [employer]. [address] [place] [address], [place] [address]. All rights reserved. This information is not intended as a substitute for professional medical care. Always follow your healthcare professional's instructions.

## 2022-01-12 ENCOUNTER — TELEPHONE (OUTPATIENT)
Dept: PAIN MEDICINE | Facility: CLINIC | Age: 62
End: 2022-01-12
Payer: COMMERCIAL

## 2022-01-12 NOTE — TELEPHONE ENCOUNTER
----- Message from Alfred Nye sent at 1/12/2022 12:05 PM CST -----  Contact: SARTHAK RAO [[id number]]  .Type:  Needs Medical Advice    Who Called: SARTHAK RAO [[id number]]  Symptoms (please be specific):  How long has patient had these symptoms:   Pharmacy name and phone #:   Would the patient rather a call back or a response via [facility]?  Call    Best Call Back Number:   [phone] (home)  Additional Information:  Pt is requesting a call back from the nurse in regards to one side of the pt pain pump not working for her please

## 2022-01-24 ENCOUNTER — OFFICE VISIT (OUTPATIENT)
Dept: INTERNAL MEDICINE | Facility: CLINIC | Age: 62
End: 2022-01-24
Payer: COMMERCIAL

## 2022-01-24 VITALS
HEART RATE: 88 BPM | SYSTOLIC BLOOD PRESSURE: 132 MMHG | HEIGHT: 62 IN | OXYGEN SATURATION: 97 % | BODY MASS INDEX: 29.21 KG/M2 | DIASTOLIC BLOOD PRESSURE: 86 MMHG | TEMPERATURE: 97 F | WEIGHT: 158.75 LBS

## 2022-01-24 DIAGNOSIS — T50.2X5A DIURETIC-INDUCED HYPOKALEMIA: ICD-10-CM

## 2022-01-24 DIAGNOSIS — Z13.220 SCREENING FOR LIPOID DISORDERS: ICD-10-CM

## 2022-01-24 DIAGNOSIS — I10 ESSENTIAL HYPERTENSION: Chronic | ICD-10-CM

## 2022-01-24 DIAGNOSIS — R09.81 NASAL CONGESTION: Primary | ICD-10-CM

## 2022-01-24 DIAGNOSIS — E66.3 OVERWEIGHT (BMI 25.0-29.9): ICD-10-CM

## 2022-01-24 DIAGNOSIS — E55.9 VITAMIN D DEFICIENCY: ICD-10-CM

## 2022-01-24 DIAGNOSIS — F41.8 SITUATIONAL ANXIETY: ICD-10-CM

## 2022-01-24 DIAGNOSIS — E87.6 DIURETIC-INDUCED HYPOKALEMIA: ICD-10-CM

## 2022-01-24 DIAGNOSIS — Z00.00 ROUTINE GENERAL MEDICAL EXAMINATION AT A HEALTH CARE FACILITY: Primary | ICD-10-CM

## 2022-01-24 DIAGNOSIS — Z13.29 THYROID DISORDER SCREEN: ICD-10-CM

## 2022-01-24 PROBLEM — J81.0 ACUTE PULMONARY EDEMA: Status: RESOLVED | Noted: 2021-10-22 | Resolved: 2022-01-24

## 2022-01-24 LAB
CTP QC/QA: YES
SARS-COV-2 RDRP RESP QL NAA+PROBE: NEGATIVE

## 2022-01-24 PROCEDURE — 3008F BODY MASS INDEX DOCD: CPT | Mod: CPTII,S$GLB,, | Performed by: FAMILY MEDICINE

## 2022-01-24 PROCEDURE — 3079F PR MOST RECENT DIASTOLIC BLOOD PRESSURE 80-89 MM HG: ICD-10-PCS | Mod: CPTII,S$GLB,, | Performed by: FAMILY MEDICINE

## 2022-01-24 PROCEDURE — 1160F PR REVIEW ALL MEDS BY PRESCRIBER/CLIN PHARMACIST DOCUMENTED: ICD-10-PCS | Mod: CPTII,S$GLB,, | Performed by: FAMILY MEDICINE

## 2022-01-24 PROCEDURE — 99214 OFFICE O/P EST MOD 30 MIN: CPT | Mod: S$GLB,,, | Performed by: FAMILY MEDICINE

## 2022-01-24 PROCEDURE — U0002: ICD-10-PCS | Mod: QW,S$GLB,, | Performed by: FAMILY MEDICINE

## 2022-01-24 PROCEDURE — U0002 COVID-19 LAB TEST NON-CDC: HCPCS | Mod: QW,S$GLB,, | Performed by: FAMILY MEDICINE

## 2022-01-24 PROCEDURE — 1160F RVW MEDS BY RX/DR IN RCRD: CPT | Mod: CPTII,S$GLB,, | Performed by: FAMILY MEDICINE

## 2022-01-24 PROCEDURE — 1159F PR MEDICATION LIST DOCUMENTED IN MEDICAL RECORD: ICD-10-PCS | Mod: CPTII,S$GLB,, | Performed by: FAMILY MEDICINE

## 2022-01-24 PROCEDURE — 1159F MED LIST DOCD IN RCRD: CPT | Mod: CPTII,S$GLB,, | Performed by: FAMILY MEDICINE

## 2022-01-24 PROCEDURE — 99214 PR OFFICE/OUTPT VISIT, EST, LEVL IV, 30-39 MIN: ICD-10-PCS | Mod: S$GLB,,, | Performed by: FAMILY MEDICINE

## 2022-01-24 PROCEDURE — 99999 PR PBB SHADOW E&M-EST. PATIENT-LVL III: ICD-10-PCS | Mod: PBBFAC,,, | Performed by: FAMILY MEDICINE

## 2022-01-24 PROCEDURE — 99999 PR PBB SHADOW E&M-EST. PATIENT-LVL III: CPT | Mod: PBBFAC,,, | Performed by: FAMILY MEDICINE

## 2022-01-24 PROCEDURE — 3075F SYST BP GE 130 - 139MM HG: CPT | Mod: CPTII,S$GLB,, | Performed by: FAMILY MEDICINE

## 2022-01-24 PROCEDURE — 3075F PR MOST RECENT SYSTOLIC BLOOD PRESS GE 130-139MM HG: ICD-10-PCS | Mod: CPTII,S$GLB,, | Performed by: FAMILY MEDICINE

## 2022-01-24 PROCEDURE — 3079F DIAST BP 80-89 MM HG: CPT | Mod: CPTII,S$GLB,, | Performed by: FAMILY MEDICINE

## 2022-01-24 PROCEDURE — 3008F PR BODY MASS INDEX (BMI) DOCUMENTED: ICD-10-PCS | Mod: CPTII,S$GLB,, | Performed by: FAMILY MEDICINE

## 2022-01-24 NOTE — PROGRESS NOTES
"Subjective:       Patient ID: Clare Whitley is a 61 y.o. female.    Chief Complaint: Follow-up    Patient presents to clinic today for followup of chronic conditions. Patient reports increased stress lately due to pipe busting and causing damage at home. She reports she feels fine throughout the day, but "feels warm" when she lays down at night. She also is concerned about nasal congestion x 2-3 days. Patient is otherwise without concerns today.      Review of Systems   Constitutional: Negative for chills, fatigue, fever and unexpected weight change.   HENT: Positive for congestion.    Eyes: Negative for visual disturbance.   Respiratory: Negative for shortness of breath.    Cardiovascular: Negative for chest pain.   Musculoskeletal: Negative for myalgias.   Neurological: Negative for headaches.   Psychiatric/Behavioral: The patient is nervous/anxious.          Objective:      Physical Exam  Vitals reviewed.   Constitutional:       General: She is not in acute distress.     Appearance: She is well-developed.   HENT:      Head: Normocephalic and atraumatic.   Eyes:      General: Lids are normal. No scleral icterus.     Extraocular Movements: Extraocular movements intact.      Conjunctiva/sclera: Conjunctivae normal.      Pupils: Pupils are equal, round, and reactive to light.   Cardiovascular:      Rate and Rhythm: Normal rate and regular rhythm.      Heart sounds: No murmur heard.  No friction rub. No gallop.    Pulmonary:      Effort: Pulmonary effort is normal.      Breath sounds: Normal breath sounds. No decreased breath sounds, wheezing, rhonchi or rales.   Neurological:      Mental Status: She is alert and oriented to person, place, and time.      Cranial Nerves: No cranial nerve deficit.      Gait: Gait normal.   Psychiatric:         Mood and Affect: Mood and affect normal.         Assessment:       1. Nasal congestion    2. Essential hypertension    3. Diuretic-induced hypokalemia    4. Situational anxiety  "   5. Overweight (BMI 25.0-29.9)    6. Vitamin D deficiency        Plan:     Problem List Items Addressed This Visit     Diuretic-induced hypokalemia    Current Assessment & Plan     Controlled on supplement         Essential hypertension (Chronic)    Current Assessment & Plan     Controlled, continue chlorthalidone and atenolol         Overweight (BMI 25.0-29.9)    Current Assessment & Plan     Body mass index is 29.03 kg/m².    Wt Readings from Last 10 Encounters:   01/24/22 72 kg (158 lb 11.7 oz)   01/11/22 72.3 kg (159 lb 8 oz)   01/07/22 72.6 kg (160 lb)   11/17/21 75 kg (165 lb 7.3 oz)   11/10/21 72.8 kg (160 lb 9.7 oz)   10/20/21 72.8 kg (160 lb 9.7 oz)   10/18/21 76.5 kg (168 lb 10.4 oz)   06/09/21 77.4 kg (170 lb 10.2 oz)   03/12/21 76.7 kg (169 lb 1.5 oz)   02/09/21 77.4 kg (170 lb 10.2 oz)              Situational anxiety    Vitamin D deficiency    Current Assessment & Plan     Continue supplement           Other Visit Diagnoses     Nasal congestion    -  Primary    Relevant Orders    POCT COVID-19 Rapid Screening (Completed)        Advised to isolate pending covid result.  Health Maintenance reviewed/updated.  Encouraged to get covid vaccine.

## 2022-01-30 PROBLEM — E66.3 OVERWEIGHT (BMI 25.0-29.9): Status: ACTIVE | Noted: 2020-08-19

## 2022-01-30 NOTE — ASSESSMENT & PLAN NOTE
Body mass index is 29.03 kg/m².    Wt Readings from Last 10 Encounters:   01/24/22 72 kg (158 lb 11.7 oz)   01/11/22 72.3 kg (159 lb 8 oz)   01/07/22 72.6 kg (160 lb)   11/17/21 75 kg (165 lb 7.3 oz)   11/10/21 72.8 kg (160 lb 9.7 oz)   10/20/21 72.8 kg (160 lb 9.7 oz)   10/18/21 76.5 kg (168 lb 10.4 oz)   06/09/21 77.4 kg (170 lb 10.2 oz)   03/12/21 76.7 kg (169 lb 1.5 oz)   02/09/21 77.4 kg (170 lb 10.2 oz)

## 2022-04-11 ENCOUNTER — PATIENT OUTREACH (OUTPATIENT)
Dept: ADMINISTRATIVE | Facility: OTHER | Age: 62
End: 2022-04-11
Payer: COMMERCIAL

## 2022-04-11 NOTE — PROGRESS NOTES
Health Maintenance Due   Topic Date Due    COVID-19 Vaccine (1) Never done    Shingles Vaccine (1 of 2) Never done    Colorectal Cancer Screening  09/09/2021     Updates were requested from care everywhere.  Chart was reviewed for overdue Proactive [facility] Encounters ([facility]AS[facility]) topics (CRS, Breast Cancer Screening, Eye exam)  Health Maintenance has been updated.  LIN[place] immunization registry triggered.  Immunizations were reconciled.

## 2022-04-12 ENCOUNTER — OFFICE VISIT (OUTPATIENT)
Dept: PAIN MEDICINE | Facility: CLINIC | Age: 62
End: 2022-04-12
Payer: COMMERCIAL

## 2022-04-12 VITALS
SYSTOLIC BLOOD PRESSURE: 126 MMHG | RESPIRATION RATE: 17 BRPM | DIASTOLIC BLOOD PRESSURE: 79 MMHG | WEIGHT: 160.25 LBS | HEART RATE: 78 BPM | HEIGHT: 62 IN | BODY MASS INDEX: 29.49 KG/M2

## 2022-04-12 DIAGNOSIS — M79.18 MYOFASCIAL PAIN: ICD-10-CM

## 2022-04-12 DIAGNOSIS — M46.1 SACROILIITIS: ICD-10-CM

## 2022-04-12 DIAGNOSIS — M47.816 LUMBAR FACET ARTHROPATHY: Primary | ICD-10-CM

## 2022-04-12 DIAGNOSIS — M54.16 LUMBAR RADICULITIS: ICD-10-CM

## 2022-04-12 PROCEDURE — 1160F PR REVIEW ALL MEDS BY PRESCRIBER/CLIN PHARMACIST DOCUMENTED: ICD-10-PCS | Mod: CPTII,S$GLB,, | Performed by: ANESTHESIOLOGY

## 2022-04-12 PROCEDURE — 99999 PR PBB SHADOW E&M-EST. PATIENT-LVL III: CPT | Mod: PBBFAC,,, | Performed by: ANESTHESIOLOGY

## 2022-04-12 PROCEDURE — 3078F DIAST BP <80 MM HG: CPT | Mod: CPTII,S$GLB,, | Performed by: ANESTHESIOLOGY

## 2022-04-12 PROCEDURE — 99213 PR OFFICE/OUTPT VISIT, EST, LEVL III, 20-29 MIN: ICD-10-PCS | Mod: S$GLB,,, | Performed by: ANESTHESIOLOGY

## 2022-04-12 PROCEDURE — 99213 OFFICE O/P EST LOW 20 MIN: CPT | Mod: S$GLB,,, | Performed by: ANESTHESIOLOGY

## 2022-04-12 PROCEDURE — 1159F PR MEDICATION LIST DOCUMENTED IN MEDICAL RECORD: ICD-10-PCS | Mod: CPTII,S$GLB,, | Performed by: ANESTHESIOLOGY

## 2022-04-12 PROCEDURE — 3008F PR BODY MASS INDEX (BMI) DOCUMENTED: ICD-10-PCS | Mod: CPTII,S$GLB,, | Performed by: ANESTHESIOLOGY

## 2022-04-12 PROCEDURE — 3078F PR MOST RECENT DIASTOLIC BLOOD PRESSURE < 80 MM HG: ICD-10-PCS | Mod: CPTII,S$GLB,, | Performed by: ANESTHESIOLOGY

## 2022-04-12 PROCEDURE — 99999 PR PBB SHADOW E&M-EST. PATIENT-LVL III: ICD-10-PCS | Mod: PBBFAC,,, | Performed by: ANESTHESIOLOGY

## 2022-04-12 PROCEDURE — 3008F BODY MASS INDEX DOCD: CPT | Mod: CPTII,S$GLB,, | Performed by: ANESTHESIOLOGY

## 2022-04-12 PROCEDURE — 3074F PR MOST RECENT SYSTOLIC BLOOD PRESSURE < 130 MM HG: ICD-10-PCS | Mod: CPTII,S$GLB,, | Performed by: ANESTHESIOLOGY

## 2022-04-12 PROCEDURE — 3074F SYST BP LT 130 MM HG: CPT | Mod: CPTII,S$GLB,, | Performed by: ANESTHESIOLOGY

## 2022-04-12 PROCEDURE — 1159F MED LIST DOCD IN RCRD: CPT | Mod: CPTII,S$GLB,, | Performed by: ANESTHESIOLOGY

## 2022-04-12 PROCEDURE — 1160F RVW MEDS BY RX/DR IN RCRD: CPT | Mod: CPTII,S$GLB,, | Performed by: ANESTHESIOLOGY

## 2022-04-12 NOTE — PATIENT INSTRUCTIONS
General Neck and Back Pain    Both neck and back pain are usually caused by injury to the muscles or ligaments of the spine. Sometimes the disks that separate each bone of the spine may cause pain by pressing on a nearby nerve. Back and neck pain may appear after a sudden twisting or bending force (such as in a car accident), or sometimes after a simple awkward movement. In either case, muscle spasm is often present and adds to the pain.  Acute neck and back pain usually gets better in 1 to 2 weeks. Pain related to disk disease, arthritis in the spinal joints or spinal stenosis (narrowing of the spinal canal) can become chronic and last for months or years.  Back and neck pain are common problems. Most people feel better in 1 or 2 weeks, and most of the rest in 1 to 2 months. Most people can remain active.  People experience and describe pain differently.  Pain can be sharp, stabbing, shooting, aching, cramping, or burning  Movement, standing, bending, lifting, sitting, or walking may worsen the pain  Pain can be localized to one spot or area, or it can be more generalized  Pain can spread or radiate upwards, downwards, to the front, or go down your arms  Muscle spasm may occur.  Most of the time mechanical problems with the muscles or spine cause the pain. it is usually caused by an injury, whether known or not, to the muscles or ligaments. While illnesses can cause back pain, it is usually not caused by a serious illness. Pain is usually related to physical activity, whether sports, exercise, work, or normal activity. Sometimes it can occur without an identifiable cause. This can happen simply by stretching or moving wrong, without noting pain at the time. Other causes include:  Overexertion, lifting, pushing, pulling incorrectly or too aggressively.  Sudden twisting, bending or stretching from an accident (car or fall), or accidental movement.  Poor posture  Poor conditioning, lack of regular exercise  Spinal  disc disease or arthritis  Stress  Pregnancy, or illness like appendicitis, bladder or kidney infection, pelvic infections   Home care  For neck pain: Use a comfortable pillow that supports the head and keeps the spine in a neutral position. The position of the head should not be tilted forward or backward.  When in bed, try to find a position of comfort. A firm mattress is best. Try lying flat on your back with pillows under your knees. You can also try lying on your side with your knees bent up towards your chest and a pillow between your knees.  At first, do not try to stretch out the sore spots. If there is a strain, it is not like the good soreness you get after exercising without an injury. In this case, stretching may make it worse.  Avoid prolonged sitting, long car rides or travel. This puts more stress on the lower back than standing or walking.  During the first 24 to 72 hours after an injury, apply an ice pack to the painful area for 20 minutes and then remove it for 20 minutes over a period of 60 to 90 minutes or several times a day.   You can alternate ice and heat therapies. Talk with your healthcare provider about the best treatment for your back or neck pain. As a safety precaution, do not use a heating pad at bedtime. Sleeping with a heating pad can lead to skin burns or tissue damage.  Therapeutic massage can help relax the back and neck muscles without stretching them.  Be aware of safe lifting methods and do not lift anything over 15 pounds until all the pain is gone.  Medications  Talk to your healthcare provider before using medicine, especially if you have other medical problems or are taking other medicines.  You may use over-the-counter medicine to control pain, unless another pain medicine was prescribed. If you have chronic conditions like diabetes, liver or kidney disease, stomach ulcers,  gastrointestinal bleeding, or are taking blood thinner medicines.  Be careful if you are given pain  medicines, narcotics, or medicine for muscle spasm. They can cause drowsiness, and can affect your coordination, reflexes, and judgment. Do not drive or operate heavy machinery.  Follow-up care  Follow up with your healthcare provider, or as advised. Physical therapy or further tests may be needed.  If X-rays were taken, you will be notified of any new findings that may affect your care.  Call 911  Seek emergency medical care if any of the following occur:  Trouble breathing  Confusion  Very drowsy or trouble awakening  Fainting or loss of consciousness  Rapid or very slow heart rate  Loss of bowel or bladder control  When to seek medical advice  Call your healthcare provider right away if any of these occur:  Pain becomes worse or spreads into your arms or legs  Weakness, numbness or pain in one or both arms or legs  Numbness in the groin area  Difficulty walking  Fever of 100.4ºF (38ºC) or higher, or as directed by your healthcare provider  Date Last Reviewed: 7/1/2016  © 0226-2511 [employer]. [address] [place] [address]. All rights reserved. This information is not intended as a substitute for professional medical care. Always follow your healthcare professional's instructions.       Exercises to Strengthen Your Lower Back  Strong lower back and abdominal muscles work together to support your spine. The exercises below will help strengthen the lower back. It is important that you begin exercising slowly and increase levels gradually.  Always begin any exercise program with stretching. If you feel pain while doing any of these exercises, stop and talk to your doctor about a more specific exercise program that better suits your condition.   Low back stretch  The point of stretching is to make you more flexible and increase your range of motion. Stretch only as much as you are able. Stretch slowly. Do not push your stretch to the limit. If at any point you feel pain while stretching,  this is your (temporary) limit.  Lie on your back with your knees bent and both feet on the ground.  Slowly raise your left knee to your chest as you flatten your lower back against the floor. Hold for 5 seconds.  Relax and repeat the exercise with your right knee.  Do 10 of these exercises for each leg.  Repeat hugging both knees to your chest at the same time.  Building lower back strength  Start your exercise routine with 10 to 30 minutes a day, 1 to 3 times a day.  Initial exercises  Lying on your back:  Ankle pumps: Move your foot up and down, towards your head, and then away. Repeat 10 times with each foot.  Heel slides: Slowly bend your knee, drawing the heel of your foot towards you. Then slide your heel/foot from you, straightening your knee. Do not lift your foot off the floor (this is not a leg lift).  Abdominal contraction: Bend your knees and put your hands on your stomach. Tighten your stomach muscles. Hold for 5 seconds, then relax. Repeat 10 times.  Straight leg raise: Bend one leg at the knee and keep the other leg straight. Tighten your stomach muscles. Slowly lift your straight leg 6 to 12 inches off the floor and hold for up to 5 seconds. Repeat 10 times on each side.  Standing:  Wall squats: Stand with your back against the wall. Move your feet about 12 inches away from the wall. Tighten your stomach muscles, and slowly bend your knees until they are at about a 45 degree angle. Do not go down too far. Hold about 5 seconds. Then slowly return to your starting position. Repeat 10 times.  Heel raises: Stand facing the wall. Slowly raise the heels of your feet up and down, while keeping your toes on the floor. If you have trouble balancing, you can touch the wall with your hands. Repeat 10 times.  More advanced exercises  When you feel comfortable enough, try these exercises.  Kneeling lumbar extension: Begin on your hands and knees. At the same time, raise and straighten your right arm and left leg  until they are parallel to the ground. Hold for 2 seconds and come back slowly to a starting position. Repeat with left arm and right leg, alternating 10 times.  Prone lumbar extension: Lie face down, arms extended overhead, palms on the floor. At the same time, raise your right arm and left leg as high as comfortably possible. Hold for 10 seconds and slowly return to start. Repeat with left arm and right leg, alternating 10 times. Gradually build up to 20 times. (Advanced: Repeat this exercise raising both arms and both legs a few inches off the floor at the same time. Hold for 5 seconds and release.)  Pelvic tilt: Lie on the floor on your back with your knees bent at 90 degrees. Your feet should be flat on the floor. Inhale, exhale, then slowly contract your abdominal muscles bringing your navel toward your spine. Let your pelvis rock back until your lower back is flat on the floor. Hold for 10 seconds while breathing smoothly.  Abdominal crunch: Perform a pelvic tilt (above) flattening your lower back against the floor. Holding the tension in your abdominal muscles, take another breath and raise your shoulder blades off the ground (this is not a full sit-up). Keep your head in line with your body (dont bend your neck forward). Hold for 2 seconds, then slowly lower.  Date Last Reviewed: 6/1/2016  © 3734-0663 [employer]. [address] [place] [address], [place] [address]. All rights reserved. This information is not intended as a substitute for professional medical care. Always follow your healthcare professional's instructions.
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
## 2022-04-14 ENCOUNTER — TELEPHONE (OUTPATIENT)
Dept: PULMONOLOGY | Facility: CLINIC | Age: 62
End: 2022-04-14
Payer: COMMERCIAL

## 2022-04-22 DIAGNOSIS — Z12.11 ENCOUNTER FOR SCREENING COLONOSCOPY: Primary | ICD-10-CM

## 2022-07-25 ENCOUNTER — LAB VISIT (OUTPATIENT)
Dept: LAB | Facility: HOSPITAL | Age: 62
End: 2022-07-25
Attending: PHYSICIAN ASSISTANT
Payer: COMMERCIAL

## 2022-07-25 ENCOUNTER — OFFICE VISIT (OUTPATIENT)
Dept: INTERNAL MEDICINE | Facility: CLINIC | Age: 62
End: 2022-07-25
Payer: COMMERCIAL

## 2022-07-25 VITALS
BODY MASS INDEX: 29.56 KG/M2 | HEIGHT: 62 IN | WEIGHT: 160.63 LBS | HEART RATE: 79 BPM | OXYGEN SATURATION: 98 % | DIASTOLIC BLOOD PRESSURE: 80 MMHG | SYSTOLIC BLOOD PRESSURE: 132 MMHG | TEMPERATURE: 98 F

## 2022-07-25 DIAGNOSIS — Z00.00 ROUTINE GENERAL MEDICAL EXAMINATION AT A HEALTH CARE FACILITY: Primary | ICD-10-CM

## 2022-07-25 DIAGNOSIS — Z13.220 SCREENING FOR LIPOID DISORDERS: ICD-10-CM

## 2022-07-25 DIAGNOSIS — I10 ESSENTIAL HYPERTENSION: Chronic | ICD-10-CM

## 2022-07-25 DIAGNOSIS — E87.6 DIURETIC-INDUCED HYPOKALEMIA: ICD-10-CM

## 2022-07-25 DIAGNOSIS — E55.9 VITAMIN D DEFICIENCY: ICD-10-CM

## 2022-07-25 DIAGNOSIS — T50.2X5A DIURETIC-INDUCED HYPOKALEMIA: ICD-10-CM

## 2022-07-25 DIAGNOSIS — Z13.29 THYROID DISORDER SCREEN: ICD-10-CM

## 2022-07-25 DIAGNOSIS — G47.33 OSA ON CPAP: ICD-10-CM

## 2022-07-25 DIAGNOSIS — Z00.00 ROUTINE GENERAL MEDICAL EXAMINATION AT A HEALTH CARE FACILITY: ICD-10-CM

## 2022-07-25 DIAGNOSIS — Z12.11 COLON CANCER SCREENING: ICD-10-CM

## 2022-07-25 DIAGNOSIS — E66.3 OVERWEIGHT (BMI 25.0-29.9): ICD-10-CM

## 2022-07-25 PROBLEM — H53.9 TRANSIENT VISUAL DISTURBANCE: Status: RESOLVED | Noted: 2019-07-23 | Resolved: 2022-07-25

## 2022-07-25 PROBLEM — M79.602 LEFT ARM PAIN: Status: RESOLVED | Noted: 2021-10-22 | Resolved: 2022-07-25

## 2022-07-25 PROBLEM — K59.00 CONSTIPATION, ACUTE: Status: RESOLVED | Noted: 2021-10-22 | Resolved: 2022-07-25

## 2022-07-25 PROBLEM — M25.50 ARTHRALGIA: Status: RESOLVED | Noted: 2021-07-08 | Resolved: 2022-07-25

## 2022-07-25 PROBLEM — R07.9 CHEST PAIN IN ADULT: Status: RESOLVED | Noted: 2018-12-03 | Resolved: 2022-07-25

## 2022-07-25 PROBLEM — H53.8 BLURRY VISION: Status: RESOLVED | Noted: 2021-01-15 | Resolved: 2022-07-25

## 2022-07-25 LAB
25(OH)D3+25(OH)D2 SERPL-MCNC: 42 NG/ML (ref 30–96)
ALBUMIN SERPL BCP-MCNC: 4.1 G/DL (ref 3.5–5.2)
ALP SERPL-CCNC: 33 U/L (ref 55–135)
ALT SERPL W/O P-5'-P-CCNC: 19 U/L (ref 10–44)
ANION GAP SERPL CALC-SCNC: 12 MMOL/L (ref 8–16)
AST SERPL-CCNC: 21 U/L (ref 10–40)
BILIRUB SERPL-MCNC: 0.8 MG/DL (ref 0.1–1)
BUN SERPL-MCNC: 16 MG/DL (ref 8–23)
CALCIUM SERPL-MCNC: 10 MG/DL (ref 8.7–10.5)
CHLORIDE SERPL-SCNC: 104 MMOL/L (ref 95–110)
CHOLEST SERPL-MCNC: 155 MG/DL (ref 120–199)
CHOLEST/HDLC SERPL: 2.6 {RATIO} (ref 2–5)
CO2 SERPL-SCNC: 25 MMOL/L (ref 23–29)
CREAT SERPL-MCNC: 0.8 MG/DL (ref 0.5–1.4)
EST. GFR  (AFRICAN AMERICAN): >60 ML/MIN/1.73 M^2
EST. GFR  (NON AFRICAN AMERICAN): >60 ML/MIN/1.73 M^2
GLUCOSE SERPL-MCNC: 90 MG/DL (ref 70–110)
HDLC SERPL-MCNC: 59 MG/DL (ref 40–75)
HDLC SERPL: 38.1 % (ref 20–50)
LDLC SERPL CALC-MCNC: 85.4 MG/DL (ref 63–159)
NONHDLC SERPL-MCNC: 96 MG/DL
POTASSIUM SERPL-SCNC: 4.1 MMOL/L (ref 3.5–5.1)
PROT SERPL-MCNC: 8.5 G/DL (ref 6–8.4)
SODIUM SERPL-SCNC: 141 MMOL/L (ref 136–145)
TRIGL SERPL-MCNC: 53 MG/DL (ref 30–150)
TSH SERPL DL<=0.005 MIU/L-ACNC: 0.88 UIU/ML (ref 0.4–4)

## 2022-07-25 PROCEDURE — 99396 PREV VISIT EST AGE 40-64: CPT | Mod: S$GLB,,, | Performed by: PHYSICIAN ASSISTANT

## 2022-07-25 PROCEDURE — 36415 COLL VENOUS BLD VENIPUNCTURE: CPT | Performed by: PHYSICIAN ASSISTANT

## 2022-07-25 PROCEDURE — 1160F RVW MEDS BY RX/DR IN RCRD: CPT | Mod: CPTII,S$GLB,, | Performed by: PHYSICIAN ASSISTANT

## 2022-07-25 PROCEDURE — 1159F MED LIST DOCD IN RCRD: CPT | Mod: CPTII,S$GLB,, | Performed by: PHYSICIAN ASSISTANT

## 2022-07-25 PROCEDURE — 1159F PR MEDICATION LIST DOCUMENTED IN MEDICAL RECORD: ICD-10-PCS | Mod: CPTII,S$GLB,, | Performed by: PHYSICIAN ASSISTANT

## 2022-07-25 PROCEDURE — 82306 VITAMIN D 25 HYDROXY: CPT | Performed by: PHYSICIAN ASSISTANT

## 2022-07-25 PROCEDURE — 99999 PR PBB SHADOW E&M-EST. PATIENT-LVL IV: ICD-10-PCS | Mod: PBBFAC,,, | Performed by: PHYSICIAN ASSISTANT

## 2022-07-25 PROCEDURE — 3079F PR MOST RECENT DIASTOLIC BLOOD PRESSURE 80-89 MM HG: ICD-10-PCS | Mod: CPTII,S$GLB,, | Performed by: PHYSICIAN ASSISTANT

## 2022-07-25 PROCEDURE — 1160F PR REVIEW ALL MEDS BY PRESCRIBER/CLIN PHARMACIST DOCUMENTED: ICD-10-PCS | Mod: CPTII,S$GLB,, | Performed by: PHYSICIAN ASSISTANT

## 2022-07-25 PROCEDURE — 3008F BODY MASS INDEX DOCD: CPT | Mod: CPTII,S$GLB,, | Performed by: PHYSICIAN ASSISTANT

## 2022-07-25 PROCEDURE — 80061 LIPID PANEL: CPT | Performed by: FAMILY MEDICINE

## 2022-07-25 PROCEDURE — 84443 ASSAY THYROID STIM HORMONE: CPT | Performed by: FAMILY MEDICINE

## 2022-07-25 PROCEDURE — 85025 COMPLETE CBC W/AUTO DIFF WBC: CPT | Performed by: FAMILY MEDICINE

## 2022-07-25 PROCEDURE — 3075F PR MOST RECENT SYSTOLIC BLOOD PRESS GE 130-139MM HG: ICD-10-PCS | Mod: CPTII,S$GLB,, | Performed by: PHYSICIAN ASSISTANT

## 2022-07-25 PROCEDURE — 99999 PR PBB SHADOW E&M-EST. PATIENT-LVL IV: CPT | Mod: PBBFAC,,, | Performed by: PHYSICIAN ASSISTANT

## 2022-07-25 PROCEDURE — 3008F PR BODY MASS INDEX (BMI) DOCUMENTED: ICD-10-PCS | Mod: CPTII,S$GLB,, | Performed by: PHYSICIAN ASSISTANT

## 2022-07-25 PROCEDURE — 80053 COMPREHEN METABOLIC PANEL: CPT | Performed by: FAMILY MEDICINE

## 2022-07-25 PROCEDURE — 99396 PR PREVENTIVE VISIT,EST,40-64: ICD-10-PCS | Mod: S$GLB,,, | Performed by: PHYSICIAN ASSISTANT

## 2022-07-25 PROCEDURE — 3079F DIAST BP 80-89 MM HG: CPT | Mod: CPTII,S$GLB,, | Performed by: PHYSICIAN ASSISTANT

## 2022-07-25 PROCEDURE — 3075F SYST BP GE 130 - 139MM HG: CPT | Mod: CPTII,S$GLB,, | Performed by: PHYSICIAN ASSISTANT

## 2022-07-25 NOTE — PROGRESS NOTES
Subjective:       Patient ID: Clare Whitley is a 61 y.o. female.    Chief Complaint: Annual Exam      Patient Care Team:  Chhaya Jennings MD as PCP - General (Family Medicine)  Alexa Fulton MD as Obstetrician (Obstetrics and Gynecology)  Aida Acuña PA-C as Physician Assistant (Internal Medicine)    Patient presents to clinic today for annual physical exam.      Review of Systems   Constitutional: Positive for fatigue ( due to recent covid infection, improving) and fever (  due to recent covid infection, improving). Negative for chills and unexpected weight change.   HENT: Positive for congestion (  due to recent covid infection, improving) and rhinorrhea (  due to recent covid infection, improving). Negative for dental problem, ear pain, hearing loss and trouble swallowing.    Eyes: Negative for pain and visual disturbance.   Respiratory: Positive for cough (  due to recent covid infection, improving). Negative for shortness of breath.    Cardiovascular: Positive for chest pain (  due to recent covid infection, improving), palpitations (  due to recent covid infection, improving) and leg swelling (  due to recent covid infection, improving).   Gastrointestinal: Negative for abdominal distention, abdominal pain, blood in stool, constipation, diarrhea, nausea and vomiting.   Genitourinary: Negative for difficulty urinating and vaginal discharge.   Musculoskeletal: Positive for arthralgias ( chronic) and myalgias ( chronic).   Skin: Negative for rash.   Neurological: Negative for dizziness, weakness, numbness and headaches.   Hematological: Negative for adenopathy. Does not bruise/bleed easily.   Psychiatric/Behavioral: Negative for dysphoric mood and sleep disturbance. The patient is nervous/anxious (  due to recent covid infection, improving).        Objective:      Physical Exam  Vitals and nursing note reviewed.   Constitutional:       General: She is not in acute distress.     Appearance: Normal  appearance. She is well-developed.   HENT:      Head: Normocephalic and atraumatic.      Right Ear: Hearing, tympanic membrane, ear canal and external ear normal.      Left Ear: Hearing, tympanic membrane, ear canal and external ear normal.      Nose: Nose normal. No mucosal edema or rhinorrhea.      Mouth/Throat:      Lips: Pink.      Mouth: Mucous membranes are moist.      Pharynx: Oropharynx is clear. Uvula midline.   Eyes:      General: Lids are normal. No scleral icterus.     Extraocular Movements: Extraocular movements intact.      Conjunctiva/sclera: Conjunctivae normal.      Pupils: Pupils are equal, round, and reactive to light.   Neck:      Thyroid: No thyromegaly.   Cardiovascular:      Rate and Rhythm: Normal rate and regular rhythm.      Pulses: Normal pulses.   Pulmonary:      Effort: Pulmonary effort is normal.      Breath sounds: Normal breath sounds. No wheezing, rhonchi or rales.   Abdominal:      General: Bowel sounds are normal. There is no distension.      Palpations: Abdomen is soft. There is no mass.      Tenderness: There is no abdominal tenderness.   Musculoskeletal:         General: Normal range of motion.      Cervical back: Normal range of motion and neck supple.      Right lower leg: No edema.      Left lower leg: No edema.   Lymphadenopathy:      Cervical: No cervical adenopathy.   Skin:     General: Skin is warm and dry.      Findings: No lesion or rash.      Nails: There is no clubbing.   Neurological:      Mental Status: She is alert.      Cranial Nerves: No cranial nerve deficit.      Sensory: No sensory deficit.   Psychiatric:         Mood and Affect: Mood and affect normal.         Assessment:       1. Routine general medical examination at a health care facility    2. Essential hypertension    3. Vitamin D deficiency    4. SINTIA on CPAP    5. Colon cancer screening    6. Diuretic-induced hypokalemia    7. Overweight (BMI 25.0-29.9)        Plan:   1. Routine general medical  examination at a health care facility    2. Essential hypertension  Assessment & Plan:  /80, controlled, continue atenolol, lasix and chlorthalidone  Lab Results   Component Value Date     12/14/2021    K 4.1 12/14/2021    BUN 14 12/14/2021    CREATININE 0.9 12/14/2021    EGFRNONAA >60.0 12/14/2021    ESTGFRAFRICA >60.0 12/14/2021       Orders:  -     Basic Metabolic Panel    3. Vitamin D deficiency  Assessment & Plan:  Status pending lab, continue supplement    Orders:  -     Vitamin D    4. SINTIA on CPAP  Overview:  9/10/2020, 9/11/2020 Home Sleep Study Mild /Borderline, positional obstructive sleep apnea (SINTIA). best night was night 1.: over all AHI 8.0 . Over all RDI 15 with 5.5 hr of sleep data. Loud snoring was recorded. Oxygen saturation shereen was 85.3%.  Cpap set up date 9/30/2020  On auto CPAP 5-10 cm   Nasal wisp mask       5. Colon cancer screening  -     Fecal Immunochemical Test (iFOBT)    6. Diuretic-induced hypokalemia  Assessment & Plan:  Status pending lab, continue supplement      7. Overweight (BMI 25.0-29.9)  Assessment & Plan:  Wt Readings from Last 10 Encounters:   07/25/22 72.8 kg (160 lb 9.7 oz)   04/12/22 72.7 kg (160 lb 4.4 oz)   01/24/22 72 kg (158 lb 11.7 oz)   01/11/22 72.3 kg (159 lb 8 oz)   01/07/22 72.6 kg (160 lb)   11/17/21 75 kg (165 lb 7.3 oz)   11/10/21 72.8 kg (160 lb 9.7 oz)   10/20/21 72.8 kg (160 lb 9.7 oz)   10/18/21 76.5 kg (168 lb 10.4 oz)   06/09/21 77.4 kg (170 lb 10.2 oz)     Body mass index is 29.38 kg/m².  Counseled on diet and exercise.        Fasting labs today  Fitkit given  Discussed Covid vaccine, given scheduling information  Discussed shingrix and pneumonia vaccines, advised can be obtained at pharmacy.  6 month f/u with Dr. Jennings scheduled with fasting labs PTA  Health Maintenance reviewed/updated.

## 2022-07-25 NOTE — PATIENT INSTRUCTIONS
Check with your pharmacist regarding shingrix vaccine.     Anyone age 18 years or older is now eligible for all COVID-19 vaccines and anyone who 16 years or older is eligible for the Pfizer vaccine.     We are offering scheduling for new first dose appointments at select locations on a first come, first serve basis. [facility] users can check availability and schedule their vaccinations via [facility]. If you don't have a [facility] account, you can sign up at [email or web address], call [phone] or visit [email or web address] for available locations and times. Once available vaccine slots are filled, you will no longer be able to see the option to book.  As eager as we are to vaccinate as many patients as possible, you must have a scheduled appointment to receive a vaccine.    We encourage you to visit the [facility] website at [place] for available vaccine locations across the state.   Please visit [email or web address] for updated information.

## 2022-07-26 LAB
BASOPHILS # BLD AUTO: 0.06 K/UL (ref 0–0.2)
BASOPHILS NFR BLD: 1 % (ref 0–1.9)
DIFFERENTIAL METHOD: ABNORMAL
EOSINOPHIL # BLD AUTO: 0.4 K/UL (ref 0–0.5)
EOSINOPHIL NFR BLD: 7.3 % (ref 0–8)
ERYTHROCYTE [DISTWIDTH] IN BLOOD BY AUTOMATED COUNT: 15 % (ref 11.5–14.5)
HCT VFR BLD AUTO: 39 % (ref 37–48.5)
HGB BLD-MCNC: 13.5 G/DL (ref 12–16)
IMM GRANULOCYTES # BLD AUTO: 0.01 K/UL (ref 0–0.04)
IMM GRANULOCYTES NFR BLD AUTO: 0.2 % (ref 0–0.5)
LYMPHOCYTES # BLD AUTO: 1.2 K/UL (ref 1–4.8)
LYMPHOCYTES NFR BLD: 19.5 % (ref 18–48)
MCH RBC QN AUTO: 28 PG (ref 27–31)
MCHC RBC AUTO-ENTMCNC: 34.6 G/DL (ref 32–36)
MCV RBC AUTO: 81 FL (ref 82–98)
MONOCYTES # BLD AUTO: 0.4 K/UL (ref 0.3–1)
MONOCYTES NFR BLD: 7.1 % (ref 4–15)
NEUTROPHILS # BLD AUTO: 3.8 K/UL (ref 1.8–7.7)
NEUTROPHILS NFR BLD: 64.9 % (ref 38–73)
NRBC BLD-RTO: 0 /100 WBC
PLATELET # BLD AUTO: 221 K/UL (ref 150–450)
PMV BLD AUTO: 11.9 FL (ref 9.2–12.9)
RBC # BLD AUTO: 4.82 M/UL (ref 4–5.4)
WBC # BLD AUTO: 5.9 K/UL (ref 3.9–12.7)

## 2022-07-26 NOTE — ASSESSMENT & PLAN NOTE
Wt Readings from Last 10 Encounters:   07/25/22 72.8 kg (160 lb 9.7 oz)   04/12/22 72.7 kg (160 lb 4.4 oz)   01/24/22 72 kg (158 lb 11.7 oz)   01/11/22 72.3 kg (159 lb 8 oz)   01/07/22 72.6 kg (160 lb)   11/17/21 75 kg (165 lb 7.3 oz)   11/10/21 72.8 kg (160 lb 9.7 oz)   10/20/21 72.8 kg (160 lb 9.7 oz)   10/18/21 76.5 kg (168 lb 10.4 oz)   06/09/21 77.4 kg (170 lb 10.2 oz)     Body mass index is 29.38 kg/m².  Counseled on diet and exercise.

## 2022-07-26 NOTE — ASSESSMENT & PLAN NOTE
/80, controlled, continue atenolol, lasix and chlorthalidone  Lab Results   Component Value Date     12/14/2021    K 4.1 12/14/2021    BUN 14 12/14/2021    CREATININE 0.9 12/14/2021    EGFRNONAA >60.0 12/14/2021    ESTGFRAFRICA >60.0 12/14/2021      Up as tolerated.

## 2022-08-26 ENCOUNTER — HOSPITAL ENCOUNTER (EMERGENCY)
Facility: HOSPITAL | Age: 62
Discharge: HOME | End: 2022-08-26
Attending: EMERGENCY MEDICINE
Payer: COMMERCIAL

## 2022-08-26 VITALS
DIASTOLIC BLOOD PRESSURE: 66 MMHG | BODY MASS INDEX: 30.67 KG/M2 | HEIGHT: 62 IN | SYSTOLIC BLOOD PRESSURE: 114 MMHG | HEART RATE: 74 BPM | TEMPERATURE: 98 F | OXYGEN SATURATION: 99 % | WEIGHT: 166.69 LBS | RESPIRATION RATE: 16 BRPM

## 2022-08-26 DIAGNOSIS — R07.9 CHEST PAIN: ICD-10-CM

## 2022-08-26 DIAGNOSIS — R42 DIZZINESS: Primary | ICD-10-CM

## 2022-08-26 DIAGNOSIS — I63.9 STROKE: ICD-10-CM

## 2022-08-26 LAB
ALBUMIN SERPL BCP-MCNC: 3.9 G/DL (ref 3.5–5.2)
ALP SERPL-CCNC: 36 U/L (ref 55–135)
ALT SERPL W/O P-5'-P-CCNC: 16 U/L (ref 10–44)
ANION GAP SERPL CALC-SCNC: 12 MMOL/L (ref 8–16)
AST SERPL-CCNC: 24 U/L (ref 10–40)
BASOPHILS # BLD AUTO: 0.06 K/UL (ref 0–0.2)
BASOPHILS NFR BLD: 1.2 % (ref 0–1.9)
BILIRUB SERPL-MCNC: 0.6 MG/DL (ref 0.1–1)
BNP SERPL-MCNC: 16 PG/ML (ref 0–99)
BUN SERPL-MCNC: 14 MG/DL (ref 8–23)
CALCIUM SERPL-MCNC: 9.4 MG/DL (ref 8.7–10.5)
CHLORIDE SERPL-SCNC: 105 MMOL/L (ref 95–110)
CO2 SERPL-SCNC: 23 MMOL/L (ref 23–29)
CREAT SERPL-MCNC: 0.8 MG/DL (ref 0.5–1.4)
DIFFERENTIAL METHOD: ABNORMAL
EOSINOPHIL # BLD AUTO: 0.2 K/UL (ref 0–0.5)
EOSINOPHIL NFR BLD: 3.2 % (ref 0–8)
ERYTHROCYTE [DISTWIDTH] IN BLOOD BY AUTOMATED COUNT: 14.2 % (ref 11.5–14.5)
EST. GFR  (NO RACE VARIABLE): >60 ML/MIN/1.73 M^2
GLUCOSE SERPL-MCNC: 96 MG/DL (ref 70–110)
HCT VFR BLD AUTO: 36.5 % (ref 37–48.5)
HGB BLD-MCNC: 13.2 G/DL (ref 12–16)
IMM GRANULOCYTES # BLD AUTO: 0.02 K/UL (ref 0–0.04)
IMM GRANULOCYTES NFR BLD AUTO: 0.4 % (ref 0–0.5)
LYMPHOCYTES # BLD AUTO: 1.3 K/UL (ref 1–4.8)
LYMPHOCYTES NFR BLD: 25.3 % (ref 18–48)
MCH RBC QN AUTO: 27.8 PG (ref 27–31)
MCHC RBC AUTO-ENTMCNC: 36.2 G/DL (ref 32–36)
MCV RBC AUTO: 77 FL (ref 82–98)
MONOCYTES # BLD AUTO: 0.4 K/UL (ref 0.3–1)
MONOCYTES NFR BLD: 8.5 % (ref 4–15)
NEUTROPHILS # BLD AUTO: 3 K/UL (ref 1.8–7.7)
NEUTROPHILS NFR BLD: 61.4 % (ref 38–73)
NRBC BLD-RTO: 0 /100 WBC
PLATELET # BLD AUTO: 176 K/UL (ref 150–450)
PMV BLD AUTO: 11.7 FL (ref 9.2–12.9)
POTASSIUM SERPL-SCNC: 3.6 MMOL/L (ref 3.5–5.1)
PROT SERPL-MCNC: 8.4 G/DL (ref 6–8.4)
RBC # BLD AUTO: 4.75 M/UL (ref 4–5.4)
SODIUM SERPL-SCNC: 140 MMOL/L (ref 136–145)
TROPONIN I SERPL DL<=0.01 NG/ML-MCNC: 0.01 NG/ML (ref 0–0.03)
TROPONIN I SERPL DL<=0.01 NG/ML-MCNC: 0.01 NG/ML (ref 0–0.03)
WBC # BLD AUTO: 4.94 K/UL (ref 3.9–12.7)

## 2022-08-26 PROCEDURE — 99285 EMERGENCY DEPT VISIT HI MDM: CPT | Mod: 25

## 2022-08-26 PROCEDURE — 96361 HYDRATE IV INFUSION ADD-ON: CPT

## 2022-08-26 PROCEDURE — 93010 ELECTROCARDIOGRAM REPORT: CPT | Mod: ,,, | Performed by: INTERNAL MEDICINE

## 2022-08-26 PROCEDURE — 96360 HYDRATION IV INFUSION INIT: CPT

## 2022-08-26 PROCEDURE — 83880 ASSAY OF NATRIURETIC PEPTIDE: CPT | Performed by: EMERGENCY MEDICINE

## 2022-08-26 PROCEDURE — 93010 EKG 12-LEAD: ICD-10-PCS | Mod: ,,, | Performed by: INTERNAL MEDICINE

## 2022-08-26 PROCEDURE — 93005 ELECTROCARDIOGRAM TRACING: CPT

## 2022-08-26 PROCEDURE — 80053 COMPREHEN METABOLIC PANEL: CPT | Performed by: EMERGENCY MEDICINE

## 2022-08-26 PROCEDURE — 85025 COMPLETE CBC W/AUTO DIFF WBC: CPT | Performed by: EMERGENCY MEDICINE

## 2022-08-26 PROCEDURE — 25000003 PHARM REV CODE 250: Performed by: FAMILY MEDICINE

## 2022-08-26 PROCEDURE — 25000003 PHARM REV CODE 250: Performed by: EMERGENCY MEDICINE

## 2022-08-26 PROCEDURE — 84484 ASSAY OF TROPONIN QUANT: CPT | Mod: 91 | Performed by: EMERGENCY MEDICINE

## 2022-08-26 RX ORDER — CLONAZEPAM 1 MG/1
1 TABLET ORAL
Status: COMPLETED | OUTPATIENT
Start: 2022-08-26 | End: 2022-08-26

## 2022-08-26 RX ADMIN — CLONAZEPAM 1 MG: 1 TABLET ORAL at 05:08

## 2022-08-26 RX ADMIN — SODIUM CHLORIDE 1000 ML: 0.9 INJECTION, SOLUTION INTRAVENOUS at 07:08

## 2022-08-26 NOTE — ED NOTES
T/c to MRI regarding status of ordered MRI.  Was informed that patient should be taken this afternoon.

## 2022-08-26 NOTE — ED PROVIDER NOTES
SCRIBE #1 NOTE: I, Joseline Blandon, am scribing for, and in the presence of, Alejandro Schmitt Do, MD. I have scribed the HPI, ROS, and PEx.     SCRIBE #2 NOTE: I, Meño Taylor, am scribing for, and in the presence of,  Vanita Bowers MD. I have scribed the remaining portions of the note not scribed by Scribe #1.    History     Chief Complaint   Patient presents with    Dizziness     Pt reports dizziness since waking up this am. Pt reports dizziness increases when bending over or laying down. Denies any ear or sinus infections, vertigo, or flu like SS     Review of patient's allergies indicates:   Allergen Reactions    No known drug allergies          History of Present Illness     HPI    8/26/2022, 9:54 AM  History obtained from the patient      History of Present Illness: Clare Whitley is a 61 y.o. female patient with a PMHx of HTN, mitral regurgitation, MVP, and sickle cell trait who presents to the Emergency Department for evaluation of dizziness which onset several hours PTA. Symptoms are constant and moderate in severity. No mitigating or exacerbating factors reported. Pt states she had an episode of dizziness yesterday that lasted about 15 minutes. Pt states she woke up feeling dizzy. She remembers feeling as if the room was spinning. She also had difficulties walking to the bathroom due to the dizziness and had to hold onto her surroundings. She took her BP and states it was around 156 systolic and around the 90s diastolic. She also had R side weakness while being dizzy, but does not feel the weakness at bedside. Pt states she still feels slightly dizzy at bedside. Associated sxs include palpitations. Patient denies any fever, speech difficulty, facial asymmetry, trouble swallowing, SOB, and all other sxs at this time. Pt states she took her atenolol, lasix, and vitamin D3. Pt states her cardiologist prescribed her lasix due to heart muscles not gely normally. Last time she saw her  "cardiologist was in July.  No further complaints or concerns at this time.       Arrival mode: Personal vehicle      PCP: Chhaya Jennings MD        Past Medical History:  Past Medical History:   Diagnosis Date    Hypertension     Mitral regurgitation     "mild"; followed by Dr. Blackmon ([facility])    MVP (mitral valve prolapse)     "trivial"; followed by Dr. Blackmon ([facility])    Sickle cell trait        Past Surgical History:  Past Surgical History:   Procedure Laterality Date    CHOLECYSTECTOMY      laser SX Bilateral     LYMPH NODE BIOPSY Left     left neck     TUBAL LIGATION  9/2/1991    UTERINE FIBROID EMBOLIZATION  01/01/2010         Family History:  Family History   Problem Relation Age of Onset    Hypertension Mother     Cataracts Mother     Hypertension Father     Hypertension Sister     Hypertension Brother     Stroke Cousin        Social History:  Social History     Tobacco Use    Smoking status: Passive Smoke Exposure - Never Smoker    Smokeless tobacco: Never   Substance and Sexual Activity    Alcohol use: Never    Drug use: Never    Sexual activity: Yes     Partners: Male     Birth control/protection: Post-menopausal, None        Review of Systems     Review of Systems   Constitutional:  Negative for fever.   HENT:  Negative for sore throat and trouble swallowing.    Respiratory:  Negative for shortness of breath.    Cardiovascular:  Positive for palpitations. Negative for chest pain.   Gastrointestinal:  Negative for nausea.   Genitourinary:  Negative for dysuria.   Musculoskeletal:  Negative for back pain.   Skin:  Negative for rash.   Neurological:  Positive for dizziness. Negative for facial asymmetry, speech difficulty and weakness.   Hematological:  Does not bruise/bleed easily.   All other systems reviewed and are negative.     Physical Exam     Initial Vitals [08/26/22 0509]   BP Pulse Resp Temp SpO2   138/73 70 16 98 °F (36.7 °C) 99 %      MAP       --          Physical Exam  Nursing Notes and Vital " Signs Reviewed.  Constitutional: Patient is in no acute distress. Well-developed and well-nourished.  Head: Atraumatic. Normocephalic.  Eyes: PERRL. EOM intact. Conjunctivae are not pale. No scleral icterus.  ENT: Mucous membranes are moist. Oropharynx is clear and symmetric.    Neck: Supple. Full ROM. No lymphadenopathy. No carotid bruiis.   Cardiovascular: Regular rate. Regular rhythm. No murmurs, rubs, or gallops. Distal pulses are 2+ and symmetric.  Pulmonary/Chest: No respiratory distress. Clear to auscultation bilaterally. No wheezing or rales.  Abdominal: Soft and non-distended.  There is no tenderness.  No rebound, guarding, or rigidity. Good bowel sounds.  Genitourinary: No CVA tenderness  Musculoskeletal: Moves all extremities. No obvious deformities. No edema. No calf tenderness.  Skin: Warm and dry.  Neurological:  Alert, awake, and appropriate.  Normal speech.  No acute focal neurological deficits are appreciated. Negative romberg and pronator. No dyskinesia. Good heel to shin.   Psychiatric: Normal affect. Good eye contact. Appropriate in content.     ED Course   Procedures  ED Vital Signs:  Vitals:    08/26/22 0719 08/26/22 0720 08/26/22 0842 08/26/22 0844   BP:  127/71 137/71 (!) 144/82   Pulse: 60 62 (!) 58 62   Resp:  15 18 18   Temp:       TempSrc:       SpO2:  97% 98% 99%   Weight:       Height:        08/26/22 0846 08/26/22 0932 08/26/22 1032 08/26/22 1132   BP: 139/76 126/62 134/64 136/65   Pulse: 69 60 (!) 58 64   Resp: 18 14 13 15   Temp:       TempSrc:       SpO2: 98% 99% 99% 100%   Weight:       Height:        08/26/22 1231 08/26/22 1331 08/26/22 1332 08/26/22 1502   BP: 131/65  (!) 143/67 129/65   Pulse: 63 (!) 59  (!) 58   Resp: 18 15  16   Temp:       TempSrc:       SpO2: 95% 99%  99%   Weight:       Height:        08/26/22 1603 08/26/22 1731 08/26/22 1915   BP: (!) 113/55 116/64 114/66   Pulse: (!) 57 (!) 59 74   Resp: 15 15 16   Temp:      TempSrc:      SpO2: 98% 97% 99%   Weight:       Height:          Abnormal Lab Results:  Labs Reviewed   CBC W/ AUTO DIFFERENTIAL - Abnormal; Notable for the following components:       Result Value    Hematocrit 36.5 (*)     MCV 77 (*)     MCHC 36.2 (*)     All other components within normal limits   COMPREHENSIVE METABOLIC PANEL - Abnormal; Notable for the following components:    Alkaline Phosphatase 36 (*)     All other components within normal limits   TROPONIN I   B-TYPE NATRIURETIC PEPTIDE   TROPONIN I        All Lab Results:  Results for orders placed or performed during the hospital encounter of 08/26/22   CBC auto differential   Result Value Ref Range    WBC 4.94 3.90 - 12.70 K/uL    RBC 4.75 4.00 - 5.40 M/uL    Hemoglobin 13.2 12.0 - 16.0 g/dL    Hematocrit 36.5 (L) 37.0 - 48.5 %    MCV 77 (L) 82 - 98 fL    MCH 27.8 27.0 - 31.0 pg    MCHC 36.2 (H) 32.0 - 36.0 g/dL    RDW 14.2 11.5 - 14.5 %    Platelets 176 150 - 450 K/uL    MPV 11.7 9.2 - 12.9 fL    Immature Granulocytes 0.4 0.0 - 0.5 %    Gran # (ANC) 3.0 1.8 - 7.7 K/uL    Immature Grans (Abs) 0.02 0.00 - 0.04 K/uL    Lymph # 1.3 1.0 - 4.8 K/uL    Mono # 0.4 0.3 - 1.0 K/uL    Eos # 0.2 0.0 - 0.5 K/uL    Baso # 0.06 0.00 - 0.20 K/uL    nRBC 0 0 /100 WBC    Gran % 61.4 38.0 - 73.0 %    Lymph % 25.3 18.0 - 48.0 %    Mono % 8.5 4.0 - 15.0 %    Eosinophil % 3.2 0.0 - 8.0 %    Basophil % 1.2 0.0 - 1.9 %    Differential Method Automated    Comprehensive metabolic panel   Result Value Ref Range    Sodium 140 136 - 145 mmol/L    Potassium 3.6 3.5 - 5.1 mmol/L    Chloride 105 95 - 110 mmol/L    CO2 23 23 - 29 mmol/L    Glucose 96 70 - 110 mg/dL    BUN 14 8 - 23 mg/dL    Creatinine 0.8 0.5 - 1.4 mg/dL    Calcium 9.4 8.7 - 10.5 mg/dL    Total Protein 8.4 6.0 - 8.4 g/dL    Albumin 3.9 3.5 - 5.2 g/dL    Total Bilirubin 0.6 0.1 - 1.0 mg/dL    Alkaline Phosphatase 36 (L) 55 - 135 U/L    AST 24 10 - 40 U/L    ALT 16 10 - 44 U/L    Anion Gap 12 8 - 16 mmol/L    eGFR >60 >60 mL/min/1.73 m^2   Troponin I #1   Result  Value Ref Range    Troponin I 0.010 0.000 - 0.026 ng/mL   BNP   Result Value Ref Range    BNP 16 0 - 99 pg/mL   Troponin I #2   Result Value Ref Range    Troponin I 0.009 0.000 - 0.026 ng/mL       Imaging Results:  Imaging Results              MRA Neck without contrast (Final result)  Result time 08/26/22 19:36:16      Final result by Sam Banuelos MD (08/26/22 19:36:16)                   Impression:      No significant arterial abnormalities.      Electronically signed by: Sam Banuelos  Date:    08/26/2022  Time:    19:36               Narrative:    EXAMINATION:  MRA NECK WITHOUT CONTRAST    CLINICAL HISTORY:  Dizziness, persistent/recurrent, cardiac or vascular cause suspected;    TECHNIQUE:  Non-contrast 2D and/or 3D time-of-flight MR angiography of the neck was performed, with MIP reformatting.    COMPARISON:  None    FINDINGS:  Aorta: Normal 3 vessel arch.    Extracranial carotid circulation: No hemodynamically significant stenosis, aneurysmal dilatation, or dissection.    Extracranial vertebral circulation: No hemodynamically significant stenosis, aneurysmal dilatation, or dissection.                                       MRA Brain without contrast (Final result)  Result time 08/26/22 18:57:01      Final result by Sam Banuelos MD (08/26/22 18:57:01)                   Impression:      No large vessel occlusion or severe stenosis.      Electronically signed by: Sam Banuelos  Date:    08/26/2022  Time:    18:57               Narrative:    EXAMINATION:  MRA BRAIN WITHOUT CONTRAST    CLINICAL HISTORY:  Dizziness, persistent/recurrent, cardiac or vascular cause suspected; .    TECHNIQUE:  Non-contrast 3-D time-of-flight intracranial MR angiography was performed through the [place] [facility] [place] with MIP reformatting.    COMPARISON:  None.    FINDINGS:  Anterior intracranial circulation: No high-grade focal stenosis, occlusion, aneurysm, or vascular malformation.    Posterior intracranial circulation: No  high-grade focal stenosis, occlusion, aneurysm, or vascular malformation. Prominent bilateral posterior communicating arteries.                                       MRI Brain Without Contrast (Final result)  Result time 08/26/22 18:46:40      Final result by Sam Banuelos MD (08/26/22 18:46:40)                   Impression:      No acute abnormality.  No findings to explain this patient's vertigo.      Electronically signed by: Sam Banuelos  Date:    08/26/2022  Time:    18:46               Narrative:    EXAMINATION:  MRI BRAIN WITHOUT CONTRAST    CLINICAL HISTORY:  Dizziness, persistent/recurrent, cardiac or vascular cause suspected;.    TECHNIQUE:  Multiplanar multisequence MR imaging of the brain was performed without contrast.    COMPARISON:  Multiple priors.    FINDINGS:  Intracranial compartment:    Ventricles and sulci are normal in size for age without evidence of hydrocephalus. No extra-axial blood or fluid collections.    The brain parenchyma appears normal. No mass lesion, acute hemorrhage, edema or acute infarct.    Normal vascular flow voids are preserved.    Skull/extracranial contents (limited evaluation): Bone marrow signal intensity is normal.                                       X-Ray Chest AP Portable (Final result)  Result time 08/26/22 08:41:41      Final result by Helen Oshea MD (08/26/22 08:41:41)                   Impression:      No acute radiographic abnormality.      Electronically signed by: Helen Oshea  Date:    08/26/2022  Time:    08:41               Narrative:    EXAMINATION:  XR CHEST AP PORTABLE    CLINICAL HISTORY:  Chest Pain;    TECHNIQUE:  Single frontal view of the chest was performed.    COMPARISON:  Chest radiograph 10/18/2021    FINDINGS:  Single ECG monitoring lead overlies lateral right hemithorax.The lungs are clear, with normal appearance of pulmonary vasculature and no pleural effusion or pneumothorax.    The cardiac silhouette is normal in size.  The hilar and mediastinal contours are unremarkable.    No acute osseous abnormality noting degenerative change of the spine.                                       CT Head Without Contrast (Final result)  Result time 08/26/22 08:25:24      Final result by Clive Cook MD (08/26/22 08:25:24)                   Impression:      Chronic microvascular ischemic changes. If there is additional clinical concern, MRI recommended.    All CT scans at this facility use dose modulation, iterative reconstruction, and/or weight based dosing when appropriate to reduce radiation dose to as low as reasonable achievable.      Electronically signed by: Clive Cook MD  Date:    08/26/2022  Time:    08:25               Narrative:    EXAMINATION:  CT HEAD WITHOUT CONTRAST    CLINICAL HISTORY:  Dizziness, persistent/recurrent, cardiac or vascular cause suspected;    TECHNIQUE:  Low dose axial CT images obtained throughout the head without intravenous contrast. Sagittal and coronal reconstructions were performed.    COMPARISON:  None.    FINDINGS:  Intracranial compartment:    The brain parenchyma demonstrates areas of decreased attenuation with mild to moderate periventricular white matter consistent with chronic microvascular ischemic changes..  No parenchymal mass, hemorrhage, edema or major vascular distribution infarct.  Vascular calcifications are noted.    Mild prominence of the sulci and ventricles are consistent with age-related involutional changes.    No extra-axial blood or fluid collections.    Skull/extracranial contents (limited evaluation): No fracture.  Degenerative changes left TMJ.  Mastoid air cells and paranasal sinuses are essentially clear.                                       The EKG was ordered, reviewed, and independently interpreted by the ED provider.  Interpretation time: 7:12  Rate: 60 BPM  Rhythm: normal sinus rhythm  Interpretation: Possible Left atrial enlargement. No STEMI.             The Emergency  Provider reviewed the vital signs and test results, which are outlined above.     ED Discussion     10:50 AM: Discussed pt's case with Dr. Marquise Olivera MD (Neurology) who recommends MRI brain and MRA head and neck.    4:00 PM: Dr. Harvey transfers care of patient to Dr. Bowers pending imaging results.    8:12 PM: Reassessed pt at this time. Discussed with pt all pertinent ED information and results. Discussed pt dx and plan of tx. Gave pt all f/u and return to the ED instructions. All questions and concerns were addressed at this time. Pt expresses understanding of information and instructions, and is comfortable with plan to discharge. Pt is stable for discharge.    I discussed with patient and/or family/caretaker that evaluation in the ED does not suggest any emergent or life threatening medical conditions requiring immediate intervention beyond what was provided in the ED, and I believe patient is safe for discharge.  Regardless, an unremarkable evaluation in the ED does not preclude the development or presence of a serious of life threatening condition. As such, patient was instructed to return immediately for any worsening or change in current symptoms.         Medical Decision Making:   Clinical Tests:   Lab Tests: Ordered and Reviewed  Radiological Study: Ordered and Reviewed  Medical Tests: Ordered and Reviewed         ED Medication(s):  Medications   sodium chloride 0.9% bolus 1,000 mL (0 mLs Intravenous Stopped 8/26/22 1133)   clonazePAM tablet 1 mg (1 mg Oral Given 8/26/22 6221)       Discharge Medication List as of 8/26/2022  7:41 PM           Follow-up Information       Schedule an appointment as soon as possible for a visit  with Chhaya Jennings MD.    Specialty: Family Medicine  Why: As needed  Contact information:  [id number] [place] [address] DR Adry TORO [id number] [phone]                                 Scribe Attestation:   Scribe #1: I performed the above scribed service and the documentation  accurately describes the services I performed. I attest to the accuracy of the note.     Attending:   Physician Attestation Statement for Scribe #1: I, Alejandro Schmitt Do, MD, personally performed the services described in this documentation, as scribed by Joseline Blandon, in my presence, and it is both accurate and complete.       Scribe Attestation:   Scribe #2: I performed the above scribed service and the documentation accurately describes the services I performed. I attest to the accuracy of the note.    Attending Attestation:           Physician Attestation for Scribe:    Physician Attestation Statement for Scribe #2: I, Vanita Bowers MD, reviewed documentation, as scribed by Meño Taylor in my presence, and it is both accurate and complete. I also acknowledge and confirm the content of the note done by Scribe #1.         Clinical Impression       ICD-10-CM ICD-9-CM   1. Dizziness  R42 780.4   2. Chest pain  R07.9 786.50   3. Stroke  I63.9 434.91       Disposition:   Disposition: Discharged  Condition: Stable     Vanita Bowers MD  08/27/22 1235

## 2022-09-01 ENCOUNTER — TELEPHONE (OUTPATIENT)
Dept: NEUROLOGY | Facility: CLINIC | Age: 62
End: 2022-09-01
Payer: COMMERCIAL

## 2022-09-01 ENCOUNTER — TELEPHONE (OUTPATIENT)
Dept: INTERNAL MEDICINE | Facility: CLINIC | Age: 62
End: 2022-09-01
Payer: COMMERCIAL

## 2022-09-01 NOTE — TELEPHONE ENCOUNTER
----- Message from Ema Barrera sent at 9/1/2022  4:09 PM CDT -----  Contact: Patient  Patient called to consult with nurse or staff regarding her recent emergency room visit. She states she was informed to reach out and let the office know that she was at the ER for dizziness. If needing to reach out to the patient, she can be reached at [phone]. Thanks/

## 2022-09-01 NOTE — TELEPHONE ENCOUNTER
I reviewed personally reviewed Patients MRI Brain and MRV Head and neck and there is not central brain abnormality noted and no blockages or stenosis that would explain central brain related dizziness. I would agree to follow up with cardiology.

## 2022-09-01 NOTE — TELEPHONE ENCOUNTER
Pt called to report a hospital visit for dizziness d, they did a MRI . Offered appointment she stated that they believe its cardiac . She gonna reach out to her cardiologist but wanted Junie to look over the testing from the ER

## 2022-09-01 NOTE — TELEPHONE ENCOUNTER
----- Message from Ema Barrera sent at 9/1/2022  4:09 PM CDT -----  Contact: Patient  Patient called to consult with nurse or staff regarding her recent emergency room visit. She states she was informed to reach out and let the office know that she was at the ER for dizziness. If needing to reach out to the patient, she can be reached at [phone]. Thanks/

## 2022-09-08 ENCOUNTER — OFFICE VISIT (OUTPATIENT)
Dept: INTERNAL MEDICINE | Facility: CLINIC | Age: 62
End: 2022-09-08
Payer: COMMERCIAL

## 2022-09-08 ENCOUNTER — OFFICE VISIT (OUTPATIENT)
Dept: PULMONOLOGY | Facility: CLINIC | Age: 62
End: 2022-09-08
Payer: COMMERCIAL

## 2022-09-08 VITALS
BODY MASS INDEX: 29.86 KG/M2 | HEIGHT: 62 IN | HEART RATE: 93 BPM | SYSTOLIC BLOOD PRESSURE: 126 MMHG | DIASTOLIC BLOOD PRESSURE: 82 MMHG | WEIGHT: 162.25 LBS | OXYGEN SATURATION: 97 %

## 2022-09-08 VITALS
HEIGHT: 62 IN | BODY MASS INDEX: 30.36 KG/M2 | RESPIRATION RATE: 20 BRPM | DIASTOLIC BLOOD PRESSURE: 82 MMHG | SYSTOLIC BLOOD PRESSURE: 112 MMHG | WEIGHT: 165 LBS | HEART RATE: 82 BPM | OXYGEN SATURATION: 97 %

## 2022-09-08 DIAGNOSIS — J30.2 SEASONAL ALLERGIC RHINITIS, UNSPECIFIED TRIGGER: ICD-10-CM

## 2022-09-08 DIAGNOSIS — R42 DIZZINESS: Primary | ICD-10-CM

## 2022-09-08 DIAGNOSIS — I10 ESSENTIAL HYPERTENSION: Chronic | ICD-10-CM

## 2022-09-08 DIAGNOSIS — G47.33 OSA ON CPAP: Primary | ICD-10-CM

## 2022-09-08 DIAGNOSIS — F41.8 SITUATIONAL ANXIETY: ICD-10-CM

## 2022-09-08 DIAGNOSIS — E66.09 CLASS 1 OBESITY DUE TO EXCESS CALORIES WITH SERIOUS COMORBIDITY AND BODY MASS INDEX (BMI) OF 30.0 TO 30.9 IN ADULT: ICD-10-CM

## 2022-09-08 PROCEDURE — 3008F PR BODY MASS INDEX (BMI) DOCUMENTED: ICD-10-PCS | Mod: CPTII,S$GLB,, | Performed by: PHYSICIAN ASSISTANT

## 2022-09-08 PROCEDURE — 3008F BODY MASS INDEX DOCD: CPT | Mod: CPTII,S$GLB,, | Performed by: NURSE PRACTITIONER

## 2022-09-08 PROCEDURE — 3079F DIAST BP 80-89 MM HG: CPT | Mod: CPTII,S$GLB,, | Performed by: NURSE PRACTITIONER

## 2022-09-08 PROCEDURE — 99214 OFFICE O/P EST MOD 30 MIN: CPT | Mod: S$GLB,,, | Performed by: NURSE PRACTITIONER

## 2022-09-08 PROCEDURE — 3074F SYST BP LT 130 MM HG: CPT | Mod: CPTII,S$GLB,, | Performed by: PHYSICIAN ASSISTANT

## 2022-09-08 PROCEDURE — 99213 OFFICE O/P EST LOW 20 MIN: CPT | Mod: S$GLB,,, | Performed by: PHYSICIAN ASSISTANT

## 2022-09-08 PROCEDURE — 1160F PR REVIEW ALL MEDS BY PRESCRIBER/CLIN PHARMACIST DOCUMENTED: ICD-10-PCS | Mod: CPTII,S$GLB,, | Performed by: NURSE PRACTITIONER

## 2022-09-08 PROCEDURE — 1159F PR MEDICATION LIST DOCUMENTED IN MEDICAL RECORD: ICD-10-PCS | Mod: CPTII,S$GLB,, | Performed by: NURSE PRACTITIONER

## 2022-09-08 PROCEDURE — 99999 PR PBB SHADOW E&M-EST. PATIENT-LVL IV: ICD-10-PCS | Mod: PBBFAC,,, | Performed by: NURSE PRACTITIONER

## 2022-09-08 PROCEDURE — 99999 PR PBB SHADOW E&M-EST. PATIENT-LVL III: CPT | Mod: PBBFAC,,, | Performed by: PHYSICIAN ASSISTANT

## 2022-09-08 PROCEDURE — 3074F SYST BP LT 130 MM HG: CPT | Mod: CPTII,S$GLB,, | Performed by: NURSE PRACTITIONER

## 2022-09-08 PROCEDURE — 3079F DIAST BP 80-89 MM HG: CPT | Mod: CPTII,S$GLB,, | Performed by: PHYSICIAN ASSISTANT

## 2022-09-08 PROCEDURE — 1160F RVW MEDS BY RX/DR IN RCRD: CPT | Mod: CPTII,S$GLB,, | Performed by: NURSE PRACTITIONER

## 2022-09-08 PROCEDURE — 99999 PR PBB SHADOW E&M-EST. PATIENT-LVL IV: CPT | Mod: PBBFAC,,, | Performed by: NURSE PRACTITIONER

## 2022-09-08 PROCEDURE — 3079F PR MOST RECENT DIASTOLIC BLOOD PRESSURE 80-89 MM HG: ICD-10-PCS | Mod: CPTII,S$GLB,, | Performed by: NURSE PRACTITIONER

## 2022-09-08 PROCEDURE — 3008F PR BODY MASS INDEX (BMI) DOCUMENTED: ICD-10-PCS | Mod: CPTII,S$GLB,, | Performed by: NURSE PRACTITIONER

## 2022-09-08 PROCEDURE — 3074F PR MOST RECENT SYSTOLIC BLOOD PRESSURE < 130 MM HG: ICD-10-PCS | Mod: CPTII,S$GLB,, | Performed by: NURSE PRACTITIONER

## 2022-09-08 PROCEDURE — 99999 PR PBB SHADOW E&M-EST. PATIENT-LVL III: ICD-10-PCS | Mod: PBBFAC,,, | Performed by: PHYSICIAN ASSISTANT

## 2022-09-08 PROCEDURE — 99214 PR OFFICE/OUTPT VISIT, EST, LEVL IV, 30-39 MIN: ICD-10-PCS | Mod: S$GLB,,, | Performed by: NURSE PRACTITIONER

## 2022-09-08 PROCEDURE — 99213 PR OFFICE/OUTPT VISIT, EST, LEVL III, 20-29 MIN: ICD-10-PCS | Mod: S$GLB,,, | Performed by: PHYSICIAN ASSISTANT

## 2022-09-08 PROCEDURE — 3074F PR MOST RECENT SYSTOLIC BLOOD PRESSURE < 130 MM HG: ICD-10-PCS | Mod: CPTII,S$GLB,, | Performed by: PHYSICIAN ASSISTANT

## 2022-09-08 PROCEDURE — 1159F MED LIST DOCD IN RCRD: CPT | Mod: CPTII,S$GLB,, | Performed by: NURSE PRACTITIONER

## 2022-09-08 PROCEDURE — 82274 ASSAY TEST FOR BLOOD FECAL: CPT | Performed by: PHYSICIAN ASSISTANT

## 2022-09-08 PROCEDURE — 3079F PR MOST RECENT DIASTOLIC BLOOD PRESSURE 80-89 MM HG: ICD-10-PCS | Mod: CPTII,S$GLB,, | Performed by: PHYSICIAN ASSISTANT

## 2022-09-08 PROCEDURE — 3008F BODY MASS INDEX DOCD: CPT | Mod: CPTII,S$GLB,, | Performed by: PHYSICIAN ASSISTANT

## 2022-09-08 NOTE — ASSESSMENT & PLAN NOTE
On auto CPAP 5-10 cm   AHI   Nasal wisp mask   Updated supply order   Continue APAP 5-10 cm   Follow up September 2023 for yearly CPAP compliance download and supply order.

## 2022-09-08 NOTE — PROGRESS NOTES
Subjective:       Patient ID: Clare Whitley is a 61 y.o. female.    Chief Complaint: Hospital Follow Up      Patient Care Team:  Chhaya Jennings MD as PCP - General (Family Medicine)  Alexa Fulton MD as Obstetrician (Obstetrics and Gynecology)  Aida Acuña PA-C as Physician Assistant (Internal Medicine)    HPI    Clare Whitley is a 61 y.o. female who presents today with complaints of Hospital Follow Up  Patient here for ER follow-up.  See ER visit below.  Patient is currently asymptomatic and has not had any further episodes of dizziness.  Denies any weakness or facial drooping.    8/26/2022, 9:54 AM  History obtained from the patient                  History of Present Illness: Clare Whitley is a 61 y.o. female patient with a PMHx of HTN, mitral regurgitation, MVP, and sickle cell trait who presents to the Emergency Department for evaluation of dizziness which onset several hours PTA. Symptoms are constant and moderate in severity. No mitigating or exacerbating factors reported. Pt states she had an episode of dizziness yesterday that lasted about 15 minutes. Pt states she woke up feeling dizzy. She remembers feeling as if the room was spinning. She also had difficulties walking to the bathroom due to the dizziness and had to hold onto her surroundings. She took her BP and states it was around 156 systolic and around the 90s diastolic. She also had R side weakness while being dizzy, but does not feel the weakness at bedside. Pt states she still feels slightly dizzy at bedside. Associated sxs include palpitations. Patient denies any fever, speech difficulty, facial asymmetry, trouble swallowing, SOB, and all other sxs at this time. Pt states she took her atenolol, lasix, and vitamin D3. Pt states her cardiologist prescribed her lasix due to heart muscles not gely normally. Last time she saw her cardiologist was in July.  No further complaints or concerns at this time.     Troponin was  normal x2  BNP normal  CMP and CBC stable  MRI and MRA brain were normal  MRA neck did not reveal any arterial abnormalities    Review of Systems   Constitutional:  Negative for chills, fatigue and fever.   Respiratory:  Negative for shortness of breath.    Cardiovascular:  Negative for chest pain.   Neurological:  Negative for dizziness, facial asymmetry, weakness, light-headedness, numbness and headaches.     Objective:      Physical Exam  Vitals and nursing note reviewed.   Constitutional:       General: She is not in acute distress.     Appearance: She is well-developed.   HENT:      Head: Normocephalic and atraumatic.   Eyes:      General: Lids are normal. No scleral icterus.     Extraocular Movements: Extraocular movements intact.      Conjunctiva/sclera: Conjunctivae normal.   Cardiovascular:      Rate and Rhythm: Normal rate and regular rhythm.   Pulmonary:      Effort: Pulmonary effort is normal.      Breath sounds: Normal breath sounds. No decreased breath sounds, wheezing, rhonchi or rales.   Neurological:      Mental Status: She is alert.      GCS: GCS eye subscore is 4. GCS verbal subscore is 5. GCS motor subscore is 6.      Cranial Nerves: No cranial nerve deficit.      Motor: Motor function is intact.      Gait: Gait is intact.      Comments: Ambulatory without assistive device   Psychiatric:         Mood and Affect: Mood and affect normal.       Assessment:       1. Dizziness        Plan:   1. Dizziness    ER precautions given  Follow-up with cardiologist

## 2022-09-08 NOTE — PROGRESS NOTES
Subjective:      Patient ID: Clare Whitley is a 61 y.o. female.    Chief Complaint: Sleep Apnea    HPI: Clare Whitley presents to clinic for follow up for SINTIA annual CPAP complaince assessment.    Cpap set up date 9/30/2020    She is on Auto CPAP of 5-10 cmH2O pressure which is optimally controlling sleep apnea with apneic index (AHI) 1.2 events an hour.   Complaince download today reveals 4.4% of days with greater than 4 hours of device use.   Patient reports benefit from CPAP use, awakens more refreshed, no longer having morning headache.   Patient reports no complaints. Nasal wisp mask is tolerated.     9/10/2020, 9/11/2020 Home Sleep Study Mild /Borderline, positional obstructive sleep apnea (SINTIA). best night was night 1.: over all AHI 8.0 . Over all RDI 15 with 5.5 hr of sleep data. Loud snoring was recorded. Oxygen saturation shereen was 85.3%.    Compliance Summary  6/10/2022 - 9/7/2022 (90 days)  Days with Device Usage 27 days  Days without Device Usage 63 days  Percent Days with Device Usage 30.0%  Cumulative Usage 1 day 11 hrs. 22 mins. 15 secs.  Maximum Usage (1 Day) 6 hrs. 43 mins. 42 secs.  Average Usage (All Days) 23 mins. 34 secs.  Average Usage (Days Used) 1 hrs. 18 mins. 36 secs.  Minimum Usage (1 Day) 4 secs.  Percent of Days with Usage >= 4 Hours 4.4%  Percent of Days with Usage < 4 Hours 95.6%  Date Range  Total Blower Time 2 days 13 hrs. 16 mins. 14 secs.  Average AHI 1.2  Auto-CPAP Summary  Auto-CPAP Mean Pressure 5.5 cmH2O  Auto-CPAP Peak Average Pressure 6.9 cmH2O  Device Pressure <= 90% of Time 6.6 cmH2O  Average Time in Large Leak Per Day 31 secs.    Previous Report Reviewed: lab reports and office notes     Past Medical History: The following portions of the patient's history were reviewed and updated as appropriate:   She  has a past surgical history that includes Uterine fibroid embolization (01/01/2010); Lymph node biopsy (Left); Cholecystectomy; laser SX (Bilateral); and Tubal  ligation (9/2/1991).  Her family history includes Cataracts in her mother; Hypertension in her brother, father, mother, and sister; Stroke in her cousin.  She  reports that she is a non-smoker but has been exposed to tobacco smoke. She has never used smokeless tobacco. She reports that she does not drink alcohol and does not use drugs.  She has a current medication list which includes the following prescription(s): aspirin, atenolol, chlorthalidone, cholecalciferol (vitamin d3), diclofenac sodium, furosemide, and potassium chloride.  She is allergic to no known drug allergies..    The following portions of the patient's history were reviewed and updated as appropriate: allergies, current medications, past family history, past medical history, past social history, past surgical history and problem list.    Review of Systems   Constitutional:  Negative for fever, chills, weight loss, weight gain, activity change, appetite change, fatigue and night sweats.   HENT:  Negative for postnasal drip, rhinorrhea, sinus pressure, voice change and congestion.    Eyes:  Negative for redness and itching.   Respiratory:  Negative for snoring, cough, sputum production, chest tightness, shortness of breath, wheezing, orthopnea, asthma nighttime symptoms, dyspnea on extertion, use of rescue inhaler and somnolence.    Cardiovascular: Negative.  Negative for chest pain, palpitations and leg swelling.   Genitourinary:  Negative for difficulty urinating and hematuria.   Endocrine:  Negative for cold intolerance and heat intolerance.    Musculoskeletal:  Negative for arthralgias, gait problem, joint swelling and myalgias.   Skin: Negative.    Gastrointestinal:  Negative for nausea, vomiting, abdominal pain and acid reflux.   Neurological:  Negative for dizziness, weakness, light-headedness and headaches.   Hematological:  Negative for adenopathy. No excessive bruising.   All other systems reviewed and are negative.   Objective:   /82  "  Pulse 82   Resp 20   Ht 5' 2" (1.575 m)   Wt 74.9 kg (165 lb 0.2 oz)   SpO2 97%   BMI 30.18 kg/m²   Physical Exam  Vitals and nursing note reviewed.   Constitutional:       General: She is not in acute distress.     Appearance: She is well-developed. She is obese. She is not ill-appearing or toxic-appearing.   HENT:      Head: Normocephalic.      Right Ear: External ear normal.      Left Ear: External ear normal.      Nose: Nose normal.      Mouth/Throat:      Pharynx: No oropharyngeal exudate.   Eyes:      Conjunctiva/sclera: Conjunctivae normal.   Cardiovascular:      Rate and Rhythm: Normal rate and regular rhythm.      Heart sounds: Normal heart sounds.   Pulmonary:      Effort: Pulmonary effort is normal.      Breath sounds: Normal breath sounds. No stridor.   Abdominal:      Palpations: Abdomen is soft.   Musculoskeletal:         General: Normal range of motion.      Cervical back: Normal range of motion and neck supple.   Lymphadenopathy:      Cervical: No cervical adenopathy.   Skin:     General: Skin is warm and dry.   Neurological:      Mental Status: She is alert and oriented to person, place, and time.   Psychiatric:         Behavior: Behavior normal. Behavior is cooperative.         Thought Content: Thought content normal.         Judgment: Judgment normal.     Personal Diagnostic Review  CPAP download    9/10/2020, 9/11/2020 Home Sleep Study Mild /Borderline, positional obstructive sleep apnea (SINTIA). best night was night 1.: over all AHI 8.0 . Over all RDI 15 with 5.5 hr of sleep data. Loud snoring was recorded. Oxygen saturation shereen was 85.3%.      Assessment:     1. SINTIA on CPAP    2. Class 1 obesity due to excess calories with serious comorbidity and body mass index (BMI) of 30.0 to 30.9 in adult    3. Seasonal allergic rhinitis, unspecified trigger    4. Situational anxiety    5. Essential hypertension      Orders Placed This Encounter   Procedures    CPAP/BIPAP SUPPLIES     Benefits and " compliant  90 day supply. 4 refills  HME: [facility]     Order Specific Question:   Length of need (1-99 months):     Answer:   99     Order Specific Question:   Choose ONE mask type and its corresponding cushions and/or pillows:     Answer:    Nasal Mask, 1 per 90 days:  Nasal Cushions, (6 per 90 days):  Nasal Pillows, (6 per 90 days)     Order Specific Question:   Choose EITHER Heated or Non-Heated Tubjing     Answer:    Non-Heated Tubing, 1 per 90 days     Order Specific Question:   Number of Days Needed:     Answer:   99     Order Specific Question:   All other supplies as needed as listed below:     Answer:    Headgear, 1 per 180 days     Order Specific Question:   All other supplies as needed as listed below:     Answer:    Chin Strap, 1 per 180 days     Order Specific Question:   All other supplies as needed as listed below:     Answer:    Disposable Filter, 6 per 90 days     Order Specific Question:   All other supplies as needed as listed below:     Answer:    Humidifier Chamber, 1 per 180 days     Order Specific Question:   All other supplies as needed as listed below:     Answer:    Non-Disposable Filter, 1 per 180 days       Plan:     Problem List Items Addressed This Visit       Essential hypertension (Chronic)     Stable and controlled. Continue current treatment plan as previously prescribed with your PCP.              Situational anxiety     Off Zoloft. Managed by primary care provider           Seasonal allergic rhinitis     Controlled          SINTIA on CPAP - Primary     On auto CPAP 5-10 cm   AHI   Nasal wisp mask   Updated supply order   Continue APAP 5-10 cm   Follow up September 2023 for yearly CPAP compliance download and supply order.             Relevant Orders    CPAP/BIPAP SUPPLIES    Class 1 obesity due to excess calories with serious comorbidity and body mass index (BMI) of 30.0 to 30.9 in adult     Encouraged calorie reduction and 30 minutes of  exercise daily. Discussed impact of obesity on general health.  Wt Readings from Last 9 Encounters:   09/08/22 74.9 kg (165 lb 0.2 oz)   09/08/22 73.6 kg (162 lb 4.1 oz)   08/26/22 75.6 kg (166 lb 10.7 oz)   07/25/22 72.8 kg (160 lb 9.7 oz)   04/12/22 72.7 kg (160 lb 4.4 oz)   01/24/22 72 kg (158 lb 11.7 oz)   01/11/22 72.3 kg (159 lb 8 oz)   01/07/22 72.6 kg (160 lb)   11/17/21 75 kg (165 lb 7.3 oz)   Body mass index is 30.18 kg/m².               (DME) - [facility]  Reviewed therapeutic goals for positive airway pressure therapy Auto CPAP  Ideal is usage 100% of nights for 6 - 8 hours per night. Minimum usage is 70% of night for at least 4 hours per night used.     Follow up for CPAP 1 year compliance download.

## 2022-09-08 NOTE — ASSESSMENT & PLAN NOTE
Encouraged calorie reduction and 30 minutes of exercise daily. Discussed impact of obesity on general health.  Wt Readings from Last 9 Encounters:   09/08/22 74.9 kg (165 lb 0.2 oz)   09/08/22 73.6 kg (162 lb 4.1 oz)   08/26/22 75.6 kg (166 lb 10.7 oz)   07/25/22 72.8 kg (160 lb 9.7 oz)   04/12/22 72.7 kg (160 lb 4.4 oz)   01/24/22 72 kg (158 lb 11.7 oz)   01/11/22 72.3 kg (159 lb 8 oz)   01/07/22 72.6 kg (160 lb)   11/17/21 75 kg (165 lb 7.3 oz)   Body mass index is 30.18 kg/m².
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
## 2022-10-18 ENCOUNTER — OFFICE VISIT (OUTPATIENT)
Dept: PAIN MEDICINE | Facility: CLINIC | Age: 62
End: 2022-10-18
Payer: COMMERCIAL

## 2022-10-18 DIAGNOSIS — M79.18 MYOFASCIAL PAIN: ICD-10-CM

## 2022-10-18 DIAGNOSIS — M46.1 SACROILIITIS: ICD-10-CM

## 2022-10-18 DIAGNOSIS — M47.816 LUMBAR FACET ARTHROPATHY: Primary | ICD-10-CM

## 2022-10-18 DIAGNOSIS — M54.9 DORSALGIA, UNSPECIFIED: ICD-10-CM

## 2022-10-18 DIAGNOSIS — M54.16 LUMBAR RADICULOPATHY: ICD-10-CM

## 2022-10-18 PROCEDURE — 1159F PR MEDICATION LIST DOCUMENTED IN MEDICAL RECORD: ICD-10-PCS | Mod: CPTII,95,, | Performed by: ANESTHESIOLOGY

## 2022-10-18 PROCEDURE — 99214 OFFICE O/P EST MOD 30 MIN: CPT | Mod: 95,,, | Performed by: ANESTHESIOLOGY

## 2022-10-18 PROCEDURE — 1160F RVW MEDS BY RX/DR IN RCRD: CPT | Mod: CPTII,95,, | Performed by: ANESTHESIOLOGY

## 2022-10-18 PROCEDURE — 99214 PR OFFICE/OUTPT VISIT, EST, LEVL IV, 30-39 MIN: ICD-10-PCS | Mod: 95,,, | Performed by: ANESTHESIOLOGY

## 2022-10-18 PROCEDURE — 1159F MED LIST DOCD IN RCRD: CPT | Mod: CPTII,95,, | Performed by: ANESTHESIOLOGY

## 2022-10-18 PROCEDURE — 1160F PR REVIEW ALL MEDS BY PRESCRIBER/CLIN PHARMACIST DOCUMENTED: ICD-10-PCS | Mod: CPTII,95,, | Performed by: ANESTHESIOLOGY

## 2022-10-18 RX ORDER — GABAPENTIN 300 MG/1
CAPSULE ORAL
Qty: 60 CAPSULE | Refills: 0 | Status: SHIPPED | OUTPATIENT
Start: 2022-10-18 | End: 2023-05-11

## 2023-01-18 ENCOUNTER — LAB VISIT (OUTPATIENT)
Dept: LAB | Facility: HOSPITAL | Age: 63
End: 2023-01-18
Attending: FAMILY MEDICINE
Payer: COMMERCIAL

## 2023-01-18 DIAGNOSIS — I10 ESSENTIAL HYPERTENSION: Chronic | ICD-10-CM

## 2023-01-18 PROCEDURE — 80048 BASIC METABOLIC PNL TOTAL CA: CPT | Performed by: PHYSICIAN ASSISTANT

## 2023-01-18 PROCEDURE — 36415 COLL VENOUS BLD VENIPUNCTURE: CPT | Performed by: PHYSICIAN ASSISTANT

## 2023-01-19 LAB
ANION GAP SERPL CALC-SCNC: 13 MMOL/L (ref 8–16)
BUN SERPL-MCNC: 12 MG/DL (ref 8–23)
CALCIUM SERPL-MCNC: 10.3 MG/DL (ref 8.7–10.5)
CHLORIDE SERPL-SCNC: 106 MMOL/L (ref 95–110)
CO2 SERPL-SCNC: 21 MMOL/L (ref 23–29)
CREAT SERPL-MCNC: 0.8 MG/DL (ref 0.5–1.4)
EST. GFR  (NO RACE VARIABLE): >60 ML/MIN/1.73 M^2
GLUCOSE SERPL-MCNC: 91 MG/DL (ref 70–110)
POTASSIUM SERPL-SCNC: 4 MMOL/L (ref 3.5–5.1)
SODIUM SERPL-SCNC: 140 MMOL/L (ref 136–145)

## 2023-01-30 ENCOUNTER — OFFICE VISIT (OUTPATIENT)
Dept: INTERNAL MEDICINE | Facility: CLINIC | Age: 63
End: 2023-01-30
Payer: COMMERCIAL

## 2023-01-30 ENCOUNTER — HOSPITAL ENCOUNTER (OUTPATIENT)
Dept: CARDIOLOGY | Facility: HOSPITAL | Age: 63
Discharge: HOME | End: 2023-01-30
Payer: COMMERCIAL

## 2023-01-30 ENCOUNTER — HOSPITAL ENCOUNTER (OUTPATIENT)
Dept: RADIOLOGY | Facility: HOSPITAL | Age: 63
Discharge: HOME | End: 2023-01-30
Attending: FAMILY MEDICINE
Payer: COMMERCIAL

## 2023-01-30 VITALS
TEMPERATURE: 98 F | DIASTOLIC BLOOD PRESSURE: 88 MMHG | BODY MASS INDEX: 30.55 KG/M2 | WEIGHT: 166 LBS | HEART RATE: 76 BPM | HEIGHT: 62 IN | RESPIRATION RATE: 18 BRPM | SYSTOLIC BLOOD PRESSURE: 120 MMHG | OXYGEN SATURATION: 98 %

## 2023-01-30 DIAGNOSIS — R06.02 SHORTNESS OF BREATH: Primary | ICD-10-CM

## 2023-01-30 DIAGNOSIS — Z13.1 DIABETES MELLITUS SCREENING: ICD-10-CM

## 2023-01-30 DIAGNOSIS — E87.6 DIURETIC-INDUCED HYPOKALEMIA: ICD-10-CM

## 2023-01-30 DIAGNOSIS — I10 ESSENTIAL HYPERTENSION: Primary | ICD-10-CM

## 2023-01-30 DIAGNOSIS — E66.09 CLASS 1 OBESITY DUE TO EXCESS CALORIES WITH SERIOUS COMORBIDITY AND BODY MASS INDEX (BMI) OF 30.0 TO 30.9 IN ADULT: ICD-10-CM

## 2023-01-30 DIAGNOSIS — R06.02 SHORTNESS OF BREATH: ICD-10-CM

## 2023-01-30 DIAGNOSIS — I10 ESSENTIAL HYPERTENSION: Chronic | ICD-10-CM

## 2023-01-30 DIAGNOSIS — F41.8 SITUATIONAL ANXIETY: ICD-10-CM

## 2023-01-30 DIAGNOSIS — T50.2X5A DIURETIC-INDUCED HYPOKALEMIA: ICD-10-CM

## 2023-01-30 DIAGNOSIS — R42 DIZZINESS: ICD-10-CM

## 2023-01-30 DIAGNOSIS — E55.9 VITAMIN D DEFICIENCY: ICD-10-CM

## 2023-01-30 LAB
CTP QC/QA: YES
SARS-COV-2 RDRP RESP QL NAA+PROBE: NEGATIVE

## 2023-01-30 PROCEDURE — 87635 SARS-COV-2 COVID-19 AMP PRB: CPT | Mod: QW,S$GLB,, | Performed by: FAMILY MEDICINE

## 2023-01-30 PROCEDURE — 3008F BODY MASS INDEX DOCD: CPT | Mod: CPTII,S$GLB,, | Performed by: FAMILY MEDICINE

## 2023-01-30 PROCEDURE — 71046 XR CHEST PA AND LATERAL: ICD-10-PCS | Mod: 26,,, | Performed by: RADIOLOGY

## 2023-01-30 PROCEDURE — 93010 ELECTROCARDIOGRAM REPORT: CPT | Mod: S$GLB,,, | Performed by: INTERNAL MEDICINE

## 2023-01-30 PROCEDURE — 3079F PR MOST RECENT DIASTOLIC BLOOD PRESSURE 80-89 MM HG: ICD-10-PCS | Mod: CPTII,S$GLB,, | Performed by: FAMILY MEDICINE

## 2023-01-30 PROCEDURE — 99999 PR PBB SHADOW E&M-EST. PATIENT-LVL V: ICD-10-PCS | Mod: PBBFAC,,, | Performed by: FAMILY MEDICINE

## 2023-01-30 PROCEDURE — 99214 OFFICE O/P EST MOD 30 MIN: CPT | Mod: S$GLB,,, | Performed by: FAMILY MEDICINE

## 2023-01-30 PROCEDURE — 1159F PR MEDICATION LIST DOCUMENTED IN MEDICAL RECORD: ICD-10-PCS | Mod: CPTII,S$GLB,, | Performed by: FAMILY MEDICINE

## 2023-01-30 PROCEDURE — 3074F PR MOST RECENT SYSTOLIC BLOOD PRESSURE < 130 MM HG: ICD-10-PCS | Mod: CPTII,S$GLB,, | Performed by: FAMILY MEDICINE

## 2023-01-30 PROCEDURE — 3079F DIAST BP 80-89 MM HG: CPT | Mod: CPTII,S$GLB,, | Performed by: FAMILY MEDICINE

## 2023-01-30 PROCEDURE — 3008F PR BODY MASS INDEX (BMI) DOCUMENTED: ICD-10-PCS | Mod: CPTII,S$GLB,, | Performed by: FAMILY MEDICINE

## 2023-01-30 PROCEDURE — 99999 PR PBB SHADOW E&M-EST. PATIENT-LVL V: CPT | Mod: PBBFAC,,, | Performed by: FAMILY MEDICINE

## 2023-01-30 PROCEDURE — 99214 PR OFFICE/OUTPT VISIT, EST, LEVL IV, 30-39 MIN: ICD-10-PCS | Mod: S$GLB,,, | Performed by: FAMILY MEDICINE

## 2023-01-30 PROCEDURE — 1159F MED LIST DOCD IN RCRD: CPT | Mod: CPTII,S$GLB,, | Performed by: FAMILY MEDICINE

## 2023-01-30 PROCEDURE — 87635: ICD-10-PCS | Mod: QW,S$GLB,, | Performed by: FAMILY MEDICINE

## 2023-01-30 PROCEDURE — 3074F SYST BP LT 130 MM HG: CPT | Mod: CPTII,S$GLB,, | Performed by: FAMILY MEDICINE

## 2023-01-30 PROCEDURE — 93005 ELECTROCARDIOGRAM TRACING: CPT | Mod: S$GLB,,, | Performed by: FAMILY MEDICINE

## 2023-01-30 PROCEDURE — 71046 X-RAY EXAM CHEST 2 VIEWS: CPT | Mod: TC

## 2023-01-30 PROCEDURE — 93005 EKG 12-LEAD: ICD-10-PCS | Mod: S$GLB,,, | Performed by: FAMILY MEDICINE

## 2023-01-30 PROCEDURE — 1160F RVW MEDS BY RX/DR IN RCRD: CPT | Mod: CPTII,S$GLB,, | Performed by: FAMILY MEDICINE

## 2023-01-30 PROCEDURE — 71046 X-RAY EXAM CHEST 2 VIEWS: CPT | Mod: 26,,, | Performed by: RADIOLOGY

## 2023-01-30 PROCEDURE — 1160F PR REVIEW ALL MEDS BY PRESCRIBER/CLIN PHARMACIST DOCUMENTED: ICD-10-PCS | Mod: CPTII,S$GLB,, | Performed by: FAMILY MEDICINE

## 2023-01-30 PROCEDURE — 93010 EKG 12-LEAD: ICD-10-PCS | Mod: S$GLB,,, | Performed by: INTERNAL MEDICINE

## 2023-01-30 NOTE — PROGRESS NOTES
Subjective:       Patient ID: Clare Whitley is a 62 y.o. female.    Chief Complaint: Anxiety, Follow-up, and Shortness of Breath    Patient presents to clinic today for followup of hypertension.  Reports SOB since Friday; symptoms are intermittent.  Reports dizzy spells. Intermittent x 1-2 weeks. Describes as lightheadedness.  Reports occasional sharp chest pain, no pressure.  Reports waking up at night with sweats x months, not every night.  No fever/chills.   Reports forgets what she went to a room for sometimes, no other memory issues.  Patient is otherwise without concerns today.      Review of Systems   Constitutional:  Negative for chills, fatigue, fever and unexpected weight change.   Eyes:  Negative for visual disturbance.   Respiratory:  Positive for shortness of breath.    Cardiovascular:  Positive for chest pain.   Musculoskeletal:  Negative for myalgias.   Neurological:  Positive for dizziness and light-headedness. Negative for headaches.       Objective:      Physical Exam  Vitals reviewed.   Constitutional:       General: She is not in acute distress.     Appearance: She is well-developed.   HENT:      Head: Normocephalic and atraumatic.   Eyes:      General: Lids are normal. No scleral icterus.     Extraocular Movements: Extraocular movements intact.      Conjunctiva/sclera: Conjunctivae normal.      Pupils: Pupils are equal, round, and reactive to light.   Cardiovascular:      Rate and Rhythm: Normal rate and regular rhythm.      Heart sounds: No murmur heard.    No friction rub. No gallop.   Pulmonary:      Effort: Pulmonary effort is normal.      Breath sounds: Normal breath sounds. No decreased breath sounds, wheezing, rhonchi or rales.   Neurological:      Mental Status: She is alert and oriented to person, place, and time.      Cranial Nerves: No cranial nerve deficit.      Gait: Gait normal.   Psychiatric:         Mood and Affect: Mood and affect normal.       Assessment:       1. Shortness of  breath    2. Dizziness    3. Essential hypertension    4. Diuretic-induced hypokalemia    5. Situational anxiety    6. Class 1 obesity due to excess calories with serious comorbidity and body mass index (BMI) of 30.0 to 30.9 in adult        Plan:   1. Shortness of breath  -     EKG 12-lead  -     X-Ray Chest PA And Lateral; Future; Expected date: 01/30/2023  -     POCT COVID-19 Rapid Screening  -     Ambulatory referral/consult to Cardiology; Future; Expected date: 02/06/2023    2. Dizziness  -     EKG 12-lead  -     Ambulatory referral/consult to Cardiology; Future; Expected date: 02/06/2023    3. Essential hypertension  Assessment & Plan:  Controlled, continue atenolol and chlorthalidone      4. Diuretic-induced hypokalemia  Assessment & Plan:  Stable on potassium supplement       5. Situational anxiety  Assessment & Plan:  Symptoms may be anxiety related, advised further evaluation to rule out cardiac/pulmonary conditions; if work up is negative advised follow up to discuss treatment of anxiety; patient expressed understanding and agreement with plan.      6. Class 1 obesity due to excess calories with serious comorbidity and body mass index (BMI) of 30.0 to 30.9 in adult  Assessment & Plan:  Body mass index is 30.36 kg/m².    Wt Readings from Last 10 Encounters:   01/30/23 75.3 kg (166 lb 0.1 oz)   01/10/23 77.1 kg (170 lb)   09/08/22 74.9 kg (165 lb 0.2 oz)   09/08/22 73.6 kg (162 lb 4.1 oz)   08/26/22 75.6 kg (166 lb 10.7 oz)   07/25/22 72.8 kg (160 lb 9.7 oz)   04/12/22 72.7 kg (160 lb 4.4 oz)   01/24/22 72 kg (158 lb 11.7 oz)   01/11/22 72.3 kg (159 lb 8 oz)   01/07/22 72.6 kg (160 lb)             Covid test is negative.  EKG shows NSR, possible LAE.  Health Maintenance reviewed/updated.  Discussed recommended vaccines, encouraged patient to consider.

## 2023-01-30 NOTE — ASSESSMENT & PLAN NOTE
Body mass index is 30.36 kg/m².    Wt Readings from Last 10 Encounters:   01/30/23 75.3 kg (166 lb 0.1 oz)   01/10/23 77.1 kg (170 lb)   09/08/22 74.9 kg (165 lb 0.2 oz)   09/08/22 73.6 kg (162 lb 4.1 oz)   08/26/22 75.6 kg (166 lb 10.7 oz)   07/25/22 72.8 kg (160 lb 9.7 oz)   04/12/22 72.7 kg (160 lb 4.4 oz)   01/24/22 72 kg (158 lb 11.7 oz)   01/11/22 72.3 kg (159 lb 8 oz)   01/07/22 72.6 kg (160 lb)

## 2023-01-30 NOTE — ASSESSMENT & PLAN NOTE
Symptoms may be anxiety related, advised further evaluation to rule out cardiac/pulmonary conditions; if work up is negative advised follow up to discuss treatment of anxiety; patient expressed understanding and agreement with plan.

## 2023-01-31 ENCOUNTER — OFFICE VISIT (OUTPATIENT)
Dept: CARDIOLOGY | Facility: CLINIC | Age: 63
End: 2023-01-31
Payer: COMMERCIAL

## 2023-01-31 VITALS
HEIGHT: 62 IN | BODY MASS INDEX: 31.24 KG/M2 | HEART RATE: 72 BPM | OXYGEN SATURATION: 97 % | SYSTOLIC BLOOD PRESSURE: 114 MMHG | WEIGHT: 169.75 LBS | DIASTOLIC BLOOD PRESSURE: 82 MMHG

## 2023-01-31 DIAGNOSIS — I10 ESSENTIAL HYPERTENSION: Primary | Chronic | ICD-10-CM

## 2023-01-31 DIAGNOSIS — R42 DIZZINESS: ICD-10-CM

## 2023-01-31 DIAGNOSIS — E66.09 CLASS 1 OBESITY DUE TO EXCESS CALORIES WITH SERIOUS COMORBIDITY AND BODY MASS INDEX (BMI) OF 30.0 TO 30.9 IN ADULT: ICD-10-CM

## 2023-01-31 DIAGNOSIS — I34.1 MVP (MITRAL VALVE PROLAPSE): ICD-10-CM

## 2023-01-31 DIAGNOSIS — G47.33 OSA ON CPAP: ICD-10-CM

## 2023-01-31 DIAGNOSIS — Z86.16 HISTORY OF 2019 NOVEL CORONAVIRUS DISEASE (COVID-19): ICD-10-CM

## 2023-01-31 DIAGNOSIS — R06.02 SHORTNESS OF BREATH: ICD-10-CM

## 2023-01-31 PROCEDURE — 99204 PR OFFICE/OUTPT VISIT, NEW, LEVL IV, 45-59 MIN: ICD-10-PCS | Mod: S$GLB,,, | Performed by: INTERNAL MEDICINE

## 2023-01-31 PROCEDURE — 1159F PR MEDICATION LIST DOCUMENTED IN MEDICAL RECORD: ICD-10-PCS | Mod: CPTII,S$GLB,, | Performed by: INTERNAL MEDICINE

## 2023-01-31 PROCEDURE — 3074F PR MOST RECENT SYSTOLIC BLOOD PRESSURE < 130 MM HG: ICD-10-PCS | Mod: CPTII,S$GLB,, | Performed by: INTERNAL MEDICINE

## 2023-01-31 PROCEDURE — 3008F PR BODY MASS INDEX (BMI) DOCUMENTED: ICD-10-PCS | Mod: CPTII,S$GLB,, | Performed by: INTERNAL MEDICINE

## 2023-01-31 PROCEDURE — 99999 PR PBB SHADOW E&M-EST. PATIENT-LVL IV: CPT | Mod: PBBFAC,,, | Performed by: INTERNAL MEDICINE

## 2023-01-31 PROCEDURE — 99204 OFFICE O/P NEW MOD 45 MIN: CPT | Mod: S$GLB,,, | Performed by: INTERNAL MEDICINE

## 2023-01-31 PROCEDURE — 3074F SYST BP LT 130 MM HG: CPT | Mod: CPTII,S$GLB,, | Performed by: INTERNAL MEDICINE

## 2023-01-31 PROCEDURE — 3008F BODY MASS INDEX DOCD: CPT | Mod: CPTII,S$GLB,, | Performed by: INTERNAL MEDICINE

## 2023-01-31 PROCEDURE — 3079F DIAST BP 80-89 MM HG: CPT | Mod: CPTII,S$GLB,, | Performed by: INTERNAL MEDICINE

## 2023-01-31 PROCEDURE — 3079F PR MOST RECENT DIASTOLIC BLOOD PRESSURE 80-89 MM HG: ICD-10-PCS | Mod: CPTII,S$GLB,, | Performed by: INTERNAL MEDICINE

## 2023-01-31 PROCEDURE — 1159F MED LIST DOCD IN RCRD: CPT | Mod: CPTII,S$GLB,, | Performed by: INTERNAL MEDICINE

## 2023-01-31 PROCEDURE — 99999 PR PBB SHADOW E&M-EST. PATIENT-LVL IV: ICD-10-PCS | Mod: PBBFAC,,, | Performed by: INTERNAL MEDICINE

## 2023-05-11 ENCOUNTER — HOSPITAL ENCOUNTER (OUTPATIENT)
Facility: HOSPITAL | Age: 63
Discharge: HOME | End: 2023-05-12
Attending: EMERGENCY MEDICINE | Admitting: FAMILY MEDICINE
Payer: COMMERCIAL

## 2023-05-11 DIAGNOSIS — R29.898 RIGHT ARM WEAKNESS: Primary | ICD-10-CM

## 2023-05-11 DIAGNOSIS — R42 DIZZINESS: ICD-10-CM

## 2023-05-11 DIAGNOSIS — G45.9 TIA (TRANSIENT ISCHEMIC ATTACK): ICD-10-CM

## 2023-05-11 LAB
ALBUMIN SERPL BCP-MCNC: 3.8 G/DL (ref 3.5–5.2)
ALP SERPL-CCNC: 37 U/L (ref 55–135)
ALT SERPL W/O P-5'-P-CCNC: 14 U/L (ref 10–44)
ANION GAP SERPL CALC-SCNC: 13 MMOL/L (ref 8–16)
AORTIC ROOT ANNULUS: 2.47 CM
ASCENDING AORTA: 2.65 CM
AST SERPL-CCNC: 20 U/L (ref 10–40)
AV INDEX (PROSTH): 0.74
AV MEAN GRADIENT: 3 MMHG
AV PEAK GRADIENT: 6 MMHG
AV VALVE AREA: 2.15 CM2
AV VELOCITY RATIO: 0.81
BASOPHILS # BLD AUTO: 0.03 K/UL (ref 0–0.2)
BASOPHILS NFR BLD: 0.8 % (ref 0–1.9)
BILIRUB SERPL-MCNC: 0.6 MG/DL (ref 0.1–1)
BILIRUB UR QL STRIP: NEGATIVE
BNP SERPL-MCNC: 35 PG/ML (ref 0–99)
BSA FOR ECHO PROCEDURE: 1.81 M2
BUN SERPL-MCNC: 17 MG/DL (ref 8–23)
CALCIUM SERPL-MCNC: 9.4 MG/DL (ref 8.7–10.5)
CHLORIDE SERPL-SCNC: 108 MMOL/L (ref 95–110)
CHOLEST SERPL-MCNC: 139 MG/DL (ref 120–199)
CHOLEST/HDLC SERPL: 2.7 {RATIO} (ref 2–5)
CLARITY UR: CLEAR
CO2 SERPL-SCNC: 23 MMOL/L (ref 23–29)
COLOR UR: COLORLESS
CREAT SERPL-MCNC: 0.8 MG/DL (ref 0.5–1.4)
CTP QC/QA: YES
CV ECHO LV RWT: 0.42 CM
DIFFERENTIAL METHOD: ABNORMAL
DOP CALC AO PEAK VEL: 1.23 M/S
DOP CALC AO VTI: 29.2 CM
DOP CALC LVOT AREA: 2.9 CM2
DOP CALC LVOT DIAMETER: 1.92 CM
DOP CALC LVOT PEAK VEL: 1 M/S
DOP CALC LVOT STROKE VOLUME: 62.8 CM3
DOP CALC RVOT PEAK VEL: 0.64 M/S
DOP CALC RVOT VTI: 13.3 CM
DOP CALCLVOT PEAK VEL VTI: 21.7 CM
E WAVE DECELERATION TIME: 253.3 MSEC
E/A RATIO: 0.86
E/E' RATIO: 14.29 M/S
ECHO LV POSTERIOR WALL: 0.92 CM (ref 0.6–1.1)
EJECTION FRACTION: 60 %
EOSINOPHIL # BLD AUTO: 0.1 K/UL (ref 0–0.5)
EOSINOPHIL NFR BLD: 3.8 % (ref 0–8)
ERYTHROCYTE [DISTWIDTH] IN BLOOD BY AUTOMATED COUNT: 14 % (ref 11.5–14.5)
EST. GFR  (NO RACE VARIABLE): >60 ML/MIN/1.73 M^2
FRACTIONAL SHORTENING: 33 % (ref 28–44)
GLUCOSE SERPL-MCNC: 92 MG/DL (ref 70–110)
GLUCOSE UR QL STRIP: NEGATIVE
HCT VFR BLD AUTO: 37.9 % (ref 37–48.5)
HCV AB SERPL QL IA: NEGATIVE
HDLC SERPL-MCNC: 52 MG/DL (ref 40–75)
HDLC SERPL: 37.4 % (ref 20–50)
HEP C VIRUS HOLD SPECIMEN: NORMAL
HGB BLD-MCNC: 13.5 G/DL (ref 12–16)
HGB UR QL STRIP: NEGATIVE
HIV 1+2 AB+HIV1 P24 AG SERPL QL IA: NEGATIVE
IMM GRANULOCYTES # BLD AUTO: 0 K/UL (ref 0–0.04)
IMM GRANULOCYTES NFR BLD AUTO: 0 % (ref 0–0.5)
INR PPP: 1 (ref 0.8–1.2)
INTERVENTRICULAR SEPTUM: 1 CM (ref 0.6–1.1)
IVC DIAMETER: 1.74 CM
IVRT: 65.65 MSEC
KETONES UR QL STRIP: NEGATIVE
LA MAJOR: 4.27 CM
LA MINOR: 4.57 CM
LA WIDTH: 3.1 CM
LDLC SERPL CALC-MCNC: 78.8 MG/DL (ref 63–159)
LEFT ATRIUM SIZE: 3.12 CM
LEFT ATRIUM VOLUME INDEX: 20.6 ML/M2
LEFT ATRIUM VOLUME: 36.3 CM3
LEFT INTERNAL DIMENSION IN SYSTOLE: 2.92 CM (ref 2.1–4)
LEFT VENTRICLE DIASTOLIC VOLUME INDEX: 49.66 ML/M2
LEFT VENTRICLE DIASTOLIC VOLUME: 87.4 ML
LEFT VENTRICLE MASS INDEX: 79 G/M2
LEFT VENTRICLE SYSTOLIC VOLUME INDEX: 18.7 ML/M2
LEFT VENTRICLE SYSTOLIC VOLUME: 32.89 ML
LEFT VENTRICULAR INTERNAL DIMENSION IN DIASTOLE: 4.39 CM (ref 3.5–6)
LEFT VENTRICULAR MASS: 139.24 G
LEUKOCYTE ESTERASE UR QL STRIP: ABNORMAL
LV LATERAL E/E' RATIO: 14.29 M/S
LV SEPTAL E/E' RATIO: 14.29 M/S
LVOT MG: 1.85 MMHG
LVOT MV: 0.62 CM/S
LYMPHOCYTES # BLD AUTO: 1.5 K/UL (ref 1–4.8)
LYMPHOCYTES NFR BLD: 39.8 % (ref 18–48)
MCH RBC QN AUTO: 27.3 PG (ref 27–31)
MCHC RBC AUTO-ENTMCNC: 35.6 G/DL (ref 32–36)
MCV RBC AUTO: 77 FL (ref 82–98)
MICROSCOPIC COMMENT: NORMAL
MONOCYTES # BLD AUTO: 0.3 K/UL (ref 0.3–1)
MONOCYTES NFR BLD: 9.1 % (ref 4–15)
MV PEAK A VEL: 1.16 M/S
MV PEAK E VEL: 1 M/S
MV STENOSIS PRESSURE HALF TIME: 73.46 MS
MV VALVE AREA P 1/2 METHOD: 2.99 CM2
NEUTROPHILS # BLD AUTO: 1.7 K/UL (ref 1.8–7.7)
NEUTROPHILS NFR BLD: 46.5 % (ref 38–73)
NITRITE UR QL STRIP: NEGATIVE
NONHDLC SERPL-MCNC: 87 MG/DL
NRBC BLD-RTO: 0 /100 WBC
PH UR STRIP: 6 [PH] (ref 5–8)
PISA MRMAX VEL: 5.12 M/S
PISA TR MAX VEL: 2.37 M/S
PLATELET # BLD AUTO: 165 K/UL (ref 150–450)
PMV BLD AUTO: 11.8 FL (ref 9.2–12.9)
POTASSIUM SERPL-SCNC: 4 MMOL/L (ref 3.5–5.1)
PROT SERPL-MCNC: 8.1 G/DL (ref 6–8.4)
PROT UR QL STRIP: ABNORMAL
PROTHROMBIN TIME: 10.6 SEC (ref 9–12.5)
PV MEAN GRADIENT: 0.85 MMHG
PV MV: 0.49 M/S
PV PEAK VELOCITY: 0.77 CM/S
RA MAJOR: 3.79 CM
RBC # BLD AUTO: 4.94 M/UL (ref 4–5.4)
RBC #/AREA URNS HPF: 3 /HPF (ref 0–4)
RIGHT VENTRICULAR END-DIASTOLIC DIMENSION: 3.25 CM
RV TISSUE DOPPLER FREE WALL SYSTOLIC VELOCITY 1 (APICAL 4 CHAMBER VIEW): 0.01 CM/S
SARS-COV-2 RDRP RESP QL NAA+PROBE: NEGATIVE
SODIUM SERPL-SCNC: 144 MMOL/L (ref 136–145)
SP GR UR STRIP: >1.03 (ref 1–1.03)
SQUAMOUS #/AREA URNS HPF: 1 /HPF
STJ: 2.52 CM
TDI LATERAL: 0.07 M/S
TDI SEPTAL: 0.07 M/S
TDI: 0.07 M/S
TR MAX PG: 22 MMHG
TRICUSPID ANNULAR PLANE SYSTOLIC EXCURSION: 2 CM
TRIGL SERPL-MCNC: 41 MG/DL (ref 30–150)
TROPONIN I SERPL DL<=0.01 NG/ML-MCNC: <0.006 NG/ML (ref 0–0.03)
TSH SERPL DL<=0.005 MIU/L-ACNC: 0.91 UIU/ML (ref 0.4–4)
URN SPEC COLLECT METH UR: ABNORMAL
UROBILINOGEN UR STRIP-ACNC: NEGATIVE EU/DL
WBC # BLD AUTO: 3.72 K/UL (ref 3.9–12.7)
WBC #/AREA URNS HPF: 3 /HPF (ref 0–5)

## 2023-05-11 PROCEDURE — 80061 LIPID PANEL: CPT | Performed by: EMERGENCY MEDICINE

## 2023-05-11 PROCEDURE — 87389 HIV-1 AG W/HIV-1&-2 AB AG IA: CPT | Performed by: EMERGENCY MEDICINE

## 2023-05-11 PROCEDURE — 92610 EVALUATE SWALLOWING FUNCTION: CPT

## 2023-05-11 PROCEDURE — 84484 ASSAY OF TROPONIN QUANT: CPT | Performed by: EMERGENCY MEDICINE

## 2023-05-11 PROCEDURE — 93010 ELECTROCARDIOGRAM REPORT: CPT | Mod: ,,, | Performed by: INTERNAL MEDICINE

## 2023-05-11 PROCEDURE — G0378 HOSPITAL OBSERVATION PER HR: HCPCS

## 2023-05-11 PROCEDURE — 93005 ELECTROCARDIOGRAM TRACING: CPT

## 2023-05-11 PROCEDURE — 83036 HEMOGLOBIN GLYCOSYLATED A1C: CPT | Performed by: EMERGENCY MEDICINE

## 2023-05-11 PROCEDURE — 63600175 PHARM REV CODE 636 W HCPCS: Performed by: NURSE PRACTITIONER

## 2023-05-11 PROCEDURE — 80053 COMPREHEN METABOLIC PANEL: CPT | Performed by: EMERGENCY MEDICINE

## 2023-05-11 PROCEDURE — 85610 PROTHROMBIN TIME: CPT | Performed by: EMERGENCY MEDICINE

## 2023-05-11 PROCEDURE — 84443 ASSAY THYROID STIM HORMONE: CPT | Performed by: EMERGENCY MEDICINE

## 2023-05-11 PROCEDURE — 83880 ASSAY OF NATRIURETIC PEPTIDE: CPT | Performed by: EMERGENCY MEDICINE

## 2023-05-11 PROCEDURE — 99285 EMERGENCY DEPT VISIT HI MDM: CPT | Mod: 25

## 2023-05-11 PROCEDURE — 93010 EKG 12-LEAD: ICD-10-PCS | Mod: ,,, | Performed by: INTERNAL MEDICINE

## 2023-05-11 PROCEDURE — 25000003 PHARM REV CODE 250: Performed by: NURSE PRACTITIONER

## 2023-05-11 PROCEDURE — 96372 THER/PROPH/DIAG INJ SC/IM: CPT | Performed by: NURSE PRACTITIONER

## 2023-05-11 PROCEDURE — 94761 N-INVAS EAR/PLS OXIMETRY MLT: CPT

## 2023-05-11 PROCEDURE — 81000 URINALYSIS NONAUTO W/SCOPE: CPT | Performed by: NURSE PRACTITIONER

## 2023-05-11 PROCEDURE — 25500020 PHARM REV CODE 255: Performed by: EMERGENCY MEDICINE

## 2023-05-11 PROCEDURE — 86803 HEPATITIS C AB TEST: CPT | Performed by: EMERGENCY MEDICINE

## 2023-05-11 PROCEDURE — 85025 COMPLETE CBC W/AUTO DIFF WBC: CPT | Performed by: EMERGENCY MEDICINE

## 2023-05-11 PROCEDURE — 92523 SPEECH SOUND LANG COMPREHEN: CPT

## 2023-05-11 RX ORDER — SODIUM CHLORIDE 0.9 % (FLUSH) 0.9 %
10 SYRINGE (ML) INJECTION
Status: DISCONTINUED | OUTPATIENT
Start: 2023-05-11 | End: 2023-05-12 | Stop reason: HOSPADM

## 2023-05-11 RX ORDER — ATORVASTATIN CALCIUM 40 MG/1
40 TABLET, FILM COATED ORAL DAILY
Status: DISCONTINUED | OUTPATIENT
Start: 2023-05-11 | End: 2023-05-11

## 2023-05-11 RX ORDER — ASPIRIN 81 MG/1
81 TABLET ORAL DAILY
Status: DISCONTINUED | OUTPATIENT
Start: 2023-05-12 | End: 2023-05-12 | Stop reason: HOSPADM

## 2023-05-11 RX ORDER — ATENOLOL 50 MG/1
100 TABLET ORAL DAILY
Status: DISCONTINUED | OUTPATIENT
Start: 2023-05-12 | End: 2023-05-12 | Stop reason: HOSPADM

## 2023-05-11 RX ORDER — ATORVASTATIN CALCIUM 40 MG/1
40 TABLET, FILM COATED ORAL NIGHTLY
Status: DISCONTINUED | OUTPATIENT
Start: 2023-05-11 | End: 2023-05-12 | Stop reason: HOSPADM

## 2023-05-11 RX ORDER — ENOXAPARIN SODIUM 100 MG/ML
40 INJECTION SUBCUTANEOUS EVERY 24 HOURS
Status: DISCONTINUED | OUTPATIENT
Start: 2023-05-11 | End: 2023-05-12 | Stop reason: HOSPADM

## 2023-05-11 RX ADMIN — ATORVASTATIN CALCIUM 40 MG: 40 TABLET, FILM COATED ORAL at 08:05

## 2023-05-11 RX ADMIN — IOHEXOL 100 ML: 350 INJECTION, SOLUTION INTRAVENOUS at 12:05

## 2023-05-11 RX ADMIN — ENOXAPARIN SODIUM 40 MG: 100 INJECTION SUBCUTANEOUS at 05:05

## 2023-05-11 NOTE — PHARMACY MED REC
"Admission Medication History     The home medication history was taken by Cm Salomon.    You may go to "Admission" then "Reconcile Home Medications" tabs to review and/or act upon these items.     The home medication list has been updated by the Pharmacy department.   Please read ALL comments highlighted in yellow.   Please address this information as you see fit.    Feel free to contact us if you have any questions or require assistance.      The medications listed below were removed from the home medication list. Please reorder if appropriate:  Patient reports no longer taking the following medication(s):  CHLORTHALIDONE 25MG  VOLTAREN 1% GEL  GABAPENTIN 300MG    Medications listed below were obtained from: Patient/family and Analytic software- [employer]  (Not in a hospital admission)      Cm Salomon  [id number]    Current Outpatient Medications on File Prior to Encounter   Medication Sig Dispense Refill Last Dose    aspirin (ECOTRIN) 81 MG EC tablet Take 81 mg by mouth once daily.   5/10/2023    atenoloL (TENORMIN) 100 MG tablet Take 100 mg by mouth once daily.   5/10/2023    cholecalciferol, vitamin D3, (VITAMIN D3) 25 mcg (1,000 unit) capsule Take 1 capsule (1,000 Units total) by mouth once daily.  0 5/10/2023    furosemide (LASIX) 40 MG tablet Take 40 mg by mouth once daily.   5/10/2023    potassium chloride (MICRO-K) 10 MEQ CpSR Take 1 capsule (10 mEq total) by mouth once daily. 90 capsule 0 5/10/2023                           .        "

## 2023-05-11 NOTE — ED PROVIDER NOTES
"SCRIBE #1 NOTE: I, Yobany Azar, am scribing for, and in the presence of, Femi Hudson MD. I have scribed the entire note.      History      Chief Complaint   Patient presents with    Dizziness     Pt here with c/o intermittent dizziness, nausea, and right arm pain xseveral days. Was told to come here by her cardiology. Denies hx of vertigo        Review of patient's allergies indicates:   Allergen Reactions    No known drug allergies         HPI   HPI    5/11/2023, 10:29 AM   History obtained from the patient      History of Present Illness: Clare Whitley is a 62 y.o. female patient who presents to the Emergency Department for dizziness, onset 5 days PTA. Symptoms are constant but fluctuates in severity. No mitigating or exacerbating factors reported. Associated sxs include RUE weakness, double vision, and confusion. Patient denies any fever, chills, n/v/d, SOB, CP, numbness, facial droop, slurred speech, headache, and all other sxs at this time. Pt is current on ASA. No further complaints or concerns at this time.     Arrival mode: Personal vehicle    PCP: Chhaya Jennings MD       Past Medical History:  Past Medical History:   Diagnosis Date    Hypertension     Mitral regurgitation     "mild"; followed by Dr. Blackmon ([facility])    MVP (mitral valve prolapse)     "trivial"; followed by Dr. Blackmon ([facility])    Sickle cell trait        Past Surgical History:  Past Surgical History:   Procedure Laterality Date    CHOLECYSTECTOMY      laser SX Bilateral     LYMPH NODE BIOPSY Left     left neck     TUBAL LIGATION  9/2/1991    UTERINE FIBROID EMBOLIZATION  01/01/2010         Family History:  Family History   Problem Relation Age of Onset    Hypertension Mother     Cataracts Mother     Hypertension Father     Hypertension Sister     Hypertension Brother     Stroke Cousin        Social History:  Social History     Tobacco Use    Smoking status: Never     Passive exposure: Yes    Smokeless tobacco: Never   Substance and " Sexual Activity    Alcohol use: Never    Drug use: Never    Sexual activity: Yes     Partners: Male     Birth control/protection: Post-menopausal, None       ROS   Review of Systems   Constitutional:  Negative for chills and fever.   HENT:  Negative for sore throat.    Eyes:  Positive for visual disturbance (double vision).   Respiratory:  Negative for shortness of breath.    Cardiovascular:  Negative for chest pain.   Gastrointestinal:  Negative for diarrhea, nausea and vomiting.   Genitourinary:  Negative for dysuria.   Musculoskeletal:  Negative for back pain.   Skin:  Negative for rash.   Neurological:  Positive for dizziness and weakness (RUE). Negative for facial asymmetry, speech difficulty, numbness and headaches.   Hematological:  Does not bruise/bleed easily.   Psychiatric/Behavioral:  Positive for confusion.    All other systems reviewed and are negative.    Physical Exam      Initial Vitals [05/11/23 1018]   BP Pulse Resp Temp SpO2   (!) 142/69 79 16 98.6 °F (37 °C) 97 %      MAP       --          Physical Exam  Nursing Notes and Vital Signs Reviewed.  Constitutional: Patient is in no acute distress. Well-developed and well-nourished.  Head: Atraumatic. Normocephalic.  Eyes: PERRL. EOM intact. Conjunctivae are not pale. No scleral icterus.  ENT: Mucous membranes are moist. Oropharynx is clear and symmetric.    Neck: Supple. Full ROM.   Cardiovascular: Regular rate. Regular rhythm. No murmurs, rubs, or gallops. Distal pulses are 2+ and symmetric.  Pulmonary/Chest: No respiratory distress. Clear to auscultation bilaterally. No wheezing or rales.  Abdominal: Soft and non-distended.  There is no tenderness.  No rebound, guarding, or rigidity.   Musculoskeletal: Moves all extremities. No obvious deformities. Trace BLE edema.  Skin: Warm and dry.  Neurological: Patient is alert and oriented to person, place and time. Cranial nerves II-XII are intact. 4/5 strength in the RUE. There is no pronator drift of  "outstretched arms. Mild drift in the RLE. Light touch sense is intact. Speech is clear and normal.  Psychiatric: Normal affect. Good eye contact. Appropriate in content.    ED Course    Procedures  ED Vital Signs:  Vitals:    05/11/23 1018 05/11/23 1039 05/11/23 1045 05/11/23 1100   BP: (!) 142/69  136/67 133/72   Pulse: 79 78 82 71   Resp: 16  (!) 22 15   Temp: 98.6 °F (37 °C)      TempSrc: Oral      SpO2: 97%  99% 98%   Weight: 74.9 kg (165 lb 0.2 oz)      Height: 5' 2" (1.575 m)       05/11/23 1200 05/11/23 1315 05/11/23 1500 05/11/23 1604   BP: 134/74 126/63 124/61 137/68   Pulse: 65 82 68 65   Resp: 15 19 17    Temp:       TempSrc:       SpO2: 98% 99% 100%    Weight:  74.8 kg (165 lb)     Height:  5' 2" (1.575 m)      05/11/23 1606 05/11/23 1608 05/11/23 1652 05/11/23 1932   BP: (!) 128/59 135/65 (!) 143/67 138/68   Pulse: 66 79 66 66   Resp:  15 16 18   Temp:   98.3 °F (36.8 °C) 98.1 °F (36.7 °C)   TempSrc:       SpO2:  99% 98% 99%   Weight:       Height:        05/11/23 1942   BP:    Pulse:    Resp:    Temp:    TempSrc:    SpO2: 99%   Weight:    Height:        Abnormal Lab Results:  Labs Reviewed   CBC W/ AUTO DIFFERENTIAL - Abnormal; Notable for the following components:       Result Value    WBC 3.72 (*)     MCV 77 (*)     Gran # (ANC) 1.7 (*)     All other components within normal limits    Narrative:     Release to patient->Immediate   COMPREHENSIVE METABOLIC PANEL - Abnormal; Notable for the following components:    Alkaline Phosphatase 37 (*)     All other components within normal limits    Narrative:     Release to patient->Immediate   URINALYSIS, REFLEX TO URINE CULTURE - Abnormal; Notable for the following components:    Color, UA Colorless (*)     Specific Gravity, UA >1.030 (*)     Protein, UA Trace (*)     Leukocytes, UA 1+ (*)     All other components within normal limits    Narrative:     Specimen Source->Urine   HIV 1 / 2 ANTIBODY    Narrative:     Release to patient->Immediate   HEPATITIS C " ANTIBODY    Narrative:     Release to patient->Immediate   HEP C VIRUS HOLD SPECIMEN    Narrative:     Release to patient->Immediate   PROTIME-INR    Narrative:     Release to patient->Immediate   TSH    Narrative:     Release to patient->Immediate   LIPID PANEL    Narrative:     Release to patient->Immediate   TROPONIN I    Narrative:     Release to patient->Immediate   B-TYPE NATRIURETIC PEPTIDE    Narrative:     Release to patient->Immediate   URINALYSIS MICROSCOPIC    Narrative:     Specimen Source->Urine   HEMOGLOBIN A1C   SARS-COV-2 RDRP GENE    Narrative:     This test utilizes isothermal nucleic acid amplification technology to detect the SARS-CoV-2 RdRp nucleic acid segment. The analytical sensitivity (limit of detection) is 500 copies/swab.     A POSITIVE result is indicative of the presence of SARS-CoV-2 RNA; clinical correlation with patient history and other diagnostic information is necessary to determine patient infection status.    A NEGATIVE result means that SARS-CoV-2 nucleic acids are not present above the limit of detection. A NEGATIVE result should be treated as presumptive. It does not rule out the possibility of COVID-19 and should not be the sole basis for treatment decisions. If COVID-19 is strongly suspected based on clinical and exposure history, re-testing using an alternate molecular assay should be considered.     This test is only for use under the Food and Drug Administration s Emergency Use Authorization (EUA).     Commercial kits are provided by [employer]. Performance characteristics of the EUA have been independently verified by [facility] Department of Pathology and Laboratory Medicine.   _________________________________________________________________   The authorized Fact Sheet for Healthcare Providers and the authorized Fact Sheet for Patients of the ID NOW COVID-19 are available on the FDA website:    https://www.fda.gov/media/535068/download       https://www.fda.gov/media/793516/download            All Lab Results:  Results for orders placed or performed during the hospital encounter of 05/11/23   HIV 1/2 Ag/Ab (4th Gen)   Result Value Ref Range    HIV 1/2 Ag/Ab Negative Negative   Hepatitis C Antibody   Result Value Ref Range    Hepatitis C Ab Negative Negative   HCV Virus Hold Specimen   Result Value Ref Range    HEP C Virus Hold Specimen Hold for HCV sendout    CBC W/ AUTO DIFFERENTIAL   Result Value Ref Range    WBC 3.72 (L) 3.90 - 12.70 K/uL    RBC 4.94 4.00 - 5.40 M/uL    Hemoglobin 13.5 12.0 - 16.0 g/dL    Hematocrit 37.9 37.0 - 48.5 %    MCV 77 (L) 82 - 98 fL    MCH 27.3 27.0 - 31.0 pg    MCHC 35.6 32.0 - 36.0 g/dL    RDW 14.0 11.5 - 14.5 %    Platelets 165 150 - 450 K/uL    MPV 11.8 9.2 - 12.9 fL    Immature Granulocytes 0.0 0.0 - 0.5 %    Gran # (ANC) 1.7 (L) 1.8 - 7.7 K/uL    Immature Grans (Abs) 0.00 0.00 - 0.04 K/uL    Lymph # 1.5 1.0 - 4.8 K/uL    Mono # 0.3 0.3 - 1.0 K/uL    Eos # 0.1 0.0 - 0.5 K/uL    Baso # 0.03 0.00 - 0.20 K/uL    nRBC 0 0 /100 WBC    Gran % 46.5 38.0 - 73.0 %    Lymph % 39.8 18.0 - 48.0 %    Mono % 9.1 4.0 - 15.0 %    Eosinophil % 3.8 0.0 - 8.0 %    Basophil % 0.8 0.0 - 1.9 %    Differential Method Automated    Comprehensive metabolic panel   Result Value Ref Range    Sodium 144 136 - 145 mmol/L    Potassium 4.0 3.5 - 5.1 mmol/L    Chloride 108 95 - 110 mmol/L    CO2 23 23 - 29 mmol/L    Glucose 92 70 - 110 mg/dL    BUN 17 8 - 23 mg/dL    Creatinine 0.8 0.5 - 1.4 mg/dL    Calcium 9.4 8.7 - 10.5 mg/dL    Total Protein 8.1 6.0 - 8.4 g/dL    Albumin 3.8 3.5 - 5.2 g/dL    Total Bilirubin 0.6 0.1 - 1.0 mg/dL    Alkaline Phosphatase 37 (L) 55 - 135 U/L    AST 20 10 - 40 U/L    ALT 14 10 - 44 U/L    Anion Gap 13 8 - 16 mmol/L    eGFR >60 >60 mL/min/1.73 m^2   Protime-INR   Result Value Ref Range    Prothrombin Time 10.6 9.0 - 12.5 sec    INR 1.0 0.8 - 1.2   TSH   Result Value Ref Range    TSH 0.909 0.400 - 4.000 uIU/mL   LDL -  Lipid Panel   Result Value Ref Range    Cholesterol 139 120 - 199 mg/dL    Triglycerides 41 30 - 150 mg/dL    HDL 52 40 - 75 mg/dL    LDL Cholesterol 78.8 63.0 - 159.0 mg/dL    HDL/Cholesterol Ratio 37.4 20.0 - 50.0 %    Total Cholesterol/HDL Ratio 2.7 2.0 - 5.0    Non-HDL Cholesterol 87 mg/dL   Troponin I   Result Value Ref Range    Troponin I <0.006 0.000 - 0.026 ng/mL   B-Type natriuretic peptide   Result Value Ref Range    BNP 35 0 - 99 pg/mL   Urinalysis, Reflex to Urine Culture Urine, Clean Catch    Specimen: Urine   Result Value Ref Range    Specimen UA Urine, Clean Catch     Color, UA Colorless (A) Yellow, Straw, Ada    Appearance, UA Clear Clear    pH, UA 6.0 5.0 - 8.0    Specific Gravity, UA >1.030 (A) 1.005 - 1.030    Protein, UA Trace (A) Negative    Glucose, UA Negative Negative    Ketones, UA Negative Negative    Bilirubin (UA) Negative Negative    Occult Blood UA Negative Negative    Nitrite, UA Negative Negative    Urobilinogen, UA Negative <2.0 EU/dL    Leukocytes, UA 1+ (A) Negative   Urinalysis Microscopic   Result Value Ref Range    RBC, UA 3 0 - 4 /hpf    WBC, UA 3 0 - 5 /hpf    Squam Epithel, UA 1 /hpf    Microscopic Comment SEE COMMENT    POCT COVID-19 Rapid Screening   Result Value Ref Range    POC Rapid COVID Negative Negative     Acceptable Yes    Echo   Result Value Ref Range    BSA 1.81 m2    TDI SEPTAL 0.07 m/s    LV LATERAL E/E' RATIO 14.29 m/s    LV SEPTAL E/E' RATIO 14.29 m/s    LA WIDTH 3.10 cm    IVC diameter 1.74 cm    Left Ventricular Outflow Tract Mean Velocity 0.62 cm/s    Left Ventricular Outflow Tract Mean Gradient 1.85 mmHg    Pulmonary Valve Mean Velocity 0.49 m/s    TDI LATERAL 0.07 m/s    PV PEAK VELOCITY 0.77 cm/s    LVIDd 4.39 3.5 - 6.0 cm    IVS 1.00 0.6 - 1.1 cm    Posterior Wall 0.92 0.6 - 1.1 cm    Ao root annulus 2.47 cm    LVIDs 2.92 2.1 - 4.0 cm    FS 33 28 - 44 %    LA volume 36.30 cm3    STJ 2.52 cm    Ascending aorta 2.65 cm    LV mass 139.24 g     LA size 3.12 cm    RVDD 3.25 cm    TAPSE 2.00 cm    RV S' 0.01 cm/s    Left Ventricle Relative Wall Thickness 0.42 cm    AV mean gradient 3 mmHg    AV valve area 2.15 cm2    AV Velocity Ratio 0.81     AV index (prosthetic) 0.74     MV valve area p 1/2 method 2.99 cm2    E/A ratio 0.86     Mean e' 0.07 m/s    E wave deceleration time 253.30 msec    IVRT 65.65 msec    LVOT diameter 1.92 cm    LVOT area 2.9 cm2    LVOT peak gil 1.00 m/s    LVOT peak VTI 21.70 cm    Ao peak gil 1.23 m/s    Ao VTI 29.2 cm    RVOT peak gil 0.64 m/s    RVOT peak VTI 13.3 cm    Mr max gil 5.12 m/s    LVOT stroke volume 62.80 cm3    AV peak gradient 6 mmHg    PV mean gradient 0.85 mmHg    E/E' ratio 14.29 m/s    MV Peak E Gil 1.00 m/s    TR Max Gil 2.37 m/s    MV stenosis pressure 1/2 time 73.46 ms    MV Peak A Gil 1.16 m/s    LV Systolic Volume 32.89 mL    LV Systolic Volume Index 18.7 mL/m2    LV Diastolic Volume 87.40 mL    LV Diastolic Volume Index 49.66 mL/m2    LA Volume Index 20.6 mL/m2    LV Mass Index 79 g/m2    RA Major Axis 3.79 cm    Left Atrium Minor Axis 4.57 cm    Left Atrium Major Axis 4.27 cm    Triscuspid Valve Regurgitation Peak Gradient 22 mmHg    EF 60 %     Imaging Results:  Imaging Results              MRI Brain Without Contrast (Final result)  Result time 05/11/23 13:11:15      Final result by Eliazar Christianson MD (05/11/23 13:11:15)                   Impression:      No acute findings.  Stable MRI of the brain.  No evidence of acute or chronic infarct.      Electronically signed by: Eliazar Christianson MD  Date:    05/11/2023  Time:    13:11               Narrative:    EXAMINATION:  MRI BRAIN WITHOUT CONTRAST    CLINICAL HISTORY:  Dizziness, persistent/recurrent, cardiac or vascular cause suspected;    TECHNIQUE:  Standard multiplanar noncontrast sequences of the brain.    COMPARISON:  MRI brain 08/26/2022.    FINDINGS:  The ventricles are non-dilated. No definite edema, hemorrhage or mass effect is seen. No  extra-axial fluid collection.    Pituitary fossa remains expanded.    Mild inferior cerebellar tonsillar displacement through the foramen magnum is again noted.  Possible normal anatomic variant.    The diffusion sequence is negative.                                       CTA Head and Neck (xpd) (Final result)  Result time 05/11/23 12:44:38      Final result by Eliazar Christianson MD (05/11/23 12:44:38)                   Impression:      Negative CTA of the neck and of the brain.  No significant stenosis or large vessel occlusion.    All CT scans at this facility use dose modulation, iterative reconstruction and/or weight based dosing when appropriate to reduce radiation dose to as low as reasonably achievable.      Electronically signed by: Eliazar Christianson MD  Date:    05/11/2023  Time:    12:44               Narrative:    EXAMINATION:  CTA HEAD AND NECK (XPD)    CLINICAL HISTORY:  Dizziness.  TIA.Dizziness, persistent/recurrent, cardiac or vascular cause suspected;    TECHNIQUE:  Standard contrast enhanced CTA of neck and brain with 100 mL Omnipaque 350 contrast with 3D MIP reformations.    COMPARISON:  None    FINDINGS:  CTA neck: Aortic arch is normal.  Two vessel anatomy is seen.  Right brachiocephalic artery is patent.  The right common carotid artery is patent.  The right bifurcation is normal.  The right ICA is tortuous    The left common carotid artery originates from the right brachiocephalic artery.  The common carotid artery is patent.  The bifurcation and internal carotid artery appear normal.    Vertebral arteries are small but patent.  The left is larger than the right.    CTA brain: The right skull base ICA is patent.  A small posterior communicating artery is present.  Right ROMAN and branches appear normal.    The right middle cerebral artery and branches appear normal.    The left skull base ICA is patent.  Left posterior communicating artery is present.  The supraclinoid ICA and branches appear  normal.    The vertebral arteries are patent.  The basilar artery is small.  The posterior cerebral arteries are patent.    NASCET criteria are used for carotid artery stenosis measurements.                                       CT Head Without Contrast (Final result)  Result time 05/11/23 11:41:43      Final result by Adis Brito MD (05/11/23 11:41:43)                   Impression:      No acute abnormality.    [place] stroke programme early CT score (ASPECTS): 10    All CT scans at this facility use dose modulation, iterative reconstruction, and/or weight based dosing when appropriate to reduce radiation dose to as low as reasonably achievable.      Electronically signed by: Adis Brito  Date:    05/11/2023  Time:    11:41               Narrative:    EXAMINATION:  CT HEAD WITHOUT CONTRAST    CLINICAL HISTORY:  Neuro deficit, acute, stroke suspected;    TECHNIQUE:  Low dose axial CT images obtained throughout the head without intravenous contrast. Sagittal and coronal reconstructions were performed.    COMPARISON:  None.    FINDINGS:  Intracranial compartment:    Ventricles and sulci are normal in size for age without evidence of hydrocephalus. No extra-axial blood or fluid collections.    The brain parenchyma appears normal. No parenchymal mass, hemorrhage, edema or major vascular distribution infarct.    Skull/extracranial contents (limited evaluation): No fracture. Mastoid air cells and paranasal sinuses are essentially clear.                                       X-Ray Chest AP Portable (Final result)  Result time 05/11/23 10:49:09      Final result by Dank Molina III, MD (05/11/23 10:49:09)                   Impression:      No acute findings.      Electronically signed by: Dank Molina MD  Date:    05/11/2023  Time:    10:49               Narrative:    EXAMINATION:  XR CHEST AP PORTABLE    CLINICAL HISTORY:  Stroke;    FINDINGS:  Comparison is made with the most recent prior chest x-ray.  The  cardiomediastinal silhouette is within normal limits for AP technique. The lungs appear clear of active disease. No acute appearing infiltrate, pleural effusion or pneumothorax identified.                                     The EKG was ordered, reviewed, and independently interpreted by the ED provider.  Interpretation time: 10:18  Rate: 76 BPM  Rhythm: normal sinus rhythm  Interpretation: No acute ST changes. No STEMI.           The Emergency Provider reviewed the vital signs and test results, which are outlined above.    ED Discussion     1:34 PM: Discussed pt's case with Dr. Olivera (Neurology) who recommends admission to Hospital Medicine.    1:35 PM: Discussed case with Dr. Posey (Hospital Medicine). Dr. Posey agrees with current care and management of pt and accepts admission.   Admitting Service: Hospital Medicine  Admitting Physician: Dr. Posey  Admit to: Obs    1:36 PM: Re-evaluated pt. I have discussed test results, shared treatment plan, and the need for admission with patient and family at bedside. Pt and family express understanding at this time and agree with all information. All questions answered. Pt and family have no further questions or concerns at this time. Pt is ready for admit.        ED Course as of 05/11/23 2153   Thu May 11, 2023   1325 No weakness at this time. Feels improved. Symptoms completely resolved. No complaints. Would like to go home.  [BA]      ED Course User Index  [BA] Femi Hudson MD       ED Medication(s):  Medications   aspirin EC tablet 81 mg (has no administration in time range)   atenoloL tablet 100 mg (has no administration in time range)   sodium chloride 0.9% flush 10 mL (has no administration in time range)   enoxaparin injection 40 mg (40 mg Subcutaneous Given 5/11/23 1700)   atorvastatin tablet 40 mg (40 mg Oral Given 5/11/23 2023)   iohexoL (OMNIPAQUE 350) injection 100 mL (100 mLs Intravenous Given 5/11/23 1226)       Current Discharge Medication List           Medical Decision Making    Medical Decision Making:   Differential Diagnosis:   CVA, TIA, Vestibular disorder, Vertigo, Cardiac disorder  Clinical Tests:   Lab Tests: Ordered and Reviewed  Radiological Study: Ordered and Reviewed  Medical Tests: Ordered and Reviewed         Scribe Attestation:   Scribe #1: I performed the above scribed service and the documentation accurately describes the services I performed. I attest to the accuracy of the note.    Attending:   Physician Attestation Statement for Scribe #1: I, Femi Hudson MD, personally performed the services described in this documentation, as scribed by Yobany Azar, in my presence, and it is both accurate and complete.          Clinical Impression       ICD-10-CM ICD-9-CM   1. Right arm weakness  R29.898 729.89   2. Dizziness  R42 780.4   3. TIA (transient ischemic attack)  G45.9 435.9       Disposition:   Disposition: Placed in Observation  Condition: Fair       Femi Hudson MD  05/11/23 2831

## 2023-05-11 NOTE — ASSESSMENT & PLAN NOTE
Antithrombotics for secondary stroke prevention: Antiplatelets: Aspirin: 81 mg daily    Statins for secondary stroke prevention and hyperlipidemia, if present:   Statins: Atorvastatin- 40 mg daily    Aggressive risk factor modification: None     Rehab efforts: The patient has been evaluated by a stroke team provider and the therapy needs have been fully considered based off the presenting complaints and exam findings. The following therapy evaluations are needed: PT evaluate and treat, OT evaluate and treat, SLP evaluate and treat    Diagnostics ordered/pending: CTA Head to assess vasculature , CTA Neck/Arch to assess vasculature, HgbA1C to assess blood glucose levels, Lipid Profile to assess cholesterol levels, MRI head without contrast to assess brain parenchyma    VTE prophylaxis: Enoxaparin 40 mg SQ every 24 hours    BP parameters: TIA: SBP <220 until imaging confirmation of no infarct      - CT head negative  - CTA head and neck no significant stenosis  - MRI negative  - follow up lipid panel and A1C  - Echo pending  - presenting complaint dizziness, will check orthostatics as well   - neurology consulted, follow-up recs

## 2023-05-11 NOTE — ASSESSMENT & PLAN NOTE
- follows with Dr. Cox  - continue home BB and lasix  - bp normotensive will hold lasix at this time

## 2023-05-11 NOTE — SUBJECTIVE & OBJECTIVE
"Past Medical History:   Diagnosis Date    Hypertension     Mitral regurgitation     "mild"; followed by Dr. Blackmon ([facility])    MVP (mitral valve prolapse)     "trivial"; followed by Dr. Blackmon ([facility])    Sickle cell trait        Past Surgical History:   Procedure Laterality Date    CHOLECYSTECTOMY      laser SX Bilateral     LYMPH NODE BIOPSY Left     left neck     TUBAL LIGATION  9/2/1991    UTERINE FIBROID EMBOLIZATION  01/01/2010       Review of patient's allergies indicates:   Allergen Reactions    No known drug allergies        No current facility-administered medications on file prior to encounter.     Current Outpatient Medications on File Prior to Encounter   Medication Sig    aspirin (ECOTRIN) 81 MG EC tablet Take 81 mg by mouth once daily.    atenoloL (TENORMIN) 100 MG tablet Take 100 mg by mouth once daily.    cholecalciferol, vitamin D3, (VITAMIN D3) 25 mcg (1,000 unit) capsule Take 1 capsule (1,000 Units total) by mouth once daily.    furosemide (LASIX) 40 MG tablet Take 40 mg by mouth once daily.    potassium chloride (MICRO-K) 10 MEQ CpSR Take 1 capsule (10 mEq total) by mouth once daily.    [DISCONTINUED] chlorthalidone (HYGROTEN) 25 MG Tab TK 1 T PO D    [DISCONTINUED] diclofenac sodium (VOLTAREN) 1 % Gel Apply 2 g topically 4 (four) times daily as needed.    [DISCONTINUED] gabapentin (NEURONTIN) 300 MG capsule Take 1 capsule (300 mg total) by mouth every evening for 10 days, THEN 2 capsules (600 mg total) every evening for 10 days, THEN 3 capsules (900 mg total) every evening for 10 days.     Family History       Problem Relation (Age of Onset)    Cataracts Mother    Hypertension Mother, Father, Sister, Brother    Stroke Cousin          Tobacco Use    Smoking status: Never     Passive exposure: Yes    Smokeless tobacco: Never   Substance and Sexual Activity    Alcohol use: Never    Drug use: Never    Sexual activity: Yes     Partners: Male     Birth control/protection: Post-menopausal, None "     Review of Systems   Neurological:  Positive for dizziness and light-headedness.   Objective:     Vital Signs (Most Recent):  Temp: 98.6 °F (37 °C) (05/11/23 1018)  Pulse: 82 (05/11/23 1315)  Resp: 19 (05/11/23 1315)  BP: 126/63 (05/11/23 1315)  SpO2: 99 % (05/11/23 1315) Vital Signs (24h Range):  Temp:  [98.6 °F (37 °C)] 98.6 °F (37 °C)  Pulse:  [65-82] 82  Resp:  [15-22] 19  SpO2:  [97 %-99 %] 99 %  BP: (126-142)/(63-74) 126/63     Weight: 74.9 kg (165 lb 0.2 oz)  Body mass index is 30.18 kg/m².     Physical Exam  Vitals and nursing note reviewed.   Constitutional:       Appearance: Normal appearance.   HENT:      Head: Normocephalic.   Cardiovascular:      Rate and Rhythm: Normal rate and regular rhythm.      Pulses: Normal pulses.      Heart sounds: Normal heart sounds.   Pulmonary:      Effort: Pulmonary effort is normal.      Breath sounds: Normal breath sounds. No wheezing.   Abdominal:      General: Bowel sounds are normal. There is no distension.      Palpations: Abdomen is soft.      Tenderness: There is no abdominal tenderness.   Musculoskeletal:         General: No swelling. Normal range of motion.   Skin:     General: Skin is warm and dry.   Neurological:      General: No focal deficit present.      Mental Status: She is alert and oriented to person, place, and time.              Significant Labs: All pertinent labs within the past 24 hours have been reviewed.    Significant Imaging: I have reviewed all pertinent imaging results/findings within the past 24 hours.

## 2023-05-11 NOTE — H&P
"[facility] - Emergency Dept.  [facility] Medicine  History & Physical    Patient Name: Clare Whitley  MRN: [id number]  Patient Class: OP- Observation  Admission Date: 5/11/2023  Attending Physician: Maureen Posey MD   Primary Care Provider: Chhaya Jennings MD         Patient information was obtained from patient, past medical records and ER records.     Subjective:     Principal Problem:TIA (transient ischemic attack)    Chief Complaint:   Chief Complaint   Patient presents with    Dizziness     Pt here with c/o intermittent dizziness, nausea, and right arm pain xseveral days. Was told to come here by her cardiology. Denies hx of vertigo         HPI:  is a 62 year old female with a history of palpitations, mitral valve proplapse, SINTIA on Cpap an migraines who presents to the ed for dizziness, onset 5 days. Symptoms are intermittent and fluctuates in severity. No mitigating or exacerbating factors reported. Associated sxs include RUE weakness, light-headedness. Patient denies any fever, chills, n/v/d, SOB, CP, numbness, facial droop, slurred speech, headache.  Lab work unremarkable.  CT head negative, CTA head and neck negative for significant stenosis.  MRI brain negative.  RUE weakness has improved.  Neurology consulted recommended admission, and TIA work-up including A1C, lipid, echo.      Code Status: Full  Surrogate Decision Maker Johnnie       Past Medical History:   Diagnosis Date    Hypertension     Mitral regurgitation     "mild"; followed by Dr. Blackmon ([facility])    MVP (mitral valve prolapse)     "trivial"; followed by Dr. Blackmon ([facility])    Sickle cell trait        Past Surgical History:   Procedure Laterality Date    CHOLECYSTECTOMY      laser SX Bilateral     LYMPH NODE BIOPSY Left     left neck     TUBAL LIGATION  9/2/1991    UTERINE FIBROID EMBOLIZATION  01/01/2010       Review of patient's allergies indicates:   Allergen Reactions    No known drug allergies        No current " facility-administered medications on file prior to encounter.     Current Outpatient Medications on File Prior to Encounter   Medication Sig    aspirin (ECOTRIN) 81 MG EC tablet Take 81 mg by mouth once daily.    atenoloL (TENORMIN) 100 MG tablet Take 100 mg by mouth once daily.    cholecalciferol, vitamin D3, (VITAMIN D3) 25 mcg (1,000 unit) capsule Take 1 capsule (1,000 Units total) by mouth once daily.    furosemide (LASIX) 40 MG tablet Take 40 mg by mouth once daily.    potassium chloride (MICRO-K) 10 MEQ CpSR Take 1 capsule (10 mEq total) by mouth once daily.    [DISCONTINUED] chlorthalidone (HYGROTEN) 25 MG Tab TK 1 T PO D    [DISCONTINUED] diclofenac sodium (VOLTAREN) 1 % Gel Apply 2 g topically 4 (four) times daily as needed.    [DISCONTINUED] gabapentin (NEURONTIN) 300 MG capsule Take 1 capsule (300 mg total) by mouth every evening for 10 days, THEN 2 capsules (600 mg total) every evening for 10 days, THEN 3 capsules (900 mg total) every evening for 10 days.     Family History       Problem Relation (Age of Onset)    Cataracts Mother    Hypertension Mother, Father, Sister, Brother    Stroke Cousin          Tobacco Use    Smoking status: Never     Passive exposure: Yes    Smokeless tobacco: Never   Substance and Sexual Activity    Alcohol use: Never    Drug use: Never    Sexual activity: Yes     Partners: Male     Birth control/protection: Post-menopausal, None     Review of Systems   Neurological:  Positive for dizziness and light-headedness.   Objective:     Vital Signs (Most Recent):  Temp: 98.6 °F (37 °C) (05/11/23 1018)  Pulse: 82 (05/11/23 1315)  Resp: 19 (05/11/23 1315)  BP: 126/63 (05/11/23 1315)  SpO2: 99 % (05/11/23 1315) Vital Signs (24h Range):  Temp:  [98.6 °F (37 °C)] 98.6 °F (37 °C)  Pulse:  [65-82] 82  Resp:  [15-22] 19  SpO2:  [97 %-99 %] 99 %  BP: (126-142)/(63-74) 126/63     Weight: 74.9 kg (165 lb 0.2 oz)  Body mass index is 30.18 kg/m².     Physical Exam  Vitals and nursing  note reviewed.   Constitutional:       Appearance: Normal appearance.   HENT:      Head: Normocephalic.   Cardiovascular:      Rate and Rhythm: Normal rate and regular rhythm.      Pulses: Normal pulses.      Heart sounds: Normal heart sounds.   Pulmonary:      Effort: Pulmonary effort is normal.      Breath sounds: Normal breath sounds. No wheezing.   Abdominal:      General: Bowel sounds are normal. There is no distension.      Palpations: Abdomen is soft.      Tenderness: There is no abdominal tenderness.   Musculoskeletal:         General: No swelling. Normal range of motion.   Skin:     General: Skin is warm and dry.   Neurological:      General: No focal deficit present.      Mental Status: She is alert and oriented to person, place, and time.              Significant Labs: All pertinent labs within the past 24 hours have been reviewed.    Significant Imaging: I have reviewed all pertinent imaging results/findings within the past 24 hours.    Assessment/Plan:     * TIA (transient ischemic attack)  Antithrombotics for secondary stroke prevention: Antiplatelets: Aspirin: 81 mg daily    Statins for secondary stroke prevention and hyperlipidemia, if present:   Statins: Atorvastatin- 40 mg daily    Aggressive risk factor modification: None     Rehab efforts: The patient has been evaluated by a stroke team provider and the therapy needs have been fully considered based off the presenting complaints and exam findings. The following therapy evaluations are needed: PT evaluate and treat, OT evaluate and treat, SLP evaluate and treat    Diagnostics ordered/pending: CTA Head to assess vasculature , CTA Neck/Arch to assess vasculature, HgbA1C to assess blood glucose levels, Lipid Profile to assess cholesterol levels, MRI head without contrast to assess brain parenchyma    VTE prophylaxis: Enoxaparin 40 mg SQ every 24 hours    BP parameters: TIA: SBP <220 until imaging confirmation of no infarct      - CT head negative  -  CTA head and neck no significant stenosis  - MRI negative  - follow up lipid panel and A1C  - Echo pending  - presenting complaint dizziness, will check orthostatics as well   - neurology consulted, follow-up recs        SINTIA on CPAP  - cpap q hs        Essential hypertension  - follows with Dr. Cox  - continue home BB and lasix  - bp normotensive will hold lasix at this time          VTE Risk Mitigation (From admission, onward)         Ordered     enoxaparin injection 40 mg  Daily         05/11/23 1448     IP VTE HIGH RISK PATIENT  Once         05/11/23 1448     Place sequential compression device  Until discontinued         05/11/23 1448                     On 05/11/2023, patient should be placed in hospital observation services under my care in collaboration with Dr. Posey.      Marnie Cruz NP  Department of Hospital Medicine  '[facility] - Emergency Dept.

## 2023-05-11 NOTE — HPI
is a 62 year old female with a history of palpitations, mitral valve proplapse, SINTIA on Cpap an migraines who presents to the ed for dizziness, onset 5 days. Symptoms are intermittent and fluctuates in severity. No mitigating or exacerbating factors reported. Associated sxs include RUE weakness, light-headedness. Patient denies any fever, chills, n/v/d, SOB, CP, numbness, facial droop, slurred speech, headache.  Lab work unremarkable.  CT head negative, CTA head and neck negative for significant stenosis.  MRI brain negative.  RUE weakness has improved.  Neurology consulted recommended admission, and TIA work-up including A1C, lipid, echo.      Code Status: Full  Surrogate Decision Maker Johnnie    Satisfactory

## 2023-05-11 NOTE — PT/OT/SLP EVAL
"Speech Language Pathology Evaluation  Cognitive/Bedside Swallow    Patient Name:  Clare Whitley   MRN:  [id number]  Admitting Diagnosis: TIA (transient ischemic attack)    Recommendations:                  General Recommendations:  Follow-up not indicated  Diet recommendations:  Regular Diet - IDDSI Level 7, Thin liquids - IDDSI Level 0   Aspiration Precautions: Frequent oral care and Standard aspiration precautions   General Precautions: Standard, aspiration  Communication strategies:  none    History:     Past Medical History:   Diagnosis Date    Hypertension     Mitral regurgitation     "mild"; followed by Dr. Blackmon ([facility])    MVP (mitral valve prolapse)     "trivial"; followed by Dr. Blackmon ([facility])    Sickle cell trait        Past Surgical History:   Procedure Laterality Date    CHOLECYSTECTOMY      laser SX Bilateral     LYMPH NODE BIOPSY Left     left neck     TUBAL LIGATION  9/2/1991    UTERINE FIBROID EMBOLIZATION  01/01/2010       Social History: Patient lives with  at home.  Independent with personal and complex ADL's, drives.  Retired from the [employer].    Prior Intubation HX:  N/A    Modified Barium Swallow: N/A    CT HEAD WITHOUT CONTRAST     CLINICAL HISTORY:  Neuro deficit, acute, stroke suspected;     TECHNIQUE:  Low dose axial CT images obtained throughout the head without intravenous contrast. Sagittal and coronal reconstructions were performed.     COMPARISON:  None.     FINDINGS:  Intracranial compartment:     Ventricles and sulci are normal in size for age without evidence of hydrocephalus. No extra-axial blood or fluid collections.     The brain parenchyma appears normal. No parenchymal mass, hemorrhage, edema or major vascular distribution infarct.     Skull/extracranial contents (limited evaluation): No fracture. Mastoid air cells and paranasal sinuses are essentially clear.     Impression:     No acute abnormality.     [place] stroke programme early CT score (ASPECTS): 10     All CT scans " at this facility use dose modulation, iterative reconstruction, and/or weight based dosing when appropriate to reduce radiation dose to as low as reasonably achievable.        Electronically signed by: Adis Brito  Date:                                            05/11/2023  Time:                                           11:41     XR CHEST AP PORTABLE     CLINICAL HISTORY:  Stroke;     FINDINGS:  Comparison is made with the most recent prior chest x-ray.  The cardiomediastinal silhouette is within normal limits for AP technique. The lungs appear clear of active disease. No acute appearing infiltrate, pleural effusion or pneumothorax identified.     Impression:     No acute findings.        Electronically signed by: Dank Molina MD  Date:                                            05/11/2023  Time:                                           10:49    MRI BRAIN WITHOUT CONTRAST     CLINICAL HISTORY:  Dizziness, persistent/recurrent, cardiac or vascular cause suspected;     TECHNIQUE:  Standard multiplanar noncontrast sequences of the brain.     COMPARISON:  MRI brain 08/26/2022.     FINDINGS:  The ventricles are non-dilated. No definite edema, hemorrhage or mass effect is seen. No extra-axial fluid collection.     Pituitary fossa remains expanded.     Mild inferior cerebellar tonsillar displacement through the foramen magnum is again noted.  Possible normal anatomic variant.     The diffusion sequence is negative.     Impression:     No acute findings.  Stable MRI of the brain.  No evidence of acute or chronic infarct.        Electronically signed by: Eliazar Christianson MD  Date:                                            05/11/2023  Time:                                           13:11    Prior diet: Pt consumes a regular diet.  Denied dysphagia hx.    Subjective     Pt seen bedside for ST evaluation.  Awake/alert and communicative.  Pleasant and watching the news.  No c/o pain.  No family present.  Patient goals:  medical work-up of symptoms    Pain/Comfort:  Pain Rating 1: 0/10  Pain Rating Post-Intervention 1: 0/10  Pain Rating 2: 0/10  Pain Rating Post-Intervention 2: 0/10    Respiratory Status: Room air    Objective:     Cognitive Status:    WFL       Receptive Language:   Comprehension:   WFL    Pragmatics:    WFL    Expressive Language:  Verbal:    WFL      Motor Speech:  [facility]    Voice:   WFL    Visual-Spatial:  WFL    Reading:   [facility]     Oral Musculature Evaluation  Oral Musculature: WFL  Dentition: scattered dentition (Dentures not present, but typically wears them)  Secretion Management: adequate  Mucosal Quality: good  Mandibular Strength and Mobility: WFL  Oral Labial Strength and Mobility: WFL  Lingual Strength and Mobility: WFL  Velar Elevation: WFL  Buccal Strength and Mobility: WFL  Volitional Cough: present  Volitional Swallow: present  Voice Prior to PO Intake: [facility]    Bedside Clinical Swallow Eval (CSE):     [place] Swallow Protocol:  [place] Swallow Protocol dictates patient remain NPO if fail screener; (Delmar et al. 2014).  The [place] Swallow Protocol was administered. The patient was alert and provided the instructions prior to the beginning of the protocol. The patient consumed 3-4 oz before putting the cup down. Patient drank with consecutive swallows. Patient without overt signs or symptoms of penetration/aspiration.     Clinical Swallow Examination:   Of note, patient self-fed throughout evaluation. Patient presented with:     CONSISTENCY  NOTES   THIN (IDDSI 0) 3 oz water challenge    No overt s/s of dysphagia   PUREE (IDDSI 4/Extremely Thick)   TSP/TBSP bites of applesauce  No overt s/s of dysphagia   SOLID (IDDSI 7/Regular) Bite of Faviola Doone cookie   No overt s/s of dysphagia   MIXED CONSISTENCY (IDDSI 6/0 Soft and Bite Sized/Thin Liquid) Bite of fruit cocktail  No overt s/s of dysphagia     Thickened liquids were not used in this assessment. Natalie (2018) reported that thickened liquids have no sound  evidence as reducing risk of pneumonia in patients with dysphagia and can cause harm by increasing risk of dehydration. It also presents an increased risk of UTI, electrolyte imbalance, constipation, fecal impaction, cognitive impairment, functional decline, and even death (Langmore, 2002; Lorelei, 2016).  Thickened liquids are associated with risks including dehydration, increased pharyngeal residue, potential interference with medication absorption, and decreased quality of life (Mynor, 2013). Thickened liquids are also more likely to be silently aspirated than thin liquids (Sumit et al., 2018).   This supports the assertion that we should confirm a patient requires thickened liquids with an instrumental swallow study prior to recommending them.    References:   Mynor RUSHING (2013). Thickening agents used for dysphagia management: Effect on bioavailability of water, medication and feelings of satiety. Nutrition Journal, 12, 54. https://doi.org/10.1186/8998-0880-40-54    СЕРГЕЙ Arana, MARITZA Granados, ASHWINI Saldaña, & СЕРГЕЙ Emerson (2018). Cough response to aspiration in thin and thick fluids during FEES in hospitalized inpatients. International journal of language & communication disorders, 53(5), 909-918. https://doi.org/10.1111/8067-2910.74960    Oral Phase:   WFL    Pharyngeal Phase:   no overt clinical signs/symptoms of aspiration    Compensatory Strategies  Basic/standard swallowing and aspiration precautions      INTERPRETATION:  Clinical swallow evaluation (CSE) revealed oral phase characterized by lingual, labial, and buccal strength and range of motion adequate for lip closure, bolus preparation and propulsion. The patient had no anterior loss of the bolus with complete closure of the lips around the utensils. No residue remained in the oral cavity following the swallow.  Patient without overt clinical signs/symptoms of aspiration on any PO trials given.     Assessment:     Clare Whitley is a 62 y.o. female  admitted to [facility]- with c/o dizziness.  MRI negative for acute event.  She presents with functional OM, communication and swallowing with recommendations for IDDSI 7 (regular solids) and IDDSI 0 (thin liquids), following basic precautions.  No further acute SLP intervention indicated at this time.  MD to reconsult if needed.      Plan:     Plan of Care reviewed with:  patient   SLP Follow-Up:  No       Treatment: Pt recommended for IDDSI 7 (regular solids) with IDDSI 0 (thin liquids).  Communication and swallowing WFL.  NO further SLP intervention indicated at this time. MD to reconsult if needed.    Goals: N/A    Discharge recommendations:  Discharge Facility/Level of Care Needs: home   Barriers to Discharge:  None    Time Tracking:     SLP Treatment Date:   05/11/23  Speech Start Time:  1700  Speech Stop Time:  1730     Speech Total Time (min):  30 min    Billable Minutes: Eval 15 minutes  and Eval Swallow and Oral Function 15 minutes    05/11/2023

## 2023-05-12 ENCOUNTER — PES CALL (OUTPATIENT)
Dept: ADMINISTRATIVE | Facility: CLINIC | Age: 63
End: 2023-05-12
Payer: COMMERCIAL

## 2023-05-12 VITALS
OXYGEN SATURATION: 99 % | HEART RATE: 58 BPM | DIASTOLIC BLOOD PRESSURE: 59 MMHG | HEIGHT: 62 IN | TEMPERATURE: 98 F | SYSTOLIC BLOOD PRESSURE: 126 MMHG | RESPIRATION RATE: 16 BRPM | BODY MASS INDEX: 30.47 KG/M2 | WEIGHT: 165.56 LBS

## 2023-05-12 LAB
ALBUMIN SERPL BCP-MCNC: 3.4 G/DL (ref 3.5–5.2)
ALP SERPL-CCNC: 36 U/L (ref 55–135)
ALT SERPL W/O P-5'-P-CCNC: 13 U/L (ref 10–44)
ANION GAP SERPL CALC-SCNC: 10 MMOL/L (ref 8–16)
APTT PPP: 25.5 SEC (ref 21–32)
AST SERPL-CCNC: 21 U/L (ref 10–40)
BASOPHILS # BLD AUTO: 0.02 K/UL (ref 0–0.2)
BASOPHILS NFR BLD: 0.5 % (ref 0–1.9)
BILIRUB SERPL-MCNC: 0.4 MG/DL (ref 0.1–1)
BUN SERPL-MCNC: 16 MG/DL (ref 8–23)
CALCIUM SERPL-MCNC: 9 MG/DL (ref 8.7–10.5)
CHLORIDE SERPL-SCNC: 107 MMOL/L (ref 95–110)
CO2 SERPL-SCNC: 22 MMOL/L (ref 23–29)
CREAT SERPL-MCNC: 0.8 MG/DL (ref 0.5–1.4)
DIFFERENTIAL METHOD: ABNORMAL
EOSINOPHIL # BLD AUTO: 0.2 K/UL (ref 0–0.5)
EOSINOPHIL NFR BLD: 5.4 % (ref 0–8)
ERYTHROCYTE [DISTWIDTH] IN BLOOD BY AUTOMATED COUNT: 13.9 % (ref 11.5–14.5)
EST. GFR  (NO RACE VARIABLE): >60 ML/MIN/1.73 M^2
ESTIMATED AVG GLUCOSE: 103 MG/DL (ref 68–131)
GLUCOSE SERPL-MCNC: 101 MG/DL (ref 70–110)
HBA1C MFR BLD: 5.2 % (ref 4–5.6)
HCT VFR BLD AUTO: 35.3 % (ref 37–48.5)
HGB BLD-MCNC: 12.4 G/DL (ref 12–16)
IMM GRANULOCYTES # BLD AUTO: 0.01 K/UL (ref 0–0.04)
IMM GRANULOCYTES NFR BLD AUTO: 0.2 % (ref 0–0.5)
INR PPP: 1 (ref 0.8–1.2)
LYMPHOCYTES # BLD AUTO: 1.9 K/UL (ref 1–4.8)
LYMPHOCYTES NFR BLD: 45.1 % (ref 18–48)
MAGNESIUM SERPL-MCNC: 2.3 MG/DL (ref 1.6–2.6)
MCH RBC QN AUTO: 27.2 PG (ref 27–31)
MCHC RBC AUTO-ENTMCNC: 35.1 G/DL (ref 32–36)
MCV RBC AUTO: 77 FL (ref 82–98)
MONOCYTES # BLD AUTO: 0.4 K/UL (ref 0.3–1)
MONOCYTES NFR BLD: 9.5 % (ref 4–15)
NEUTROPHILS # BLD AUTO: 1.6 K/UL (ref 1.8–7.7)
NEUTROPHILS NFR BLD: 39.3 % (ref 38–73)
NRBC BLD-RTO: 0 /100 WBC
PHOSPHATE SERPL-MCNC: 4 MG/DL (ref 2.7–4.5)
PLATELET # BLD AUTO: 155 K/UL (ref 150–450)
PMV BLD AUTO: 11.6 FL (ref 9.2–12.9)
POTASSIUM SERPL-SCNC: 3.9 MMOL/L (ref 3.5–5.1)
PROT SERPL-MCNC: 7.2 G/DL (ref 6–8.4)
PROTHROMBIN TIME: 10.9 SEC (ref 9–12.5)
RBC # BLD AUTO: 4.56 M/UL (ref 4–5.4)
SODIUM SERPL-SCNC: 139 MMOL/L (ref 136–145)
TROPONIN I SERPL DL<=0.01 NG/ML-MCNC: <0.006 NG/ML (ref 0–0.03)
WBC # BLD AUTO: 4.1 K/UL (ref 3.9–12.7)

## 2023-05-12 PROCEDURE — 85025 COMPLETE CBC W/AUTO DIFF WBC: CPT | Performed by: NURSE PRACTITIONER

## 2023-05-12 PROCEDURE — 36415 COLL VENOUS BLD VENIPUNCTURE: CPT | Performed by: NURSE PRACTITIONER

## 2023-05-12 PROCEDURE — 84484 ASSAY OF TROPONIN QUANT: CPT | Performed by: NURSE PRACTITIONER

## 2023-05-12 PROCEDURE — 85730 THROMBOPLASTIN TIME PARTIAL: CPT | Performed by: NURSE PRACTITIONER

## 2023-05-12 PROCEDURE — 97166 OT EVAL MOD COMPLEX 45 MIN: CPT

## 2023-05-12 PROCEDURE — 85610 PROTHROMBIN TIME: CPT | Performed by: NURSE PRACTITIONER

## 2023-05-12 PROCEDURE — 97161 PT EVAL LOW COMPLEX 20 MIN: CPT

## 2023-05-12 PROCEDURE — 80053 COMPREHEN METABOLIC PANEL: CPT | Performed by: NURSE PRACTITIONER

## 2023-05-12 PROCEDURE — 84100 ASSAY OF PHOSPHORUS: CPT | Performed by: NURSE PRACTITIONER

## 2023-05-12 PROCEDURE — 94760 N-INVAS EAR/PLS OXIMETRY 1: CPT

## 2023-05-12 PROCEDURE — 99215 PR OFFICE/OUTPT VISIT, EST, LEVL V, 40-54 MIN: ICD-10-PCS | Mod: ,,, | Performed by: PSYCHIATRY & NEUROLOGY

## 2023-05-12 PROCEDURE — 25000003 PHARM REV CODE 250: Performed by: NURSE PRACTITIONER

## 2023-05-12 PROCEDURE — 83735 ASSAY OF MAGNESIUM: CPT | Performed by: NURSE PRACTITIONER

## 2023-05-12 PROCEDURE — G0378 HOSPITAL OBSERVATION PER HR: HCPCS

## 2023-05-12 PROCEDURE — 99215 OFFICE O/P EST HI 40 MIN: CPT | Mod: ,,, | Performed by: PSYCHIATRY & NEUROLOGY

## 2023-05-12 RX ORDER — ATORVASTATIN CALCIUM 40 MG/1
40 TABLET, FILM COATED ORAL NIGHTLY
Qty: 30 TABLET | Refills: 0 | Status: SHIPPED | OUTPATIENT
Start: 2023-05-12 | End: 2023-07-10

## 2023-05-12 RX ADMIN — ATENOLOL 100 MG: 50 TABLET ORAL at 09:05

## 2023-05-12 RX ADMIN — ASPIRIN 81 MG: 81 TABLET, COATED ORAL at 09:05

## 2023-05-12 NOTE — PLAN OF CARE
OT eval completed. Patient at functional independent baseline. Continued skilled OT intervention in acute or post acute not warranted. D/C OT.

## 2023-05-12 NOTE — PLAN OF CARE
[facility] - Telemetry (Hospital)  Discharge Final Note    Primary Care Provider: Chhaya Jennings MD    Expected Discharge Date: 5/12/2023    Final Discharge Note (most recent)       Final Note - 05/12/23 1139          Final Note    Assessment Type Final Discharge Note     Anticipated Discharge Disposition Home or Self Care     Hospital Resources/Appts/Education Provided Appointments scheduled by Navigator/Coordinator;Appointments scheduled and added to AVS        Post-Acute Status    Discharge Delays None known at this time                     Important Message from Medicare             Contact Info       Abi Pride MD   Specialty: Neurology    [address] [place] [address] [address] [address]   Phone: [phone]       Next Steps: Schedule an appointment as soon as possible for a visit in 2 day(s)    Instructions: For re-evaluation and further treatment    [facility] - Emergency Dept.   Specialty: Emergency Medicine    [id number] [place], [place] [address] [place] [address]   Phone: [phone]       Next Steps: Go today    Instructions: If symptoms worsen, For re-evaluation and further treatment, As needed    Chhaya Jennings MD   Specialty: Family Medicine   Relationship: PCP - General    [address] [place] [address] [address] [address]   Phone: [phone]       Next Steps: Schedule an appointment as soon as possible for a visit in 2 day(s)    Instructions: For re-evaluation and further treatment

## 2023-05-12 NOTE — HOSPITAL COURSE
5/12  Admitted for tia. Neuro recommended inpatient workup for stroke. Workup with imaging and labs within normal limits. Echocardiogram within normal limits. Chest x-ray with no acute findings. Physical/occupational therapy recommended no needs. Speech evaluation with no signs or symptoms of dysphagia.     Patient seen and evaluated by me. Patient was determined to be suitable for d/c. Patient deemed stable for discharge to home.

## 2023-05-12 NOTE — PT/OT/SLP EVAL
Physical Therapy Evaluation and Discharge Note    Patient Name:  Clare Whitley   MRN:  [id number]    Recommendations:     Discharge Recommendations: home  Discharge Equipment Recommendations: none   Barriers to discharge: None    Assessment:     Clare Whitley is a 62 y.o. female admitted with a medical diagnosis of TIA (transient ischemic attack). .  At this time, patient is functioning at their prior level of function and does not require further acute PT services.     Recent Surgery: * No surgery found *     Plan:     During this hospitalization, patient does not require further acute PT services.  Please re-consult if situation changes.      Subjective     Chief Complaint: NONE, EAGER TO WALK  Patient/Family Comments/goals:   Pain/Comfort:  Pain Rating 1: 0/10    Patients cultural, spiritual, Presybeterian conflicts given the current situation:      Living Environment:  PT LIVES WITH  AND FAMILY 1 STORY HOUSE NO STEPS, AMB INDEP COMMUNITY DISTANCES, DRIVES, RETIRED, INDEP WITH ADL'S  Prior to admission, patients level of function was INDEP.  Equipment used at home: none.  DME owned (not currently used): none.  Upon discharge, patient will have assistance from FAMILY.    Objective:     Communicated with NURSE COLE prior to session.  Patient found supine with peripheral IV, telemetry upon PT entry to room.    General Precautions: Standard  Orthopedic Precautions:N/A   Braces: N/A  Respiratory Status: Room air    Exams:  Cognitive Exam:  Patient is oriented to Person, Place, Time, and Situation  Postural Exam:  Patient presented with the following abnormalities:    -       No postural abnormalities identified  Sensation:    -       Intact  RLE ROM: WNL  RLE Strength: WNL  LLE ROM: WNL  LLE Strength: WNL    Functional Mobility:  Bed Mobility:     Rolling Left:  independence  Rolling Right: independence  Scooting: independence  Bridging: independence  Supine to Sit: independence  Sit to Supine:  "independence  Transfers:     Sit to Stand:  independence with no AD  Gait: PT ' NO AD INDEP, NO LOB OR SOB ON ROOM AIR  Balance: GOOD SITTING AND STANDING BALANCE    AM-PAC 6 CLICK MOBILITY  Total Score:21     Treatment and Education:  PT EDUCATED IN ROLE OF P.T. IN ACUTE CARE HOSPITAL SETTING, ENCOURAGED TO INCREASE TIME OOB IN CHAIR TO TOLERANCE, EDUCATED IN AND ENCOURAGED TO PERFORM BLE THEREX WHILE SEATED OR SUPINE THROUGHOUT THE DAY TO TOLERANCE: HIP FLEX/EXT, QUAD SET, LAQ, HEEL SLIDES, AP'S.  PT AGREEABLE TO REQUESTS    AM-PAC 6 CLICK MOBILITY  Total Score:21     Patient left HOB elevated with all lines intact, call button in reach, and NURSE notified.    GOALS:   Multidisciplinary Problems       Physical Therapy Goals       Not on file                    History:     Past Medical History:   Diagnosis Date    Hypertension     Mitral regurgitation     "mild"; followed by Dr. Blackmon ([facility])    MVP (mitral valve prolapse)     "trivial"; followed by Dr. Blackmon ([facility])    Sickle cell trait        Past Surgical History:   Procedure Laterality Date    CHOLECYSTECTOMY      laser SX Bilateral     LYMPH NODE BIOPSY Left     left neck     TUBAL LIGATION  9/2/1991    UTERINE FIBROID EMBOLIZATION  01/01/2010       Time Tracking:     PT Received On: 05/12/23  PT Start Time: 0850     PT Stop Time: 0913  PT Total Time (min): 23 min     Billable Minutes: Evaluation 23 05/12/2023  "

## 2023-05-12 NOTE — PLAN OF CARE
[facility] - Telemetry (Hospital)  Discharge Assessment    Primary Care Provider: Chhaya Jennings MD     Discharge Assessment (most recent)       BRIEF DISCHARGE ASSESSMENT - 05/12/23 1137          Discharge Planning    Assessment Type Discharge Planning Brief Assessment     Resource/Environmental Concerns none     Support Systems Spouse/significant other     Equipment Currently Used at Home CPAP     Current Living Arrangements home     Patient/Family Anticipates Transition to home with family     Patient/Family Anticipated Services at Transition none     DME Needed Upon Discharge  CPAP     Discharge Plan A Home with family                   Patient is independent with ADL's;  Spouse will be her help at home if needed.  All follow up appointments set up.

## 2023-05-12 NOTE — DISCHARGE SUMMARY
[facility] - Telemetry ([facility] Medicine  Discharge Summary      Patient Name: Clare Whitley  MRN: [id number]  ROLAND: [id number]  Patient Class: OP- Observation  Admission Date: 5/11/2023  Hospital Length of Stay: 0 days  Discharge Date and Time:  05/12/2023 10:57 AM  Attending Physician: Laure Colunga MD   Discharging Provider: Laure Colunga MD  Primary Care Provider: Chhaya Jennings MD    Primary Care Team: [facility] MEDICINE B    HPI:    is a 62 year old female with a history of palpitations, mitral valve proplapse, SINTIA on Cpap an migraines who presents to the ed for dizziness, onset 5 days. Symptoms are intermittent and fluctuates in severity. No mitigating or exacerbating factors reported. Associated sxs include RUE weakness, light-headedness. Patient denies any fever, chills, n/v/d, SOB, CP, numbness, facial droop, slurred speech, headache.  Lab work unremarkable.  CT head negative, CTA head and neck negative for significant stenosis.  MRI brain negative.  RUE weakness has improved.  Neurology consulted recommended admission, and TIA work-up including A1C, lipid, echo.      Code Status: Full  Surrogate Decision Maker Johnnie       * No surgery found *      Hospital Course:   5/12  Admitted for tia. Neuro recommended inpatient workup for stroke. Workup with imaging and labs within normal limits. Echocardiogram within normal limits. Chest x-ray with no acute findings. Physical/occupational therapy recommended no needs. Speech evaluation with no signs or symptoms of dysphagia.     Patient seen and evaluated by me. Patient was determined to be suitable for d/c. Patient deemed stable for discharge to home.    A referral has been placed for nurse practitioner at home program.     Goals of Care Treatment Preferences:  Code Status: Full Code      Consults:   Consults (From admission, onward)          Status Ordering Provider     Inpatient consult to Neurology  Once        Provider:  Lynne Camarillo     Acknowledged CAROLYNE CARTER     IP consult to case management/social work  Once        Provider:  (Not yet assigned)    Acknowledged CAROLYNE CARTER            No new Assessment & Plan notes have been filed under this hospital service since the last note was generated.  Service: Hospital Medicine    Final Active Diagnoses:    Diagnosis Date Noted POA    PRINCIPAL PROBLEM:  TIA (transient ischemic attack) [G45.9] 05/11/2023 Yes    SINTIA on CPAP [G47.33, Z99.89] 09/17/2020 Not Applicable    Essential hypertension [I10] 06/28/2013 Yes     Chronic      Problems Resolved During this Admission:       Discharged Condition: stable    Disposition:     Follow Up:   Follow-up Information       Abi Pride MD. Schedule an appointment as soon as possible for a visit in 2 days.    Specialty: Neurology  Why: For re-evaluation and further treatment  Contact information:  [address] [place] [address] DR Adry TORO [address]  [phone]               [facility] - Emergency Dept.. Go today.    Specialty: Emergency Medicine  Why: If symptoms worsen, For re-evaluation and further treatment, As needed  Contact information:  [address] [place] [id number] [phone]             Chhaya Jennings MD. Schedule an appointment as soon as possible for a visit in 2 days.    Specialty: Family Medicine  Why: For re-evaluation and further treatment  Contact information:  [address] [place] [address] DR Adry T[address]O [address]  [phone]                           Patient Instructions:   No discharge procedures on file.    Significant Diagnostic Studies: Labs:   CMP   Recent Labs   Lab 05/11/23  1040 05/12/23  0529    139   K 4.0 3.9    107   CO2 23 22*   GLU 92 101   BUN 17 16   CREATININE 0.8 0.8   CALCIUM 9.4 9.0   PROT 8.1 7.2   ALBUMIN 3.8 3.4*   BILITOT 0.6 0.4   ALKPHOS 37* 36*   AST 20 21   ALT 14 13   ANIONGAP 13 10   , CBC   Recent Labs   Lab 05/11/23  1040 05/12/23  0529   WBC 3.72* 4.10   HGB 13.5  12.4   HCT 37.9 35.3*    155   , INR   Lab Results   Component Value Date    INR 1.0 05/12/2023    INR 1.0 05/11/2023   , Lipid Panel   Lab Results   Component Value Date    CHOL 139 05/11/2023    HDL 52 05/11/2023    LDLCALC 78.8 05/11/2023    TRIG 41 05/11/2023    CHOLHDL 37.4 05/11/2023   , Troponin   Recent Labs   Lab 05/11/23  1040 05/12/23  0529   TROPONINI <0.006 <0.006   , A1C:   Recent Labs   Lab 05/11/23  1422   HGBA1C 5.2    and All labs within the past 24 hours have been reviewed  Radiology: X-Ray: CXR: portable  MRI: brain wo contrast  CT scan:  CT head wo contrast; cta head and neck  Cardiac Graphics: Echocardiogram:   Transthoracic echo (TTE) complete (Cupid Only):   Results for orders placed or performed during the hospital encounter of 05/11/23   Echo   Result Value Ref Range    BSA 1.81 m2    TDI SEPTAL 0.07 m/s    LV LATERAL E/E' RATIO 14.29 m/s    LV SEPTAL E/E' RATIO 14.29 m/s    LA WIDTH 3.10 cm    IVC diameter 1.74 cm    Left Ventricular Outflow Tract Mean Velocity 0.62 cm/s    Left Ventricular Outflow Tract Mean Gradient 1.85 mmHg    Pulmonary Valve Mean Velocity 0.49 m/s    TDI LATERAL 0.07 m/s    PV PEAK VELOCITY 0.77 cm/s    LVIDd 4.39 3.5 - 6.0 cm    IVS 1.00 0.6 - 1.1 cm    Posterior Wall 0.92 0.6 - 1.1 cm    Ao root annulus 2.47 cm    LVIDs 2.92 2.1 - 4.0 cm    FS 33 28 - 44 %    LA volume 36.30 cm3    STJ 2.52 cm    Ascending aorta 2.65 cm    LV mass 139.24 g    LA size 3.12 cm    RVDD 3.25 cm    TAPSE 2.00 cm    RV S' 0.01 cm/s    Left Ventricle Relative Wall Thickness 0.42 cm    AV mean gradient 3 mmHg    AV valve area 2.15 cm2    AV Velocity Ratio 0.81     AV index (prosthetic) 0.74     MV valve area p 1/2 method 2.99 cm2    E/A ratio 0.86     Mean e' 0.07 m/s    E wave deceleration time 253.30 msec    IVRT 65.65 msec    LVOT diameter 1.92 cm    LVOT area 2.9 cm2    LVOT peak zenobia 1.00 m/s    LVOT peak VTI 21.70 cm    Ao peak zenobia 1.23 m/s    Ao VTI 29.2 cm    RVOT peak zenobia  0.64 m/s    RVOT peak VTI 13.3 cm    Mr max gil 5.12 m/s    LVOT stroke volume 62.80 cm3    AV peak gradient 6 mmHg    PV mean gradient 0.85 mmHg    E/E' ratio 14.29 m/s    MV Peak E Gil 1.00 m/s    TR Max Gil 2.37 m/s    MV stenosis pressure 1/2 time 73.46 ms    MV Peak A Gil 1.16 m/s    LV Systolic Volume 32.89 mL    LV Systolic Volume Index 18.7 mL/m2    LV Diastolic Volume 87.40 mL    LV Diastolic Volume Index 49.66 mL/m2    LA Volume Index 20.6 mL/m2    LV Mass Index 79 g/m2    RA Major Axis 3.79 cm    Left Atrium Minor Axis 4.57 cm    Left Atrium Major Axis 4.27 cm    Triscuspid Valve Regurgitation Peak Gradient 22 mmHg    EF 60 %    Narrative    · Normal systolic function.  · The estimated ejection fraction is 60%.  · Indeterminate left ventricular diastolic function.  · Normal right ventricular size with normal right ventricular systolic   function.          Pending Diagnostic Studies:       None           Medications:  Reconciled Home Medications:      Medication List        ASK your doctor about these medications      aspirin 81 MG EC tablet  Commonly known as: ECOTRIN  Take 81 mg by mouth once daily.     atenoloL 100 MG tablet  Commonly known as: TENORMIN  Take 100 mg by mouth once daily.     cholecalciferol (vitamin D3) 25 mcg (1,000 unit) capsule  Commonly known as: VITAMIN D3  Take 1 capsule (1,000 Units total) by mouth once daily.     furosemide 40 MG tablet  Commonly known as: LASIX  Take 40 mg by mouth once daily.     potassium chloride 10 MEQ Cpsr  Commonly known as: MICRO-K  Take 1 capsule (10 mEq total) by mouth once daily.              Indwelling Lines/Drains at time of discharge:   Lines/Drains/Airways       None                   Time spent on the discharge of patient: 39 minutes         Laure Colunga MD  Department of Hospital Medicine  [facility] - Telemetry ([facility])

## 2023-05-12 NOTE — CONSULTS
"  Consultation Requested by: ED  Chief Complaint: Right sided weakness and dizziness.    Service used: No    HPI: Clare Whitley is a 62 y.o. female with past medical history of mitral valve prolapse, obstructive sleep apnea and migraine presented to the ED with dizziness that was associated with right-sided upper extremity weakness.  Last known normal was 5 days ago.  Denies any aggravating or relieving factors.  On arrival in the emergency department she reported resolution of symptoms and she was back to baseline.  Denies having such symptoms in the past.  Denies history of a stroke or TIA in the past.  She is not on any anticoagulation.  Denies headache, focal weakness, numbness, vision problem, trouble swallowing, trouble talking, fall or loss of consciousness.    Past Medical History:   Diagnosis Date    Hypertension     Mitral regurgitation     "mild"; followed by Dr. Blackmon ([facility])    MVP (mitral valve prolapse)     "trivial"; followed by Dr. Blackmon ([facility])    Sickle cell trait      Past Surgical History:   Procedure Laterality Date    CHOLECYSTECTOMY      laser SX Bilateral     LYMPH NODE BIOPSY Left     left neck     TUBAL LIGATION  9/2/1991    UTERINE FIBROID EMBOLIZATION  01/01/2010     Review of patient's allergies indicates:   Allergen Reactions    No known drug allergies        Current Facility-Administered Medications:     aspirin EC tablet 81 mg, 81 mg, Oral, Daily, Marnie Cruz NP, 81 mg at 05/12/23 0937    atenoloL tablet 100 mg, 100 mg, Oral, Daily, Marnie Cruz, NP, 100 mg at 05/12/23 0937    atorvastatin tablet 40 mg, 40 mg, Oral, QHS, Marnie Cruz NP, 40 mg at 05/11/23 2023    enoxaparin injection 40 mg, 40 mg, Subcutaneous, Daily, Marnie Cruz NP, 40 mg at 05/11/23 1700    sodium chloride 0.9% flush 10 mL, 10 mL, Intravenous, PRN, Marnie Cruz NP    Current Outpatient Medications:     aspirin (ECOTRIN) 81 MG EC tablet, Take 81 mg by mouth once " daily., Disp: , Rfl:     atenoloL (TENORMIN) 100 MG tablet, Take 100 mg by mouth once daily., Disp: , Rfl:     cholecalciferol, vitamin D3, (VITAMIN D3) 25 mcg (1,000 unit) capsule, Take 1 capsule (1,000 Units total) by mouth once daily., Disp: , Rfl: 0    furosemide (LASIX) 40 MG tablet, Take 40 mg by mouth once daily., Disp: , Rfl:     potassium chloride (MICRO-K) 10 MEQ CpSR, Take 1 capsule (10 mEq total) by mouth once daily., Disp: 90 capsule, Rfl: 0    atorvastatin (LIPITOR) 40 MG tablet, Take 1 tablet (40 mg total) by mouth every evening., Disp: 30 tablet, Rfl: 0    Social History     Socioeconomic History    Marital status:      Spouse name: Johnnie Whitley    Number of children: 3   Occupational History    Occupation: Rehab counselor associate     Employer: [employer]     Comment: Works with disabled people   Tobacco Use    Smoking status: Never     Passive exposure: Yes    Smokeless tobacco: Never   Substance and Sexual Activity    Alcohol use: Never    Drug use: Never    Sexual activity: Yes     Partners: Male     Birth control/protection: Post-menopausal, None     Family History   Problem Relation Age of Onset    Hypertension Mother     Cataracts Mother     Hypertension Father     Hypertension Sister     Hypertension Brother     Stroke Cousin      Review of Systems  Constitutional: no fevers, no weight changes,  No diaphoresis  HEENT: No congestion, no doublevision, no dysphagia, no rhinnorhea, no lacrimation  Cardiovascular: no chest pain or palpitations  Respiratory: no shortness of breath, no cough  Gastrointestinal: no diarrhea, no constipation  Genitourinary: no dysuria  Musculoskeletal: no joint swelling. No myalgia  Skin: no rash  Psychiatric: no hallucinations, no depression or anxiety  Neurologic: as per HPI  All other review of systems is negative and as per HPI.    Vitals:    05/12/23 0726   BP: (!) 126/59   Pulse: (!) 58   Resp: 16   Temp: 98 °F (36.7 °C)      Exam  General: Pleasant,  conversant, Well-kempt  HEENT: Head atraumatic. No nasal abnormality. No gaze preference. EOMI.  Cardiovascular: Regular   Lungs: Not in distress   Mental Status: Awake alert and oriented x III. Follows commands. No aphasia or dysarthria. Naming and repetition intact. Mood is appropriate.  Cranial Nerve: PERRL. VFF. EOMI. Facial sensation intact. No facial asymmetry. Hearing intact. Palate elevates. Uvula midline. SCM strong. No tongue deviation.  Motor: Normal tone. No cogwheel rigidity. No bradykinesia. No pronator drift. 5/5 strength bilaterally in the upper extremities including deltoids, biceps, triceps, wrist extensors/flexors, finger flexors/extensors, interossei, and APB. 5/5 strength bilaterally in the lower extremities including iliopsoas, hamstring, quadriceps, tibialis anterior, gastrocnemius, EHL.  Deep Tendon Reflexes: 2+ in biceps, tricepts, brachioradialis b/l. 2+ in patellar and ankle jerks. No Babinski.  Sensory: Intact to soft touch, cold. No neglect.  Cerebellar: Finger to nose intact. Heel to shin intact.   Gait: Normal narrow based gait.      Other tests:    HbA1c: 5.2  LDL: 78    Carotid US:    ECHO:  Normal systolic function.  The estimated ejection fraction is 60%.  Indeterminate left ventricular diastolic function.  Normal right ventricular size with normal right ventricular systolic function.      MRI Brain:  No acute findings.  Stable MRI of the brain.  No evidence of acute or chronic infarct.     CTA Head and Neck:  Negative CTA of the neck and of the brain.  No significant stenosis or large vessel occlusion.    Assessment:    Clare Whitley is a 62 y.o. female with past medical history of mitral valve prolapse, obstructive sleep apnea and migraine presented to the ED with dizziness that was associated with right-sided upper extremity weakness.  Last known normal was 5 days ago. On arrival in the emergency department she reported resolution of symptoms and she was back to baseline. NIHSS  0.  MRI showed no acute intracranial process.  CTA was negative for any critical stenosis or occlusion.  Not a tPA or thrombectomy candidate.  Likely a transient neurological deficit.  Less likely ischemic event in the setting of no significant vascular risk factors.  We will workup for TIA.      Plan:    -- Inpatient  -- Initiate Stroke orderset  -- Place patient on telemetry  -- Activity as tolerated  -- Normoglycemia  -- Normothermia  -- BP goal normotensive   -- Do not reduce BP more then 10-15% per day  -- ASA 81 mg daily PO   -- Atorvastatin 40 mg daily   -- RN swallow screen to be completed by the RN prior to any PO intake. If the patient fails the RN swallow screen then make patient NPO. A formal clinical swallow evaluation by SLP is needed.  -- Follow speech recommendations for starting diet  -- Start 0.9% NS @  cc/ hr unless contraindicated   -- Discontinue IV fluids when the patient becomes stable, is taking fluids by mouth, and is not required for BP management   -- Physical therapy  -- Occupational therapy  -- Speech therapy  -- Rehab consult  -- We had an in depth discussion with patient and family regarding the imaging findings, stroke etiology and our plan  -- Case and plan discussed with primary team    -- We discussed her vascular risk factors and the need for her to continue to modify her risks e.g. taking her mediations, proper diet, and exercise.  -- Work with primary care provider on stroke risk factor management, BP <140/90, cholesterol monitoring  -- We discussed the need for her to continue to exercise her body and her mind with physical activity and mental activities.  -- We discussed BEFAST and the need for her to recognize stroke symptoms and report to the ER early is needed.    B-Balance  E-Eyes, vision  F-Facial Droop  A-Arm or leg weakness on one side  S-Speech trouble  T-Time - CALL 911!    -- Please have this patient follow up in my clinic in July   above.      Marquise Olivera,  MD  Neurology   [facility]

## 2023-05-12 NOTE — PLAN OF CARE
Problem: Adult Inpatient Plan of Care  Goal: Plan of Care Review  Outcome: Met  Goal: Patient-Specific Goal (Individualized)  Outcome: Met  Goal: Absence of Hospital-Acquired Illness or Injury  Outcome: Met  Goal: Optimal Comfort and Wellbeing  Outcome: Met  Goal: Readiness for Transition of Care  Outcome: Met     Problem: Infection  Goal: Absence of Infection Signs and Symptoms  Outcome: Met     Problem: Adjustment to Illness (Stroke, Ischemic/Transient Ischemic Attack)  Goal: Optimal Coping  Outcome: Met     Problem: Cerebral Tissue Perfusion (Stroke, Ischemic/Transient Ischemic Attack)  Goal: Optimal Cerebral Tissue Perfusion  Outcome: Met     Problem: Cognitive Impairment (Stroke, Ischemic/Transient Ischemic Attack)  Goal: Optimal Cognitive Function  Outcome: Met     Problem: Communication Impairment (Stroke, Ischemic/Transient Ischemic Attack)  Goal: Improved Communication Skills  Outcome: Met     Problem: Functional Ability Impaired (Stroke, Ischemic/Transient Ischemic Attack)  Goal: Optimal Functional Ability  Outcome: Met     Problem: Respiratory Compromise (Stroke, Ischemic/Transient Ischemic Attack)  Goal: Effective Oxygenation and Ventilation  Outcome: Met     Problem: Sensorimotor Impairment (Stroke, Ischemic/Transient Ischemic Attack)  Goal: Improved Sensorimotor Function  Outcome: Met     Problem: Swallowing Impairment (Stroke, Ischemic/Transient Ischemic Attack)  Goal: Optimal Eating and Swallowing without Aspiration  Outcome: Met     Problem: Urinary Elimination Impaired (Stroke, Ischemic/Transient Ischemic Attack)  Goal: Effective Urinary Elimination  Outcome: Met

## 2023-05-12 NOTE — PT/OT/SLP EVAL
"Occupational Therapy   Evaluation and Discharge Note    Name: Clare Whitley  MRN: [id number]  Admitting Diagnosis: TIA (transient ischemic attack)  Recent Surgery: * No surgery found *      Recommendations:     Discharge Recommendations: home  Discharge Equipment Recommendations: none  Barriers to discharge:  None    Assessment:     Clare Whitley is a 62 y.o. female with a medical diagnosis of TIA (transient ischemic attack). At this time, patient is functioning at their prior level of function and does not require further acute OT services.     Plan:     During this hospitalization, patient does not require further acute OT services.  Please re-consult if situation changes.    Plan of Care Reviewed with: patient    Subjective     Chief Complaint: Reported "I am doing well."  Patient/Family Comments/goals: none reported    Occupational Profile:  Living Environment: Patient resides in a 1 story home with no steps to enter, with her  and adult children.  Previous level of function: Patient was independent with ADLs and community distance ambulation.  Roles and Routines: Patient was an active  and retired.  Equipment Used at home: none  Assistance upon Discharge: family    Pain/Comfort:  Pain Rating 1: 0/10  Pain Rating Post-Intervention 1: 0/10    Objective:     Communicated with: NurseMaria, prior to session.  Patient found supine with peripheral IV, telemetry upon OT entry to room.    General Precautions: Standard  Orthopedic Precautions: N/A  Braces: N/A  Respiratory Status: Room air     Occupational Performance:    Bed Mobility:    Patient completed Supine to Sit with independence  Patient completed Sit to Supine with independence    Functional Mobility/Transfers:  Patient completed Sit <> Stand Transfer with independence  with  no assistive device   Patient completed Bed <> Chair Transfer using Step Transfer technique with independence with no assistive device  Functional Mobility: Patient completed " "200ft functional mobility, independently to promote OOB activity.    Activities of Daily Living:  Upper Body Dressing: independence .  Lower Body Dressing: modified independence donned socks while seated EOB    Cognitive/Visual Perceptual:  Cognitive/Psychosocial Skills:     -       Oriented to: Person, Place, Time, and Situation   -       Follows Commands/attention:Follows multistep  commands    Physical Exam:  Balance:    -       sitting: WFL, standing: WFL  Dominant hand:    -       right  Upper Extremity Range of Motion:     -       Right Upper Extremity: WFL  -       Left Upper Extremity: WFL  Upper Extremity Strength:    -       Right Upper Extremity: WFL  -       Left Upper Extremity: WFL   Strength:    -       Right Upper Extremity: WFL  -       Left Upper Extremity: WFL  B UE sensation intact  Gross and FMC WFL  Declines visual changes    AMPAC 6 Click ADL:  AMPAC Total Score: 24    Treatment & Education:  Patient educated on role of OT in acute setting and benefits of OOB activity. Encouraged OOB throughout the course of hospitalization to decrease risk of hospital related debility. Patient stated understanding and in agreement with POC.    Patient left supine with all lines intact and call button in reach    History:     Past Medical History:   Diagnosis Date    Hypertension     Mitral regurgitation     "mild"; followed by Dr. Blackmon ([facility])    MVP (mitral valve prolapse)     "trivial"; followed by Dr. Blackmon ([facility])    Sickle cell trait          Past Surgical History:   Procedure Laterality Date    CHOLECYSTECTOMY      laser SX Bilateral     LYMPH NODE BIOPSY Left     left neck     TUBAL LIGATION  9/2/1991    UTERINE FIBROID EMBOLIZATION  01/01/2010       Time Tracking:     OT Date of Treatment: 05/12/23  OT Start Time: 0920  OT Stop Time: 0945  OT Total Time (min): 25 min    Billable Minutes:Evaluation 25    5/12/2023  "

## 2023-05-15 ENCOUNTER — PES CALL (OUTPATIENT)
Dept: ADMINISTRATIVE | Facility: CLINIC | Age: 63
End: 2023-05-15
Payer: COMMERCIAL

## 2023-05-16 ENCOUNTER — OFFICE VISIT (OUTPATIENT)
Dept: INTERNAL MEDICINE | Facility: CLINIC | Age: 63
End: 2023-05-16
Payer: COMMERCIAL

## 2023-05-16 ENCOUNTER — PES CALL (OUTPATIENT)
Dept: ADMINISTRATIVE | Facility: CLINIC | Age: 63
End: 2023-05-16
Payer: COMMERCIAL

## 2023-05-16 VITALS
DIASTOLIC BLOOD PRESSURE: 80 MMHG | RESPIRATION RATE: 17 BRPM | HEART RATE: 88 BPM | BODY MASS INDEX: 29.64 KG/M2 | OXYGEN SATURATION: 96 % | SYSTOLIC BLOOD PRESSURE: 110 MMHG | WEIGHT: 161.06 LBS | TEMPERATURE: 97 F | HEIGHT: 62 IN

## 2023-05-16 DIAGNOSIS — G45.9 TIA (TRANSIENT ISCHEMIC ATTACK): ICD-10-CM

## 2023-05-16 DIAGNOSIS — G47.33 OSA ON CPAP: ICD-10-CM

## 2023-05-16 DIAGNOSIS — I10 ESSENTIAL HYPERTENSION: ICD-10-CM

## 2023-05-16 DIAGNOSIS — Z09 HOSPITAL DISCHARGE FOLLOW-UP: Primary | ICD-10-CM

## 2023-05-16 DIAGNOSIS — M46.1 SACROILIITIS: ICD-10-CM

## 2023-05-16 PROBLEM — E66.09 CLASS 1 OBESITY DUE TO EXCESS CALORIES WITH SERIOUS COMORBIDITY AND BODY MASS INDEX (BMI) OF 30.0 TO 30.9 IN ADULT: Status: RESOLVED | Noted: 2020-08-19 | Resolved: 2023-05-16

## 2023-05-16 PROBLEM — E66.811 CLASS 1 OBESITY DUE TO EXCESS CALORIES WITH SERIOUS COMORBIDITY AND BODY MASS INDEX (BMI) OF 30.0 TO 30.9 IN ADULT: Status: RESOLVED | Noted: 2020-08-19 | Resolved: 2023-05-16

## 2023-05-16 PROCEDURE — 1160F RVW MEDS BY RX/DR IN RCRD: CPT | Mod: CPTII,S$GLB,, | Performed by: PHYSICIAN ASSISTANT

## 2023-05-16 PROCEDURE — 3079F PR MOST RECENT DIASTOLIC BLOOD PRESSURE 80-89 MM HG: ICD-10-PCS | Mod: CPTII,S$GLB,, | Performed by: PHYSICIAN ASSISTANT

## 2023-05-16 PROCEDURE — 3044F PR MOST RECENT HEMOGLOBIN A1C LEVEL <7.0%: ICD-10-PCS | Mod: CPTII,S$GLB,, | Performed by: PHYSICIAN ASSISTANT

## 2023-05-16 PROCEDURE — 3074F PR MOST RECENT SYSTOLIC BLOOD PRESSURE < 130 MM HG: ICD-10-PCS | Mod: CPTII,S$GLB,, | Performed by: PHYSICIAN ASSISTANT

## 2023-05-16 PROCEDURE — 3008F PR BODY MASS INDEX (BMI) DOCUMENTED: ICD-10-PCS | Mod: CPTII,S$GLB,, | Performed by: PHYSICIAN ASSISTANT

## 2023-05-16 PROCEDURE — 1159F MED LIST DOCD IN RCRD: CPT | Mod: CPTII,S$GLB,, | Performed by: PHYSICIAN ASSISTANT

## 2023-05-16 PROCEDURE — 3079F DIAST BP 80-89 MM HG: CPT | Mod: CPTII,S$GLB,, | Performed by: PHYSICIAN ASSISTANT

## 2023-05-16 PROCEDURE — 99496 TRANSJ CARE MGMT HIGH F2F 7D: CPT | Mod: S$GLB,,, | Performed by: PHYSICIAN ASSISTANT

## 2023-05-16 PROCEDURE — 3008F BODY MASS INDEX DOCD: CPT | Mod: CPTII,S$GLB,, | Performed by: PHYSICIAN ASSISTANT

## 2023-05-16 PROCEDURE — 99999 PR PBB SHADOW E&M-EST. PATIENT-LVL IV: CPT | Mod: PBBFAC,,, | Performed by: PHYSICIAN ASSISTANT

## 2023-05-16 PROCEDURE — 99496 TRANSITIONAL CARE MANAGE SERVICE 7 DAY DISCHARGE: ICD-10-PCS | Mod: S$GLB,,, | Performed by: PHYSICIAN ASSISTANT

## 2023-05-16 PROCEDURE — 99999 PR PBB SHADOW E&M-EST. PATIENT-LVL IV: ICD-10-PCS | Mod: PBBFAC,,, | Performed by: PHYSICIAN ASSISTANT

## 2023-05-16 PROCEDURE — 1159F PR MEDICATION LIST DOCUMENTED IN MEDICAL RECORD: ICD-10-PCS | Mod: CPTII,S$GLB,, | Performed by: PHYSICIAN ASSISTANT

## 2023-05-16 PROCEDURE — 3074F SYST BP LT 130 MM HG: CPT | Mod: CPTII,S$GLB,, | Performed by: PHYSICIAN ASSISTANT

## 2023-05-16 PROCEDURE — 1160F PR REVIEW ALL MEDS BY PRESCRIBER/CLIN PHARMACIST DOCUMENTED: ICD-10-PCS | Mod: CPTII,S$GLB,, | Performed by: PHYSICIAN ASSISTANT

## 2023-05-16 PROCEDURE — 3044F HG A1C LEVEL LT 7.0%: CPT | Mod: CPTII,S$GLB,, | Performed by: PHYSICIAN ASSISTANT

## 2023-05-16 NOTE — PROGRESS NOTES
Transitional Care Note  Subjective:      Patient ID: Clare Whitley is a 62 y.o. female.  Chief Complaint: Hospital Follow Up      Patient presents to clinic today for hospital TCC visit. Patient was recently hospitalized for TIA      Family and/or Caretaker present at visit?  No.  Diagnostic tests reviewed/disposition: No diagnosic tests pending after this hospitalization.  Disease/illness education: See A&P  Home health/community services discussion/referrals: Patient does not have home health established from hospital visit.  They do not need home health.  If needed, we will set up home health for the patient.   Establishment or re-establishment of referral orders for community resources: No other necessary community resources.   Discussion with other health care providers: No discussion with other health care providers necessary.     Admission Date: 5/11/2023  Hospital Length of Stay: 0 days  Discharge Date and Time:  05/12/2023 10:57 AM  Attending Physician: Laure Colunga MD   Discharging Provider: Laure Colunga MD  Primary Care Provider: Chhaya Jennings MD     Primary Care Team: [facility]     HPI:    is a 62 year old female with a history of palpitations, mitral valve proplapse, SINTIA on Cpap an migraines who presents to the ed for dizziness, onset 5 days. Symptoms are intermittent and fluctuates in severity. No mitigating or exacerbating factors reported. Associated sxs include RUE weakness, light-headedness. Patient denies any fever, chills, n/v/d, SOB, CP, numbness, facial droop, slurred speech, headache.  Lab work unremarkable.  CT head negative, CTA head and neck negative for significant stenosis.  MRI brain negative.  RUE weakness has improved.  Neurology consulted recommended admission, and TIA work-up including A1C, lipid, echo.       Code Status: Full  Surrogate Decision Maker [facility] Course:   5/12  Admitted for tia. Neuro recommended inpatient workup for stroke.  Workup with imaging and labs within normal limits. Echocardiogram within normal limits. Chest x-ray with no acute findings. Physical/occupational therapy recommended no needs. Speech evaluation with no signs or symptoms of dysphagia.      Patient seen and evaluated by me. Patient was determined to be suitable for d/c. Patient deemed stable for discharge to home.     A referral has been placed for nurse practitioner at home program.     Review of Systems   Constitutional:  Negative for chills and fever.   Respiratory:  Negative for shortness of breath.    Cardiovascular:  Negative for chest pain.   Neurological:  Negative for facial asymmetry, speech difficulty and weakness.      Objective:      Physical Exam  Vitals and nursing note reviewed.   Constitutional:       General: She is not in acute distress.     Appearance: She is well-developed.   HENT:      Head: Normocephalic and atraumatic.   Eyes:      General: Lids are normal. No scleral icterus.     Extraocular Movements: Extraocular movements intact.      Conjunctiva/sclera: Conjunctivae normal.   Cardiovascular:      Rate and Rhythm: Normal rate and regular rhythm.   Pulmonary:      Effort: Pulmonary effort is normal.      Breath sounds: Normal breath sounds. No decreased breath sounds, wheezing, rhonchi or rales.   Neurological:      Mental Status: She is alert.      Cranial Nerves: No cranial nerve deficit.   Psychiatric:         Mood and Affect: Mood and affect normal.       Assessment:       1. Hospital discharge follow-up    2. TIA (transient ischemic attack)    3. Essential hypertension    4. SINTIA on CPAP    5. Sacroiliitis        Plan:   1. Hospital discharge follow-up    2. TIA (transient ischemic attack)  Assessment & Plan:  Encouraged to start atorvastatin, keep Neuro appts 5/29 and 7/10, ED precautions given      3. Essential hypertension  Assessment & Plan:  /80, controlled, continue atenolol, lasix      4. SINTIA on CPAP  Overview:  Followed by  Pulmonology, continue current treatment plan   9/10/2020, 9/11/2020 Home Sleep Study Mild /Borderline, positional obstructive sleep apnea (SINTIA). best night was night 1.: over all AHI 8.0 . Over all RDI 15 with 5.5 hr of sleep data. Loud snoring was recorded. Oxygen saturation shereen was 85.3%.  Cpap set up date 9/30/2020  On auto CPAP 5-10 cm   Nasal wisp mask       5. Sacroiliitis  Overview:  Followed by Pain Management, continue current treatment plan

## 2023-05-29 ENCOUNTER — OFFICE VISIT (OUTPATIENT)
Dept: NEUROLOGY | Facility: CLINIC | Age: 63
End: 2023-05-29
Payer: COMMERCIAL

## 2023-05-29 VITALS
RESPIRATION RATE: 16 BRPM | WEIGHT: 166 LBS | BODY MASS INDEX: 30.55 KG/M2 | HEIGHT: 62 IN | DIASTOLIC BLOOD PRESSURE: 79 MMHG | SYSTOLIC BLOOD PRESSURE: 121 MMHG | HEART RATE: 77 BPM | OXYGEN SATURATION: 99 %

## 2023-05-29 DIAGNOSIS — F41.8 SITUATIONAL ANXIETY: ICD-10-CM

## 2023-05-29 DIAGNOSIS — I10 ESSENTIAL HYPERTENSION: Chronic | ICD-10-CM

## 2023-05-29 DIAGNOSIS — G43.009 MIGRAINE WITHOUT AURA AND WITHOUT STATUS MIGRAINOSUS, NOT INTRACTABLE: ICD-10-CM

## 2023-05-29 DIAGNOSIS — R00.2 PALPITATIONS: ICD-10-CM

## 2023-05-29 DIAGNOSIS — R20.0 RIGHT ARM NUMBNESS: ICD-10-CM

## 2023-05-29 DIAGNOSIS — G47.33 OSA ON CPAP: ICD-10-CM

## 2023-05-29 DIAGNOSIS — G45.9 TIA (TRANSIENT ISCHEMIC ATTACK): Primary | ICD-10-CM

## 2023-05-29 DIAGNOSIS — J30.2 SEASONAL ALLERGIC RHINITIS, UNSPECIFIED TRIGGER: ICD-10-CM

## 2023-05-29 DIAGNOSIS — I34.1 MVP (MITRAL VALVE PROLAPSE): ICD-10-CM

## 2023-05-29 DIAGNOSIS — R29.898 DECREASED STRENGTH OF TRUNK AND BACK: ICD-10-CM

## 2023-05-29 DIAGNOSIS — R53.1 RIGHT SIDED WEAKNESS: ICD-10-CM

## 2023-05-29 PROCEDURE — 99999 PR PBB SHADOW E&M-EST. PATIENT-LVL IV: CPT | Mod: PBBFAC,,, | Performed by: NURSE PRACTITIONER

## 2023-05-29 PROCEDURE — 3044F HG A1C LEVEL LT 7.0%: CPT | Mod: CPTII,S$GLB,, | Performed by: NURSE PRACTITIONER

## 2023-05-29 PROCEDURE — 1160F PR REVIEW ALL MEDS BY PRESCRIBER/CLIN PHARMACIST DOCUMENTED: ICD-10-PCS | Mod: CPTII,S$GLB,, | Performed by: NURSE PRACTITIONER

## 2023-05-29 PROCEDURE — 3044F PR MOST RECENT HEMOGLOBIN A1C LEVEL <7.0%: ICD-10-PCS | Mod: CPTII,S$GLB,, | Performed by: NURSE PRACTITIONER

## 2023-05-29 PROCEDURE — 3078F PR MOST RECENT DIASTOLIC BLOOD PRESSURE < 80 MM HG: ICD-10-PCS | Mod: CPTII,S$GLB,, | Performed by: NURSE PRACTITIONER

## 2023-05-29 PROCEDURE — 1159F MED LIST DOCD IN RCRD: CPT | Mod: CPTII,S$GLB,, | Performed by: NURSE PRACTITIONER

## 2023-05-29 PROCEDURE — 3078F DIAST BP <80 MM HG: CPT | Mod: CPTII,S$GLB,, | Performed by: NURSE PRACTITIONER

## 2023-05-29 PROCEDURE — 3074F SYST BP LT 130 MM HG: CPT | Mod: CPTII,S$GLB,, | Performed by: NURSE PRACTITIONER

## 2023-05-29 PROCEDURE — 1160F RVW MEDS BY RX/DR IN RCRD: CPT | Mod: CPTII,S$GLB,, | Performed by: NURSE PRACTITIONER

## 2023-05-29 PROCEDURE — 99999 PR PBB SHADOW E&M-EST. PATIENT-LVL IV: ICD-10-PCS | Mod: PBBFAC,,, | Performed by: NURSE PRACTITIONER

## 2023-05-29 PROCEDURE — 3008F BODY MASS INDEX DOCD: CPT | Mod: CPTII,S$GLB,, | Performed by: NURSE PRACTITIONER

## 2023-05-29 PROCEDURE — 99214 OFFICE O/P EST MOD 30 MIN: CPT | Mod: S$GLB,,, | Performed by: NURSE PRACTITIONER

## 2023-05-29 PROCEDURE — 3074F PR MOST RECENT SYSTOLIC BLOOD PRESSURE < 130 MM HG: ICD-10-PCS | Mod: CPTII,S$GLB,, | Performed by: NURSE PRACTITIONER

## 2023-05-29 PROCEDURE — 99214 PR OFFICE/OUTPT VISIT, EST, LEVL IV, 30-39 MIN: ICD-10-PCS | Mod: S$GLB,,, | Performed by: NURSE PRACTITIONER

## 2023-05-29 PROCEDURE — 3008F PR BODY MASS INDEX (BMI) DOCUMENTED: ICD-10-PCS | Mod: CPTII,S$GLB,, | Performed by: NURSE PRACTITIONER

## 2023-05-29 PROCEDURE — 1159F PR MEDICATION LIST DOCUMENTED IN MEDICAL RECORD: ICD-10-PCS | Mod: CPTII,S$GLB,, | Performed by: NURSE PRACTITIONER

## 2023-05-29 NOTE — PROGRESS NOTES
Subjective:       Patient ID: Clare Whitley is a 62 y.o. female.    Chief Complaint: Transient Ischemic Attack          Follow-up  Associated symptoms include chest pain. Pertinent negatives include no arthralgias, fatigue, headaches, joint swelling, myalgias, nausea, neck pain, numbness, rash, vomiting or weakness.      The patient was referred by Dr. Jennings for headaches.    The patient has longstanding history of headaches. The headaches affect her 3-4 times a month, throbbing, last fore few hours and associated with nausea. In 2019 underwent Brain MRI and MRV. The Brain MRI shows Chiari Malformation. Never lost vision. Never had papilledema. Never underwent spinal tap. Despite all that she was diagnosed with PTC/IIH and prescribed Diamox 250 mg BID. The patient felt no difference taking it. She actually skips taking it many times and only takes it once a day. 03-: Patient is a known Patient of Dr. Pride but new to me. Patient doing well. Patient 03- LP: The opening pressure was 14 cm H2O.  Perfectly NL. No PTC/IIH. Patient denies headache since stopping the diamox. The patient has RX Nurtec for abortive but she haven't had to us      INTERVAL NOTE 05-: Patient present for hospital follow up. Patient  went to ED on 05- related to  RUE weakness and dizziness complaint for 5 days. She reports she feel light headedness, No LOC, NO FRANKLYN. she reports it would come and go, she states the sensation would last up ten minutes occurred multiple times per day. Stroke work up 05- MRI Brain WO negative, CTA Head and Neck  Normal, Chest X ray neg, Negative EKG SR 76. Patient is back to baseline. Patient is on stroke prevention medications. She was started on  ASA 81 mg po daily and continues Lipitor 40 mg po HS, and Atenolol 100 mg po  daily.  Lasix 40 mg po daily. She is followed by Cardiology Dr. Chris, at [facility]. Patient has Hx of  situational anxiety.  Patient  reports that she has chest pain and right arm pain off and on. Instructed patient to discuss with cardiology. No complaint of pain at this time.     Patient denies any headaches, reports they stopped. She never tried Nurtec.        Review of Systems   Constitutional:  Negative for appetite change and fatigue.   HENT:  Negative for hearing loss and tinnitus.    Eyes:  Negative for photophobia and visual disturbance.   Respiratory:  Negative for apnea and shortness of breath.    Cardiovascular:  Positive for chest pain. Negative for palpitations.   Gastrointestinal:  Negative for nausea and vomiting.   Endocrine: Negative for cold intolerance and heat intolerance.   Genitourinary:  Negative for difficulty urinating and urgency.   Musculoskeletal:  Negative for arthralgias, back pain, gait problem, joint swelling, myalgias, neck pain and neck stiffness.   Skin:  Negative for color change and rash.   Allergic/Immunologic: Negative for environmental allergies and immunocompromised state.   Neurological:  Positive for dizziness. Negative for tremors, seizures, syncope, facial asymmetry, speech difficulty, weakness, light-headedness, numbness and headaches.   Hematological:  Negative for adenopathy. Does not bruise/bleed easily.   Psychiatric/Behavioral:  Negative for agitation, behavioral problems, confusion, decreased concentration, dysphoric mood, hallucinations, self-injury, sleep disturbance and suicidal ideas. The patient is nervous/anxious. The patient is not hyperactive.                Current Outpatient Medications:     aspirin (ECOTRIN) 81 MG EC tablet, Take 81 mg by mouth once daily., Disp: , Rfl:     atenoloL (TENORMIN) 100 MG tablet, Take 100 mg by mouth once daily., Disp: , Rfl:     atorvastatin (LIPITOR) 40 MG tablet, Take 1 tablet (40 mg total) by mouth every evening., Disp: 30 tablet, Rfl: 0    cholecalciferol, vitamin D3, (VITAMIN D3) 25 mcg (1,000 unit) capsule, Take 1 capsule (1,000 Units total) by  "mouth once daily., Disp: , Rfl: 0    furosemide (LASIX) 40 MG tablet, Take 40 mg by mouth once daily., Disp: , Rfl:     potassium chloride (MICRO-K) 10 MEQ CpSR, Take 1 capsule (10 mEq total) by mouth once daily., Disp: 90 capsule, Rfl: 0  Past Medical History:   Diagnosis Date    Hypertension     Mitral regurgitation     "mild"; followed by Dr. Blackmon ([facility])    MVP (mitral valve prolapse)     "trivial"; followed by Dr. Blackmon ([facility])    Sickle cell trait      Past Surgical History:   Procedure Laterality Date    CHOLECYSTECTOMY      laser SX Bilateral     LYMPH NODE BIOPSY Left     left neck     TUBAL LIGATION  9/2/1991    UTERINE FIBROID EMBOLIZATION  01/01/2010     Social History     Socioeconomic History    Marital status:      Spouse name: Johnnie Whitley    Number of children: 3   Occupational History    Occupation: Rehab counselor associate     Employer: [employer]     Comment: Works with disabled people   Tobacco Use    Smoking status: Never     Passive exposure: Yes    Smokeless tobacco: Never   Substance and Sexual Activity    Alcohol use: Never    Drug use: Never    Sexual activity: Yes     Partners: Male     Birth control/protection: Post-menopausal, None             Past/Current Medical/Surgical History, Past/Current Social History, Past/Current Family History and Past/Current Medications were reviewed in detail.        Objective:           VITAL SIGNS WERE REVIEWED      GENERAL APPEARANCE:     The patient looks comfortable.    Body habitus is normal.     No signs of respiratory distress.    Normal breathing pattern.    No dysmorphic features    Normal eye contact.     GENERAL MEDICAL EXAM:    HEENT:  Head is atraumatic normocephalic.     No tender temporal arteries. Fundoscopic (Ophthalmoscopic) exam showed no disc edema.      Neck and Axillae: No JVD. No visible lesions.    No carotid bruits. No thyromegaly. No lymphadenopathy.    Cardiopulmonary: No cyanosis. No tachypnea. Normal respiratory " effort.    Clear breath sounds. S1, S2 with regular rhythm . No murmurs.     Gastrointestinal/Urogenital:  No jaundice. No stomas or lesions. No visible hernias. No catheters.     Abdomen is soft non-tender. No masses or organomegaly.    Skin, Hair and Nails: No pathognonomic skin rash. No neurofibromatosis. No visible lesions.No stigmata of autoimmune disease. No clubbing.    Skin is warm and moist. No palpable masses.    Limbs: No varicose veins. No visible swelling.    No palpable edema. Pulses are symmetric. Pedal pulses are palpable.      Muskoskeletal: No visible deformities.No visible lesions.    No spine tenderness. No signs of longstanding neuropathy. No dislocations or fractures.            Neurologic Exam     Mental Status   Oriented to person, place, and time.   Registration: recalls 3 of 3 objects. Recall at 5 minutes: recalls 3 of 3 objects. Follows 3 step commands.   Attention: normal. Concentration: normal.   Speech: speech is normal   Level of consciousness: alert  Knowledge: good and consistent with education. Able to perform simple calculations.   Able to name object. Able to read. Able to repeat. Able to write. Normal comprehension.     Cranial Nerves   Cranial nerves II through XII intact.     CN II   Visual fields full to confrontation.   Visual acuity: normal  Right visual field deficit: none  Left visual field deficit: none     CN III, IV, VI   Pupils are equal, round, and reactive to light.  Extraocular motions are normal.   Right pupil: Size: 2 mm. Shape: regular. Reactivity: brisk. Consensual response: intact. Accommodation: intact.   Left pupil: Size: 2 mm. Shape: regular. Reactivity: brisk. Consensual response: intact. Accommodation: intact.   CN III: no CN III palsy  CN VI: no CN VI palsy  Nystagmus: none   Diplopia: none  Ophthalmoparesis: none  Upgaze: normal  Downgaze: normal  Conjugate gaze: present  Vestibulo-ocular reflex: present    CN V   Facial sensation intact.   Right facial  sensation deficit: none  Left facial sensation deficit: none    CN VII   Facial expression full, symmetric.   Right facial weakness: none  Left facial weakness: none    CN VIII   CN VIII normal.   Hearing: intact  Right Rinne: AC > BC  Left Rinne: AC > BC  Joaquin: does not lateralize     CN IX, X   CN IX normal.   CN X normal.   Palate: symmetric    CN XI   CN XI normal.   Right sternocleidomastoid strength: normal  Left sternocleidomastoid strength: normal  Right trapezius strength: normal  Left trapezius strength: normal    CN XII   CN XII normal.   Tongue: not atrophic  Fasciculations: absent  Tongue deviation: none    Motor Exam   Muscle bulk: normal  Overall muscle tone: normal  Right arm tone: normal  Left arm tone: normal  Right arm pronator drift: absent  Left arm pronator drift: absent  Right leg tone: normal  Left leg tone: normal    Strength   Strength 5/5 throughout.   Right neck flexion: 5/5  Left neck flexion: 5/5  Right neck extension: 5/5  Left neck extension: 5/5  Right deltoid: 5/5  Left deltoid: 5/5  Right biceps: 5/5  Left biceps: 5/5  Right triceps: 5/5  Left triceps: 5/5  Right wrist flexion: 5/5  Left wrist flexion: 5/5  Right wrist extension: 5/5  Left wrist extension: 5/5  Right interossei: 5/5  Left interossei: 5/5  Right iliopsoas: 5/5  Left iliopsoas: 5/5  Right quadriceps: 5/5  Left quadriceps: 5/5  Right hamstrin/5  Left hamstrin/5  Right glutei: 5/5  Left glutei: 5/5  Right anterior tibial: 5/5  Left anterior tibial: 5/5  Right posterior tibial: 5/5  Left posterior tibial: 5/5  Right peroneal: 5/5  Left peroneal: 5/5  Right gastroc: 5/5  Left gastroc: 5/5    Sensory Exam   Light touch normal.   Right arm light touch: normal  Left arm light touch: normal  Right leg light touch: normal  Left leg light touch: normal  Vibration normal.   Right arm vibration: normal  Left arm vibration: normal  Right leg vibration: normal  Left leg vibration: normal  Proprioception normal.   Right  arm proprioception: normal  Left arm proprioception: normal  Right leg proprioception: normal  Left leg proprioception: normal  Pinprick normal.   Right arm pinprick: normal  Left arm pinprick: normal  Right leg pinprick: normal  Left leg pinprick: normal  Graphesthesia: normal  Stereognosis: normal    Gait, Coordination, and Reflexes     Gait  Gait: normal    Coordination   Romberg: negative  Finger to nose coordination: normal  Heel to shin coordination: normal  Tandem walking coordination: normal    Tremor   Resting tremor: absent  Intention tremor: absent  Action tremor: absent    Reflexes   Right brachioradialis: 2+  Left brachioradialis: 2+  Right biceps: 2+  Left biceps: 2+  Right triceps: 2+  Left triceps: 2+  Right patellar: 2+  Left patellar: 2+  Right achilles: 2+  Left achilles: 2+  Right plantar: normal  Left plantar: normal  Right Corley: absent  Left Corley: absent  Right ankle clonus: absent  Left ankle clonus: absent  Right pendular knee jerk: absent  Left pendular knee jerk: absent    Lab Results   Component Value Date    WBC 4.10 05/12/2023    HGB 12.4 05/12/2023    HCT 35.3 (L) 05/12/2023    MCV 77 (L) 05/12/2023     05/12/2023     Sodium   Date Value Ref Range Status   05/12/2023 139 136 - 145 mmol/L Final     Potassium   Date Value Ref Range Status   05/12/2023 3.9 3.5 - 5.1 mmol/L Final     Chloride   Date Value Ref Range Status   05/12/2023 107 95 - 110 mmol/L Final     CO2   Date Value Ref Range Status   05/12/2023 22 (L) 23 - 29 mmol/L Final     Glucose   Date Value Ref Range Status   05/12/2023 101 70 - 110 mg/dL Final     BUN   Date Value Ref Range Status   05/12/2023 16 8 - 23 mg/dL Final     Creatinine   Date Value Ref Range Status   05/12/2023 0.8 0.5 - 1.4 mg/dL Final     Calcium   Date Value Ref Range Status   05/12/2023 9.0 8.7 - 10.5 mg/dL Final     Total Protein   Date Value Ref Range Status   05/12/2023 7.2 6.0 - 8.4 g/dL Final     Albumin   Date Value Ref Range Status    05/12/2023 3.4 (L) 3.5 - 5.2 g/dL Final     Total Bilirubin   Date Value Ref Range Status   05/12/2023 0.4 0.1 - 1.0 mg/dL Final     Comment:     For infants and newborns, interpretation of results should be based  on gestational age, weight and in agreement with clinical  observations.    Premature Infant recommended reference ranges:  Up to 24 hours.............<8.0 mg/dL  Up to 48 hours............<12.0 mg/dL  3-5 days..................<15.0 mg/dL  6-29 days.................<15.0 mg/dL       Alkaline Phosphatase   Date Value Ref Range Status   05/12/2023 36 (L) 55 - 135 U/L Final     AST   Date Value Ref Range Status   05/12/2023 21 10 - 40 U/L Final     ALT   Date Value Ref Range Status   05/12/2023 13 10 - 44 U/L Final     Anion Gap   Date Value Ref Range Status   05/12/2023 10 8 - 16 mmol/L Final     eGFR if    Date Value Ref Range Status   07/25/2022 >60.0 >60 mL/min/1.73 m^2 Final     eGFR if non    Date Value Ref Range Status   07/25/2022 >60.0 >60 mL/min/1.73 m^2 Final     Comment:     Calculation used to obtain the estimated glomerular filtration  rate (eGFR) is the CKD-EPI equation.        Lab Results   Component Value Date    QFFNJRIW41 740 05/04/2005     Lab Results   Component Value Date    TSH 0.909 05/11/2023    Z0JKATK 7.7 06/13/2013    FREET4 1.12 06/28/2013         09/      Brain MRI CMF-Type 1               07-    EEG NL     Brain MRV NL    03-    LP: The opening pressure was 14 cm H2O.  Perfectly NL. No PTC/IIH.      05- MRI Brain WO negative,    CTA Head and Neck  Normal     Chest X ray neg, Negative EKG SR 76.   Reviewed the neuroimaging independently       Assessment:       1. TIA (transient ischemic attack)    2. Migraine without aura and without status migrainosus, not intractable    3. Palpitations    4. MVP (mitral valve prolapse)    5. SINTIA on CPAP    6. Situational anxiety    7. Right arm numbness    8. Essential hypertension     9. Right sided weakness    10. Seasonal allergic rhinitis, unspecified trigger    11. Decreased strength of trunk and back          Plan:       TIA RUE WEAKNESS RESOLVED/ DIZZINESS RESOLVED     Continue ASA 81 mg QD    Continue stroke prevention     Follow up with Cardiology r/t intermittent CP pain     Vascular Risk Factors (VRFs) stratification (BP, BS, BC control and Smoking Cessation) is the mainstay of stroke prevention (>90%). The benefit of antiplatelets is < 20% stroke risk reduction.         Healthy diet and exercise    Avoid driving.    Call 911 if any SUDDEN:   Weakness  Numbness  Slurring of speech  Speech difficulty   Vertigo  Loss of balance  Loss of vision  Loss of hearing  Double vision  Trouble swallowing  Trouble breathing  Facial drooping      COMMON MIGRAINE WITHOUT AURA, EPISODIC, LOW FREQUENCY, Rule OUT PTC/IIH     RESOLVED     HEADACHE DIARY     DISCUSSED THE THREE-FOLD MANAGEMENT OF MIGRAINE:      LIFESTYLE CHANGES:     Good sleep hygiene  Avoid general triggers like lack of sleep/too much sleep, prolonged sun exposure, excessive screen time and specific triggers based on you own diary   Minimize physical and emotional stress  Smoking avoidance and cessation  Limit caffeine drinks to 1-2 a day   Good hydration   Small frequent meals and avoid skipping meals   Moderate 30-minute-long aerobic exercises 3 times/week. Avoid strenuous exercise       ABORTIVE MEDICATIONS:     Should only be taken 2-3 times/week to avoid rebound and overuse headaches.    Never tried Rimegepant (Nurtec) 75 mg. Resolved     Triptans are C/I in unstable HTN.         PREVENTATIVE MEDICATIONS:     Since the patients headache is very infrequent will hold off for now.                MEDICAL/SURGICAL COMORBIDITIES     All relevant medical comorbidities noted and managed by primary care physician and medical care team.          MISCELLANEOUS MEDICAL PROBLEMS       HEALTHY LIFESTYLE AND PREVENTATIVE CARE    Encouraged the  patient to adhere to the age-appropriate health maintenance guidelines including screening tests and vaccinations.     Discussed the overall importance of healthy lifestyle, optimal weight, exercise, healthy diet, good sleep hygiene and avoiding drugs including smoking, alcohol and recreational drugs. The patient verbalized full understanding.       Advised the patient to follow COVID-19 prevention measures.       RTC PRN         I spent 30 minutes face to face with the patient. More than 20 minutes of the time spent in counseling and coordination of care including discussions etiology of diagnosis (MIGRAINE), pathogenesis of diagnosis, prognosis of diagnosis,, diagnostic results, impression and recommendations, diagnostic studies, management, risks and benefits of treatment, instructions of disease self-management, treatment instructions, follow up requirements, patient and family counseling/involvement in care compliance with treatment regimen. All of the patient's questions were answered during this discussion.             Neftali Terrazas, MSN NP      Collaborating Provider: Abi Pride MD, FAAN Neurologist/Epileptologist

## 2023-06-29 ENCOUNTER — TELEPHONE (OUTPATIENT)
Dept: NEUROLOGY | Facility: CLINIC | Age: 63
End: 2023-06-29
Payer: COMMERCIAL

## 2023-06-29 DIAGNOSIS — R42 DIZZINESS: Primary | ICD-10-CM

## 2023-06-29 NOTE — TELEPHONE ENCOUNTER
Spoke with patient in regards to her wanting to get the EEG testing done that she previously discusses with you. At the time of the appointment she declined the testing but she said she's ready now.   Please advise.

## 2023-06-29 NOTE — TELEPHONE ENCOUNTER
----- Message from Pj Salomon sent at 6/29/2023  9:07 AM CDT -----      Name of Who is Calling:PT          What is the request in detail:PT is requesting a call back to discuss questions she has about a test your office spoke with her about. Please be Advised!          Can the clinic reply by [facility]:no          What Number to Call Back if not in [phone]

## 2023-06-30 NOTE — TELEPHONE ENCOUNTER
Attempted to contact patient in regards to further discussing appointment. I left a VM to contact office.

## 2023-07-10 ENCOUNTER — OFFICE VISIT (OUTPATIENT)
Dept: NEUROLOGY | Facility: CLINIC | Age: 63
End: 2023-07-10
Payer: COMMERCIAL

## 2023-07-10 VITALS
BODY MASS INDEX: 30.44 KG/M2 | SYSTOLIC BLOOD PRESSURE: 157 MMHG | HEART RATE: 79 BPM | WEIGHT: 165.38 LBS | HEIGHT: 62 IN | DIASTOLIC BLOOD PRESSURE: 89 MMHG | RESPIRATION RATE: 16 BRPM

## 2023-07-10 DIAGNOSIS — E78.5 HYPERLIPIDEMIA, UNSPECIFIED HYPERLIPIDEMIA TYPE: ICD-10-CM

## 2023-07-10 DIAGNOSIS — I10 ESSENTIAL HYPERTENSION: Primary | Chronic | ICD-10-CM

## 2023-07-10 PROCEDURE — 1159F PR MEDICATION LIST DOCUMENTED IN MEDICAL RECORD: ICD-10-PCS | Mod: CPTII,S$GLB,, | Performed by: PSYCHIATRY & NEUROLOGY

## 2023-07-10 PROCEDURE — 99999 PR PBB SHADOW E&M-EST. PATIENT-LVL IV: ICD-10-PCS | Mod: PBBFAC,,, | Performed by: PSYCHIATRY & NEUROLOGY

## 2023-07-10 PROCEDURE — 3077F SYST BP >= 140 MM HG: CPT | Mod: CPTII,S$GLB,, | Performed by: PSYCHIATRY & NEUROLOGY

## 2023-07-10 PROCEDURE — 1159F MED LIST DOCD IN RCRD: CPT | Mod: CPTII,S$GLB,, | Performed by: PSYCHIATRY & NEUROLOGY

## 2023-07-10 PROCEDURE — 3044F PR MOST RECENT HEMOGLOBIN A1C LEVEL <7.0%: ICD-10-PCS | Mod: CPTII,S$GLB,, | Performed by: PSYCHIATRY & NEUROLOGY

## 2023-07-10 PROCEDURE — 3008F PR BODY MASS INDEX (BMI) DOCUMENTED: ICD-10-PCS | Mod: CPTII,S$GLB,, | Performed by: PSYCHIATRY & NEUROLOGY

## 2023-07-10 PROCEDURE — 99213 PR OFFICE/OUTPT VISIT, EST, LEVL III, 20-29 MIN: ICD-10-PCS | Mod: S$GLB,,, | Performed by: PSYCHIATRY & NEUROLOGY

## 2023-07-10 PROCEDURE — 1160F PR REVIEW ALL MEDS BY PRESCRIBER/CLIN PHARMACIST DOCUMENTED: ICD-10-PCS | Mod: CPTII,S$GLB,, | Performed by: PSYCHIATRY & NEUROLOGY

## 2023-07-10 PROCEDURE — 99213 OFFICE O/P EST LOW 20 MIN: CPT | Mod: S$GLB,,, | Performed by: PSYCHIATRY & NEUROLOGY

## 2023-07-10 PROCEDURE — 3077F PR MOST RECENT SYSTOLIC BLOOD PRESSURE >= 140 MM HG: ICD-10-PCS | Mod: CPTII,S$GLB,, | Performed by: PSYCHIATRY & NEUROLOGY

## 2023-07-10 PROCEDURE — 3008F BODY MASS INDEX DOCD: CPT | Mod: CPTII,S$GLB,, | Performed by: PSYCHIATRY & NEUROLOGY

## 2023-07-10 PROCEDURE — 99999 PR PBB SHADOW E&M-EST. PATIENT-LVL IV: CPT | Mod: PBBFAC,,, | Performed by: PSYCHIATRY & NEUROLOGY

## 2023-07-10 PROCEDURE — 3044F HG A1C LEVEL LT 7.0%: CPT | Mod: CPTII,S$GLB,, | Performed by: PSYCHIATRY & NEUROLOGY

## 2023-07-10 PROCEDURE — 1160F RVW MEDS BY RX/DR IN RCRD: CPT | Mod: CPTII,S$GLB,, | Performed by: PSYCHIATRY & NEUROLOGY

## 2023-07-10 PROCEDURE — 3079F DIAST BP 80-89 MM HG: CPT | Mod: CPTII,S$GLB,, | Performed by: PSYCHIATRY & NEUROLOGY

## 2023-07-10 PROCEDURE — 3079F PR MOST RECENT DIASTOLIC BLOOD PRESSURE 80-89 MM HG: ICD-10-PCS | Mod: CPTII,S$GLB,, | Performed by: PSYCHIATRY & NEUROLOGY

## 2023-07-10 NOTE — PROGRESS NOTES
"  Consultation Requested by: Follow up from hospital visit   Chief Complaint: Right sided weakness and dizziness.    Service used: No    HPI: Clare Whitley is a 62 y.o. female with past medical history of mitral valve prolapse, obstructive sleep apnea and migraine admitted to the hospital with dizziness that was associated with right-sided upper extremity weakness on 5/12 and she was diagnosed with transient neurological deficit. On arrival in the emergency department she reported resolution of symptoms and she was back to baseline. NIHSS 0.  MRI showed no acute intracranial process.  CTA was negative for any critical stenosis or occlusion.  Was not a tPA or thrombectomy candidate. Today reported no new symptoms and at his baseline.  Denies headache, focal weakness, numbness, vision problem, trouble swallowing, trouble talking, fall or loss of consciousness.    Past Medical History:   Diagnosis Date    Hypertension     Mitral regurgitation     "mild"; followed by Dr. Blackmon ([facility])    MVP (mitral valve prolapse)     "trivial"; followed by Dr. Blackmon ([facility])    Sickle cell trait      Past Surgical History:   Procedure Laterality Date    CHOLECYSTECTOMY      laser SX Bilateral     LYMPH NODE BIOPSY Left     left neck     TUBAL LIGATION  9/2/1991    UTERINE FIBROID EMBOLIZATION  01/01/2010     Review of patient's allergies indicates:   Allergen Reactions    No known drug allergies        Current Outpatient Medications:     aspirin (ECOTRIN) 81 MG EC tablet, Take 81 mg by mouth once daily., Disp: , Rfl:     atenoloL (TENORMIN) 100 MG tablet, Take 100 mg by mouth once daily., Disp: , Rfl:     atorvastatin (LIPITOR) 40 MG tablet, Take 1 tablet (40 mg total) by mouth every evening., Disp: 30 tablet, Rfl: 0    cholecalciferol, vitamin D3, (VITAMIN D3) 25 mcg (1,000 unit) capsule, Take 1 capsule (1,000 Units total) by mouth once daily., Disp: , Rfl: 0    furosemide (LASIX) 40 MG tablet, Take 40 mg by mouth once " daily., Disp: , Rfl:     potassium chloride (MICRO-K) 10 MEQ CpSR, Take 1 capsule (10 mEq total) by mouth once daily., Disp: 90 capsule, Rfl: 0    Social History     Socioeconomic History    Marital status:      Spouse name: Johnnie Whitley    Number of children: 3   Occupational History    Occupation: Rehab counselor associate     Employer: [employer]     Comment: Works with disabled people   Tobacco Use    Smoking status: Never     Passive exposure: Yes    Smokeless tobacco: Never   Substance and Sexual Activity    Alcohol use: Never    Drug use: Never    Sexual activity: Yes     Partners: Male     Birth control/protection: Post-menopausal, None     Family History   Problem Relation Age of Onset    Hypertension Mother     Cataracts Mother     Hypertension Father     Hypertension Sister     Hypertension Brother     Stroke Cousin      Review of Systems  Constitutional: no fevers, no weight changes,  No diaphoresis  HEENT: No congestion, no doublevision, no dysphagia, no rhinnorhea, no lacrimation  Cardiovascular: no chest pain or palpitations  Respiratory: no shortness of breath, no cough  Gastrointestinal: no diarrhea, no constipation  Genitourinary: no dysuria  Musculoskeletal: no joint swelling. No myalgia  Skin: no rash  Psychiatric: no hallucinations, no depression or anxiety  Neurologic: as per HPI  All other review of systems is negative and as per HPI.    Vitals:    07/10/23 1037   BP: (!) 157/89   Pulse: 79   Resp: 16      Exam  General: Pleasant, conversant, Well-kempt  HEENT: Head atraumatic. No nasal abnormality. No gaze preference. EOMI.  Cardiovascular: Regular   Lungs: Not in distress   Mental Status: Awake alert and oriented x III. Follows commands. No aphasia or dysarthria. Naming and repetition intact. Mood is appropriate.  Cranial Nerve: PERRL. VFF. EOMI. Facial sensation intact. No facial asymmetry. Hearing intact. Palate elevates. Uvula midline. SCM strong. No tongue deviation.  Motor: Normal  tone. No cogwheel rigidity. No bradykinesia. No pronator drift. 5/5 strength bilaterally in the upper extremities including deltoids, biceps, triceps, wrist extensors/flexors, finger flexors/extensors, interossei, and APB. 5/5 strength bilaterally in the lower extremities including iliopsoas, hamstring, quadriceps, tibialis anterior, gastrocnemius, EHL.  Deep Tendon Reflexes: 2+ in biceps, tricepts, brachioradialis b/l. 2+ in patellar and ankle jerks. No Babinski.  Sensory: Intact to soft touch, cold. No neglect.  Cerebellar: Finger to nose intact. Heel to shin intact.   Gait: Normal narrow based gait.      Other tests:    HbA1c: 5.2  LDL: 78    ECHO:  Normal systolic function.  The estimated ejection fraction is 60%.  Indeterminate left ventricular diastolic function.  Normal right ventricular size with normal right ventricular systolic function.      MRI Brain:  No acute findings.  Stable MRI of the brain.  No evidence of acute or chronic infarct.     CTA Head and Neck:  Negative CTA of the neck and of the brain.  No significant stenosis or large vessel occlusion.    Assessment:    Clare Whitley is a 62 y.o. female with past medical history of mitral valve prolapse, obstructive sleep apnea and migraine admitted to the hospital with dizziness that was associated with right-sided upper extremity weakness on 5/12 and she was diagnosed with transient neurological deficit. On arrival in the emergency department she reported resolution of symptoms and she was back to baseline. NIHSS 0.  MRI showed no acute intracranial process.  CTA was negative for any critical stenosis or occlusion.  Was not a tPA or thrombectomy candidate. Less likely ischemic event in the setting of no significant vascular risk factors.  Work-up is negative for TIA.       Plan:    -- Activity as tolerated  -- Normoglycemia  -- Normothermia  -- BP goal normotensive   -- On ASA 81 mg daily PO   -- DC Atorvastatin - no stroke or TIA. Further  recommendations and PCP   -- EEG pending   -- We had an in depth discussion with patient regarding the imaging findings, symptoms etiology and our plan    -- We discussed her vascular risk factors and the need for her to continue to modify her risks e.g. taking her mediations, proper diet, and exercise.  -- Work with primary care provider on stroke risk factor management, BP <140/90, cholesterol monitoring  -- We discussed the need for her to continue to exercise her body and her mind with physical activity and mental activities.  -- We discussed BEFAST and the need for her to recognize stroke symptoms and report to the ER early is needed.    B-Balance  E-Eyes, vision  F-Facial Droop  A-Arm or leg weakness on one side  S-Speech trouble  T-Time - CALL 911!    -- Please have this patient follow up in my clinic on PRN basis   above.      Marquise Olivera MD  Neurology   [facility]

## 2023-07-10 NOTE — PATIENT INSTRUCTIONS
-- Activity as tolerated  -- On ASA 81 mg daily PO   -- DC Atorvastatin - no stroke or TIA. Further recommendations and PCP   -- EEG pending   -- We had an in depth discussion with patient regarding the imaging findings, symptoms etiology and our plan    -- We discussed her vascular risk factors and the need for her to continue to modify her risks e.g. taking her mediations, proper diet, and exercise.  -- Work with primary care provider on stroke risk factor management, BP <140/90, cholesterol monitoring  -- We discussed the need for her to continue to exercise her body and her mind with physical activity and mental activities.  -- We discussed BEFAST and the need for her to recognize stroke symptoms and report to the ER early is needed.    B-Balance  E-Eyes, vision  F-Facial Droop  A-Arm or leg weakness on one side   S-Speech trouble  T-Time - CALL 911!    -- Please have this patient follow up in my clinic on PRN basis

## 2023-07-18 ENCOUNTER — HOSPITAL ENCOUNTER (OUTPATIENT)
Dept: PULMONOLOGY | Facility: HOSPITAL | Age: 63
Discharge: HOME | End: 2023-07-18
Attending: COLON & RECTAL SURGERY
Payer: COMMERCIAL

## 2023-07-18 DIAGNOSIS — R42 DIZZINESS: ICD-10-CM

## 2023-07-18 PROCEDURE — 95816 EEG AWAKE AND DROWSY: CPT | Mod: 26,,, | Performed by: PSYCHIATRY & NEUROLOGY

## 2023-07-18 PROCEDURE — 95816 EEG AWAKE AND DROWSY: CPT

## 2023-07-18 PROCEDURE — 95816 PR EEG,W/AWAKE & DROWSY RECORD: ICD-10-PCS | Mod: 26,,, | Performed by: PSYCHIATRY & NEUROLOGY

## 2023-07-19 PROBLEM — R42 DIZZINESS: Status: ACTIVE | Noted: 2023-07-19

## 2023-07-20 NOTE — PROCEDURES
DATE EEG PERFORMED:  2023.      DATE EEG INTERPRETED:  2023.                    DURATION OF EE MINUTES         LEVEL OF CONSCIOUSENESS    Awake.        EEG BACKGROUND    The posterior dominant basic rhythm reaches 11-12 Hz, symmetric, reactive, well-modulated and well-sustained.         EEG CLASSIFICATION    Normal        IMPRESSION      The EEG is normal in the awake state.       There are no epileptiform discharges or lateralizing signs. No typical events were recorded. There is no electrographic evidence of seizure.There is no electrographic evidence of status epilepticus.         PLEASE NOTE THAT A NON-EPILEPTIFORM EEG DOES NOT RULE OUT EPILEPSY.        KUSUM FATIMA MD, FAAN    Diplomate, American Board of Psychiatry and Neurology    Diplomate, American Board of Clinical Neurophysiology

## 2023-07-21 ENCOUNTER — TELEPHONE (OUTPATIENT)
Dept: NEUROLOGY | Facility: CLINIC | Age: 63
End: 2023-07-21
Payer: COMMERCIAL

## 2023-07-21 NOTE — TELEPHONE ENCOUNTER
----- Message from Junie Terrazas NP sent at 7/21/2023  9:36 AM CDT -----  EEG 30 min:    07-    EEG NL

## 2023-07-21 NOTE — TELEPHONE ENCOUNTER
----- Message from Judy Hernández sent at 7/21/2023 11:25 AM CDT -----  Contact: Clare  Type:  Patient Returning Call    Who Called: Clare  Who Left Message for Patient:Golden Salomon  Does the patient know what this is regarding?:EEG Results  Would the patient rather a call back or a response via [facility]? call  Best Call Back Number: [phone]   Additional Information: Patient reports missing a call and request another call back.

## 2023-07-24 ENCOUNTER — LAB VISIT (OUTPATIENT)
Dept: LAB | Facility: HOSPITAL | Age: 63
End: 2023-07-24
Attending: FAMILY MEDICINE
Payer: COMMERCIAL

## 2023-07-24 DIAGNOSIS — Z13.1 DIABETES MELLITUS SCREENING: ICD-10-CM

## 2023-07-24 DIAGNOSIS — E55.9 VITAMIN D DEFICIENCY: ICD-10-CM

## 2023-07-24 DIAGNOSIS — I10 ESSENTIAL HYPERTENSION: ICD-10-CM

## 2023-07-24 LAB
25(OH)D3+25(OH)D2 SERPL-MCNC: 33 NG/ML (ref 30–96)
ALBUMIN SERPL BCP-MCNC: 3.7 G/DL (ref 3.5–5.2)
ALP SERPL-CCNC: 36 U/L (ref 55–135)
ALT SERPL W/O P-5'-P-CCNC: 15 U/L (ref 10–44)
ANION GAP SERPL CALC-SCNC: 12 MMOL/L (ref 8–16)
AST SERPL-CCNC: 22 U/L (ref 10–40)
BASOPHILS # BLD AUTO: 0.03 K/UL (ref 0–0.2)
BASOPHILS NFR BLD: 0.8 % (ref 0–1.9)
BILIRUB SERPL-MCNC: 0.5 MG/DL (ref 0.1–1)
BUN SERPL-MCNC: 12 MG/DL (ref 8–23)
CALCIUM SERPL-MCNC: 9.3 MG/DL (ref 8.7–10.5)
CHLORIDE SERPL-SCNC: 107 MMOL/L (ref 95–110)
CHOLEST SERPL-MCNC: 141 MG/DL (ref 120–199)
CHOLEST/HDLC SERPL: 2.6 {RATIO} (ref 2–5)
CO2 SERPL-SCNC: 23 MMOL/L (ref 23–29)
CREAT SERPL-MCNC: 0.8 MG/DL (ref 0.5–1.4)
DIFFERENTIAL METHOD: ABNORMAL
EOSINOPHIL # BLD AUTO: 0.3 K/UL (ref 0–0.5)
EOSINOPHIL NFR BLD: 6.4 % (ref 0–8)
ERYTHROCYTE [DISTWIDTH] IN BLOOD BY AUTOMATED COUNT: 14.6 % (ref 11.5–14.5)
EST. GFR  (NO RACE VARIABLE): >60 ML/MIN/1.73 M^2
ESTIMATED AVG GLUCOSE: 105 MG/DL (ref 68–131)
GLUCOSE SERPL-MCNC: 91 MG/DL (ref 70–110)
HBA1C MFR BLD: 5.3 % (ref 4–5.6)
HCT VFR BLD AUTO: 36.8 % (ref 37–48.5)
HDLC SERPL-MCNC: 55 MG/DL (ref 40–75)
HDLC SERPL: 39 % (ref 20–50)
HGB BLD-MCNC: 13 G/DL (ref 12–16)
IMM GRANULOCYTES # BLD AUTO: 0 K/UL (ref 0–0.04)
IMM GRANULOCYTES NFR BLD AUTO: 0 % (ref 0–0.5)
LDLC SERPL CALC-MCNC: 76 MG/DL (ref 63–159)
LYMPHOCYTES # BLD AUTO: 1.9 K/UL (ref 1–4.8)
LYMPHOCYTES NFR BLD: 49.5 % (ref 18–48)
MCH RBC QN AUTO: 27.6 PG (ref 27–31)
MCHC RBC AUTO-ENTMCNC: 35.3 G/DL (ref 32–36)
MCV RBC AUTO: 78 FL (ref 82–98)
MONOCYTES # BLD AUTO: 0.3 K/UL (ref 0.3–1)
MONOCYTES NFR BLD: 8.5 % (ref 4–15)
NEUTROPHILS # BLD AUTO: 1.4 K/UL (ref 1.8–7.7)
NEUTROPHILS NFR BLD: 34.8 % (ref 38–73)
NONHDLC SERPL-MCNC: 86 MG/DL
NRBC BLD-RTO: 0 /100 WBC
PLATELET # BLD AUTO: 184 K/UL (ref 150–450)
PMV BLD AUTO: 12.3 FL (ref 9.2–12.9)
POTASSIUM SERPL-SCNC: 3.8 MMOL/L (ref 3.5–5.1)
PROT SERPL-MCNC: 7.9 G/DL (ref 6–8.4)
RBC # BLD AUTO: 4.71 M/UL (ref 4–5.4)
SODIUM SERPL-SCNC: 142 MMOL/L (ref 136–145)
TRIGL SERPL-MCNC: 50 MG/DL (ref 30–150)
TSH SERPL DL<=0.005 MIU/L-ACNC: 1.38 UIU/ML (ref 0.4–4)
WBC # BLD AUTO: 3.9 K/UL (ref 3.9–12.7)

## 2023-07-24 PROCEDURE — 36415 COLL VENOUS BLD VENIPUNCTURE: CPT | Performed by: FAMILY MEDICINE

## 2023-07-24 PROCEDURE — 82306 VITAMIN D 25 HYDROXY: CPT | Performed by: FAMILY MEDICINE

## 2023-07-24 PROCEDURE — 83036 HEMOGLOBIN GLYCOSYLATED A1C: CPT | Performed by: FAMILY MEDICINE

## 2023-07-24 PROCEDURE — 85025 COMPLETE CBC W/AUTO DIFF WBC: CPT | Performed by: FAMILY MEDICINE

## 2023-07-24 PROCEDURE — 80061 LIPID PANEL: CPT | Performed by: FAMILY MEDICINE

## 2023-07-24 PROCEDURE — 84443 ASSAY THYROID STIM HORMONE: CPT | Performed by: FAMILY MEDICINE

## 2023-07-24 PROCEDURE — 80053 COMPREHEN METABOLIC PANEL: CPT | Performed by: FAMILY MEDICINE

## 2023-07-31 ENCOUNTER — OFFICE VISIT (OUTPATIENT)
Dept: INTERNAL MEDICINE | Facility: CLINIC | Age: 63
End: 2023-07-31
Payer: COMMERCIAL

## 2023-07-31 VITALS
HEART RATE: 68 BPM | OXYGEN SATURATION: 98 % | SYSTOLIC BLOOD PRESSURE: 138 MMHG | BODY MASS INDEX: 29.52 KG/M2 | TEMPERATURE: 98 F | WEIGHT: 161.38 LBS | DIASTOLIC BLOOD PRESSURE: 86 MMHG

## 2023-07-31 DIAGNOSIS — D70.9 NEUTROPENIA, UNSPECIFIED TYPE: ICD-10-CM

## 2023-07-31 DIAGNOSIS — M46.1 SACROILIITIS: ICD-10-CM

## 2023-07-31 DIAGNOSIS — Z12.11 COLON CANCER SCREENING: ICD-10-CM

## 2023-07-31 DIAGNOSIS — I10 ESSENTIAL HYPERTENSION: Chronic | ICD-10-CM

## 2023-07-31 DIAGNOSIS — E55.9 VITAMIN D DEFICIENCY: ICD-10-CM

## 2023-07-31 DIAGNOSIS — G47.33 OSA ON CPAP: ICD-10-CM

## 2023-07-31 DIAGNOSIS — Z00.00 ROUTINE GENERAL MEDICAL EXAMINATION AT A HEALTH CARE FACILITY: Primary | ICD-10-CM

## 2023-07-31 PROBLEM — F41.8 SITUATIONAL ANXIETY: Status: RESOLVED | Noted: 2020-07-08 | Resolved: 2023-07-31

## 2023-07-31 PROCEDURE — 99396 PR PREVENTIVE VISIT,EST,40-64: ICD-10-PCS | Mod: S$GLB,,, | Performed by: PHYSICIAN ASSISTANT

## 2023-07-31 PROCEDURE — 1160F PR REVIEW ALL MEDS BY PRESCRIBER/CLIN PHARMACIST DOCUMENTED: ICD-10-PCS | Mod: CPTII,S$GLB,, | Performed by: PHYSICIAN ASSISTANT

## 2023-07-31 PROCEDURE — 99999 PR PBB SHADOW E&M-EST. PATIENT-LVL IV: ICD-10-PCS | Mod: PBBFAC,,, | Performed by: PHYSICIAN ASSISTANT

## 2023-07-31 PROCEDURE — 1159F PR MEDICATION LIST DOCUMENTED IN MEDICAL RECORD: ICD-10-PCS | Mod: CPTII,S$GLB,, | Performed by: PHYSICIAN ASSISTANT

## 2023-07-31 PROCEDURE — 3008F BODY MASS INDEX DOCD: CPT | Mod: CPTII,S$GLB,, | Performed by: PHYSICIAN ASSISTANT

## 2023-07-31 PROCEDURE — 3075F PR MOST RECENT SYSTOLIC BLOOD PRESS GE 130-139MM HG: ICD-10-PCS | Mod: CPTII,S$GLB,, | Performed by: PHYSICIAN ASSISTANT

## 2023-07-31 PROCEDURE — 1159F MED LIST DOCD IN RCRD: CPT | Mod: CPTII,S$GLB,, | Performed by: PHYSICIAN ASSISTANT

## 2023-07-31 PROCEDURE — 3044F PR MOST RECENT HEMOGLOBIN A1C LEVEL <7.0%: ICD-10-PCS | Mod: CPTII,S$GLB,, | Performed by: PHYSICIAN ASSISTANT

## 2023-07-31 PROCEDURE — 3044F HG A1C LEVEL LT 7.0%: CPT | Mod: CPTII,S$GLB,, | Performed by: PHYSICIAN ASSISTANT

## 2023-07-31 PROCEDURE — 3079F DIAST BP 80-89 MM HG: CPT | Mod: CPTII,S$GLB,, | Performed by: PHYSICIAN ASSISTANT

## 2023-07-31 PROCEDURE — 1160F RVW MEDS BY RX/DR IN RCRD: CPT | Mod: CPTII,S$GLB,, | Performed by: PHYSICIAN ASSISTANT

## 2023-07-31 PROCEDURE — 99999 PR PBB SHADOW E&M-EST. PATIENT-LVL IV: CPT | Mod: PBBFAC,,, | Performed by: PHYSICIAN ASSISTANT

## 2023-07-31 PROCEDURE — 3079F PR MOST RECENT DIASTOLIC BLOOD PRESSURE 80-89 MM HG: ICD-10-PCS | Mod: CPTII,S$GLB,, | Performed by: PHYSICIAN ASSISTANT

## 2023-07-31 PROCEDURE — 3008F PR BODY MASS INDEX (BMI) DOCUMENTED: ICD-10-PCS | Mod: CPTII,S$GLB,, | Performed by: PHYSICIAN ASSISTANT

## 2023-07-31 PROCEDURE — 3075F SYST BP GE 130 - 139MM HG: CPT | Mod: CPTII,S$GLB,, | Performed by: PHYSICIAN ASSISTANT

## 2023-07-31 PROCEDURE — 99396 PREV VISIT EST AGE 40-64: CPT | Mod: S$GLB,,, | Performed by: PHYSICIAN ASSISTANT

## 2023-07-31 NOTE — PROGRESS NOTES
Subjective:       Patient ID: Clare Whitley is a 62 y.o. female.    Chief Complaint: Annual Exam      Patient presents to clinic today for annual physical exam.        Review of Systems   Constitutional:  Positive for fatigue (chronic). Negative for chills, fever and unexpected weight change.   HENT:  Negative for congestion, dental problem, ear pain, hearing loss, rhinorrhea and trouble swallowing.    Eyes:  Negative for pain and visual disturbance.   Respiratory:  Positive for cough (chronic). Negative for shortness of breath.    Cardiovascular:  Positive for chest pain (chronic, intermittent, assoc with not sleeping, recent stress neg), palpitations (chronic, intermittent) and leg swelling (chronic, taking lasix).   Gastrointestinal:  Negative for abdominal distention, abdominal pain, blood in stool, constipation, diarrhea, nausea and vomiting.   Genitourinary:  Negative for difficulty urinating and vaginal discharge.   Musculoskeletal:  Positive for arthralgias (chronic). Negative for myalgias.   Skin:  Negative for rash.   Neurological:  Positive for dizziness (chronic, intermittent, brief episodes) and headaches (chronic, intermittent). Negative for weakness and numbness.   Hematological:  Negative for adenopathy. Does not bruise/bleed easily.   Psychiatric/Behavioral:  Positive for sleep disturbance (due to CPAP use and SINTIA). Negative for dysphoric mood. The patient is nervous/anxious (chronic).        Objective:      Physical Exam  Vitals and nursing note reviewed.   Constitutional:       General: She is not in acute distress.     Appearance: Normal appearance. She is well-developed.   HENT:      Head: Normocephalic and atraumatic.      Right Ear: Tympanic membrane, ear canal and external ear normal.      Left Ear: Tympanic membrane, ear canal and external ear normal.      Nose: Nose normal. No mucosal edema or rhinorrhea.      Mouth/Throat:      Lips: Pink.      Mouth: Mucous membranes are moist.       Dentition: Has dentures.      Pharynx: Oropharynx is clear. Uvula midline.   Eyes:      General: Lids are normal. No scleral icterus.     Extraocular Movements: Extraocular movements intact.      Conjunctiva/sclera: Conjunctivae normal.      Pupils: Pupils are equal, round, and reactive to light.   Neck:      Thyroid: No thyromegaly.   Cardiovascular:      Rate and Rhythm: Normal rate and regular rhythm.      Pulses: Normal pulses.   Pulmonary:      Effort: Pulmonary effort is normal.      Breath sounds: Normal breath sounds. No wheezing, rhonchi or rales.   Abdominal:      General: Bowel sounds are normal. There is no distension.      Palpations: Abdomen is soft. There is no mass.      Tenderness: There is no abdominal tenderness.   Musculoskeletal:         General: Normal range of motion.      Cervical back: Normal range of motion and neck supple.      Right lower leg: No edema.      Left lower leg: No edema.   Lymphadenopathy:      Cervical: No cervical adenopathy.   Skin:     General: Skin is warm and dry.      Findings: No lesion or rash.      Nails: There is no clubbing.   Neurological:      Mental Status: She is alert.      Cranial Nerves: No cranial nerve deficit.      Sensory: No sensory deficit.   Psychiatric:         Mood and Affect: Mood and affect normal.         Assessment:       1. Routine general medical examination at a health care facility    2. Essential hypertension    3. Vitamin D deficiency    4. Sacroiliitis    5. SINTIA on CPAP    6. Neutropenia, unspecified type    7. Colon cancer screening        Plan:   1. Routine general medical examination at a health care facility  -     Ambulatory referral/consult to Optometry; Future; Expected date: 08/07/2023    2. Essential hypertension  Assessment & Plan:  /86, controlled, continue atenolol and Lasix  Lab Results   Component Value Date     07/24/2023    K 3.8 07/24/2023    BUN 12 07/24/2023    CREATININE 0.8 07/24/2023    ESTGFRAFRICA  >60.0 07/25/2022    EGFRNONAA >60.0 07/25/2022    EGFRNORACEVR >60.0 07/24/2023          3. Vitamin D deficiency  Assessment & Plan:  Continue supplement      4. Sacroiliitis  Overview:  Followed by Pain Management, continue current treatment plan       5. SINTIA on CPAP  Overview:  Followed by Pulmonology, continue current treatment plan   9/10/2020, 9/11/2020 Home Sleep Study Mild /Borderline, positional obstructive sleep apnea (SINTIA). best night was night 1.: over all AHI 8.0 . Over all RDI 15 with 5.5 hr of sleep data. Loud snoring was recorded. Oxygen saturation shereen was 85.3%.  Cpap set up date 9/30/2020  On auto CPAP 5-10 cm   Nasal wisp mask       6. Neutropenia, unspecified type  Assessment & Plan:  Refer to hematology for evaluation      7. Colon cancer screening  -     Cologuard Screening (Multitarget Stool DNA); Future; Expected date: 07/31/2023        Recent labs reviewed with patient.    Discussed shingrix vaccine, advised can be obtained at pharmacy.  Cologuard ordered  6 month f/u with Dr. Jennings scheduled  Health Maintenance reviewed/updated.

## 2023-07-31 NOTE — ASSESSMENT & PLAN NOTE
/86, controlled, continue atenolol and Lasix  Lab Results   Component Value Date     07/24/2023    K 3.8 07/24/2023    BUN 12 07/24/2023    CREATININE 0.8 07/24/2023    ESTGFRAFRICA >60.0 07/25/2022    EGFRNONAA >60.0 07/25/2022    EGFRNORACEVR >60.0 07/24/2023

## 2023-08-14 PROBLEM — G45.9 TIA (TRANSIENT ISCHEMIC ATTACK): Status: RESOLVED | Noted: 2023-05-11 | Resolved: 2023-08-14

## 2023-08-24 LAB — NONINV COLON CA DNA+OCC BLD SCRN STL QL: NEGATIVE

## 2023-09-11 ENCOUNTER — OFFICE VISIT (OUTPATIENT)
Dept: PULMONOLOGY | Facility: CLINIC | Age: 63
End: 2023-09-11
Payer: COMMERCIAL

## 2023-09-11 VITALS
DIASTOLIC BLOOD PRESSURE: 86 MMHG | HEART RATE: 81 BPM | RESPIRATION RATE: 17 BRPM | HEIGHT: 62 IN | OXYGEN SATURATION: 99 % | WEIGHT: 166 LBS | BODY MASS INDEX: 30.55 KG/M2 | SYSTOLIC BLOOD PRESSURE: 138 MMHG

## 2023-09-11 DIAGNOSIS — E66.9 CLASS 1 OBESITY WITH SERIOUS COMORBIDITY AND BODY MASS INDEX (BMI) OF 30.0 TO 30.9 IN ADULT, UNSPECIFIED OBESITY TYPE: ICD-10-CM

## 2023-09-11 DIAGNOSIS — J30.2 SEASONAL ALLERGIC RHINITIS, UNSPECIFIED TRIGGER: ICD-10-CM

## 2023-09-11 DIAGNOSIS — G47.33 OSA ON CPAP: Primary | ICD-10-CM

## 2023-09-11 DIAGNOSIS — I10 ESSENTIAL HYPERTENSION: Chronic | ICD-10-CM

## 2023-09-11 PROBLEM — E66.811 CLASS 1 OBESITY WITH SERIOUS COMORBIDITY AND BODY MASS INDEX (BMI) OF 30.0 TO 30.9 IN ADULT: Status: ACTIVE | Noted: 2023-09-11

## 2023-09-11 PROCEDURE — 99214 PR OFFICE/OUTPT VISIT, EST, LEVL IV, 30-39 MIN: ICD-10-PCS | Mod: S$GLB,,, | Performed by: NURSE PRACTITIONER

## 2023-09-11 PROCEDURE — 1159F PR MEDICATION LIST DOCUMENTED IN MEDICAL RECORD: ICD-10-PCS | Mod: CPTII,S$GLB,, | Performed by: NURSE PRACTITIONER

## 2023-09-11 PROCEDURE — 3079F DIAST BP 80-89 MM HG: CPT | Mod: CPTII,S$GLB,, | Performed by: NURSE PRACTITIONER

## 2023-09-11 PROCEDURE — 1160F PR REVIEW ALL MEDS BY PRESCRIBER/CLIN PHARMACIST DOCUMENTED: ICD-10-PCS | Mod: CPTII,S$GLB,, | Performed by: NURSE PRACTITIONER

## 2023-09-11 PROCEDURE — 99999 PR PBB SHADOW E&M-EST. PATIENT-LVL III: CPT | Mod: PBBFAC,,, | Performed by: NURSE PRACTITIONER

## 2023-09-11 PROCEDURE — 3075F PR MOST RECENT SYSTOLIC BLOOD PRESS GE 130-139MM HG: ICD-10-PCS | Mod: CPTII,S$GLB,, | Performed by: NURSE PRACTITIONER

## 2023-09-11 PROCEDURE — 99214 OFFICE O/P EST MOD 30 MIN: CPT | Mod: S$GLB,,, | Performed by: NURSE PRACTITIONER

## 2023-09-11 PROCEDURE — 99999 PR PBB SHADOW E&M-EST. PATIENT-LVL III: ICD-10-PCS | Mod: PBBFAC,,, | Performed by: NURSE PRACTITIONER

## 2023-09-11 PROCEDURE — 3079F PR MOST RECENT DIASTOLIC BLOOD PRESSURE 80-89 MM HG: ICD-10-PCS | Mod: CPTII,S$GLB,, | Performed by: NURSE PRACTITIONER

## 2023-09-11 PROCEDURE — 3044F HG A1C LEVEL LT 7.0%: CPT | Mod: CPTII,S$GLB,, | Performed by: NURSE PRACTITIONER

## 2023-09-11 PROCEDURE — 3075F SYST BP GE 130 - 139MM HG: CPT | Mod: CPTII,S$GLB,, | Performed by: NURSE PRACTITIONER

## 2023-09-11 PROCEDURE — 1159F MED LIST DOCD IN RCRD: CPT | Mod: CPTII,S$GLB,, | Performed by: NURSE PRACTITIONER

## 2023-09-11 PROCEDURE — 3044F PR MOST RECENT HEMOGLOBIN A1C LEVEL <7.0%: ICD-10-PCS | Mod: CPTII,S$GLB,, | Performed by: NURSE PRACTITIONER

## 2023-09-11 PROCEDURE — 1160F RVW MEDS BY RX/DR IN RCRD: CPT | Mod: CPTII,S$GLB,, | Performed by: NURSE PRACTITIONER

## 2023-09-11 PROCEDURE — 3008F PR BODY MASS INDEX (BMI) DOCUMENTED: ICD-10-PCS | Mod: CPTII,S$GLB,, | Performed by: NURSE PRACTITIONER

## 2023-09-11 PROCEDURE — 3008F BODY MASS INDEX DOCD: CPT | Mod: CPTII,S$GLB,, | Performed by: NURSE PRACTITIONER

## 2023-09-11 NOTE — ASSESSMENT & PLAN NOTE
Encouraged calorie reduction and 30 minutes of exercise daily. Discussed impact of obesity on general health.  Has walking program  Wt Readings from Last 9 Encounters:   09/11/23 75.3 kg (166 lb 0.1 oz)   07/31/23 73.2 kg (161 lb 6 oz)   07/10/23 75 kg (165 lb 5.5 oz)   05/29/23 75.3 kg (166 lb 0.1 oz)   05/16/23 73.1 kg (161 lb 0.7 oz)   05/12/23 75.1 kg (165 lb 9.1 oz)   03/29/23 75.3 kg (166 lb 0.1 oz)   01/31/23 77 kg (169 lb 12.1 oz)   01/30/23 75.3 kg (166 lb 0.1 oz)   Body mass index is 30.36 kg/m².

## 2023-09-11 NOTE — ASSESSMENT & PLAN NOTE
9/10/2020, 9/11/2020 Home Sleep Study Mild /Borderline, positional obstructive sleep apnea (SINTIA). best night was night 1.: over all AHI 8.0 . Over all RDI 15 with 5.5 hr of sleep data. Loud snoring was recorded. Oxygen saturation shereen was 85.3%.  Cpap set up date 9/30/2020 2023 obtained [employer] Dreamstation 2 CPAP machine related recall  Nasal wisp mask, plans to change nasal mask  HME: [facility]   Updated supply order  Adherence   Follow up 1 year, September 2024

## 2023-09-11 NOTE — PROGRESS NOTES
Subjective:      Patient ID: Clare Whitley is a 62 y.o. female.    Chief Complaint: Sleep Apnea      HPI: Clare Whitley presents to clinic for follow up for SINTIA annual CPAP complaince assessment.    Cpap set up date 9/30/2020    She is on Auto CPAP of 5-10 cmH2O pressure which is optimally controlling sleep apnea with apneic index (AHI) 1.4 events an hour.   Complaince download today reveals 26.7% of days with greater than 4 hours of device use.   Patient reports benefit from CPAP use, awakens more refreshed, no longer having morning headache.   Patient reports complaint of does not like nasal wisp mask or head gear, showed patient pic of Gerson pennora nasal mask with less head gear, she will meet with kurt to see what's available.  . Nasal wisp mask is used.     Has Saritha Dreamstation 2 obtained related to recall.     9/10/2020, 9/11/2020 Home Sleep Study Mild /Borderline, positional obstructive sleep apnea (SINTIA). best night was night 1.: over all AHI 8.0 . Over all RDI 15 with 5.5 hr of sleep data. Loud snoring was recorded. Oxygen saturation shereen was 85.3%.    Compliance Summary  8/8/2023 - 9/6/2023 (30 days)  Days with Device Usage 20 days  Days without Device Usage 10 days  Percent Days with Device Usage 66.7%  Cumulative Usage 2 days 21 hrs. 27 mins. 55 secs.  Maximum Usage (1 Day) 6 hrs. 30 mins. 23 secs.  Average Usage (All Days) 2 hrs. 18 mins. 55 secs.  Average Usage (Days Used) 3 hrs. 28 mins. 23 secs.  Minimum Usage (1 Day) 23 mins. 36 secs.  Percent of Days with Usage >= 4 Hours 26.7%  Percent of Days with Usage < 4 Hours 73.3%  Date Range  Total Blower Time 2 days 21 hrs. 41 mins. 48 secs.  Average AHI 1.4  Auto-CPAP Summary  Auto-CPAP Mean Pressure 5.5 cmH2O  Auto-CPAP Peak Average Pressure 7.3 cmH2O  Device Pressure <= 90% of Time 6.5 cmH2O  Average Time in Large Leak Per Day 2 mins. 27 secs.      Previous Report Reviewed: lab reports and office notes     Past Medical History: The  following portions of the patient's history were reviewed and updated as appropriate:   She  has a past surgical history that includes Uterine fibroid embolization (01/01/2010); Lymph node biopsy (Left); Cholecystectomy; laser SX (Bilateral); and Tubal ligation (9/2/1991).  Her family history includes Cataracts in her mother; Hypertension in her brother, father, mother, and sister; Stroke in her cousin.  She  reports that she has never smoked. She has been exposed to tobacco smoke. She has never used smokeless tobacco. She reports that she does not drink alcohol and does not use drugs.  She has a current medication list which includes the following prescription(s): aspirin, atenolol, cholecalciferol (vitamin d3), furosemide, and potassium chloride.  She is allergic to no known drug allergies..    The following portions of the patient's history were reviewed and updated as appropriate: allergies, current medications, past family history, past medical history, past social history, past surgical history and problem list.    Review of Systems   Constitutional:  Negative for fever, chills, weight loss, weight gain, activity change, appetite change, fatigue and night sweats.   HENT:  Negative for postnasal drip, rhinorrhea, sinus pressure, voice change and congestion.    Eyes:  Negative for redness and itching.   Respiratory:  Negative for snoring, cough, sputum production, chest tightness, shortness of breath, wheezing, orthopnea, asthma nighttime symptoms, dyspnea on extertion, use of rescue inhaler and somnolence.    Cardiovascular: Negative.  Negative for chest pain, palpitations and leg swelling.   Genitourinary:  Negative for difficulty urinating and hematuria.   Endocrine:  Negative for cold intolerance and heat intolerance.    Musculoskeletal:  Negative for arthralgias, gait problem, joint swelling and myalgias.   Skin: Negative.    Gastrointestinal:  Negative for nausea, vomiting, abdominal pain and acid reflux.  "  Neurological:  Negative for dizziness, weakness, light-headedness and headaches.   Hematological:  Negative for adenopathy. No excessive bruising.   All other systems reviewed and are negative.     Objective:   /86   Pulse 81   Resp 17   Ht 5' 2" (1.575 m)   Wt 75.3 kg (166 lb 0.1 oz)   SpO2 99%   BMI 30.36 kg/m²   Physical Exam  Vitals and nursing note reviewed.   Constitutional:       General: She is not in acute distress.     Appearance: She is well-developed. She is obese. She is not ill-appearing or toxic-appearing.   HENT:      Head: Normocephalic.      Right Ear: External ear normal.      Left Ear: External ear normal.      Nose: Nose normal.      Mouth/Throat:      Pharynx: No oropharyngeal exudate.   Eyes:      Conjunctiva/sclera: Conjunctivae normal.   Cardiovascular:      Rate and Rhythm: Normal rate and regular rhythm.      Heart sounds: Normal heart sounds.   Pulmonary:      Effort: Pulmonary effort is normal.      Breath sounds: Normal breath sounds. No stridor.   Abdominal:      Palpations: Abdomen is soft.   Musculoskeletal:         General: Normal range of motion.      Cervical back: Normal range of motion and neck supple.   Lymphadenopathy:      Cervical: No cervical adenopathy.   Skin:     General: Skin is warm and dry.   Neurological:      Mental Status: She is alert and oriented to person, place, and time.   Psychiatric:         Behavior: Behavior normal. Behavior is cooperative.         Thought Content: Thought content normal.         Judgment: Judgment normal.       Personal Diagnostic Review  CPAP download    9/10/2020, 9/11/2020 Home Sleep Study Mild /Borderline, positional obstructive sleep apnea (SINTIA). best night was night 1.: over all AHI 8.0 . Over all RDI 15 with 5.5 hr of sleep data. Loud snoring was recorded. Oxygen saturation shereen was 85.3%.      Assessment:     1. SINTIA on CPAP    2. Seasonal allergic rhinitis, unspecified trigger    3. Essential hypertension    4. " Class 1 obesity with serious comorbidity and body mass index (BMI) of 30.0 to 30.9 in adult, unspecified obesity type        Orders Placed This Encounter   Procedures    CPAP/BIPAP SUPPLIES     Benefits and compliant  90 day supply. 4 refills  HME: [facility]     Order Specific Question:   Length of need (1-99 months):     Answer:   99     Order Specific Question:   Choose ONE mask type and its corresponding cushions and/or pillows:     Answer:    Nasal Mask, 1 per 90 days:  Nasal Cushions, (6 per 90 days):  Nasal Pillows, (6 per 90 days)     Order Specific Question:   Choose EITHER Heated or Non-Heated Tubjing     Answer:    Non-Heated Tubing, 1 per 90 days     Order Specific Question:   Number of Days Needed:     Answer:   99     Order Specific Question:   All other supplies as needed as listed below:     Answer:    Headgear, 1 per 180 days     Order Specific Question:   All other supplies as needed as listed below:     Answer:    Chin Strap, 1 per 180 days     Order Specific Question:   All other supplies as needed as listed below:     Answer:    Disposable Filter, 6 per 90 days     Order Specific Question:   All other supplies as needed as listed below:     Answer:    Humidifier Chamber, 1 per 180 days     Order Specific Question:   All other supplies as needed as listed below:     Answer:    Non-Disposable Filter, 1 per 180 days       Plan:     Problem List Items Addressed This Visit       Essential hypertension (Chronic)     Stable and controlled. Continue current treatment plan as previously prescribed with your PCP.              Seasonal allergic rhinitis     Controlled off medication         SINTIA on CPAP - Primary     9/10/2020, 9/11/2020 Home Sleep Study Mild /Borderline, positional obstructive sleep apnea (SINTIA). best night was night 1.: over all AHI 8.0 . Over all RDI 15 with 5.5 hr of sleep data. Loud snoring was recorded. Oxygen saturation shereen was 85.3%.  Cpap  set up date 9/30/2020 2023 obtained Saritha Dreamstation 2 CPAP machine related recall  Nasal wisp mask, plans to change nasal mask  HME: [facility]   Updated supply order  Adherence   Follow up 1 year, September 2024           Relevant Orders    CPAP/BIPAP SUPPLIES    Class 1 obesity with serious comorbidity and body mass index (BMI) of 30.0 to 30.9 in adult     Encouraged calorie reduction and 30 minutes of exercise daily. Discussed impact of obesity on general health.  Has walking program  Wt Readings from Last 9 Encounters:   09/11/23 75.3 kg (166 lb 0.1 oz)   07/31/23 73.2 kg (161 lb 6 oz)   07/10/23 75 kg (165 lb 5.5 oz)   05/29/23 75.3 kg (166 lb 0.1 oz)   05/16/23 73.1 kg (161 lb 0.7 oz)   05/12/23 75.1 kg (165 lb 9.1 oz)   03/29/23 75.3 kg (166 lb 0.1 oz)   01/31/23 77 kg (169 lb 12.1 oz)   01/30/23 75.3 kg (166 lb 0.1 oz)   Body mass index is 30.36 kg/m².               (DME) - [facility]  Reviewed therapeutic goals for positive airway pressure therapy Auto CPAP  Ideal is usage 100% of nights for 6 - 8 hours per night. Minimum usage is 70% of night for at least 4 hours per night used.     Follow up in about 1 year (around 9/11/2024) for CPAP compliance dnld.

## 2023-09-14 ENCOUNTER — OFFICE VISIT (OUTPATIENT)
Dept: OPHTHALMOLOGY | Facility: CLINIC | Age: 63
End: 2023-09-14
Payer: COMMERCIAL

## 2023-09-14 DIAGNOSIS — H25.13 CATARACT, NUCLEAR SCLEROTIC SENILE, BILATERAL: Primary | ICD-10-CM

## 2023-09-14 DIAGNOSIS — I10 ESSENTIAL HYPERTENSION: Chronic | ICD-10-CM

## 2023-09-14 DIAGNOSIS — H52.7 REFRACTIVE ERRORS: ICD-10-CM

## 2023-09-14 DIAGNOSIS — Z00.00 ROUTINE GENERAL MEDICAL EXAMINATION AT A HEALTH CARE FACILITY: ICD-10-CM

## 2023-09-14 PROCEDURE — 1159F PR MEDICATION LIST DOCUMENTED IN MEDICAL RECORD: ICD-10-PCS | Mod: CPTII,S$GLB,, | Performed by: OPTOMETRIST

## 2023-09-14 PROCEDURE — 3044F HG A1C LEVEL LT 7.0%: CPT | Mod: CPTII,S$GLB,, | Performed by: OPTOMETRIST

## 2023-09-14 PROCEDURE — 99999 PR PBB SHADOW E&M-EST. PATIENT-LVL III: CPT | Mod: PBBFAC,,, | Performed by: OPTOMETRIST

## 2023-09-14 PROCEDURE — 3044F PR MOST RECENT HEMOGLOBIN A1C LEVEL <7.0%: ICD-10-PCS | Mod: CPTII,S$GLB,, | Performed by: OPTOMETRIST

## 2023-09-14 PROCEDURE — 92014 COMPRE OPH EXAM EST PT 1/>: CPT | Mod: S$GLB,,, | Performed by: OPTOMETRIST

## 2023-09-14 PROCEDURE — 92015 DETERMINE REFRACTIVE STATE: CPT | Mod: S$GLB,,, | Performed by: OPTOMETRIST

## 2023-09-14 PROCEDURE — 99999 PR PBB SHADOW E&M-EST. PATIENT-LVL III: ICD-10-PCS | Mod: PBBFAC,,, | Performed by: OPTOMETRIST

## 2023-09-14 PROCEDURE — 92015 PR REFRACTION: ICD-10-PCS | Mod: S$GLB,,, | Performed by: OPTOMETRIST

## 2023-09-14 PROCEDURE — 1159F MED LIST DOCD IN RCRD: CPT | Mod: CPTII,S$GLB,, | Performed by: OPTOMETRIST

## 2023-09-14 PROCEDURE — 92014 PR EYE EXAM, EST PATIENT,COMPREHESV: ICD-10-PCS | Mod: S$GLB,,, | Performed by: OPTOMETRIST

## 2023-09-14 NOTE — PROGRESS NOTES
HPI    Last visit with TRF on 01/21/2021.    Patient states trouble with near vision and driving at night   No ocular pain/discomfort  Not using any otc drops  Wear otc readers   Last edited by Gabriella Russell on 9/14/2023 10:17 AM.            Assessment /Plan     For exam results, see Encounter Report.    Cataract, nuclear sclerotic senile, bilateral    Routine general medical examination at a health care facility  -     Ambulatory referral/consult to Optometry    Essential hypertension    Refractive errors      Moderate cataracts OU, not surgical  Follow annually.  Worsening    Dispense Final Rx for glasses.  RTC 1 year  Discussed above and answered questions.

## 2024-01-09 ENCOUNTER — TELEPHONE (OUTPATIENT)
Dept: INTERNAL MEDICINE | Facility: CLINIC | Age: 64
End: 2024-01-09
Payer: COMMERCIAL

## 2024-01-09 DIAGNOSIS — Z13.29 THYROID DISORDER SCREEN: Primary | ICD-10-CM

## 2024-01-09 DIAGNOSIS — Z13.1 DIABETES MELLITUS SCREENING: ICD-10-CM

## 2024-01-09 DIAGNOSIS — I10 ESSENTIAL HYPERTENSION: ICD-10-CM

## 2024-01-09 DIAGNOSIS — Z13.220 SCREENING FOR LIPOID DISORDERS: ICD-10-CM

## 2024-01-09 NOTE — TELEPHONE ENCOUNTER
----- Message from Ayush Conklin sent at 1/9/2024  9:35 AM CST -----  Contact: patient  Clare Whitley would like a call back at [phone], in regards to getting lab order for her upcoming annual visit. Pt would like to have the labs done prior to the appt.

## 2024-01-09 NOTE — TELEPHONE ENCOUNTER
Please order labs and add vitamin d (I am unable to pend) thank you, I will call pt to schedule her.

## 2024-01-09 NOTE — TELEPHONE ENCOUNTER
----- Message from Kelly Aguilar sent at 1/9/2024  2:28 PM CST -----  .Type:  Patient Returning Call    Who Called:.Clare Whitley   Who Left Message for Patient:  Does the patient know what this is regarding?:blood work  Would the patient rather a call back or a response via [facility]? Call back  Best Call Back Number:.[phone]   Additional Information:

## 2024-01-19 ENCOUNTER — HOSPITAL ENCOUNTER (EMERGENCY)
Facility: HOSPITAL | Age: 64
Discharge: HOME | End: 2024-01-19
Attending: EMERGENCY MEDICINE
Payer: COMMERCIAL

## 2024-01-19 VITALS
HEART RATE: 68 BPM | OXYGEN SATURATION: 99 % | SYSTOLIC BLOOD PRESSURE: 149 MMHG | WEIGHT: 168.44 LBS | TEMPERATURE: 98 F | DIASTOLIC BLOOD PRESSURE: 72 MMHG | BODY MASS INDEX: 30.81 KG/M2 | RESPIRATION RATE: 16 BRPM

## 2024-01-19 DIAGNOSIS — R05.9 COUGH: ICD-10-CM

## 2024-01-19 DIAGNOSIS — R53.1 WEAKNESS: ICD-10-CM

## 2024-01-19 LAB
ALBUMIN SERPL BCP-MCNC: 3.8 G/DL (ref 3.5–5.2)
ALP SERPL-CCNC: 37 U/L (ref 55–135)
ALT SERPL W/O P-5'-P-CCNC: 17 U/L (ref 10–44)
ANION GAP SERPL CALC-SCNC: 12 MMOL/L (ref 8–16)
AST SERPL-CCNC: 22 U/L (ref 10–40)
BASOPHILS # BLD AUTO: 0.04 K/UL (ref 0–0.2)
BASOPHILS NFR BLD: 0.8 % (ref 0–1.9)
BILIRUB SERPL-MCNC: 0.5 MG/DL (ref 0.1–1)
BNP SERPL-MCNC: 72 PG/ML (ref 0–99)
BUN SERPL-MCNC: 13 MG/DL (ref 8–23)
CALCIUM SERPL-MCNC: 9.1 MG/DL (ref 8.7–10.5)
CHLORIDE SERPL-SCNC: 108 MMOL/L (ref 95–110)
CO2 SERPL-SCNC: 20 MMOL/L (ref 23–29)
CREAT SERPL-MCNC: 0.8 MG/DL (ref 0.5–1.4)
DIFFERENTIAL METHOD BLD: ABNORMAL
EOSINOPHIL # BLD AUTO: 0.2 K/UL (ref 0–0.5)
EOSINOPHIL NFR BLD: 3.5 % (ref 0–8)
ERYTHROCYTE [DISTWIDTH] IN BLOOD BY AUTOMATED COUNT: 14.2 % (ref 11.5–14.5)
EST. GFR  (NO RACE VARIABLE): >60 ML/MIN/1.73 M^2
GLUCOSE SERPL-MCNC: 91 MG/DL (ref 70–110)
HCT VFR BLD AUTO: 37.9 % (ref 37–48.5)
HGB BLD-MCNC: 13.7 G/DL (ref 12–16)
IMM GRANULOCYTES # BLD AUTO: 0.02 K/UL (ref 0–0.04)
IMM GRANULOCYTES NFR BLD AUTO: 0.4 % (ref 0–0.5)
INFLUENZA A, MOLECULAR: NEGATIVE
INFLUENZA B, MOLECULAR: NEGATIVE
LYMPHOCYTES # BLD AUTO: 1.5 K/UL (ref 1–4.8)
LYMPHOCYTES NFR BLD: 28.5 % (ref 18–48)
MCH RBC QN AUTO: 27.8 PG (ref 27–31)
MCHC RBC AUTO-ENTMCNC: 36.1 G/DL (ref 32–36)
MCV RBC AUTO: 77 FL (ref 82–98)
MONOCYTES # BLD AUTO: 0.4 K/UL (ref 0.3–1)
MONOCYTES NFR BLD: 7.3 % (ref 4–15)
NEUTROPHILS # BLD AUTO: 3 K/UL (ref 1.8–7.7)
NEUTROPHILS NFR BLD: 59.5 % (ref 38–73)
NRBC BLD-RTO: 0 /100 WBC
PLATELET # BLD AUTO: 146 K/UL (ref 150–450)
PMV BLD AUTO: 10.7 FL (ref 9.2–12.9)
POTASSIUM SERPL-SCNC: 3.7 MMOL/L (ref 3.5–5.1)
PROT SERPL-MCNC: 8.1 G/DL (ref 6–8.4)
RBC # BLD AUTO: 4.92 M/UL (ref 4–5.4)
SARS-COV-2 RDRP RESP QL NAA+PROBE: NEGATIVE
SODIUM SERPL-SCNC: 140 MMOL/L (ref 136–145)
SPECIMEN SOURCE: NORMAL
TROPONIN I SERPL DL<=0.01 NG/ML-MCNC: <0.006 NG/ML (ref 0–0.03)
WBC # BLD AUTO: 5.08 K/UL (ref 3.9–12.7)

## 2024-01-19 PROCEDURE — 83880 ASSAY OF NATRIURETIC PEPTIDE: CPT | Performed by: REGISTERED NURSE

## 2024-01-19 PROCEDURE — 99285 EMERGENCY DEPT VISIT HI MDM: CPT | Mod: 25

## 2024-01-19 PROCEDURE — 93010 ELECTROCARDIOGRAM REPORT: CPT | Mod: ,,, | Performed by: INTERNAL MEDICINE

## 2024-01-19 PROCEDURE — 84484 ASSAY OF TROPONIN QUANT: CPT | Performed by: REGISTERED NURSE

## 2024-01-19 PROCEDURE — 93005 ELECTROCARDIOGRAM TRACING: CPT

## 2024-01-19 PROCEDURE — 80053 COMPREHEN METABOLIC PANEL: CPT | Performed by: REGISTERED NURSE

## 2024-01-19 PROCEDURE — U0002 COVID-19 LAB TEST NON-CDC: HCPCS | Performed by: EMERGENCY MEDICINE

## 2024-01-19 PROCEDURE — 85025 COMPLETE CBC W/AUTO DIFF WBC: CPT | Performed by: REGISTERED NURSE

## 2024-01-19 PROCEDURE — 87502 INFLUENZA DNA AMP PROBE: CPT | Performed by: EMERGENCY MEDICINE

## 2024-01-19 RX ORDER — PROMETHAZINE HYDROCHLORIDE AND DEXTROMETHORPHAN HYDROBROMIDE 6.25; 15 MG/5ML; MG/5ML
5 SYRUP ORAL EVERY 6 HOURS PRN
Qty: 120 ML | Refills: 0 | Status: SHIPPED | OUTPATIENT
Start: 2024-01-19 | End: 2024-01-29

## 2024-01-19 NOTE — FIRST PROVIDER EVALUATION
Medical screening examination initiated.  I have conducted a focused provider triage encounter, findings are as follows:    Brief history of present illness:  Weakness, fatigue and dizziness    Vitals:    01/19/24 0758   BP: (!) 177/102   BP Location: Right arm   Patient Position: Sitting   Pulse: 80   Resp: 16   Temp: 98.1 °F (36.7 °C)   TempSrc: Oral   SpO2: 99%   Weight: 76.4 kg (168 lb 6.9 oz)       Pertinent physical exam:  No acute distress    Brief workup plan:  Workup    Preliminary workup initiated; this workup will be continued and followed by the physician or advanced practice provider that is assigned to the patient when roomed.

## 2024-01-24 ENCOUNTER — LAB VISIT (OUTPATIENT)
Dept: LAB | Facility: HOSPITAL | Age: 64
End: 2024-01-24
Attending: FAMILY MEDICINE
Payer: COMMERCIAL

## 2024-01-24 DIAGNOSIS — Z13.1 DIABETES MELLITUS SCREENING: ICD-10-CM

## 2024-01-24 DIAGNOSIS — Z13.29 THYROID DISORDER SCREEN: ICD-10-CM

## 2024-01-24 DIAGNOSIS — Z13.220 SCREENING FOR LIPOID DISORDERS: ICD-10-CM

## 2024-01-24 DIAGNOSIS — I10 ESSENTIAL HYPERTENSION: ICD-10-CM

## 2024-01-24 LAB
ALBUMIN SERPL BCP-MCNC: 3.8 G/DL (ref 3.5–5.2)
ALP SERPL-CCNC: 30 U/L (ref 55–135)
ALT SERPL W/O P-5'-P-CCNC: 14 U/L (ref 10–44)
ANION GAP SERPL CALC-SCNC: 7 MMOL/L (ref 8–16)
AST SERPL-CCNC: 19 U/L (ref 10–40)
BASOPHILS # BLD AUTO: 0.05 K/UL (ref 0–0.2)
BASOPHILS NFR BLD: 1.3 % (ref 0–1.9)
BILIRUB SERPL-MCNC: 0.9 MG/DL (ref 0.1–1)
BUN SERPL-MCNC: 15 MG/DL (ref 8–23)
CALCIUM SERPL-MCNC: 9.5 MG/DL (ref 8.7–10.5)
CHLORIDE SERPL-SCNC: 109 MMOL/L (ref 95–110)
CHOLEST SERPL-MCNC: 141 MG/DL (ref 120–199)
CHOLEST/HDLC SERPL: 2.7 {RATIO} (ref 2–5)
CO2 SERPL-SCNC: 25 MMOL/L (ref 23–29)
CREAT SERPL-MCNC: 0.8 MG/DL (ref 0.5–1.4)
DIFFERENTIAL METHOD BLD: ABNORMAL
EOSINOPHIL # BLD AUTO: 0.2 K/UL (ref 0–0.5)
EOSINOPHIL NFR BLD: 4.3 % (ref 0–8)
ERYTHROCYTE [DISTWIDTH] IN BLOOD BY AUTOMATED COUNT: 14.6 % (ref 11.5–14.5)
EST. GFR  (NO RACE VARIABLE): >60 ML/MIN/1.73 M^2
ESTIMATED AVG GLUCOSE: 100 MG/DL (ref 68–131)
GLUCOSE SERPL-MCNC: 90 MG/DL (ref 70–110)
HBA1C MFR BLD: 5.1 % (ref 4–5.6)
HCT VFR BLD AUTO: 38.8 % (ref 37–48.5)
HDLC SERPL-MCNC: 52 MG/DL (ref 40–75)
HDLC SERPL: 36.9 % (ref 20–50)
HGB BLD-MCNC: 14.1 G/DL (ref 12–16)
IMM GRANULOCYTES # BLD AUTO: 0.01 K/UL (ref 0–0.04)
IMM GRANULOCYTES NFR BLD AUTO: 0.3 % (ref 0–0.5)
LDLC SERPL CALC-MCNC: 80.6 MG/DL (ref 63–159)
LYMPHOCYTES # BLD AUTO: 1.4 K/UL (ref 1–4.8)
LYMPHOCYTES NFR BLD: 35.3 % (ref 18–48)
MCH RBC QN AUTO: 28.1 PG (ref 27–31)
MCHC RBC AUTO-ENTMCNC: 36.3 G/DL (ref 32–36)
MCV RBC AUTO: 77 FL (ref 82–98)
MONOCYTES # BLD AUTO: 0.5 K/UL (ref 0.3–1)
MONOCYTES NFR BLD: 11.6 % (ref 4–15)
NEUTROPHILS # BLD AUTO: 1.9 K/UL (ref 1.8–7.7)
NEUTROPHILS NFR BLD: 47.2 % (ref 38–73)
NONHDLC SERPL-MCNC: 89 MG/DL
NRBC BLD-RTO: 0 /100 WBC
PLATELET # BLD AUTO: 185 K/UL (ref 150–450)
PMV BLD AUTO: 12.5 FL (ref 9.2–12.9)
POTASSIUM SERPL-SCNC: 3.9 MMOL/L (ref 3.5–5.1)
PROT SERPL-MCNC: 8.3 G/DL (ref 6–8.4)
RBC # BLD AUTO: 5.01 M/UL (ref 4–5.4)
SODIUM SERPL-SCNC: 141 MMOL/L (ref 136–145)
TRIGL SERPL-MCNC: 42 MG/DL (ref 30–150)
TSH SERPL DL<=0.005 MIU/L-ACNC: 0.96 UIU/ML (ref 0.4–4)
WBC # BLD AUTO: 3.97 K/UL (ref 3.9–12.7)

## 2024-01-24 PROCEDURE — 80053 COMPREHEN METABOLIC PANEL: CPT | Performed by: FAMILY MEDICINE

## 2024-01-24 PROCEDURE — 36415 COLL VENOUS BLD VENIPUNCTURE: CPT | Performed by: FAMILY MEDICINE

## 2024-01-24 PROCEDURE — 80061 LIPID PANEL: CPT | Performed by: FAMILY MEDICINE

## 2024-01-24 PROCEDURE — 83036 HEMOGLOBIN GLYCOSYLATED A1C: CPT | Performed by: FAMILY MEDICINE

## 2024-01-24 PROCEDURE — 85025 COMPLETE CBC W/AUTO DIFF WBC: CPT | Performed by: FAMILY MEDICINE

## 2024-01-24 PROCEDURE — 84443 ASSAY THYROID STIM HORMONE: CPT | Performed by: FAMILY MEDICINE

## 2024-01-31 ENCOUNTER — OFFICE VISIT (OUTPATIENT)
Dept: INTERNAL MEDICINE | Facility: CLINIC | Age: 64
End: 2024-01-31
Payer: COMMERCIAL

## 2024-01-31 VITALS
WEIGHT: 163.56 LBS | TEMPERATURE: 98 F | DIASTOLIC BLOOD PRESSURE: 78 MMHG | RESPIRATION RATE: 18 BRPM | BODY MASS INDEX: 30.1 KG/M2 | HEIGHT: 62 IN | SYSTOLIC BLOOD PRESSURE: 132 MMHG | HEART RATE: 101 BPM | OXYGEN SATURATION: 98 %

## 2024-01-31 DIAGNOSIS — T50.2X5A DIURETIC-INDUCED HYPOKALEMIA: ICD-10-CM

## 2024-01-31 DIAGNOSIS — I10 ESSENTIAL HYPERTENSION: Primary | ICD-10-CM

## 2024-01-31 DIAGNOSIS — E55.9 VITAMIN D DEFICIENCY: ICD-10-CM

## 2024-01-31 DIAGNOSIS — E87.6 DIURETIC-INDUCED HYPOKALEMIA: ICD-10-CM

## 2024-01-31 PROBLEM — M46.1 SACROILIITIS: Status: RESOLVED | Noted: 2023-05-16 | Resolved: 2024-01-31

## 2024-01-31 PROBLEM — D70.9 NEUTROPENIA, UNSPECIFIED TYPE: Status: RESOLVED | Noted: 2023-07-31 | Resolved: 2024-01-31

## 2024-01-31 PROCEDURE — 3044F HG A1C LEVEL LT 7.0%: CPT | Mod: CPTII,S$GLB,, | Performed by: FAMILY MEDICINE

## 2024-01-31 PROCEDURE — 99999 PR PBB SHADOW E&M-EST. PATIENT-LVL IV: CPT | Mod: PBBFAC,,, | Performed by: FAMILY MEDICINE

## 2024-01-31 PROCEDURE — 3078F DIAST BP <80 MM HG: CPT | Mod: CPTII,S$GLB,, | Performed by: FAMILY MEDICINE

## 2024-01-31 PROCEDURE — 1160F RVW MEDS BY RX/DR IN RCRD: CPT | Mod: CPTII,S$GLB,, | Performed by: FAMILY MEDICINE

## 2024-01-31 PROCEDURE — 3008F BODY MASS INDEX DOCD: CPT | Mod: CPTII,S$GLB,, | Performed by: FAMILY MEDICINE

## 2024-01-31 PROCEDURE — 1159F MED LIST DOCD IN RCRD: CPT | Mod: CPTII,S$GLB,, | Performed by: FAMILY MEDICINE

## 2024-01-31 PROCEDURE — 99214 OFFICE O/P EST MOD 30 MIN: CPT | Mod: S$GLB,,, | Performed by: FAMILY MEDICINE

## 2024-01-31 PROCEDURE — 3075F SYST BP GE 130 - 139MM HG: CPT | Mod: CPTII,S$GLB,, | Performed by: FAMILY MEDICINE

## 2024-01-31 NOTE — PROGRESS NOTES
Subjective:       Patient ID: Clare Whitley is a 63 y.o. female.    Chief Complaint: Follow-up    Patient presents to clinic today for followup of chronic conditions. Reports cough is gradually improving, given cough medication by ER physician.     Review of Systems   Constitutional:  Negative for chills, fatigue, fever and unexpected weight change.   Eyes:  Negative for visual disturbance.   Respiratory:  Negative for shortness of breath.    Cardiovascular:  Negative for chest pain.   Musculoskeletal:  Negative for myalgias.   Neurological:  Negative for headaches.         Objective:      Physical Exam  Vitals reviewed.   Constitutional:       General: She is not in acute distress.     Appearance: She is well-developed.   HENT:      Head: Normocephalic and atraumatic.   Eyes:      General: Lids are normal. No scleral icterus.     Extraocular Movements: Extraocular movements intact.      Conjunctiva/sclera: Conjunctivae normal.      Pupils: Pupils are equal, round, and reactive to light.   Pulmonary:      Effort: Pulmonary effort is normal.   Neurological:      Mental Status: She is alert and oriented to person, place, and time.      Cranial Nerves: No cranial nerve deficit.      Gait: Gait normal.   Psychiatric:         Mood and Affect: Mood and affect normal.         Assessment:       1. Essential hypertension    2. Vitamin D deficiency    3. Diuretic-induced hypokalemia        Plan:   1. Essential hypertension  Assessment & Plan:  Controlled, continue atenolol    Orders:  -     Comprehensive Metabolic Panel; Future; Expected date: 07/31/2024  -     Lipid Panel; Future; Expected date: 07/31/2024  -     TSH; Future; Expected date: 07/31/2024  -     CBC Auto Differential; Future; Expected date: 07/31/2024    2. Vitamin D deficiency  Assessment & Plan:  Continue supplement     Orders:  -     Vitamin D; Future; Expected date: 07/31/2024    3. Diuretic-induced hypokalemia  Assessment & Plan:  Stable, on potassium  supplement         Advised to discuss CHF diagnosis with Dr. Blackmon and have documentation sent for review so we can address HCC diagnosis. Patient expressed understanding and agreement with plan.  Health Maintenance reviewed/updated.

## 2024-02-16 ENCOUNTER — HOSPITAL ENCOUNTER (EMERGENCY)
Facility: HOSPITAL | Age: 64
Discharge: HOME | End: 2024-02-16
Attending: EMERGENCY MEDICINE
Payer: COMMERCIAL

## 2024-02-16 VITALS
HEIGHT: 62 IN | OXYGEN SATURATION: 100 % | RESPIRATION RATE: 18 BRPM | SYSTOLIC BLOOD PRESSURE: 124 MMHG | TEMPERATURE: 99 F | HEART RATE: 58 BPM | BODY MASS INDEX: 30.97 KG/M2 | DIASTOLIC BLOOD PRESSURE: 59 MMHG | WEIGHT: 168.31 LBS

## 2024-02-16 DIAGNOSIS — R51.9 ACUTE NONINTRACTABLE HEADACHE, UNSPECIFIED HEADACHE TYPE: ICD-10-CM

## 2024-02-16 DIAGNOSIS — I10 HYPERTENSION, UNSPECIFIED TYPE: ICD-10-CM

## 2024-02-16 DIAGNOSIS — R07.9 CHEST PAIN, UNSPECIFIED TYPE: Primary | ICD-10-CM

## 2024-02-16 DIAGNOSIS — H11.31 SUBCONJUNCTIVAL HEMORRHAGE OF RIGHT EYE: ICD-10-CM

## 2024-02-16 DIAGNOSIS — R06.02 SOB (SHORTNESS OF BREATH): ICD-10-CM

## 2024-02-16 LAB
ALBUMIN SERPL BCP-MCNC: 3.7 G/DL (ref 3.5–5.2)
ALP SERPL-CCNC: 26 U/L (ref 55–135)
ALT SERPL W/O P-5'-P-CCNC: 15 U/L (ref 10–44)
ANION GAP SERPL CALC-SCNC: 9 MMOL/L (ref 8–16)
AST SERPL-CCNC: 22 U/L (ref 10–40)
BASOPHILS # BLD AUTO: 0.03 K/UL (ref 0–0.2)
BASOPHILS NFR BLD: 0.8 % (ref 0–1.9)
BILIRUB SERPL-MCNC: 0.9 MG/DL (ref 0.1–1)
BNP SERPL-MCNC: 102 PG/ML (ref 0–99)
BUN SERPL-MCNC: 8 MG/DL (ref 8–23)
CALCIUM SERPL-MCNC: 8.9 MG/DL (ref 8.7–10.5)
CHLORIDE SERPL-SCNC: 108 MMOL/L (ref 95–110)
CO2 SERPL-SCNC: 25 MMOL/L (ref 23–29)
CREAT SERPL-MCNC: 0.8 MG/DL (ref 0.5–1.4)
DIFFERENTIAL METHOD BLD: ABNORMAL
EOSINOPHIL # BLD AUTO: 0.2 K/UL (ref 0–0.5)
EOSINOPHIL NFR BLD: 5.5 % (ref 0–8)
ERYTHROCYTE [DISTWIDTH] IN BLOOD BY AUTOMATED COUNT: 14.2 % (ref 11.5–14.5)
EST. GFR  (NO RACE VARIABLE): >60 ML/MIN/1.73 M^2
GLUCOSE SERPL-MCNC: 88 MG/DL (ref 70–110)
HCT VFR BLD AUTO: 35.9 % (ref 37–48.5)
HGB BLD-MCNC: 13.2 G/DL (ref 12–16)
IMM GRANULOCYTES # BLD AUTO: 0 K/UL (ref 0–0.04)
IMM GRANULOCYTES NFR BLD AUTO: 0 % (ref 0–0.5)
LYMPHOCYTES # BLD AUTO: 1.4 K/UL (ref 1–4.8)
LYMPHOCYTES NFR BLD: 37.6 % (ref 18–48)
MCH RBC QN AUTO: 28.2 PG (ref 27–31)
MCHC RBC AUTO-ENTMCNC: 36.8 G/DL (ref 32–36)
MCV RBC AUTO: 77 FL (ref 82–98)
MONOCYTES # BLD AUTO: 0.3 K/UL (ref 0.3–1)
MONOCYTES NFR BLD: 8.6 % (ref 4–15)
NEUTROPHILS # BLD AUTO: 1.7 K/UL (ref 1.8–7.7)
NEUTROPHILS NFR BLD: 47.5 % (ref 38–73)
NRBC BLD-RTO: 0 /100 WBC
PLATELET # BLD AUTO: 165 K/UL (ref 150–450)
PMV BLD AUTO: 11.4 FL (ref 9.2–12.9)
POTASSIUM SERPL-SCNC: 3.8 MMOL/L (ref 3.5–5.1)
PROT SERPL-MCNC: 7.8 G/DL (ref 6–8.4)
RBC # BLD AUTO: 4.68 M/UL (ref 4–5.4)
SODIUM SERPL-SCNC: 142 MMOL/L (ref 136–145)
TROPONIN I SERPL DL<=0.01 NG/ML-MCNC: <0.006 NG/ML (ref 0–0.03)
WBC # BLD AUTO: 3.62 K/UL (ref 3.9–12.7)

## 2024-02-16 PROCEDURE — 80053 COMPREHEN METABOLIC PANEL: CPT | Performed by: NURSE PRACTITIONER

## 2024-02-16 PROCEDURE — 99285 EMERGENCY DEPT VISIT HI MDM: CPT | Mod: 25

## 2024-02-16 PROCEDURE — 93010 ELECTROCARDIOGRAM REPORT: CPT | Mod: ,,, | Performed by: INTERNAL MEDICINE

## 2024-02-16 PROCEDURE — 84484 ASSAY OF TROPONIN QUANT: CPT | Performed by: NURSE PRACTITIONER

## 2024-02-16 PROCEDURE — 25000003 PHARM REV CODE 250: Performed by: EMERGENCY MEDICINE

## 2024-02-16 PROCEDURE — 93005 ELECTROCARDIOGRAM TRACING: CPT

## 2024-02-16 PROCEDURE — 85025 COMPLETE CBC W/AUTO DIFF WBC: CPT | Performed by: NURSE PRACTITIONER

## 2024-02-16 PROCEDURE — 83880 ASSAY OF NATRIURETIC PEPTIDE: CPT | Performed by: NURSE PRACTITIONER

## 2024-02-16 RX ORDER — ACETAMINOPHEN 500 MG
1000 TABLET ORAL
Status: COMPLETED | OUTPATIENT
Start: 2024-02-16 | End: 2024-02-16

## 2024-02-16 RX ORDER — CLONIDINE HYDROCHLORIDE 0.1 MG/1
0.1 TABLET ORAL
Status: COMPLETED | OUTPATIENT
Start: 2024-02-16 | End: 2024-02-16

## 2024-02-16 RX ADMIN — ACETAMINOPHEN 1000 MG: 500 TABLET ORAL at 01:02

## 2024-02-16 RX ADMIN — CLONIDINE HYDROCHLORIDE 0.1 MG: 0.1 TABLET ORAL at 01:02

## 2024-02-16 NOTE — FIRST PROVIDER EVALUATION
"Medical screening examination initiated.  I have conducted a focused provider triage encounter, findings are as follows:    Brief history of present illness:  63-year-old female with complaint of shortness of breath since this morning.  Patient also reports fluctuating chest pain over the past day or so.    Vitals:    02/16/24 1143   BP: (!) 165/73   BP Location: Right arm   Patient Position: Sitting   Pulse: 77   Resp: 18   Temp: 98.9 °F (37.2 °C)   TempSrc: Oral   SpO2: 99%   Weight: 76.4 kg (168 lb 5.1 oz)   Height: 5' 2" (1.575 m)       Pertinent physical exam:  alert, BBSCTA    Brief workup plan: cardiac work upt    Preliminary workup initiated; this workup will be continued and followed by the physician or advanced practice provider that is assigned to the patient when roomed.  "

## 2024-02-16 NOTE — ED PROVIDER NOTES
"SCRIBE #1 NOTE: I, Erika Coronel, am scribing for, and in the presence of, Alejandro Harvey MD. I have scribed the entire note.       History     Chief Complaint   Patient presents with    Shortness of Breath     Pt reports this morning when she woke up she was having some shortness of breath. Pt also reports some tingling in her fingers. Pt denies any chest pain. Hx hypertension and compliant with meds     Review of patient's allergies indicates:   Allergen Reactions    No known drug allergies          History of Present Illness     HPI    2/16/2024, 12:45 PM  History obtained from the patient      History of Present Illness: Clare Whitley is a 63 y.o. female patient with a PMHx of HTN and MVP who presents to the Emergency Department for evaluation of SOB which onset this AM. Pt says she has not eaten yet today, woke up 2 days ago with a  busted blood vessel in her right eye (no trauma), she will call so complains of generalized headache and brain fog.  And feeling a little lightheaded.   Patient denies any fever, chills, CP. N/V, and all other sxs at this time. No prior tx reported. No further complaints or concerns at this time.       Arrival mode: Personal vehicle     PCP: Chhaya Jennings MD        Past Medical History:  Past Medical History:   Diagnosis Date    Hypertension     Mitral regurgitation     "mild"; followed by Dr. Blackmon ([facility])    MVP (mitral valve prolapse)     "trivial"; followed by Dr. Blackmon ([facility])    Sickle cell trait        Past Surgical History:  Past Surgical History:   Procedure Laterality Date    CHOLECYSTECTOMY      laser SX Bilateral     LYMPH NODE BIOPSY Left     left neck     TUBAL LIGATION  9/2/1991    UTERINE FIBROID EMBOLIZATION  01/01/2010         Family History:  Family History   Problem Relation Age of Onset    Hypertension Mother     Cataracts Mother     Hypertension Father     Hypertension Sister     Hypertension Brother     Stroke Cousin        Social " History:  Social History     Tobacco Use    Smoking status: Never     Passive exposure: Yes    Smokeless tobacco: Never   Substance and Sexual Activity    Alcohol use: Never    Drug use: Never    Sexual activity: Yes     Partners: Male     Birth control/protection: Post-menopausal, None        Review of Systems     Review of Systems   Constitutional:  Negative for chills and fever.   HENT:  Negative for congestion and sore throat.    Eyes:  Positive for redness.        Blurred vision (+)   Respiratory:  Positive for shortness of breath. Negative for cough.    Cardiovascular:  Negative for chest pain and palpitations.   Gastrointestinal:  Negative for nausea and vomiting.   Genitourinary:  Negative for dysuria and hematuria.   Musculoskeletal:  Negative for back pain and neck pain.   Skin:  Negative for rash and wound.   Neurological:  Positive for dizziness and headaches.   Psychiatric/Behavioral:  Negative for agitation and confusion.    All other systems reviewed and are negative.       Physical Exam     Initial Vitals [02/16/24 1143]   BP Pulse Resp Temp SpO2   (!) 165/73 77 18 98.9 °F (37.2 °C) 99 %      MAP       --          Physical Exam  Nursing Notes and Vital Signs Reviewed.  Constitutional: Patient is in no acute distress. Well-developed and well-nourished.  Head: Atraumatic. Normocephalic.  Eyes: PERRL. EOM intact. Conjunctivae are not pale. No scleral icterus. Right eye small subconjunctival hematoma.  ENT: Mucous membranes are moist. Oropharynx is clear and symmetric.    Neck: Supple. Full ROM.   Cardiovascular: Regular rate. Regular rhythm. No murmurs, rubs, or gallops. Distal pulses are 2+ and symmetric.  Pulmonary/Chest: No respiratory distress. Clear to auscultation bilaterally. No wheezing or rales.  Abdominal: Soft and non-distended.  There is no tenderness.  No rebound, guarding, or rigidity.   Genitourinary: No CVA tenderness  Musculoskeletal: Moves all extremities. No obvious deformities. No  "edema. No calf tenderness.  Skin: Warm and dry.  Neurological:  Alert, awake, and appropriate.  Normal speech.  No acute focal neurological deficits are appreciated.  Psychiatric: Normal affect. Good eye contact. Appropriate in content.     ED Course   Procedures  ED Vital Signs:  Vitals:    02/16/24 1143 02/16/24 1445   BP: (!) 165/73 (!) 124/59   Pulse: 77 (!) 58   Resp: 18 18   Temp: 98.9 °F (37.2 °C)    TempSrc: Oral    SpO2: 99% 100%   Weight: 76.4 kg (168 lb 5.1 oz)    Height: 5' 2" (1.575 m)        Abnormal Lab Results:  Labs Reviewed   CBC W/ AUTO DIFFERENTIAL - Abnormal; Notable for the following components:       Result Value    WBC 3.62 (*)     Hematocrit 35.9 (*)     MCV 77 (*)     MCHC 36.8 (*)     Gran # (ANC) 1.7 (*)     All other components within normal limits   COMPREHENSIVE METABOLIC PANEL - Abnormal; Notable for the following components:    Alkaline Phosphatase 26 (*)     All other components within normal limits   B-TYPE NATRIURETIC PEPTIDE - Abnormal; Notable for the following components:     (*)     All other components within normal limits   TROPONIN I        All Lab Results:  Results for orders placed or performed during the hospital encounter of 02/16/24   CBC auto differential   Result Value Ref Range    WBC 3.62 (L) 3.90 - 12.70 K/uL    RBC 4.68 4.00 - 5.40 M/uL    Hemoglobin 13.2 12.0 - 16.0 g/dL    Hematocrit 35.9 (L) 37.0 - 48.5 %    MCV 77 (L) 82 - 98 fL    MCH 28.2 27.0 - 31.0 pg    MCHC 36.8 (H) 32.0 - 36.0 g/dL    RDW 14.2 11.5 - 14.5 %    Platelets 165 150 - 450 K/uL    MPV 11.4 9.2 - 12.9 fL    Immature Granulocytes 0.0 0.0 - 0.5 %    Gran # (ANC) 1.7 (L) 1.8 - 7.7 K/uL    Immature Grans (Abs) 0.00 0.00 - 0.04 K/uL    Lymph # 1.4 1.0 - 4.8 K/uL    Mono # 0.3 0.3 - 1.0 K/uL    Eos # 0.2 0.0 - 0.5 K/uL    Baso # 0.03 0.00 - 0.20 K/uL    nRBC 0 0 /100 WBC    Gran % 47.5 38.0 - 73.0 %    Lymph % 37.6 18.0 - 48.0 %    Mono % 8.6 4.0 - 15.0 %    Eosinophil % 5.5 0.0 - 8.0 %    " Basophil % 0.8 0.0 - 1.9 %    Differential Method Automated    Comprehensive metabolic panel   Result Value Ref Range    Sodium 142 136 - 145 mmol/L    Potassium 3.8 3.5 - 5.1 mmol/L    Chloride 108 95 - 110 mmol/L    CO2 25 23 - 29 mmol/L    Glucose 88 70 - 110 mg/dL    BUN 8 8 - 23 mg/dL    Creatinine 0.8 0.5 - 1.4 mg/dL    Calcium 8.9 8.7 - 10.5 mg/dL    Total Protein 7.8 6.0 - 8.4 g/dL    Albumin 3.7 3.5 - 5.2 g/dL    Total Bilirubin 0.9 0.1 - 1.0 mg/dL    Alkaline Phosphatase 26 (L) 55 - 135 U/L    AST 22 10 - 40 U/L    ALT 15 10 - 44 U/L    eGFR >60 >60 mL/min/1.73 m^2    Anion Gap 9 8 - 16 mmol/L   Troponin I   Result Value Ref Range    Troponin I <0.006 0.000 - 0.026 ng/mL   Brain natriuretic peptide   Result Value Ref Range     (H) 0 - 99 pg/mL   EKG 12-lead   Result Value Ref Range    QRS Duration 70 ms    [facility] QTC Calculation 397 ms         Imaging Results:  Imaging Results              CT Head Without Contrast (Final result)  Result time 02/16/24 14:03:31      Final result by Jigar Oviedo MD (02/16/24 14:03:31)                   Impression:      No acute findings.    All CT scans at this facility are performed  using dose modulation techniques as appropriate to performed exam including the following:  automated exposure control; adjustment of mA and/or kV according to the patients size (this includes techniques or standardized protocols for targeted exams where dose is matched to indication/reason for exam: i.e. extremities or head);  iterative reconstruction technique.      Electronically signed by: Jigar Oviedo  Date:    02/16/2024  Time:    14:03               Narrative:    EXAMINATION:  CT HEAD WITHOUT CONTRAST    CLINICAL HISTORY:  Dizziness, persistent/recurrent, cardiac or vascular cause suspected;Headache, new or worsening (Age >= 50y);    TECHNIQUE:  Noncontrast CT scan of the head    COMPARISON:  MRI 05/11/2023.    FINDINGS:  No intracranial hemorrhage or acute findings are demonstrated.  The visualized paranasal sinuses are clear. The calvarium is intact.                                       X-Ray Chest 1 View (Final result)  Result time 02/16/24 12:09:57      Final result by Eliazar Christianson MD (02/16/24 12:09:57)                   Impression:      No acute findings.      Electronically signed by: Eliazar Christianson MD  Date:    02/16/2024  Time:    12:09               Narrative:    EXAMINATION:  XR CHEST 1 VIEW    CLINICAL HISTORY:  Shortness of breath.,    COMPARISON:  01/19/2024 chest x-ray.    FINDINGS:  Heart size is normal. The lung fields are clear. No acute cardiopulmonary infiltrate.  Left neck surgical clips incidentally noted.                                       The EKG was ordered, reviewed, and independently interpreted by the ED provider.  Interpretation time: 11:42  Rate: 65 BPM  Rhythm: normal sinus rhythm  Interpretation: normal ECG. No STEMI.             The Emergency Provider reviewed the vital signs and test results, which are outlined above.     ED Discussion       Reassessed pt at this time.  Discussed with pt all pertinent ED information and results. Discussed pt dx and plan of tx. Gave pt all f/u and return to the ED instructions. All questions and concerns were addressed at this time. Pt expresses understanding of information and instructions, and is comfortable with plan to discharge. Pt is stable for discharge.    I discussed with patient and/or family/caretaker that evaluation in the ED does not suggest any emergent or life threatening medical conditions requiring immediate intervention beyond what was provided in the ED, and I believe patient is safe for discharge.  Regardless, an unremarkable evaluation in the ED does not preclude the development or presence of a serious of life threatening condition. As such, patient was instructed to return immediately for any worsening or change in current symptoms.         Medical Decision Making  Patient with a  subconjunctival hemorrhage, headache, CT head with no acute findings, uncontrolled blood pressure her normal blood pressures around 120 systolic but the last few days it has been a roughly 160 systolic.  Workup for chest pain within normal limits.  She was very stable and got full relief with the clonidine and acetaminophen.  She did not want to start any blood pressure medications.  She reports that she would rather follow up with her primary care doctor to manage her blood pressure.    Eye exam with the suction jovial hemorrhage there was no drainage or signs of trauma or signs of infection      Differential diagnosis: Dehydration, electrolyte abnormality, arrhythmia, infection, STEMI, NSTEMI, UTI , TIA, brain mass, CVA    Amount and/or Complexity of Data Reviewed  Labs: ordered. Decision-making details documented in ED Course.  Radiology: ordered. Decision-making details documented in ED Course.  ECG/medicine tests: ordered and independent interpretation performed. Decision-making details documented in ED Course.    Risk  OTC drugs.  Prescription drug management.                ED Medication(s):  Medications   acetaminophen tablet 1,000 mg (1,000 mg Oral Given 2/16/24 1312)   cloNIDine tablet 0.1 mg (0.1 mg Oral Given 2/16/24 1312)       Discharge Medication List as of 2/16/2024  3:30 PM           Follow-up Information       Chhaya Jennings MD. Schedule an appointment as soon as possible for a visit in 2 days.    Specialty: Family Medicine  Contact information:  [address] [place] [address] DR Adry TORO [id number] [phone]                                 Scribe Attestation:   Scribe #1: I performed the above scribed service and the documentation accurately describes the services I performed. I attest to the accuracy of the note.     Attending:   Physician Attestation Statement for Scribe #1: Do COFFEY Thuytien Wendy, MD, personally performed the services described in this documentation, as scribed by Erika  Homer, in my presence, and it is both accurate and complete.           Clinical Impression       ICD-10-CM ICD-9-CM   1. Chest pain, unspecified type  R07.9 786.50   2. SOB (shortness of breath)  R06.02 786.05   3. Acute nonintractable headache, unspecified headache type  R51.9 784.0   4. Subconjunctival hemorrhage of right eye  H11.31 372.72   5. Hypertension, unspecified type  I10 401.9       Disposition:   Disposition: Discharged  Condition: Stable         Alejandro Harvey MD  02/16/24 0162

## 2024-02-16 NOTE — DISCHARGE INSTRUCTIONS
Make sure to follow up with the primary care doctor for further recheck and management of hypertension and changes to your medication as needed.    Make sure to eat 3 meals a day  You can take Tylenol 650 mg every 6-8 hours as needed for headache

## 2024-02-18 LAB
OHS QRS DURATION: 70 MS
OHS QTC CALCULATION: 397 MS

## 2024-02-19 ENCOUNTER — TELEPHONE (OUTPATIENT)
Dept: INTERNAL MEDICINE | Facility: CLINIC | Age: 64
End: 2024-02-19
Payer: COMMERCIAL

## 2024-02-19 NOTE — TELEPHONE ENCOUNTER
----- Message from Isa Santiago sent at 2/19/2024 11:48 AM CST -----  Type:  Needs Medical Advice    Who Called: pt     Would the patient rather a call back or a response via [facility]? Call back   Best Call Back Number: [phone]  Additional Information: pt was seen in the ER on 02/16/24 for high blood pressure , blood vessel to burst in the right eye and shortness of breath and dizziness and a bad headache pt also stated the her pressure is staying elevated even after taking her medication and her heart rate is low 40-50 pt is requesting to get a return call to discuss

## 2024-03-20 ENCOUNTER — TELEPHONE (OUTPATIENT)
Dept: PHARMACY | Facility: CLINIC | Age: 64
End: 2024-03-20
Payer: COMMERCIAL

## 2024-03-20 NOTE — TELEPHONE ENCOUNTER
Contacted the patient to perform Medication Therapy Management review, specifically in reference to refilling lipitor to improve PDC for HEDIS measurements.   Medication was discontinued

## 2024-04-03 ENCOUNTER — PATIENT MESSAGE (OUTPATIENT)
Dept: PULMONOLOGY | Facility: CLINIC | Age: 64
End: 2024-04-03
Payer: COMMERCIAL

## 2024-04-17 ENCOUNTER — TELEPHONE (OUTPATIENT)
Dept: INTERNAL MEDICINE | Facility: CLINIC | Age: 64
End: 2024-04-17
Payer: COMMERCIAL

## 2024-04-17 ENCOUNTER — PATIENT MESSAGE (OUTPATIENT)
Dept: INTERNAL MEDICINE | Facility: CLINIC | Age: 64
End: 2024-04-17
Payer: COMMERCIAL

## 2024-04-17 ENCOUNTER — E-VISIT (OUTPATIENT)
Dept: FAMILY MEDICINE | Facility: CLINIC | Age: 64
End: 2024-04-17
Payer: COMMERCIAL

## 2024-04-17 DIAGNOSIS — R30.0 DYSURIA: Primary | ICD-10-CM

## 2024-04-17 PROCEDURE — 99421 OL DIG E/M SVC 5-10 MIN: CPT | Mod: ,,, | Performed by: FAMILY MEDICINE

## 2024-04-17 RX ORDER — NITROFURANTOIN 25; 75 MG/1; MG/1
100 CAPSULE ORAL 2 TIMES DAILY
Qty: 10 CAPSULE | Refills: 0 | Status: SHIPPED | OUTPATIENT
Start: 2024-04-17 | End: 2024-04-22

## 2024-04-17 NOTE — PROGRESS NOTES
Patient ID: Clare Whitley is a 63 y.o. female.    Chief Complaint: Urinary Tract Infection (Entered automatically based on patient selection in Patient Portal.)    The patient initiated a request through [employer] on 4/17/2024 for evaluation and management with a chief complaint of Urinary Tract Infection (Entered automatically based on patient selection in Patient Portal.)     I evaluated the questionnaire submission on 4/17/24.    [facility] Peq Evisit Uti Questionnaire    4/17/2024  9:22 AM CDT - Filed by Patient   Do you agree to participate in an E-Visit? Yes   If you have any of the following problems, please present to your local ER or call 911:  I acknowledge   What is the main issue you would like addressed today? Burning after urination.   Are you able to take your vital signs? Yes   Systolic Blood Pressure: 149   Diastolic Blood Pressure: 93   Weight: 169   Height: 62   Pulse: 61   Temperature: 97.1   Respiration rate:    Pulse Oxygen: 97   What symptoms do you currently have? Change in urine appearance or smell   When did your symptoms first appear? 4/13/2024   List what you have done or taken to help your symptoms. Drink lots of water   Please indicate whether you have had the following symptoms during the past 24 hours     Urgent urination (a sudden and uncontrollable urge to urinate) Mild   Frequent urination of small amounts of urine (going to the toilet very often) None   Burning pain when urinating Moderate   Incomplete bladder emptying (still feel like you need to urinate again after urination) None   Pain not associated with urination in the lower abdomen below the belly button) None   What does your urine look like? Clear   Blood seen in the urine None   Flank pain (pain in one or both sides of the lower back) None   Abnormal Vaginal Discharge (abnormal amount, color and/or odor) None   Discharge from the urethra (urinary opening) without urination None   High body temperature/fever? None-<99.5   Please  rate how much discomfort you have experience because of the symptoms in the past 24 hours: None   Please indicate how the symptoms have interfered with your every day activities/work in the past 24 hours: Mild   Please indicate how these symptoms have interfered with your social activities (visiting people, meeting with friends, etc.) in the past 24 hours? None   Are you a diabetic? Maybe   Please indicate whether you have the following at the time of completion of this questionnaire: None of the above   Provide any additional information you feel is important. The only symptom I have is the burning after urination.  I selected change in color or smell in order to proceed with questionnaire.   Please attach any relevant images or files (if you have performed a home test for UTI, please submit a photo of results)          Encounter Diagnosis   Name Primary?    Dysuria Yes        No orders of the defined types were placed in this encounter.     Medications Ordered This Encounter   Medications    nitrofurantoin, macrocrystal-monohydrate, (MACROBID) 100 MG capsule     Sig: Take 1 capsule (100 mg total) by mouth 2 (two) times daily. for 5 days     Dispense:  10 capsule     Refill:  0        No follow-ups on file.      E-Visit Time Trackin mins.

## 2024-04-17 NOTE — TELEPHONE ENCOUNTER
----- Message from Shari Conrad sent at 4/17/2024  8:29 AM CDT -----  Contact: Clare Charles is calling in regards to getting a urinalysis put in due to burning when urinating and pt stated going to her yearly appt at another facility and was told by the nurses she is boarder line diabetic. Please call back at  [phone]                        Thanks  KT

## 2024-04-25 ENCOUNTER — TELEPHONE (OUTPATIENT)
Dept: OPHTHALMOLOGY | Facility: CLINIC | Age: 64
End: 2024-04-25
Payer: COMMERCIAL

## 2024-04-25 NOTE — TELEPHONE ENCOUNTER
----- Message from Saritha Connell sent at 4/25/2024  1:26 PM CDT -----  Regarding: pt callback  Name of Who is Calling:Pt         What is the request in detail: Requesting callback for next open appt in regards to seeing floaters   Pls advise           Can the clinic reply by [facility]:yes         What Number to Call Back if not in [facility]:Telephone Information:  Mobile          [phone]

## 2024-04-26 ENCOUNTER — OFFICE VISIT (OUTPATIENT)
Dept: OPHTHALMOLOGY | Facility: CLINIC | Age: 64
End: 2024-04-26
Payer: COMMERCIAL

## 2024-04-26 DIAGNOSIS — H43.812 PVD (POSTERIOR VITREOUS DETACHMENT), LEFT EYE: Primary | ICD-10-CM

## 2024-04-26 DIAGNOSIS — H52.7 REFRACTIVE ERRORS: ICD-10-CM

## 2024-04-26 DIAGNOSIS — I10 ESSENTIAL HYPERTENSION: ICD-10-CM

## 2024-04-26 DIAGNOSIS — H25.13 CATARACT, NUCLEAR SCLEROTIC SENILE, BILATERAL: ICD-10-CM

## 2024-04-26 PROCEDURE — 4010F ACE/ARB THERAPY RXD/TAKEN: CPT | Mod: CPTII,S$GLB,, | Performed by: OPTOMETRIST

## 2024-04-26 PROCEDURE — 92014 COMPRE OPH EXAM EST PT 1/>: CPT | Mod: S$GLB,,, | Performed by: OPTOMETRIST

## 2024-04-26 PROCEDURE — 99999 PR PBB SHADOW E&M-EST. PATIENT-LVL III: CPT | Mod: PBBFAC,,, | Performed by: OPTOMETRIST

## 2024-04-26 PROCEDURE — 1159F MED LIST DOCD IN RCRD: CPT | Mod: CPTII,S$GLB,, | Performed by: OPTOMETRIST

## 2024-04-26 PROCEDURE — 3044F HG A1C LEVEL LT 7.0%: CPT | Mod: CPTII,S$GLB,, | Performed by: OPTOMETRIST

## 2024-04-26 NOTE — PROGRESS NOTES
SUBJECTIVE  Clareayde Whitley is 63 y.o. female  Uncorrected distance visual acuity was 20/20 in the right eye and 20/25 in the left eye. Uncorrected near visual acuity was J3 in the right eye and J3 in the left eye.   Chief Complaint   Patient presents with    Flashes and Floater     Pt here with c/o flashes of lights for 1 day, when she turn her head quickly she can see the flash of light in the left eye only. Pt has a little eye twitch,denies pain and discomfort.           HPI     Flashes and Floater     Additional comments: Pt here with c/o flashes of lights for 1 day, when   she turn her head quickly she can see the flash of light in the left eye   only. Pt has a little eye twitch,denies pain and discomfort.            Comments    No specialty comments available.            Last edited by Tracey Acevedo on 4/26/2024 10:27 AM.         Assessment /Plan :  1. PVD (posterior vitreous detachment), left eye   Discussed signs and symptoms of retinal detachment.  Informed patient to return to clinic if any worsening of symptoms or decreased vision.  Refer to Dr. Gonzales for Retina eval     2. Essential hypertension   No HTN Retinopathy, monitor annually.      3. Cataract, nuclear sclerotic senile, bilateral   Cataracts are not visually significant and not affecting activities of daily living. Annual observation is recommended at this time. Patient to call or return to clinic with any significant change in vision prior to next visit.     4. Refractive errors   Dispense Final Rx for glasses.  RTC 1 year  Discussed above and answered questions.

## 2024-04-30 ENCOUNTER — TELEPHONE (OUTPATIENT)
Dept: OPHTHALMOLOGY | Facility: CLINIC | Age: 64
End: 2024-04-30
Payer: COMMERCIAL

## 2024-04-30 NOTE — TELEPHONE ENCOUNTER
----- Message from Chinyere Shen sent at 4/30/2024 10:51 AM CDT -----  .Type: Patient Call Back    Who called: Self     What is the request in detail: Would like to know is ok to see MEL Gonzales MD on 5/6/24 or wait 6 weeks from last Friday     Can the clinic reply by [facility]? No     Would the patient rather a call back or a response via [facility]? Call Back     Best call back number:.[phone] (home)       Additional Information:

## 2024-05-06 ENCOUNTER — OFFICE VISIT (OUTPATIENT)
Dept: OPHTHALMOLOGY | Facility: CLINIC | Age: 64
End: 2024-05-06
Payer: COMMERCIAL

## 2024-05-06 DIAGNOSIS — H25.13 CATARACT, NUCLEAR SCLEROTIC SENILE, BILATERAL: ICD-10-CM

## 2024-05-06 DIAGNOSIS — I10 ESSENTIAL HYPERTENSION: ICD-10-CM

## 2024-05-06 DIAGNOSIS — H43.812 PVD (POSTERIOR VITREOUS DETACHMENT), LEFT EYE: Primary | ICD-10-CM

## 2024-05-06 PROCEDURE — 92134 CPTRZ OPH DX IMG PST SGM RTA: CPT | Mod: S$GLB,,, | Performed by: OPHTHALMOLOGY

## 2024-05-06 PROCEDURE — 1159F MED LIST DOCD IN RCRD: CPT | Mod: CPTII,S$GLB,, | Performed by: OPHTHALMOLOGY

## 2024-05-06 PROCEDURE — 99203 OFFICE O/P NEW LOW 30 MIN: CPT | Mod: S$GLB,,, | Performed by: OPHTHALMOLOGY

## 2024-05-06 PROCEDURE — 4010F ACE/ARB THERAPY RXD/TAKEN: CPT | Mod: CPTII,S$GLB,, | Performed by: OPHTHALMOLOGY

## 2024-05-06 PROCEDURE — 3044F HG A1C LEVEL LT 7.0%: CPT | Mod: CPTII,S$GLB,, | Performed by: OPHTHALMOLOGY

## 2024-05-06 PROCEDURE — 99999 PR PBB SHADOW E&M-EST. PATIENT-LVL III: CPT | Mod: PBBFAC,,, | Performed by: OPHTHALMOLOGY

## 2024-05-06 PROCEDURE — 1160F RVW MEDS BY RX/DR IN RCRD: CPT | Mod: CPTII,S$GLB,, | Performed by: OPHTHALMOLOGY

## 2024-05-06 NOTE — PROGRESS NOTES
===============================  Date today is 5/6/2024  Clare Whitley is a 63 y.o. female  Last visit [facility]: :Visit date not found   Last visit eye dept. Visit date not found    Uncorrected distance visual acuity was 20/20 in the right eye and 20/25 in the left eye.  Tonometry       Tonometry ([facility]open, 8:01 AM)         Right Left    Pressure 17 17                  Not recorded       Not recorded       Not recorded       Chief Complaint   Patient presents with    Follow-up       PVD (posterior vitreous detachment), left eye         HPI     Follow-up     Additional comments:   PVD (posterior vitreous detachment), left eye               Comments    States that she is still seeing flashes in her OS.States that when she was   17 she a laser done for floaters in her OS. States that her son has it as   well.          Last edited by Cleo Carreno on 5/6/2024  8:00 AM.      Problem List Items Addressed This Visit       Essential hypertension (Chronic)     Other Visit Diagnoses       PVD (posterior vitreous detachment), left eye    -  Primary    Relevant Orders    Posterior Segment OCT Retina-Both eyes (Completed)    Cataract, nuclear sclerotic senile, bilateral              Instructed to call 24/7 for any worsening of vision, visual distortion or pain.  Check OU independently daily.    Gave my office and personal cell phone number.  ________________  5/6/2024 today  Clare Whitley    OS PVD  OCT looks good  No holes or tears on exam  OS vitreo- retinal tuft at 12 o'clock  Hx ocular migraines  OD superior retinopexy  No new holes or tears OD  Macula looks good OU  1+ NS OD  2+ cortical cataract OS  Expect PVD symptoms to resolve over weeks- call if any increase in floaters    RTC with Dr. Martinez as scheduled  Instructed to call 24/7 for any worsening of vision or symptoms. Check OU daily.   Gave my office and cell phone number.    =============================

## 2024-06-12 ENCOUNTER — LAB VISIT (OUTPATIENT)
Dept: LAB | Facility: HOSPITAL | Age: 64
End: 2024-06-12
Payer: COMMERCIAL

## 2024-06-12 ENCOUNTER — OFFICE VISIT (OUTPATIENT)
Dept: INTERNAL MEDICINE | Facility: CLINIC | Age: 64
End: 2024-06-12
Payer: COMMERCIAL

## 2024-06-12 VITALS
HEART RATE: 63 BPM | TEMPERATURE: 98 F | OXYGEN SATURATION: 98 % | DIASTOLIC BLOOD PRESSURE: 86 MMHG | SYSTOLIC BLOOD PRESSURE: 136 MMHG | WEIGHT: 168.44 LBS | HEIGHT: 62 IN | BODY MASS INDEX: 31 KG/M2

## 2024-06-12 DIAGNOSIS — R00.2 PALPITATIONS: ICD-10-CM

## 2024-06-12 DIAGNOSIS — I10 ESSENTIAL HYPERTENSION: ICD-10-CM

## 2024-06-12 DIAGNOSIS — R73.09 ELEVATED RANDOM BLOOD GLUCOSE LEVEL: ICD-10-CM

## 2024-06-12 DIAGNOSIS — G47.9 DIFFICULTY SLEEPING: ICD-10-CM

## 2024-06-12 DIAGNOSIS — I34.1 MVP (MITRAL VALVE PROLAPSE): ICD-10-CM

## 2024-06-12 DIAGNOSIS — I10 ESSENTIAL HYPERTENSION: Primary | ICD-10-CM

## 2024-06-12 LAB
BASOPHILS # BLD AUTO: 0.03 K/UL (ref 0–0.2)
BASOPHILS NFR BLD: 0.7 % (ref 0–1.9)
DIFFERENTIAL METHOD BLD: ABNORMAL
EOSINOPHIL # BLD AUTO: 0.4 K/UL (ref 0–0.5)
EOSINOPHIL NFR BLD: 8 % (ref 0–8)
ERYTHROCYTE [DISTWIDTH] IN BLOOD BY AUTOMATED COUNT: 14.3 % (ref 11.5–14.5)
ESTIMATED AVG GLUCOSE: 108 MG/DL (ref 68–131)
HBA1C MFR BLD: 5.4 % (ref 4–5.6)
HCT VFR BLD AUTO: 40 % (ref 37–48.5)
HGB BLD-MCNC: 13.9 G/DL (ref 12–16)
IMM GRANULOCYTES # BLD AUTO: 0 K/UL (ref 0–0.04)
IMM GRANULOCYTES NFR BLD AUTO: 0 % (ref 0–0.5)
LYMPHOCYTES # BLD AUTO: 1.7 K/UL (ref 1–4.8)
LYMPHOCYTES NFR BLD: 39 % (ref 18–48)
MCH RBC QN AUTO: 27.5 PG (ref 27–31)
MCHC RBC AUTO-ENTMCNC: 34.8 G/DL (ref 32–36)
MCV RBC AUTO: 79 FL (ref 82–98)
MONOCYTES # BLD AUTO: 0.4 K/UL (ref 0.3–1)
MONOCYTES NFR BLD: 8.7 % (ref 4–15)
NEUTROPHILS # BLD AUTO: 1.9 K/UL (ref 1.8–7.7)
NEUTROPHILS NFR BLD: 43.6 % (ref 38–73)
NRBC BLD-RTO: 0 /100 WBC
PLATELET # BLD AUTO: 155 K/UL (ref 150–450)
PMV BLD AUTO: 12.6 FL (ref 9.2–12.9)
RBC # BLD AUTO: 5.05 M/UL (ref 4–5.4)
WBC # BLD AUTO: 4.39 K/UL (ref 3.9–12.7)

## 2024-06-12 PROCEDURE — 83036 HEMOGLOBIN GLYCOSYLATED A1C: CPT

## 2024-06-12 PROCEDURE — 3044F HG A1C LEVEL LT 7.0%: CPT | Mod: CPTII,S$GLB,,

## 2024-06-12 PROCEDURE — 3079F DIAST BP 80-89 MM HG: CPT | Mod: CPTII,S$GLB,,

## 2024-06-12 PROCEDURE — 85025 COMPLETE CBC W/AUTO DIFF WBC: CPT

## 2024-06-12 PROCEDURE — 99999 PR PBB SHADOW E&M-EST. PATIENT-LVL IV: CPT | Mod: PBBFAC,,,

## 2024-06-12 PROCEDURE — 99214 OFFICE O/P EST MOD 30 MIN: CPT | Mod: S$GLB,,,

## 2024-06-12 PROCEDURE — 3008F BODY MASS INDEX DOCD: CPT | Mod: CPTII,S$GLB,,

## 2024-06-12 PROCEDURE — 1160F RVW MEDS BY RX/DR IN RCRD: CPT | Mod: CPTII,S$GLB,,

## 2024-06-12 PROCEDURE — 4010F ACE/ARB THERAPY RXD/TAKEN: CPT | Mod: CPTII,S$GLB,,

## 2024-06-12 PROCEDURE — 36415 COLL VENOUS BLD VENIPUNCTURE: CPT

## 2024-06-12 PROCEDURE — 1159F MED LIST DOCD IN RCRD: CPT | Mod: CPTII,S$GLB,,

## 2024-06-12 PROCEDURE — 3075F SYST BP GE 130 - 139MM HG: CPT | Mod: CPTII,S$GLB,,

## 2024-06-12 NOTE — PROGRESS NOTES
"Clare MUHAMMAD Gutierrez  06/12/2024  [id number]    Chhaya Jennings MD  Patient Care Team:  Chhaya Jennings MD as PCP - General (Family Medicine)  Alexa Fulton MD as Obstetrician (Obstetrics and Gynecology)  Aida Acuña PA-C as Physician Assistant (Internal Medicine)          Visit Type:an urgent visit for a new problem    Chief Complaint:  Chief Complaint   Patient presents with    Hot Flashes     At night      Fatigue        History of Present Illness:    She wakes up hot and sweating at night  Has some palpations at night when trying to go to sleep     Home BP has been Elevated before taking her medication  BP does come down after she taking she takes her medicine   Went to  and BP was elevated     HTN  Atenolol 100 mg daily  Valsartan 160 mg (her cardiologist prescribed it). Not on her medication list     Pt states HR sometimes drops into her 50s     She had a wellness exam with her job  Was informed that she may be borderline diabetic    Dr. Jennings ordered labs in [place]   Lab Results   Component Value Date    HGBA1C 5.1 01/24/2024          History:  Past Medical History:   Diagnosis Date    Hypertension     Mitral regurgitation     "mild"; followed by Dr. Blackmon ([facility])    MVP (mitral valve prolapse)     "trivial"; followed by Dr. Blackmon ([facility])    Sickle cell trait      Past Surgical History:   Procedure Laterality Date    CHOLECYSTECTOMY      laser SX Bilateral     LYMPH NODE BIOPSY Left     left neck     TUBAL LIGATION  9/2/1991    UTERINE FIBROID EMBOLIZATION  01/01/2010     Family History   Problem Relation Name Age of Onset    Hypertension Mother Zainab Puente     Cataracts Mother Zainab Puente     Hypertension Father Brandt Mena     Hypertension Sister Hazel Mena     Hypertension Brother Kp Mena     Stroke Cousin       Social History     Socioeconomic History    Marital status:      Spouse name: Johnnie Gutierrez    Number of children: 3   Occupational History "    Occupation: Rehab counselor associate     Employer: [employer]     Comment: Works with disabled people   Tobacco Use    Smoking status: Never     Passive exposure: Yes    Smokeless tobacco: Never   Substance and Sexual Activity    Alcohol use: Never    Drug use: Never    Sexual activity: Yes     Partners: Male     Birth control/protection: Post-menopausal, None     Patient Active Problem List   Diagnosis    Essential hypertension    Diuretic-induced hypokalemia    Right sided weakness    Seasonal allergic rhinitis    Palpitations    Right arm numbness    Abnormal finding on MRI of brain    MVP (mitral valve prolapse)    SINTIA on CPAP    Migraine without aura and without status migrainosus, not intractable    History of 2019 novel coronavirus disease (COVID-19)    Vitamin D deficiency    Decreased strength of trunk and back    Dizziness    Class 1 obesity with serious comorbidity and body mass index (BMI) of 30.0 to 30.9 in adult     Review of patient's allergies indicates:   Allergen Reactions    No known drug allergies        The following were reviewed at this visit: active problem list, medication list, allergies, family history, social history, and health maintenance.    Medications:  Current Outpatient Medications on File Prior to Visit   Medication Sig Dispense Refill    aspirin (ECOTRIN) 81 MG EC tablet Take 81 mg by mouth once daily.      atenoloL (TENORMIN) 100 MG tablet Take 100 mg by mouth once daily.      cholecalciferol, vitamin D3, (VITAMIN D3) 25 mcg (1,000 unit) capsule Take 1 capsule (1,000 Units total) by mouth once daily.  0    furosemide (LASIX) 40 MG tablet Take 40 mg by mouth once daily. (Patient not taking: Reported on 4/26/2024)      potassium chloride (MICRO-K) 10 MEQ CpSR Take 1 capsule (10 mEq total) by mouth once daily. 90 capsule 0     No current facility-administered medications on file prior to visit.       Medications have been reviewed and reconciled with  patient at this visit.  Barriers to medications reviewed with patient.    Adverse reactions to current medications reviewed with patient..    Over the counter medications reviewed and reconciled with patient.    Exam:  Wt Readings from Last 3 Encounters:   02/16/24 76.4 kg (168 lb 5.1 oz)   01/31/24 74.2 kg (163 lb 9.3 oz)   01/19/24 76.4 kg (168 lb 6.9 oz)     Temp Readings from Last 3 Encounters:   02/16/24 98.9 °F (37.2 °C) (Oral)   01/31/24 98.1 °F (36.7 °C) (Tympanic)   01/19/24 98.1 °F (36.7 °C) (Oral)     BP Readings from Last 3 Encounters:   02/16/24 (!) 124/59   01/31/24 132/78   01/19/24 (!) 149/72     Pulse Readings from Last 3 Encounters:   02/16/24 (!) 58   01/31/24 101   01/19/24 68     There is no height or weight on file to calculate BMI.      Review of Systems   Eyes:  Negative for double vision.   Cardiovascular:  Negative for chest pain and palpitations.   Neurological:  Negative for dizziness.     Physical Exam  Vitals and nursing note reviewed.   HENT:      Head: Normocephalic and atraumatic.      Salivary Glands: Right salivary gland is not diffusely enlarged or tender. Left salivary gland is not diffusely enlarged or tender.      Right Ear: Tympanic membrane normal.      Left Ear: Tympanic membrane normal.      Nose:      Right Sinus: No maxillary sinus tenderness or frontal sinus tenderness.      Left Sinus: No maxillary sinus tenderness or frontal sinus tenderness.      Mouth/Throat:      Mouth: Mucous membranes are moist.      Pharynx: Uvula midline.   Cardiovascular:      Rate and Rhythm: Normal rate and regular rhythm.      Pulses: Normal pulses.      Heart sounds: Normal heart sounds.   Pulmonary:      Effort: Pulmonary effort is normal. No respiratory distress.      Breath sounds: Normal breath sounds. No wheezing or rales.   Abdominal:      General: Bowel sounds are normal.   Skin:     General: Skin is dry.   Neurological:      Mental Status: She is alert and oriented to person, place,  and time.   Psychiatric:         Mood and Affect: Mood normal.         Behavior: Behavior normal.         Thought Content: Thought content normal.         Judgment: Judgment normal.       Laboratory Reviewed ({Yes)  Lab Results   Component Value Date    WBC 3.62 (L) 02/16/2024    HGB 13.2 02/16/2024    HCT 35.9 (L) 02/16/2024     02/16/2024    CHOL 141 01/24/2024    TRIG 42 01/24/2024    HDL 52 01/24/2024    ALT 15 02/16/2024    AST 22 02/16/2024     02/16/2024    K 3.8 02/16/2024     02/16/2024    CREATININE 0.8 02/16/2024    BUN 8 02/16/2024    CO2 25 02/16/2024    TSH 0.959 01/24/2024    INR 1.0 05/12/2023    HGBA1C 5.1 01/24/2024     Clare was seen today for hot flashes and fatigue.    Diagnoses and all orders for this visit:    Essential hypertension  -     HEMOGLOBIN A1C; Future  -     CBC Auto Differential; Future    MVP (mitral valve prolapse)  -     CBC Auto Differential; Future    Palpitations  -     CBC Auto Differential; Future    Difficulty sleeping  -     CBC Auto Differential; Future    Elevated random blood glucose level  -     CBC Auto Differential; Future  Was informed that she was borderline diabetic at health screening for her job  Will check A1C today        Explained that since her BP is at goal after taking her medication, will continue medication at current dose    Informed anxiety  may be cause of her having trouble sleeping at night  Pt will try OTC melatonin for sleep     Palpations  Continue taking Atenolol  Decrease caffeine use     Pt has an appt with her care team in August for follow up appt     Care Plan/Goals: Reviewed    Goals    None         Follow up: No follow-ups on file.    After visit summary was printed and given to patient upon discharge today.  Patient goals and care plan are included in After Visit Summary.

## 2024-06-23 DIAGNOSIS — E87.6 DIURETIC-INDUCED HYPOKALEMIA: ICD-10-CM

## 2024-06-23 DIAGNOSIS — T50.2X5A DIURETIC-INDUCED HYPOKALEMIA: ICD-10-CM

## 2024-06-23 NOTE — TELEPHONE ENCOUNTER
No care due was identified.  [facility] Embedded Care Due Messages. Reference number: [id number].   6/23/2024 12:39:13 PM CDT

## 2024-06-26 ENCOUNTER — TELEPHONE (OUTPATIENT)
Dept: INTERNAL MEDICINE | Facility: CLINIC | Age: 64
End: 2024-06-26
Payer: COMMERCIAL

## 2024-06-26 NOTE — TELEPHONE ENCOUNTER
----- Message from Suze Chiu sent at 6/26/2024  4:19 PM CDT -----  Type:  Patient Returning Call    Who Called:PT  Who Left Message for Patient:MIGUEL ANGEL  Does the patient know what this is regarding?:Pt returning missed call  Would the patient rather a call back or a response via [facility]? CALL  Best Call Back Number:.Telephone Information:  Mobile          [phone]      Additional Information: Please advise thank you

## 2024-06-26 NOTE — TELEPHONE ENCOUNTER
----- Message from Trnyrudi Edmond sent at 6/26/2024 12:54 PM CDT -----  Contact: [phone]  Type:  RX Refill Request    Who Called: SARTHAK RAO [[id number]]  Refill or New Rx:REFILL  RX Name and Strength:potassium chloride (MICRO-K) 10 MEQ CpSR  How is the patient currently taking it? (ex. 1XDay):  Is this a 30 day or 90 day RX:  Preferred Pharmacy with phone number: [phone] Fax: [phone]    Local or Mail Order:local  Ordering Provider:isis  Would the patient rather a call back or a response via [facility]? Call back  Best Call Back Number: [phone]  Additional Information: [id number]           [id number] - [place] - [address] [facility]  [address] [address]  Phone: [phone] Fax: [phone]

## 2024-06-27 RX ORDER — POTASSIUM CHLORIDE 750 MG/1
10 CAPSULE, EXTENDED RELEASE ORAL
Qty: 90 CAPSULE | Refills: 1 | Status: SHIPPED | OUTPATIENT
Start: 2024-06-27

## 2024-07-26 ENCOUNTER — LAB VISIT (OUTPATIENT)
Dept: LAB | Facility: HOSPITAL | Age: 64
End: 2024-07-26
Attending: FAMILY MEDICINE
Payer: COMMERCIAL

## 2024-07-26 ENCOUNTER — HOSPITAL ENCOUNTER (EMERGENCY)
Facility: HOSPITAL | Age: 64
Discharge: HOME | End: 2024-07-26
Attending: EMERGENCY MEDICINE
Payer: COMMERCIAL

## 2024-07-26 VITALS
BODY MASS INDEX: 29.96 KG/M2 | SYSTOLIC BLOOD PRESSURE: 137 MMHG | HEART RATE: 63 BPM | OXYGEN SATURATION: 98 % | DIASTOLIC BLOOD PRESSURE: 60 MMHG | TEMPERATURE: 98 F | WEIGHT: 163.81 LBS | RESPIRATION RATE: 18 BRPM

## 2024-07-26 DIAGNOSIS — R42 DIZZINESS: ICD-10-CM

## 2024-07-26 DIAGNOSIS — N30.00 ACUTE CYSTITIS WITHOUT HEMATURIA: Primary | ICD-10-CM

## 2024-07-26 DIAGNOSIS — E55.9 VITAMIN D DEFICIENCY: ICD-10-CM

## 2024-07-26 DIAGNOSIS — I10 ESSENTIAL HYPERTENSION: ICD-10-CM

## 2024-07-26 LAB
ALBUMIN SERPL BCP-MCNC: 4 G/DL (ref 3.5–5.2)
ALP SERPL-CCNC: 28 U/L (ref 55–135)
ALT SERPL W/O P-5'-P-CCNC: 14 U/L (ref 10–44)
ANION GAP SERPL CALC-SCNC: 11 MMOL/L (ref 8–16)
AST SERPL-CCNC: 24 U/L (ref 10–40)
BASOPHILS # BLD AUTO: 0.03 K/UL (ref 0–0.2)
BASOPHILS NFR BLD: 0.7 % (ref 0–1.9)
BILIRUB SERPL-MCNC: 0.8 MG/DL (ref 0.1–1)
BILIRUB UR QL STRIP: NEGATIVE
BNP SERPL-MCNC: 12 PG/ML (ref 0–99)
BUN SERPL-MCNC: 14 MG/DL (ref 8–23)
CALCIUM SERPL-MCNC: 9.6 MG/DL (ref 8.7–10.5)
CHLORIDE SERPL-SCNC: 104 MMOL/L (ref 95–110)
CLARITY UR: CLEAR
CO2 SERPL-SCNC: 25 MMOL/L (ref 23–29)
COLOR UR: YELLOW
CREAT SERPL-MCNC: 0.9 MG/DL (ref 0.5–1.4)
DIFFERENTIAL METHOD BLD: ABNORMAL
EOSINOPHIL # BLD AUTO: 0.1 K/UL (ref 0–0.5)
EOSINOPHIL NFR BLD: 2.4 % (ref 0–8)
ERYTHROCYTE [DISTWIDTH] IN BLOOD BY AUTOMATED COUNT: 13.9 % (ref 11.5–14.5)
EST. GFR  (NO RACE VARIABLE): >60 ML/MIN/1.73 M^2
GLUCOSE SERPL-MCNC: 82 MG/DL (ref 70–110)
GLUCOSE UR QL STRIP: NEGATIVE
HCT VFR BLD AUTO: 39.6 % (ref 37–48.5)
HGB BLD-MCNC: 14.4 G/DL (ref 12–16)
HGB UR QL STRIP: NEGATIVE
IMM GRANULOCYTES # BLD AUTO: 0.01 K/UL (ref 0–0.04)
IMM GRANULOCYTES NFR BLD AUTO: 0.2 % (ref 0–0.5)
KETONES UR QL STRIP: NEGATIVE
LEUKOCYTE ESTERASE UR QL STRIP: ABNORMAL
LYMPHOCYTES # BLD AUTO: 1.4 K/UL (ref 1–4.8)
LYMPHOCYTES NFR BLD: 33.3 % (ref 18–48)
MCH RBC QN AUTO: 28.1 PG (ref 27–31)
MCHC RBC AUTO-ENTMCNC: 36.4 G/DL (ref 32–36)
MCV RBC AUTO: 77 FL (ref 82–98)
MICROSCOPIC COMMENT: ABNORMAL
MONOCYTES # BLD AUTO: 0.4 K/UL (ref 0.3–1)
MONOCYTES NFR BLD: 8.7 % (ref 4–15)
NEUTROPHILS # BLD AUTO: 2.3 K/UL (ref 1.8–7.7)
NEUTROPHILS NFR BLD: 54.7 % (ref 38–73)
NITRITE UR QL STRIP: NEGATIVE
NRBC BLD-RTO: 0 /100 WBC
OHS QRS DURATION: 68 MS
OHS QTC CALCULATION: 417 MS
PH UR STRIP: 6 [PH] (ref 5–8)
PLATELET # BLD AUTO: 201 K/UL (ref 150–450)
PMV BLD AUTO: 11.4 FL (ref 9.2–12.9)
POTASSIUM SERPL-SCNC: 3.7 MMOL/L (ref 3.5–5.1)
PROT SERPL-MCNC: 8.8 G/DL (ref 6–8.4)
PROT UR QL STRIP: NEGATIVE
RBC # BLD AUTO: 5.12 M/UL (ref 4–5.4)
SARS-COV-2 RDRP RESP QL NAA+PROBE: NEGATIVE
SODIUM SERPL-SCNC: 140 MMOL/L (ref 136–145)
SP GR UR STRIP: 1.02 (ref 1–1.03)
TROPONIN I SERPL DL<=0.01 NG/ML-MCNC: <0.006 NG/ML (ref 0–0.03)
TSH SERPL DL<=0.005 MIU/L-ACNC: 1.36 UIU/ML (ref 0.4–4)
URN SPEC COLLECT METH UR: ABNORMAL
UROBILINOGEN UR STRIP-ACNC: NEGATIVE EU/DL
WBC # BLD AUTO: 4.15 K/UL (ref 3.9–12.7)
WBC #/AREA URNS HPF: 9 /HPF (ref 0–5)

## 2024-07-26 PROCEDURE — 84484 ASSAY OF TROPONIN QUANT: CPT | Performed by: NURSE PRACTITIONER

## 2024-07-26 PROCEDURE — 83880 ASSAY OF NATRIURETIC PEPTIDE: CPT | Performed by: NURSE PRACTITIONER

## 2024-07-26 PROCEDURE — 80053 COMPREHEN METABOLIC PANEL: CPT | Performed by: NURSE PRACTITIONER

## 2024-07-26 PROCEDURE — 85025 COMPLETE CBC W/AUTO DIFF WBC: CPT | Performed by: NURSE PRACTITIONER

## 2024-07-26 PROCEDURE — 93005 ELECTROCARDIOGRAM TRACING: CPT

## 2024-07-26 PROCEDURE — U0002 COVID-19 LAB TEST NON-CDC: HCPCS | Performed by: NURSE PRACTITIONER

## 2024-07-26 PROCEDURE — 81000 URINALYSIS NONAUTO W/SCOPE: CPT | Performed by: NURSE PRACTITIONER

## 2024-07-26 PROCEDURE — 82306 VITAMIN D 25 HYDROXY: CPT | Performed by: FAMILY MEDICINE

## 2024-07-26 PROCEDURE — 84443 ASSAY THYROID STIM HORMONE: CPT | Performed by: FAMILY MEDICINE

## 2024-07-26 PROCEDURE — 80061 LIPID PANEL: CPT | Performed by: FAMILY MEDICINE

## 2024-07-26 PROCEDURE — 93010 ELECTROCARDIOGRAM REPORT: CPT | Mod: ,,, | Performed by: INTERNAL MEDICINE

## 2024-07-26 PROCEDURE — 36415 COLL VENOUS BLD VENIPUNCTURE: CPT | Performed by: FAMILY MEDICINE

## 2024-07-26 PROCEDURE — 99285 EMERGENCY DEPT VISIT HI MDM: CPT | Mod: 25

## 2024-07-26 RX ORDER — CEPHALEXIN 500 MG/1
500 CAPSULE ORAL 4 TIMES DAILY
Qty: 20 CAPSULE | Refills: 0 | Status: SHIPPED | OUTPATIENT
Start: 2024-07-26 | End: 2024-08-01

## 2024-07-27 LAB
25(OH)D3+25(OH)D2 SERPL-MCNC: 5 NG/ML (ref 30–96)
CHOLEST SERPL-MCNC: 150 MG/DL (ref 120–199)
CHOLEST/HDLC SERPL: 2.5 {RATIO} (ref 2–5)
HDLC SERPL-MCNC: 61 MG/DL (ref 40–75)
HDLC SERPL: 40.7 % (ref 20–50)
LDLC SERPL CALC-MCNC: 77.2 MG/DL (ref 63–159)
NONHDLC SERPL-MCNC: 89 MG/DL
TRIGL SERPL-MCNC: 59 MG/DL (ref 30–150)

## 2024-07-27 NOTE — ED PROVIDER NOTES
"Encounter Date: 7/26/2024       History     Chief Complaint   Patient presents with    Headache     Pt. Reports she woke up this morning with a headache, slight chest pain and burning when she urinates.      Patient presents with episodes of dizziness and headache and and also reports urinary urgency with dysuria.  Denies fevers sweats chills chest pain shortness breath or blurry vision.        Review of patient's allergies indicates:   Allergen Reactions    No known drug allergies      Past Medical History:   Diagnosis Date    Hypertension     Mitral regurgitation     "mild"; followed by Dr. Blackmon ([facility])    MVP (mitral valve prolapse)     "trivial"; followed by Dr. Blackmon ([facility])    Sickle cell trait      Past Surgical History:   Procedure Laterality Date    CHOLECYSTECTOMY      laser SX Bilateral     LYMPH NODE BIOPSY Left     left neck     TUBAL LIGATION  9/2/1991    UTERINE FIBROID EMBOLIZATION  01/01/2010     Family History   Problem Relation Name Age of Onset    Hypertension Mother Zainab Puente     Cataracts Mother Zainab Puente     Hypertension Father Brandt Mena     Hypertension Sister Hazel Mena     Hypertension Brother Kp Mena     Stroke Cousin       Social History     Tobacco Use    Smoking status: Never     Passive exposure: Yes    Smokeless tobacco: Never   Substance Use Topics    Alcohol use: Never    Drug use: Never     Review of Systems   Constitutional:  Negative for fever.   HENT:  Negative for sore throat.    Respiratory:  Negative for shortness of breath.    Cardiovascular:  Negative for chest pain.   Gastrointestinal:  Negative for nausea.   Genitourinary:  Negative for dysuria.   Musculoskeletal:  Negative for back pain.   Skin:  Negative for rash.   Neurological:  Negative for weakness.   Hematological:  Does not bruise/bleed easily.       Physical Exam     Initial Vitals [07/26/24 0906]   BP Pulse Resp Temp SpO2   (!) 146/69 74 16 98.3 °F (36.8 °C) 98 %      MAP       --     "     Physical Exam    Nursing note and vitals reviewed.  Constitutional: She appears well-developed and well-nourished. She is not diaphoretic. She is active.  Non-toxic appearance. No distress.   HENT:   Head: Normocephalic and atraumatic.   Eyes: Conjunctivae are normal. Right eye exhibits no discharge. Left eye exhibits no discharge. No scleral icterus.   Neck:   Normal range of motion.  Cardiovascular:  Normal rate, regular rhythm and intact distal pulses.           No murmur heard.  Pulmonary/Chest: Breath sounds normal. No respiratory distress. She has no wheezes.   Abdominal: She exhibits no distension.   Musculoskeletal:         General: No tenderness. Normal range of motion.      Cervical back: Normal range of motion.     Neurological: She is alert and oriented to person, place, and time. No cranial nerve deficit. GCS score is 15. GCS eye subscore is 4. GCS verbal subscore is 5. GCS motor subscore is 6.   Skin: Skin is warm and dry. Capillary refill takes less than 2 seconds. No rash noted.   Psychiatric: She has a normal mood and affect. Her behavior is normal. Judgment and thought content normal.         ED Course   Procedures  Labs Reviewed   CBC W/ AUTO DIFFERENTIAL - Abnormal       Result Value    WBC 4.15      RBC 5.12      Hemoglobin 14.4      Hematocrit 39.6      MCV 77 (*)     MCH 28.1      MCHC 36.4 (*)     RDW 13.9      Platelets 201      MPV 11.4      Immature Granulocytes 0.2      Gran # (ANC) 2.3      Immature Grans (Abs) 0.01      Lymph # 1.4      Mono # 0.4      Eos # 0.1      Baso # 0.03      nRBC 0      Gran % 54.7      Lymph % 33.3      Mono % 8.7      Eosinophil % 2.4      Basophil % 0.7      Differential Method Automated     COMPREHENSIVE METABOLIC PANEL - Abnormal    Sodium 140      Potassium 3.7      Chloride 104      CO2 25      Glucose 82      BUN 14      Creatinine 0.9      Calcium 9.6      Total Protein 8.8 (*)     Albumin 4.0      Total Bilirubin 0.8      Alkaline Phosphatase 28  (*)     AST 24      ALT 14      eGFR >60      Anion Gap 11     URINALYSIS, REFLEX TO URINE CULTURE - Abnormal    Specimen UA Urine, Clean Catch      Color, UA Yellow      Appearance, UA Clear      pH, UA 6.0      Specific Gravity, UA 1.020      Protein, UA Negative      Glucose, UA Negative      Ketones, UA Negative      Bilirubin (UA) Negative      Occult Blood UA Negative      Nitrite, UA Negative      Urobilinogen, UA Negative      Leukocytes, UA 2+ (*)     Narrative:     Specimen Source->Urine   URINALYSIS MICROSCOPIC - Abnormal    WBC, UA 9 (*)     Microscopic Comment SEE COMMENT      Narrative:     Specimen Source->Urine   B-TYPE NATRIURETIC PEPTIDE    BNP 12     TROPONIN I    Troponin I <0.006     SARS-COV-2 RNA AMPLIFICATION, QUAL    SARS-CoV-2 RNA, Amplification, Qual Negative          ECG Results              EKG 12-lead (Final result)        Collection Time Result Time QRS Duration OHS QTC Calculation    07/26/24 09:10:26 07/26/24 20:54:56 68 417                     Final result by Interface, Lab In [facility] (07/26/24 20:55:06)                   Narrative:    Test Reason : R42,    Vent. Rate : 079 BPM     Atrial Rate : 079 BPM     P-R Int : 166 ms          QRS Dur : 068 ms      QT Int : 364 ms       P-R-T Axes : 055 012 070 degrees     QTc Int : 417 ms    Normal sinus rhythm  Possible Left atrial enlargement  Borderline Abnormal ECG  When compared with ECG of 16-FEB-2024 11:42,  No significant change was found  Confirmed by STEWART OKEEFE, PORFIRIO ([id number]) on 7/26/2024 8:54:51 PM    Referred By: [email or web address]   SELF           Confirmed By:PORFIRIO WILLIAM MD                                  Imaging Results              X-Ray Chest 1 View (Final result)  Result time 07/26/24 11:52:12      Final result by Eliazar Christianson MD (07/26/24 11:52:12)                   Impression:      No acute findings.      Electronically signed by: Eliazar Christianson MD  Date:    07/26/2024  Time:    11:52               Narrative:     EXAMINATION:  XR CHEST 1 VIEW    CLINICAL HISTORY:  Respiratory distress.,    COMPARISON:  02/16/2024 chest x-ray.    FINDINGS:  Heart size is normal. The lung fields are clear. No acute cardiopulmonary infiltrate.                                       Medications - No data to display  Medical Decision Making  Differential diagnosis considered but not limited to;, viral illness, urinary tract infection.  I went to speak to the patient about her results and it was then that she explained how these symptoms are worse in the morning when she wakes up.  She also explained that she had checked her pulse ox throughout the night and found that it was in the low 90s.  Patient is on CPAP at home but believes that maybe she is not oxygenating well at night.  I explained that this is definitely cause for concern with her symptoms and that she should reach out to her primary care doctor who she has a good establish relationship with and prescribed her the CPAP to discuss whether or not the CPAP settings need to be adjusted or if there is a need for further pulmonary evaluation or sleep study.    Amount and/or Complexity of Data Reviewed  Labs: ordered.  Radiology: ordered.    Risk  Prescription drug management.                                      Clinical Impression:  Final diagnoses:  [R42] Dizziness  [N30.00] Acute cystitis without hematuria (Primary)          ED Disposition Condition    Discharge Stable          ED Prescriptions       Medication Sig Dispense Start Date End Date Auth. Provider    cephALEXin (KEFLEX) 500 MG capsule Take 1 capsule (500 mg total) by mouth 4 (four) times daily. for 5 days 20 capsule 7/26/2024 7/31/2024 Lawrence Carrillo NP          Follow-up Information       Follow up With Specialties Details Why Contact Info    Primary care  Schedule an appointment as soon as possible for a visit  As needed              Lawrence Carrillo NP  07/27/24 1002

## 2024-08-01 ENCOUNTER — OFFICE VISIT (OUTPATIENT)
Dept: INTERNAL MEDICINE | Facility: CLINIC | Age: 64
End: 2024-08-01
Payer: COMMERCIAL

## 2024-08-01 VITALS
DIASTOLIC BLOOD PRESSURE: 74 MMHG | OXYGEN SATURATION: 97 % | TEMPERATURE: 98 F | WEIGHT: 165.81 LBS | HEART RATE: 62 BPM | BODY MASS INDEX: 30.32 KG/M2 | SYSTOLIC BLOOD PRESSURE: 136 MMHG

## 2024-08-01 DIAGNOSIS — G47.33 OSA ON CPAP: ICD-10-CM

## 2024-08-01 DIAGNOSIS — Z00.00 ROUTINE GENERAL MEDICAL EXAMINATION AT A HEALTH CARE FACILITY: Primary | ICD-10-CM

## 2024-08-01 DIAGNOSIS — I10 ESSENTIAL HYPERTENSION: Chronic | ICD-10-CM

## 2024-08-01 DIAGNOSIS — R42 DIZZINESS: ICD-10-CM

## 2024-08-01 DIAGNOSIS — E55.9 VITAMIN D DEFICIENCY: ICD-10-CM

## 2024-08-01 PROBLEM — E66.811 CLASS 1 OBESITY WITH SERIOUS COMORBIDITY AND BODY MASS INDEX (BMI) OF 30.0 TO 30.9 IN ADULT: Status: RESOLVED | Noted: 2023-09-11 | Resolved: 2024-08-01

## 2024-08-01 PROBLEM — R29.898 DECREASED STRENGTH OF TRUNK AND BACK: Status: RESOLVED | Noted: 2021-11-30 | Resolved: 2024-08-01

## 2024-08-01 PROBLEM — E66.9 CLASS 1 OBESITY WITH SERIOUS COMORBIDITY AND BODY MASS INDEX (BMI) OF 30.0 TO 30.9 IN ADULT: Status: RESOLVED | Noted: 2023-09-11 | Resolved: 2024-08-01

## 2024-08-01 PROCEDURE — 3078F DIAST BP <80 MM HG: CPT | Mod: CPTII,S$GLB,, | Performed by: PHYSICIAN ASSISTANT

## 2024-08-01 PROCEDURE — 3044F HG A1C LEVEL LT 7.0%: CPT | Mod: CPTII,S$GLB,, | Performed by: PHYSICIAN ASSISTANT

## 2024-08-01 PROCEDURE — 1159F MED LIST DOCD IN RCRD: CPT | Mod: CPTII,S$GLB,, | Performed by: PHYSICIAN ASSISTANT

## 2024-08-01 PROCEDURE — 99396 PREV VISIT EST AGE 40-64: CPT | Mod: S$GLB,,, | Performed by: PHYSICIAN ASSISTANT

## 2024-08-01 PROCEDURE — 4010F ACE/ARB THERAPY RXD/TAKEN: CPT | Mod: CPTII,S$GLB,, | Performed by: PHYSICIAN ASSISTANT

## 2024-08-01 PROCEDURE — 3075F SYST BP GE 130 - 139MM HG: CPT | Mod: CPTII,S$GLB,, | Performed by: PHYSICIAN ASSISTANT

## 2024-08-01 PROCEDURE — 1160F RVW MEDS BY RX/DR IN RCRD: CPT | Mod: CPTII,S$GLB,, | Performed by: PHYSICIAN ASSISTANT

## 2024-08-01 PROCEDURE — 99999 PR PBB SHADOW E&M-EST. PATIENT-LVL III: CPT | Mod: PBBFAC,,, | Performed by: PHYSICIAN ASSISTANT

## 2024-08-01 PROCEDURE — 3008F BODY MASS INDEX DOCD: CPT | Mod: CPTII,S$GLB,, | Performed by: PHYSICIAN ASSISTANT

## 2024-08-01 RX ORDER — MECLIZINE HYDROCHLORIDE 25 MG/1
25 TABLET ORAL 3 TIMES DAILY PRN
Qty: 30 TABLET | Refills: 0 | Status: SHIPPED | OUTPATIENT
Start: 2024-08-01

## 2024-08-01 NOTE — PROGRESS NOTES
Subjective:       Patient ID: Clare Whitley is a 63 y.o. female.    Chief Complaint: Annual Exam      Patient presents to clinic today for annual physical exam.        Review of Systems   Constitutional:  Negative for chills, fatigue, fever and unexpected weight change.   HENT:  Negative for congestion, dental problem, ear pain, hearing loss, rhinorrhea and trouble swallowing.    Eyes:  Positive for pain (sees ophth) and visual disturbance (sees ophth).   Respiratory:  Positive for shortness of breath (chronic, intermittent, thinks it is related to her CPAP). Negative for cough.    Cardiovascular:  Positive for chest pain (intermittent, left upper chest, recently went to ER and had cardiac r/o), palpitations (intermittent) and leg swelling (intermittent, chronic, better with lasix).   Gastrointestinal:  Negative for abdominal distention, abdominal pain, blood in stool, constipation, diarrhea, nausea and vomiting.   Genitourinary:  Negative for difficulty urinating and vaginal discharge.   Musculoskeletal:  Positive for arthralgias (chronic, stiffness). Negative for myalgias.   Skin:  Negative for rash.   Neurological:  Positive for dizziness (intermittent, spinning) and headaches (chronic, intermittent, frontal). Negative for weakness and numbness.   Hematological:  Negative for adenopathy. Does not bruise/bleed easily.   Psychiatric/Behavioral:  Negative for dysphoric mood and sleep disturbance. The patient is nervous/anxious (chronic, intermittent).        Objective:      Physical Exam  Vitals and nursing note reviewed.   Constitutional:       General: She is not in acute distress.     Appearance: Normal appearance. She is well-developed.   HENT:      Head: Normocephalic and atraumatic.      Right Ear: Tympanic membrane, ear canal and external ear normal.      Left Ear: Tympanic membrane, ear canal and external ear normal.      Nose: Nose normal. No mucosal edema or rhinorrhea.      Mouth/Throat:      Lips: Pink.       Mouth: Mucous membranes are moist.      Pharynx: Oropharynx is clear. Uvula midline.   Eyes:      General: Lids are normal. No scleral icterus.     Extraocular Movements: Extraocular movements intact.      Conjunctiva/sclera: Conjunctivae normal.      Pupils: Pupils are equal, round, and reactive to light.   Neck:      Thyroid: No thyromegaly.   Cardiovascular:      Rate and Rhythm: Normal rate and regular rhythm.      Pulses: Normal pulses.   Pulmonary:      Effort: Pulmonary effort is normal.      Breath sounds: Normal breath sounds. No wheezing, rhonchi or rales.   Abdominal:      General: Bowel sounds are normal. There is no distension.      Palpations: Abdomen is soft. There is no mass.      Tenderness: There is no abdominal tenderness.   Musculoskeletal:         General: Normal range of motion.      Cervical back: Normal range of motion and neck supple.      Right lower leg: No edema.      Left lower leg: No edema.   Lymphadenopathy:      Cervical: No cervical adenopathy.   Skin:     General: Skin is warm and dry.      Findings: No lesion or rash.      Nails: There is no clubbing.   Neurological:      Mental Status: She is alert.      Cranial Nerves: No cranial nerve deficit.      Sensory: No sensory deficit.   Psychiatric:         Mood and Affect: Mood and affect normal.         Assessment:       1. Routine general medical examination at a health care facility    2. Essential hypertension    3. Vitamin D deficiency    4. SINTIA on CPAP    5. Dizziness        Plan:   1. Routine general medical examination at a health care facility    2. Essential hypertension  Assessment & Plan:  /74, controled, continue atenolol and lasix  Lab Results   Component Value Date     07/26/2024    K 3.7 07/26/2024    BUN 14 07/26/2024    CREATININE 0.9 07/26/2024    EGFRNORACEVR >60 07/26/2024        Orders:  -     Comprehensive Metabolic Panel; Future; Expected date: 02/01/2025    3. Vitamin D  deficiency  Overview:  Continue supplement     Orders:  -     Vitamin D; Future; Expected date: 02/01/2025    4. SINTIA on CPAP  Overview:  Followed by Pulmonology, continue current treatment plan   9/10/2020, 9/11/2020 Home Sleep Study Mild /Borderline, positional obstructive sleep apnea (SINTIA). best night was night 1.: over all AHI 8.0 . Over all RDI 15 with 5.5 hr of sleep data. Loud snoring was recorded. Oxygen saturation shereen was 85.3%.  Cpap set up date 9/30/2020 2023 obtained [employer] Dreamstation 2 CPAP machine related recall  On auto CPAP 5-10 cm with optimal control AHI 1.4  Nasal mask   HME: [facility]       5. Dizziness  -     meclizine (ANTIVERT) 25 mg tablet; Take 1 tablet (25 mg total) by mouth 3 (three) times daily as needed for Dizziness.  Dispense: 30 tablet; Refill: 0        Recent labs reviewed with patient.     Discussed shingrix vaccine, advised can be obtained at pharmacy.   6 month f/u with Dr. Jennings scheduled with fasting labs PTA   Health Maintenance reviewed/updated.

## 2024-08-02 NOTE — ASSESSMENT & PLAN NOTE
/74, controled, continue atenolol and lasix  Lab Results   Component Value Date     07/26/2024    K 3.7 07/26/2024    BUN 14 07/26/2024    CREATININE 0.9 07/26/2024    EGFRNORACEVR >60 07/26/2024

## 2024-09-24 ENCOUNTER — OFFICE VISIT (OUTPATIENT)
Dept: PULMONOLOGY | Facility: CLINIC | Age: 64
End: 2024-09-24
Payer: COMMERCIAL

## 2024-09-24 VITALS
SYSTOLIC BLOOD PRESSURE: 122 MMHG | RESPIRATION RATE: 18 BRPM | HEART RATE: 74 BPM | DIASTOLIC BLOOD PRESSURE: 76 MMHG | HEIGHT: 62 IN | OXYGEN SATURATION: 97 % | BODY MASS INDEX: 31.36 KG/M2 | WEIGHT: 170.44 LBS

## 2024-09-24 DIAGNOSIS — Z71.89 CPAP USE COUNSELING: ICD-10-CM

## 2024-09-24 DIAGNOSIS — G47.33 OSA ON CPAP: Primary | ICD-10-CM

## 2024-09-24 DIAGNOSIS — R06.83 SNORING: ICD-10-CM

## 2024-09-24 DIAGNOSIS — Z78.9 DIFFICULTY WITH CPAP NASAL MASK USE: ICD-10-CM

## 2024-09-24 PROCEDURE — 1160F RVW MEDS BY RX/DR IN RCRD: CPT | Mod: CPTII,S$GLB,, | Performed by: INTERNAL MEDICINE

## 2024-09-24 PROCEDURE — 4010F ACE/ARB THERAPY RXD/TAKEN: CPT | Mod: CPTII,S$GLB,, | Performed by: INTERNAL MEDICINE

## 2024-09-24 PROCEDURE — 99214 OFFICE O/P EST MOD 30 MIN: CPT | Mod: S$GLB,,, | Performed by: INTERNAL MEDICINE

## 2024-09-24 PROCEDURE — 1159F MED LIST DOCD IN RCRD: CPT | Mod: CPTII,S$GLB,, | Performed by: INTERNAL MEDICINE

## 2024-09-24 PROCEDURE — 3044F HG A1C LEVEL LT 7.0%: CPT | Mod: CPTII,S$GLB,, | Performed by: INTERNAL MEDICINE

## 2024-09-24 PROCEDURE — 3078F DIAST BP <80 MM HG: CPT | Mod: CPTII,S$GLB,, | Performed by: INTERNAL MEDICINE

## 2024-09-24 PROCEDURE — 3074F SYST BP LT 130 MM HG: CPT | Mod: CPTII,S$GLB,, | Performed by: INTERNAL MEDICINE

## 2024-09-24 PROCEDURE — 3008F BODY MASS INDEX DOCD: CPT | Mod: CPTII,S$GLB,, | Performed by: INTERNAL MEDICINE

## 2024-09-24 PROCEDURE — 99999 PR PBB SHADOW E&M-EST. PATIENT-LVL IV: CPT | Mod: PBBFAC,,, | Performed by: INTERNAL MEDICINE

## 2024-09-24 RX ORDER — VALSARTAN 160 MG/1
160 TABLET ORAL
COMMUNITY
Start: 2024-08-27

## 2024-09-24 NOTE — PATIENT INSTRUCTIONS
POSITIONAL BACK PACK     POSITIONAL WEDGE PILLOW     call [phone] to schedule CPAP  mask fitting.

## 2024-09-24 NOTE — PROGRESS NOTES
"                                         Pulmonary Outpatient Follow Up Visit     Subjective:       Patient ID: Clare Whitley is a 64 y.o. female.    Chief Complaint: Sleep Apnea and Shortness of Breath      HPI        64-year-old female patient with mild SINTIA on auto PAP.    [place] Sleepiness scale score 0.    Nasal mask.     Using and benefitting but suboptimal adherence.    Review of Systems   Respiratory:  Positive for apnea and snoring.    Psychiatric/Behavioral:  Positive for sleep disturbance.        Outpatient Encounter Medications as of 9/24/2024   Medication Sig Dispense Refill    aspirin (ECOTRIN) 81 MG EC tablet Take 81 mg by mouth once daily.      atenoloL (TENORMIN) 100 MG tablet Take 100 mg by mouth once daily.      cholecalciferol, vitamin D3, (VITAMIN D3) 25 mcg (1,000 unit) capsule Take 1 capsule (1,000 Units total) by mouth once daily.  0    furosemide (LASIX) 40 MG tablet Take 40 mg by mouth once daily.      meclizine (ANTIVERT) 25 mg tablet Take 1 tablet (25 mg total) by mouth 3 (three) times daily as needed for Dizziness. 30 tablet 0    potassium chloride (MICRO-K) 10 MEQ CpSR TAKE 1 CAPSULE(10 MEQ) BY MOUTH EVERY DAY 90 capsule 1    valsartan (DIOVAN) 160 MG tablet Take 160 mg by mouth.       No facility-administered encounter medications on file as of 9/24/2024.       Objective:     Vital Signs (Most Recent)  Vital Signs  Pulse: 74  Resp: 18  SpO2: 97 %  BP: 122/76  Height and Weight  Height: 5' 2" (157.5 cm)  Weight: 77.3 kg (170 lb 6.7 oz)  BSA (Calculated - sq m): 1.84 sq meters  BMI (Calculated): 31.2  Weight in (lb) to have BMI = 25: 136.4]  Wt Readings from Last 2 Encounters:   09/24/24 77.3 kg (170 lb 6.7 oz)   08/01/24 75.2 kg (165 lb 12.6 oz)       Physical Exam   Constitutional: She is oriented to person, place, and time. She appears well-developed and well-nourished.   Cardiovascular: Normal rate.   Pulmonary/Chest: Normal expansion and effort normal.   Neurological: She is alert " "and oriented to person, place, and time.   Psychiatric: Her behavior is normal.       Laboratory  Lab Results   Component Value Date    WBC 4.15 07/26/2024    RBC 5.12 07/26/2024    HGB 14.4 07/26/2024    HCT 39.6 07/26/2024    MCV 77 (L) 07/26/2024    MCH 28.1 07/26/2024    MCHC 36.4 (H) 07/26/2024    RDW 13.9 07/26/2024     07/26/2024    MPV 11.4 07/26/2024    GRAN 2.3 07/26/2024    GRAN 54.7 07/26/2024    LYMPH 1.4 07/26/2024    LYMPH 33.3 07/26/2024    MONO 0.4 07/26/2024    MONO 8.7 07/26/2024    EOS 0.1 07/26/2024    BASO 0.03 07/26/2024    EOSINOPHIL 2.4 07/26/2024    BASOPHIL 0.7 07/26/2024       BMP  Lab Results   Component Value Date     07/26/2024    K 3.7 07/26/2024     07/26/2024    CO2 25 07/26/2024    BUN 14 07/26/2024    CREATININE 0.9 07/26/2024    CALCIUM 9.6 07/26/2024    ANIONGAP 11 07/26/2024    ESTGFRAFRICA >60.0 07/25/2022    EGFRNONAA >60.0 07/25/2022    AST 24 07/26/2024    ALT 14 07/26/2024    PROT 8.8 (H) 07/26/2024       Lab Results   Component Value Date    BNP 94 09/12/2024    BNP 12 07/26/2024     (H) 02/16/2024    BNP 72 01/19/2024    BNP 35 05/11/2023    BNP 16 08/26/2022       Lab Results   Component Value Date    TSH 1.362 07/26/2024       No results found for: "SEDRATE"    No results found for: "CRP"  No results found for: "IGE"     No results found for: "ASPERGILLUS"  No results found for: "AFUMIGATUSCL"     No results found for: "ACE"     Diagnostic Results:  I have personally reviewed today the following studies:              Assessment/Plan:   SINTIA on CPAP  -     CPAP/BIPAP SUPPLIES    CPAP use counseling  -     CPAP/BIPAP SUPPLIES    Difficulty with CPAP nasal mask use  -     CPAP/BIPAP SUPPLIES    Snoring  -     CPAP/BIPAP SUPPLIES      Positional therapy w CPAP     Nasal mask     Has dentures not good candidate for OAT    Follow up in about 6 months (around 3/24/2025).    This note was prepared using voice recognition system and is likely to have " sound alike errors that may have been overlooked even after proof reading.  Please call me with any questions    Discussed diagnosis, its evaluation, treatment and usual course. All questions answered.      Radha Wilder MD

## 2024-10-04 ENCOUNTER — OFFICE VISIT (OUTPATIENT)
Dept: INTERNAL MEDICINE | Facility: CLINIC | Age: 64
End: 2024-10-04
Payer: COMMERCIAL

## 2024-10-04 VITALS
BODY MASS INDEX: 30.44 KG/M2 | SYSTOLIC BLOOD PRESSURE: 150 MMHG | HEART RATE: 86 BPM | OXYGEN SATURATION: 98 % | TEMPERATURE: 98 F | WEIGHT: 166.44 LBS | DIASTOLIC BLOOD PRESSURE: 92 MMHG

## 2024-10-04 DIAGNOSIS — M54.2 NECK PAIN, BILATERAL POSTERIOR: Primary | ICD-10-CM

## 2024-10-04 DIAGNOSIS — R42 INTERMITTENT LIGHTHEADEDNESS: ICD-10-CM

## 2024-10-04 DIAGNOSIS — I10 ESSENTIAL HYPERTENSION: Chronic | ICD-10-CM

## 2024-10-04 DIAGNOSIS — G47.33 OSA ON CPAP: ICD-10-CM

## 2024-10-04 PROCEDURE — 99999 PR PBB SHADOW E&M-EST. PATIENT-LVL V: CPT | Mod: PBBFAC,,, | Performed by: PHYSICIAN ASSISTANT

## 2024-10-04 RX ORDER — NAPROXEN 500 MG/1
500 TABLET ORAL 2 TIMES DAILY WITH MEALS
Qty: 30 TABLET | Refills: 0 | Status: SHIPPED | OUTPATIENT
Start: 2024-10-04

## 2024-10-04 RX ORDER — CEFDINIR 300 MG/1
CAPSULE ORAL
COMMUNITY
Start: 2024-09-28

## 2024-10-04 RX ORDER — METHOCARBAMOL 750 MG/1
750 TABLET, FILM COATED ORAL EVERY 8 HOURS PRN
Qty: 20 TABLET | Refills: 0 | Status: SHIPPED | OUTPATIENT
Start: 2024-10-04

## 2024-10-04 RX ORDER — PROMETHAZINE HYDROCHLORIDE AND DEXTROMETHORPHAN HYDROBROMIDE 6.25; 15 MG/5ML; MG/5ML
SYRUP ORAL
COMMUNITY
Start: 2024-09-28

## 2024-10-04 NOTE — PROGRESS NOTES
Subjective:       Patient ID: Clare Whitley is a 64 y.o. female.    Chief Complaint: Neck Pain (Patient stated that on Monday her neck started getting harder to turn. )      History of Present Illness    CHIEF COMPLAINT:  - The patient presents with neck pain, dizziness, and tingling in hands, which started 4 days ago.    HPI:  Ms. Whitley reports neck pain that began 4 days ago on Monday. The pain initially affected the left side and radiated down her arm, then migrated to the middle, then to the right side, and now affects both sides of her neck. She describes the pain as aching and throbbing, similar to dental pain. The patient has pain with all neck movements, including lateral head rotation, chin flexion and extension, and lateral neck flexion. The pain severity impairs her sleep and necessitates using her nightstand for support when rising from bed.    The patient reports dizziness, described as lightheadedness and pre-syncope. She also notes paresthesia in her hands and arms, as well as intermittent blurred or double vision, particularly noticeable when viewing white text on television.    The patient has been using acetaminophen for pain relief, which provides partial benefit. She denies recent falls or injuries. Her cardiologist noted nocturnal drops in oxygen levels and heart rate while using her CPAP machine, with oxygen levels decreasing to 92 at times.    The patient recently visited an urgent care facility and was diagnosed with strep throat, for which she is currently taking antibiotics. She believes she contracted strep throat from her daughter.    The patient denies the type of dizziness she had in August, which was treated with meclizine. She denies facial drooping or paresthesia.    MEDICATIONS:  - Antibiotic (likely amoxicillin), 300 mg twice daily for 5 days, for strep throat and possibly urinary tract infection  - Blood pressure medication, not taken on the morning of the visit  - Acetaminophen,  as needed for neck pain, provides partial relief    MEDICAL HISTORY:  - Hypertension  - Sleep apnea  - History of dizziness    FAMILY HISTORY:  - Grandfather ('s side): Narcolepsy    TEST RESULTS:  - Sleep study: Ms. Whitley uses CPAP machine, Oxygen levels drop to 92 at night.         Review of Systems   Constitutional:  Negative for chills and fever.   Respiratory:  Negative for shortness of breath.    Cardiovascular:  Negative for chest pain.   Musculoskeletal:  Positive for myalgias, neck pain and neck stiffness. Negative for back pain and gait problem.   Neurological:  Negative for facial asymmetry, weakness and numbness.       Objective:      Physical Exam  Vitals and nursing note reviewed.   Constitutional:       General: She is not in acute distress.     Appearance: She is well-developed.   HENT:      Head: Normocephalic and atraumatic.   Eyes:      General: Lids are normal. No scleral icterus.     Extraocular Movements: Extraocular movements intact.      Conjunctiva/sclera: Conjunctivae normal.   Cardiovascular:      Rate and Rhythm: Normal rate and regular rhythm.      Pulses:           Radial pulses are 2+ on the right side and 2+ on the left side.   Pulmonary:      Effort: Pulmonary effort is normal.      Breath sounds: Normal breath sounds. No decreased breath sounds, wheezing, rhonchi or rales.   Musculoskeletal:      Cervical back: No swelling, deformity or bony tenderness. Decreased range of motion (due to pain).      Thoracic back: Normal.        Back:    Neurological:      Mental Status: She is alert.      Cranial Nerves: No cranial nerve deficit.      Motor: Motor function is intact. No pronator drift.      Coordination: Finger-Nose-Finger Test normal.      Gait: Gait is intact.      Deep Tendon Reflexes:      Reflex Scores:       Brachioradialis reflexes are 2+ on the right side and 2+ on the left side.  Psychiatric:         Mood and Affect: Mood and affect normal.         Assessment:       1.  Neck pain, bilateral posterior    2. Essential hypertension    3. Intermittent lightheadedness    4. SINTIA on CPAP        Plan:   1. Neck pain, bilateral posterior  -     naproxen (NAPROSYN) 500 MG tablet; Take 1 tablet (500 mg total) by mouth 2 (two) times daily with meals. Take with food  Dispense: 30 tablet; Refill: 0  -     methocarbamoL (ROBAXIN) 750 MG Tab; Take 1 tablet (750 mg total) by mouth every 8 (eight) hours as needed (muscle spasm).  Dispense: 20 tablet; Refill: 0    2. Essential hypertension  Assessment & Plan:  Uncontrolled, did not take meds before visit today. Recheck BP and NV in 2 weeks if still elevated, continue current medication regimen      3. Intermittent lightheadedness    4. SINTIA on CPAP  Overview:  Followed by Pulmonology, continue current treatment plan   9/10/2020, 9/11/2020 Home Sleep Study Mild /Borderline, positional obstructive sleep apnea (SINTIA). best night was night 1.: over all AHI 8.0 . Over all RDI 15 with 5.5 hr of sleep data. Loud snoring was recorded. Oxygen saturation shereen was 85.3%.  Cpap set up date 9/30/2020 2023 obtained [employer] Dreamstation 2 CPAP machine related recall  On auto CPAP 5-10 cm with optimal control AHI 1.4  Nasal mask   HME: [facility]     Assessment & Plan:  Advised to schedule with pulmonary about new mask and nocturnal episodes of hypoxia          Assessment & Plan    Assessed neck pain, likely due to poor sleep position causing muscle strain and nerve compression  Evaluated for potential neurological issues with basic tests, finding no concerning signs  Considered imaging if pain persists after initial treatment plan  Noted elevated blood pressure, emphasizing importance of medication adherence  Reviewed recent strep throat diagnosis and antibiotic treatment, confirming appropriate dosage    PATIENT EDUCATION:  - Explained that neck pain from poor sleep position can last up to 2-3 months if not treated properly  - Discussed how tight neck muscles can  compress nerves, causing arm symptoms  - Informed about potential need for CPAP machine adjustment if oxygen levels continue to drop during sleep    ACTION ITEMS/LIFESTYLE:  - Ms. Whitley to apply warm compresses to the back of the neck for 15-20 minutes at a time  - Ms. Whitley to perform prescribed neck stretches as instructed    MEDICATIONS:  - Continued antibiotic treatment for strep throat (300mg twice daily for 5 days)  - Started OTC pain medication to be taken around the clock for 5 days    ORDERS:  - Printed neck stretching exercises for patient    FOLLOW UP:  - Contact the office through [facility] if neck pain does not improve, for potential physical therapy referral and imaging  - Follow up in February as scheduled for routine appointment           This note was generated with the assistance of ambient listening technology. I attest to having reviewed and edited the generated note for accuracy, though some syntax or spelling errors may persist. Please contact the author of this note for any clarification.

## 2024-10-04 NOTE — ASSESSMENT & PLAN NOTE
Uncontrolled, did not take meds before visit today. Recheck BP and NV in 2 weeks if still elevated, continue current medication regimen

## 2024-10-17 ENCOUNTER — OFFICE VISIT (OUTPATIENT)
Dept: INTERNAL MEDICINE | Facility: CLINIC | Age: 64
End: 2024-10-17
Payer: COMMERCIAL

## 2024-10-17 VITALS
WEIGHT: 165.25 LBS | DIASTOLIC BLOOD PRESSURE: 104 MMHG | SYSTOLIC BLOOD PRESSURE: 168 MMHG | HEART RATE: 68 BPM | HEIGHT: 62 IN | OXYGEN SATURATION: 96 % | BODY MASS INDEX: 30.41 KG/M2

## 2024-10-17 DIAGNOSIS — I10 ESSENTIAL HYPERTENSION: Primary | ICD-10-CM

## 2024-10-17 DIAGNOSIS — G47.33 OSA ON CPAP: ICD-10-CM

## 2024-10-17 PROCEDURE — 99999 PR PBB SHADOW E&M-EST. PATIENT-LVL IV: CPT | Mod: PBBFAC,,, | Performed by: PHYSICIAN ASSISTANT

## 2024-10-17 RX ORDER — VALSARTAN 160 MG/1
160 TABLET ORAL DAILY
Qty: 90 TABLET | Refills: 3 | Status: SHIPPED | OUTPATIENT
Start: 2024-10-17 | End: 2025-10-17

## 2024-10-17 RX ORDER — HYDROCHLOROTHIAZIDE 25 MG/1
25 TABLET ORAL DAILY
Qty: 30 TABLET | Refills: 11 | Status: SHIPPED | OUTPATIENT
Start: 2024-10-17 | End: 2025-10-17

## 2024-10-17 NOTE — PATIENT INSTRUCTIONS
Changes to your Blood Pressure Medications:     Take 1/2 tablet of Atenolol, once per day.     Take Valsartan the same as before (160mg tablet, once per day)    New/added medication: Hydrochlorothiazide (HCTZ)- take one tablet per day    *You can still take Lasix IF NEEDED for swelling and high blood pressure, but if taking it often (more than twice per week), let us know because we may need to adjust your medications again.     I recommend you check your blood pressure at home at least once per day, and record the levels in a journal or notebook. When checking your blood pressure make sure you are sitting down for at least 5 minutes, with both feet flat on the ground. Make sure the cuff fits correctly and isn't too tight prior to it being inflated.

## 2024-10-17 NOTE — PROGRESS NOTES
Subjective:       Patient ID: Clare Whitley is a 64 y.o. female.    Chief Complaint: Hypertension    CHIEF COMPLAINT:  - Clare presents for follow-up of hypertension and to discuss concerns about blood pressure readings and sleep issues.    HPI:  - Clare reports fluctuating blood pressure with home readings this morning of 135/88 and 140/88, higher than usual readings of around 130/90 or 130/87. During the visit, blood pressure was measured at 168/104 and 152/92, which the patient notes as unusually high. Clare attributes elevated readings to difficulty sleeping the previous night.  - Clare expresses concerns about heart rate and oxygen levels during sleep. While using a CPAP machine, oxygen levels have been observed dropping, sometimes to 92, when preparing to sleep. Heart rate has been noted to decrease to the 50s, once reaching 49. Clare wonders if these low heart rates might be causing morning headaches, which persist despite CPAP use.  - Clare reports palpitations and is under the care of a cardiologist, Dr. Rucker at [facility], last seen about a month ago. A recent appointment with Dr. Peter for CPAP and BiPAP supplies occurred on September 24th. During that visit, patient reports feeling smothered by the CPAP mask at times.  - Regarding diuretic use, the patient takes Lasix as needed, approximately every 2-3 days, based on swelling in hands or legs. Clare reports following the cardiologist's advice to monitor weight daily and use the medication accordingly.  - Clare notes that neck pain, a problem during the last visit, has improved somewhat but still occasionally causes discomfort.          Review of Systems   Constitutional:  Negative for chills, fatigue, fever and unexpected weight change.   HENT:  Negative for congestion, dental problem, ear pain, hearing loss, rhinorrhea and trouble swallowing.    Eyes:  Negative for pain and visual disturbance.   Respiratory:   Negative for cough and shortness of breath.    Cardiovascular:  Negative for chest pain, palpitations and leg swelling.   Gastrointestinal:  Negative for abdominal distention, abdominal pain, blood in stool, constipation, diarrhea, nausea and vomiting.   Genitourinary:  Negative for difficulty urinating and pelvic pain.   Musculoskeletal:  Positive for neck pain. Negative for arthralgias and myalgias.   Skin:  Negative for rash.   Neurological:  Positive for headaches. Negative for dizziness, weakness and numbness.   Hematological:  Negative for adenopathy. Does not bruise/bleed easily.   Psychiatric/Behavioral:  Positive for sleep disturbance. Negative for dysphoric mood. The patient is not nervous/anxious.        Objective:      Physical Exam  Vitals reviewed.   Constitutional:       General: She is not in acute distress.     Appearance: Normal appearance. She is well-developed and normal weight. She is not ill-appearing or toxic-appearing.   HENT:      Head: Normocephalic and atraumatic.   Eyes:      General: Lids are normal. No scleral icterus.     Extraocular Movements: Extraocular movements intact.      Conjunctiva/sclera: Conjunctivae normal.      Pupils: Pupils are equal, round, and reactive to light.   Cardiovascular:      Rate and Rhythm: Normal rate and regular rhythm.      Pulses: Normal pulses.      Heart sounds: Normal heart sounds. No murmur heard.     No friction rub. No gallop.   Pulmonary:      Effort: Pulmonary effort is normal.      Breath sounds: Normal breath sounds. No decreased breath sounds, wheezing, rhonchi or rales.   Neurological:      General: No focal deficit present.      Mental Status: She is alert and oriented to person, place, and time. Mental status is at baseline.      Cranial Nerves: No cranial nerve deficit.      Gait: Gait normal.   Psychiatric:         Mood and Affect: Mood and affect normal.         Behavior: Behavior normal.         Thought Content: Thought content normal.          Judgment: Judgment normal.         Assessment:       1. Essential hypertension    2. SINTIA on CPAP        Plan:   1. Essential hypertension   Clare to continue monitoring blood pressure at home.   Clare to notify office if systolic blood pressure consistently exceeds 170 or diastolic exceeds 100.   Started HCTZ daily in the morning.   Decreased atenolol from 100mg to 50mg daily: Take half a tablet daily in the morning.   Continued valsartan 160mg tablet daily in the morning.   Continued Lasix as needed for significant fluid retention.   Clare to use pill cutter to halve atenolol tablets.   Educated on the potential for dehydration when using multiple diuretics and the importance of adequate water intake.   Continued potassium and vitamin D3 supplements as previously prescribed.  -     hydroCHLOROthiazide (HYDRODIURIL) 25 MG tablet; Take 1 tablet (25 mg total) by mouth once daily.  Dispense: 30 tablet; Refill: 11  -     valsartan (DIOVAN) 160 MG tablet; Take 1 tablet (160 mg total) by mouth once daily.  Dispense: 90 tablet; Refill: 3    2. SINTIA on CPAP   Explained that low heart rate can contribute to morning headaches.   Discussed importance of proper CPAP mask fit and its impact on sleep quality.   Informed about the relationship between sleep apnea and blood pressure fluctuations.   Referred to Dr. Wilder (pulmonology) for updated sleep study.  Overview:  Followed by Pulmonology, continue current treatment plan   9/10/2020, 9/11/2020 Home Sleep Study Mild /Borderline, positional obstructive sleep apnea (SINTIA). best night was night 1.: over all AHI 8.0 . Over all RDI 15 with 5.5 hr of sleep data. Loud snoring was recorded. Oxygen saturation shereen was 85.3%.  Cpap set up date 9/30/2020 2023 obtained [employer] Dreamstation 2 CPAP machine related recall  On auto CPAP 5-10 cm with optimal control AHI 1.4  Nasal mask   HME: [facility]         FOLLOW UP:   Follow up for nurse visit in 2 weeks to reassess blood  pressure management.   Contact office via [employer] if blood pressures are out of control or if systolic consistently exceeds 170 or diastolic exceeds 100.           This note was generated with the assistance of ambient listening technology. I attest to having reviewed and edited the generated note for accuracy, though some syntax or spelling errors may persist. Please contact the author of this note for any clarification.

## 2024-10-17 NOTE — Clinical Note
Can patient please have a sleep study for her SINTIA with CPAP? She's reporting decreased SpO2 at night, waking with daily headaches, and says it's been a few years since last sleep study.

## 2024-10-23 ENCOUNTER — TELEPHONE (OUTPATIENT)
Dept: INTERNAL MEDICINE | Facility: CLINIC | Age: 64
End: 2024-10-23
Payer: COMMERCIAL

## 2024-10-23 NOTE — TELEPHONE ENCOUNTER
Called pt to let her know about her CPAP only needs to be worn at night   Pt had clear understanding and said she will do.

## 2024-10-31 ENCOUNTER — CLINICAL SUPPORT (OUTPATIENT)
Dept: INTERNAL MEDICINE | Facility: CLINIC | Age: 64
End: 2024-10-31
Payer: COMMERCIAL

## 2024-10-31 VITALS — SYSTOLIC BLOOD PRESSURE: 150 MMHG | DIASTOLIC BLOOD PRESSURE: 98 MMHG

## 2024-10-31 DIAGNOSIS — Z01.30 BLOOD PRESSURE CHECK: Primary | ICD-10-CM

## 2024-10-31 PROCEDURE — 99999 PR PBB SHADOW E&M-EST. PATIENT-LVL I: CPT | Mod: PBBFAC,,,

## 2024-11-01 DIAGNOSIS — G47.33 OSA ON CPAP: Primary | ICD-10-CM

## 2024-11-11 ENCOUNTER — OFFICE VISIT (OUTPATIENT)
Dept: INTERNAL MEDICINE | Facility: CLINIC | Age: 64
End: 2024-11-11
Payer: COMMERCIAL

## 2024-11-11 VITALS
HEART RATE: 79 BPM | DIASTOLIC BLOOD PRESSURE: 92 MMHG | BODY MASS INDEX: 29.84 KG/M2 | RESPIRATION RATE: 16 BRPM | OXYGEN SATURATION: 97 % | TEMPERATURE: 99 F | SYSTOLIC BLOOD PRESSURE: 138 MMHG | WEIGHT: 163.13 LBS

## 2024-11-11 DIAGNOSIS — G45.9 TIA (TRANSIENT ISCHEMIC ATTACK): ICD-10-CM

## 2024-11-11 DIAGNOSIS — I10 ESSENTIAL HYPERTENSION: ICD-10-CM

## 2024-11-11 DIAGNOSIS — Z23 NEED FOR VACCINATION: Primary | ICD-10-CM

## 2024-11-11 DIAGNOSIS — R42 DIZZINESS: ICD-10-CM

## 2024-11-11 PROCEDURE — 4010F ACE/ARB THERAPY RXD/TAKEN: CPT | Mod: CPTII,S$GLB,, | Performed by: PHYSICIAN ASSISTANT

## 2024-11-11 PROCEDURE — 1159F MED LIST DOCD IN RCRD: CPT | Mod: CPTII,S$GLB,, | Performed by: PHYSICIAN ASSISTANT

## 2024-11-11 PROCEDURE — 3080F DIAST BP >= 90 MM HG: CPT | Mod: CPTII,S$GLB,, | Performed by: PHYSICIAN ASSISTANT

## 2024-11-11 PROCEDURE — 3044F HG A1C LEVEL LT 7.0%: CPT | Mod: CPTII,S$GLB,, | Performed by: PHYSICIAN ASSISTANT

## 2024-11-11 PROCEDURE — 99999 PR PBB SHADOW E&M-EST. PATIENT-LVL V: CPT | Mod: PBBFAC,,, | Performed by: PHYSICIAN ASSISTANT

## 2024-11-11 PROCEDURE — 90656 IIV3 VACC NO PRSV 0.5 ML IM: CPT | Mod: S$GLB,,, | Performed by: PHYSICIAN ASSISTANT

## 2024-11-11 PROCEDURE — 90471 IMMUNIZATION ADMIN: CPT | Mod: S$GLB,,, | Performed by: PHYSICIAN ASSISTANT

## 2024-11-11 PROCEDURE — 99214 OFFICE O/P EST MOD 30 MIN: CPT | Mod: 25,S$GLB,, | Performed by: PHYSICIAN ASSISTANT

## 2024-11-11 PROCEDURE — 3008F BODY MASS INDEX DOCD: CPT | Mod: CPTII,S$GLB,, | Performed by: PHYSICIAN ASSISTANT

## 2024-11-11 PROCEDURE — 3075F SYST BP GE 130 - 139MM HG: CPT | Mod: CPTII,S$GLB,, | Performed by: PHYSICIAN ASSISTANT

## 2024-11-11 PROCEDURE — 1160F RVW MEDS BY RX/DR IN RCRD: CPT | Mod: CPTII,S$GLB,, | Performed by: PHYSICIAN ASSISTANT

## 2024-11-11 RX ORDER — VALSARTAN 160 MG/1
320 TABLET ORAL DAILY
Qty: 180 TABLET | Refills: 3 | Status: SHIPPED | OUTPATIENT
Start: 2024-11-11 | End: 2025-11-11

## 2024-11-11 NOTE — PROGRESS NOTES
"Subjective:      Patient ID: Clare Whitley is a 64 y.o. female.    Chief Complaint: " Transient Ischemic Attack ".     HPI 64 Years old AA Female with PMHx of Migraines / TIA's / SINTIA / HTN  and others Medical issues  came for the evaluation and recommendation of " Transient Ischemic Attack ".      Started: this past Sunday had an episode right side weakness.   Describes: " mild right weakness ".   Timing: lasted ' about an hour ".   Frequency: this is at least # 5.   Pain:  0 - 4/ 10.   Location: right arm / Leg.   Family: Cousin with CVA.   Medications: ASA / Atenolol / Robaxin / Diovan / Vit D / HCTZ.   Worsen: none.   Alleviated: none.   Associated symptoms: Confusion / numbness / blurry vision / tingling sensation.   Triggers: none.   Prodrome symptoms: none.                Referral by PCP / NP.        Did not go to the ER.        No deficits after it - no speech difficulties.        She indicated has been taking ASA for over 5 years.     Review of Systems   All other systems reviewed and are negative.    Objective:     Neurological Exam  Mental Status  Alert. Oriented to person, place, time and situation. Recalls 3 of 3 objects immediately. At 5 minutes recalls 3 of 3 objects. Able to copy figure. Clock drawing is normal. Speech is normal. Language is fluent with no aphasia. Fund of knowledge is appropriate for level of education. Apraxia absent.    Cranial Nerves  CN I: Sense of smell is normal.  CN II: Visual acuity is normal. Visual fields full to confrontation.  CN III, IV, VI: Extraocular movements intact bilaterally. Normal lids and orbits bilaterally. Pupils equal round and reactive to light bilaterally.  CN V: Facial sensation is normal.  CN VII: Full and symmetric facial movement.  CN VIII: Hearing is normal.  CN IX, X: Palate elevates symmetrically. Normal gag reflex.  CN XI: Shoulder shrug strength is normal.  CN XII: Tongue midline without atrophy or fasciculations.    Motor  Normal muscle bulk " throughout. No fasciculations present. Normal muscle tone. No abnormal involuntary movements. Strength is 5/5 throughout all four extremities.    Sensory  Sensation is intact to light touch, pinprick, vibration and proprioception in all four extremities.    Reflexes                                            Right                      Left  Brachioradialis                    2+                         2+  Biceps                                 2+                         2+  Triceps                                2+                         2+  Finger flex                           2+                         2+  Hamstring                            2+                         2+  Patellar                                2+                         2+  Achilles                                2+                         2+  Jaw jerk absent.  Right pathological reflexes: Alana's absent. Ankle clonus absent.  Left pathological reflexes: Alana's absent. Ankle clonus absent.  Glabellar tap absent. Snout absent. Right palmomental absent. Left palmomental absent. Right palmar grasp absent. Left palmar grasp absent.    Gait  Normal casual, toe, heel and tandem gait.      Physical Exam  Constitutional:       Appearance: Normal appearance. She is normal weight.   HENT:      Head: Normocephalic and atraumatic.      Right Ear: Tympanic membrane normal.      Left Ear: Tympanic membrane normal.      Nose: Nose normal.      Mouth/Throat:      Mouth: Mucous membranes are moist.      Pharynx: Oropharynx is clear.   Eyes:      General: Lids are normal.      Extraocular Movements: Extraocular movements intact.      Conjunctiva/sclera: Conjunctivae normal.      Pupils: Pupils are equal, round, and reactive to light.   Cardiovascular:      Rate and Rhythm: Normal rate and regular rhythm.      Pulses: Normal pulses.      Heart sounds: Normal heart sounds.   Pulmonary:      Effort: Pulmonary effort is normal.      Breath sounds: Normal  "breath sounds.   Abdominal:      General: Abdomen is flat. Bowel sounds are normal.      Palpations: Abdomen is soft.   Genitourinary:     Comments: Deferred.   Musculoskeletal:         General: Normal range of motion.      Cervical back: Normal range of motion and neck supple.   Skin:     General: Skin is warm and dry.      Capillary Refill: Capillary refill takes less than 2 seconds.   Neurological:      Mental Status: She is alert. Mental status is at baseline.      Motor: Motor strength is normal.     Deep Tendon Reflexes:      Reflex Scores:       Tricep reflexes are 2+ on the right side and 2+ on the left side.       Bicep reflexes are 2+ on the right side and 2+ on the left side.       Brachioradialis reflexes are 2+ on the right side and 2+ on the left side.       Patellar reflexes are 2+ on the right side and 2+ on the left side.       Achilles reflexes are 2+ on the right side and 2+ on the left side.  Psychiatric:         Mood and Affect: Mood normal.         Speech: Speech normal.         Behavior: Behavior normal.         Thought Content: Thought content normal.         Judgment: Judgment normal.        Assessment:   64 Years old C. Female with PMHX as above came of the evaluation of " Transient Ischemic Attack ".   - Transient Neurological Symptoms.   - HTN.  - Hx of TIA's.   - Chiari type 1.   - Hx of Migraines HA's.   Plan:   Patient Neurological Assessment is non focal. She has been having these episodes that has been diagnosed as TIA's   for the last few years - all of them in the same side / same clinical presentation /   MRI and MRA Brain / CTA Neck and Head since 2028 and no evidence of any CV accident.   One of the MRI showed - There is crowding of the foramen magnum and extension of the cerebellar tonsils roughly 10 mm below the for level of the foramen magnum.   There is partially empty sella configuration and prominence of Meckel's caves bilaterally.      I do not really know where Her " symptoms comes from - I do not believe She is having TIA's after TIA's in the same distribution.  Migraines can do the same and be more possible than TIA's.   Chiari - another possibility as a cause of such intermittent episodes.   Focal Seizures is very high in the differential - even one hour is too long.     She has also Hx of Migraines HA's - which She could have complicated Migraines with Neurological symptoms.  Migraines frequency about 2 per month / She takes NSAID's PRN with good results.     Chiari malformation: about 10 mm below FM / no Syrinx.   She does not have any lateralizing Neuro deficits.      Long term EEG.     Eyes clinic: 05/ 2024 - no mention of Papilledema ( see note by Dr Gonzales - PVD [ posterior Vitreous detachment ] )    EEG routine - 07/ 2023 - normal.    Labs: CBC / CMP / Hgb A 1 C / Lipids panel / TSH / HIV / HCV / Hep C - 2023 / 2024 - non significant abnormalities.     Personally reviewed CT Scan Head - 2024 - no acute changes.     TTE: EF > 60 % ( 2023 ).  Saw Cardiology clinic - July 2024 - according to patient no issues.     RCT: 4 weeks.       Please do not hesitate to contact me with any updates, questions or concerns.    I spent a total of 45 minutes on the day of the visit.This includes face to face time and non-face to face time preparing to see the patient (eg, review of tests),   obtaining and/or reviewing separately obtained history, documenting clinical information in the electronic or other health record,   independently interpreting results and communicating results to the patient/family/caregiver, or care coordinator.    Ubaldo Oseguera MD.  General Neurologist.

## 2024-11-11 NOTE — PATIENT INSTRUCTIONS
The bivalent covid booster is now available. You can visit [facility] in our primary care building by the front entrance to get this vaccine.     Pharmacy hours:  Monday-Friday 8am-5:30pm     You can get your Shingles vaccine at the pharmacy.      You can get your RSV vaccine at the pharmacy.

## 2024-11-11 NOTE — PROGRESS NOTES
Subjective:       Patient ID: Clare Whitley is a 64 y.o. female.      CHIEF COMPLAINT:  - Clare presents for follow-up on hypertension management and to discuss recent episodes of dizziness.    HPI:  - Clare reports intermittent dizziness, particularly when maintaining a static standing position or changing visual focus from a fixed point to an upward gaze, resulting in lightheadedness. Clare had dizziness yesterday. While driving yesterday, she felt an unusual, transient sensation on her right side when applying pressure to the accelerator.  - Clare has hypertension and is on medication. She notes discrepancies between home and clinic blood pressure readings, with home readings being lower. Her blood pressure monitoring device is outdated.  - Acute posterior cervical neck pain began yesterday. Clare denies recent lifting or unusual activities that might have precipitated this pain.  - Clare reports occasional memory lapses, such as task-specific amnesia upon entering a room. In a previous medical encounter, it was noted that her right hand had decreased strength compared to her left.  - Clare is under the care of cardiologist Dr. Garry Rucker at [facility]. During a previous visit with the cardiologist's physician assistant, Vinicio, it was noted that the patient's cardiac function was compromised, leading to the prescription of Lasix for edema. Clare reports resolution of pedal edema.  - Clare has a history of a suspected TIA diagnosed at [facility], though the exact timing is unclear to her.  - Clare denies confusion, headaches, or current symptoms of stroke or TIA such as weakness, numbness, or speech difficulties.    MEDICATIONS:  - Valsartan 160 mg, daily, for hypertension  - Atenolol 50 mg, daily, for hypertension  - Lasix (furosemide), as needed, for edema  - Tylenol (acetaminophen)    MEDICAL HISTORY:  - Hypertension  - Sleep apnea  - TIA (Transient Ischemic  Attack): past occurrence  - Flu shot received recently.  COVID-19 booster received today.    TEST RESULTS:  - Sleep apnea test: Clare using CPAP.  Echo: Clare's cardiac function was noted to be compromised.    SOCIAL HISTORY:  - Worship: [employer]         Review of Systems   Constitutional:  Negative for chills, fatigue, fever and unexpected weight change.   Eyes:  Negative for visual disturbance.   Respiratory:  Negative for shortness of breath.    Cardiovascular:  Negative for chest pain.   Musculoskeletal:  Negative for myalgias.   Neurological:  Positive for dizziness. Negative for headaches.       Objective:      Physical Exam  Vitals reviewed.   Constitutional:       General: She is not in acute distress.     Appearance: Normal appearance. She is well-developed and normal weight. She is not ill-appearing or toxic-appearing.   HENT:      Head: Normocephalic and atraumatic.   Eyes:      General: Lids are normal. No scleral icterus.     Extraocular Movements: Extraocular movements intact.      Conjunctiva/sclera: Conjunctivae normal.      Pupils: Pupils are equal, round, and reactive to light.   Cardiovascular:      Rate and Rhythm: Normal rate and regular rhythm.      Heart sounds: Normal heart sounds. No murmur heard.     No friction rub. No gallop.   Pulmonary:      Effort: Pulmonary effort is normal.      Breath sounds: Normal breath sounds. No decreased breath sounds, wheezing, rhonchi or rales.   Neurological:      General: No focal deficit present.      Mental Status: She is alert and oriented to person, place, and time. Mental status is at baseline.      Cranial Nerves: No cranial nerve deficit.      Sensory: No sensory deficit.      Motor: No weakness.      Coordination: Coordination normal.      Gait: Gait normal.      Comments: NIHSS:0   Psychiatric:         Mood and Affect: Mood and affect normal.         Behavior: Behavior normal.         Thought Content: Thought content normal.          Judgment: Judgment normal.         Assessment:       1. Need for vaccination    2. Essential hypertension    3. TIA (transient ischemic attack)    4. Dizziness        Plan:   1. Need for vaccination  -     Influenza - Trivalent - PF (ADULT)    2. Essential hypertension   Discontinued HCTZ.   Increased valsartan to 2 tablets daily.   Continued atenolol 50 mg daily.   Emphasized the importance of proper hydration, especially when taking diuretics.   Discussed the difference between daily diuretics (HCTZ) and as-needed diuretics (Lasix).   Continued Lasix as needed for significant swelling, with instruction to increase water intake on days it is taken.   Referred to cardiology for follow-up on blood pressure management and heart function.   Follow up in 2 weeks for blood pressure medication follow-up.  -     valsartan (DIOVAN) 160 MG tablet; Take 2 tablets (320 mg total) by mouth once daily.  Dispense: 180 tablet; Refill: 3    3. TIA (transient ischemic attack)  -     Ambulatory referral/consult to Neurology; Future; Expected date: 11/18/2024   Educated on signs and symptoms of stroke/TIA and when to seek immediate medical attention.   Referred to neurology for urgent evaluation of dizziness, numbness, and possible TIA history.   Go to the hospital immediately if experiencing symptoms of stroke or TIA.    4. Dizziness   Educated on signs and symptoms of stroke/TIA and when to seek immediate medical attention.   Referred to neurology for urgent evaluation of dizziness, numbness, and possible TIA history.   Go to the hospital immediately if experiencing symptoms of stroke or TIA.  -     Ambulatory referral/consult to Neurology; Future; Expected date: 11/18/2024    5. NECK PAIN/Musculoskeletal strain:   Recommend using heating pad and performing stretches for neck pain.   Clare can take ibuprofen or Tylenol for neck pain as needed.      Assessment & Plan    Adjusted antihypertensive regimen due to persistent  hypertension and side effects  Discontinued HCTZ due to suspected dehydration causing dizziness  Increased valsartan dosage to improve blood pressure control without affecting heart rate or fluid balance  Continued atenolol at current dose due to improved heart rate, though still low at night  Considered neurological evaluation due to reported dizziness, numbness, and history of possible TIA  Performed basic neurological exam, which appeared normal  Recommend cardiology follow-up for medication management and to address concerns about heart function      FOLLOW UP:   Contact the office if running out of any medications before next appointment.   Message the office if unable to schedule neurology appointment within a week for consideration of [facility].         Follow Up:  Patient to follow up with her Cardiologist asap (at [facility]). Follow up with Joy Garcia PA-C or Dr. Jennings in 2 weeks.       This note was generated with the assistance of ambient listening technology. I attest to having reviewed and edited the generated note for accuracy, though some syntax or spelling errors may persist. Please contact the author of this note for any clarification.

## 2024-11-13 ENCOUNTER — OFFICE VISIT (OUTPATIENT)
Dept: NEUROLOGY | Facility: CLINIC | Age: 64
End: 2024-11-13
Payer: COMMERCIAL

## 2024-11-13 VITALS
DIASTOLIC BLOOD PRESSURE: 81 MMHG | BODY MASS INDEX: 30.71 KG/M2 | HEIGHT: 62 IN | HEART RATE: 92 BPM | WEIGHT: 166.88 LBS | SYSTOLIC BLOOD PRESSURE: 132 MMHG

## 2024-11-13 DIAGNOSIS — G43.009 MIGRAINE WITHOUT AURA AND WITHOUT STATUS MIGRAINOSUS, NOT INTRACTABLE: ICD-10-CM

## 2024-11-13 DIAGNOSIS — R42 DIZZINESS: ICD-10-CM

## 2024-11-13 DIAGNOSIS — R29.818 TRANSIENT NEUROLOGICAL SYMPTOMS: Primary | ICD-10-CM

## 2024-11-13 PROCEDURE — 99999 PR PBB SHADOW E&M-EST. PATIENT-LVL IV: CPT | Mod: PBBFAC,,, | Performed by: PSYCHIATRY & NEUROLOGY

## 2024-11-25 ENCOUNTER — OFFICE VISIT (OUTPATIENT)
Dept: INTERNAL MEDICINE | Facility: CLINIC | Age: 64
End: 2024-11-25
Payer: COMMERCIAL

## 2024-11-25 VITALS
HEIGHT: 62 IN | BODY MASS INDEX: 31.18 KG/M2 | DIASTOLIC BLOOD PRESSURE: 80 MMHG | SYSTOLIC BLOOD PRESSURE: 134 MMHG | OXYGEN SATURATION: 97 % | HEART RATE: 65 BPM | WEIGHT: 169.44 LBS

## 2024-11-25 DIAGNOSIS — I10 ESSENTIAL HYPERTENSION: Chronic | ICD-10-CM

## 2024-11-25 DIAGNOSIS — G43.009 MIGRAINE WITHOUT AURA AND WITHOUT STATUS MIGRAINOSUS, NOT INTRACTABLE: Primary | ICD-10-CM

## 2024-11-25 PROCEDURE — 3079F DIAST BP 80-89 MM HG: CPT | Mod: CPTII,S$GLB,, | Performed by: PHYSICIAN ASSISTANT

## 2024-11-25 PROCEDURE — 99999 PR PBB SHADOW E&M-EST. PATIENT-LVL IV: CPT | Mod: PBBFAC,,, | Performed by: PHYSICIAN ASSISTANT

## 2024-11-25 PROCEDURE — G2211 COMPLEX E/M VISIT ADD ON: HCPCS | Mod: S$GLB,,, | Performed by: PHYSICIAN ASSISTANT

## 2024-11-25 PROCEDURE — 3044F HG A1C LEVEL LT 7.0%: CPT | Mod: CPTII,S$GLB,, | Performed by: PHYSICIAN ASSISTANT

## 2024-11-25 PROCEDURE — 4010F ACE/ARB THERAPY RXD/TAKEN: CPT | Mod: CPTII,S$GLB,, | Performed by: PHYSICIAN ASSISTANT

## 2024-11-25 PROCEDURE — 1159F MED LIST DOCD IN RCRD: CPT | Mod: CPTII,S$GLB,, | Performed by: PHYSICIAN ASSISTANT

## 2024-11-25 PROCEDURE — 99214 OFFICE O/P EST MOD 30 MIN: CPT | Mod: S$GLB,,, | Performed by: PHYSICIAN ASSISTANT

## 2024-11-25 PROCEDURE — 3075F SYST BP GE 130 - 139MM HG: CPT | Mod: CPTII,S$GLB,, | Performed by: PHYSICIAN ASSISTANT

## 2024-11-25 PROCEDURE — 1160F RVW MEDS BY RX/DR IN RCRD: CPT | Mod: CPTII,S$GLB,, | Performed by: PHYSICIAN ASSISTANT

## 2024-11-25 PROCEDURE — 3008F BODY MASS INDEX DOCD: CPT | Mod: CPTII,S$GLB,, | Performed by: PHYSICIAN ASSISTANT

## 2024-11-26 ENCOUNTER — HOSPITAL ENCOUNTER (OUTPATIENT)
Dept: PULMONOLOGY | Facility: HOSPITAL | Age: 64
Discharge: HOME | End: 2024-11-26
Attending: PSYCHIATRY & NEUROLOGY
Payer: COMMERCIAL

## 2024-11-26 DIAGNOSIS — R29.818 TRANSIENT NEUROLOGICAL SYMPTOMS: ICD-10-CM

## 2024-11-26 PROCEDURE — 95816 EEG AWAKE AND DROWSY: CPT

## 2024-11-27 PROBLEM — R29.818 TRANSIENT NEUROLOGICAL SYMPTOMS: Status: ACTIVE | Noted: 2024-11-27

## 2024-12-11 ENCOUNTER — TELEPHONE (OUTPATIENT)
Dept: NEUROLOGY | Facility: CLINIC | Age: 64
End: 2024-12-11
Payer: COMMERCIAL

## 2024-12-11 NOTE — TELEPHONE ENCOUNTER
----- Message from Tia sent at 12/11/2024  1:01 PM CST -----  Who Called: Pt    What is the request in detail: Requesting call back to discuss virtual visit issues. Please advise    Can the clinic reply by [facility]? No    Best Call Back Number: [phone]      Additional Information:

## 2024-12-11 NOTE — TELEPHONE ENCOUNTER
----- Message from Loreta sent at 12/11/2024  2:33 PM CST -----  Contact: [phone]  Type:  Patient Returning Call    Who Called:SARTHAK RAO [[id number]]  Who Left Message for Patient:  Does the patient know what this is regarding?:need to talk about her virtual call /appointment  Would the patient rather a call back or a response via [facility]? Call back  Best Call Back Number:[phone]  Additional Information: [id number] [id number]

## 2025-02-07 ENCOUNTER — LAB VISIT (OUTPATIENT)
Dept: LAB | Facility: HOSPITAL | Age: 65
End: 2025-02-07
Attending: PHYSICIAN ASSISTANT
Payer: COMMERCIAL

## 2025-02-07 DIAGNOSIS — I10 ESSENTIAL HYPERTENSION: Chronic | ICD-10-CM

## 2025-02-07 DIAGNOSIS — E55.9 VITAMIN D DEFICIENCY: ICD-10-CM

## 2025-02-07 PROCEDURE — 36415 COLL VENOUS BLD VENIPUNCTURE: CPT | Performed by: PHYSICIAN ASSISTANT

## 2025-02-07 PROCEDURE — 80053 COMPREHEN METABOLIC PANEL: CPT | Performed by: PHYSICIAN ASSISTANT

## 2025-02-07 PROCEDURE — 82306 VITAMIN D 25 HYDROXY: CPT | Performed by: PHYSICIAN ASSISTANT

## 2025-02-08 LAB
25(OH)D3+25(OH)D2 SERPL-MCNC: 29 NG/ML (ref 30–96)
ALBUMIN SERPL BCP-MCNC: 3.6 G/DL (ref 3.5–5.2)
ALP SERPL-CCNC: 33 U/L (ref 40–150)
ALT SERPL W/O P-5'-P-CCNC: 10 U/L (ref 10–44)
ANION GAP SERPL CALC-SCNC: 8 MMOL/L (ref 8–16)
AST SERPL-CCNC: 21 U/L (ref 10–40)
BILIRUB SERPL-MCNC: 0.5 MG/DL (ref 0.1–1)
BUN SERPL-MCNC: 11 MG/DL (ref 8–23)
CALCIUM SERPL-MCNC: 8.9 MG/DL (ref 8.7–10.5)
CHLORIDE SERPL-SCNC: 110 MMOL/L (ref 95–110)
CO2 SERPL-SCNC: 24 MMOL/L (ref 23–29)
CREAT SERPL-MCNC: 0.8 MG/DL (ref 0.5–1.4)
EST. GFR  (NO RACE VARIABLE): >60 ML/MIN/1.73 M^2
GLUCOSE SERPL-MCNC: 91 MG/DL (ref 70–110)
POTASSIUM SERPL-SCNC: 3.8 MMOL/L (ref 3.5–5.1)
PROT SERPL-MCNC: 7.8 G/DL (ref 6–8.4)
SODIUM SERPL-SCNC: 142 MMOL/L (ref 136–145)

## 2025-02-11 ENCOUNTER — OFFICE VISIT (OUTPATIENT)
Dept: INTERNAL MEDICINE | Facility: CLINIC | Age: 65
End: 2025-02-11
Payer: COMMERCIAL

## 2025-02-11 VITALS
SYSTOLIC BLOOD PRESSURE: 134 MMHG | WEIGHT: 167.56 LBS | RESPIRATION RATE: 16 BRPM | BODY MASS INDEX: 30.83 KG/M2 | HEIGHT: 62 IN | DIASTOLIC BLOOD PRESSURE: 88 MMHG | TEMPERATURE: 98 F | HEART RATE: 75 BPM | OXYGEN SATURATION: 98 %

## 2025-02-11 DIAGNOSIS — T50.2X5A DIURETIC-INDUCED HYPOKALEMIA: ICD-10-CM

## 2025-02-11 DIAGNOSIS — E87.6 DIURETIC-INDUCED HYPOKALEMIA: ICD-10-CM

## 2025-02-11 DIAGNOSIS — E55.9 VITAMIN D DEFICIENCY: ICD-10-CM

## 2025-02-11 DIAGNOSIS — I10 ESSENTIAL HYPERTENSION: Primary | Chronic | ICD-10-CM

## 2025-02-11 DIAGNOSIS — I50.32 CHRONIC DIASTOLIC (CONGESTIVE) HEART FAILURE: ICD-10-CM

## 2025-02-11 PROCEDURE — 99214 OFFICE O/P EST MOD 30 MIN: CPT | Mod: S$GLB,,, | Performed by: FAMILY MEDICINE

## 2025-02-11 PROCEDURE — 1160F RVW MEDS BY RX/DR IN RCRD: CPT | Mod: CPTII,S$GLB,, | Performed by: FAMILY MEDICINE

## 2025-02-11 PROCEDURE — 3079F DIAST BP 80-89 MM HG: CPT | Mod: CPTII,S$GLB,, | Performed by: FAMILY MEDICINE

## 2025-02-11 PROCEDURE — 99999 PR PBB SHADOW E&M-EST. PATIENT-LVL IV: CPT | Mod: PBBFAC,,, | Performed by: FAMILY MEDICINE

## 2025-02-11 PROCEDURE — G2211 COMPLEX E/M VISIT ADD ON: HCPCS | Mod: S$GLB,,, | Performed by: FAMILY MEDICINE

## 2025-02-11 PROCEDURE — 3008F BODY MASS INDEX DOCD: CPT | Mod: CPTII,S$GLB,, | Performed by: FAMILY MEDICINE

## 2025-02-11 PROCEDURE — 3075F SYST BP GE 130 - 139MM HG: CPT | Mod: CPTII,S$GLB,, | Performed by: FAMILY MEDICINE

## 2025-02-11 PROCEDURE — 1159F MED LIST DOCD IN RCRD: CPT | Mod: CPTII,S$GLB,, | Performed by: FAMILY MEDICINE

## 2025-02-11 NOTE — PROGRESS NOTES
"Subjective:       Patient ID: Clare Whitley is a 64 y.o. female.    Chief Complaint: Follow-up    History of Present Illness    Patient presents to clinic today for followup of chronic conditions.  - Patient reports persistent fatigue despite improvements in vitamin D levels. She describes intermittent pain, numbness, and occasional weakness in her right arm, with altered sensation when grasping objects. She has current neck pain. Patient has consulted a neurologist regarding these symptoms and is scheduled for follow-up in March. The neurologist conducted tests, including one to assess for potential seizures, with normal results. Patient also mentions occasional cognitive interruptions.  - Patient reports intermittent pain in her right shoulder. She has previously consulted an orthopedic specialist and undergone therapy for back pain.  Patient is otherwise without concerns today.    ROS:  General: -fever, -chills, +FATIGUE, -weight gain, -weight loss  Eyes: -eye pain, -vision change  ENMT: -hearing loss, -ear pain, -nasal congestion, -rhinorrhea, -dental problem, -trouble swallowing  Cardiovascular: -chest pain, -palpitations, -lower extremity edema  Respiratory: -cough, -trouble breathing  Gastrointestinal: -abdominal pain, -abdominal swelling, -nausea, -vomiting, -diarrhea, -constipation, -blood in stool  Genitourinary: -difficulty urinating, -genital problem  Musculoskeletal: +JOINT PAIN, -muscle aches, +MUSCLE WEAKNESS, +NECK PAIN  Integumentary: -rash  Lymphatics: -swollen glands, -easy bruising  Neurologic: -headache, -dizziness, +NUMBNESS, -weakness  Psychiatric: -anxiety, -depression, -sleep difficulty, +MEMORY PROBLEMS     Objective:     Vitals:    02/11/25 1135   BP: 134/88   BP Location: Right arm   Patient Position: Sitting   Pulse: 75   Resp: 16   Temp: 98.3 °F (36.8 °C)   TempSrc: Tympanic   SpO2: 98%   Weight: 76 kg (167 lb 8.8 oz)   Height: 5' 2" (1.575 m)            Physical Exam  Vitals reviewed. "   Constitutional:       General: She is not in acute distress.     Appearance: She is well-developed.   HENT:      Head: Normocephalic and atraumatic.   Eyes:      General: Lids are normal. No scleral icterus.     Extraocular Movements: Extraocular movements intact.      Conjunctiva/sclera: Conjunctivae normal.      Pupils: Pupils are equal, round, and reactive to light.   Pulmonary:      Effort: Pulmonary effort is normal.   Neurological:      Mental Status: She is alert and oriented to person, place, and time.      Cranial Nerves: No cranial nerve deficit.      Gait: Gait normal.   Psychiatric:         Mood and Affect: Mood and affect normal.           Lab Results   Component Value Date     02/07/2025    K 3.8 02/07/2025     02/07/2025    CO2 24 02/07/2025    GLU 91 02/07/2025    BUN 11 02/07/2025    CREATININE 0.8 02/07/2025    CALCIUM 8.9 02/07/2025    PROT 7.8 02/07/2025    ALBUMIN 3.6 02/07/2025    BILITOT 0.5 02/07/2025    ALKPHOS 33 (L) 02/07/2025    AST 21 02/07/2025    ALT 10 02/07/2025    EGFRNORACEVR >60.0 02/07/2025    ANIONGAP 8 02/07/2025       Lab Results   Component Value Date    [id number] 29 (L) 02/07/2025       Assessment:       1. Essential hypertension    2. Chronic diastolic (congestive) heart failure    3. Diuretic-induced hypokalemia    4. Vitamin D deficiency        Plan:   1. Essential hypertension  -     Comprehensive Metabolic Panel; Future; Expected date: 08/11/2025  -     Lipid Panel; Future; Expected date: 08/11/2025  -     TSH; Future; Expected date: 08/11/2025  -     CBC Auto Differential; Future; Expected date: 08/11/2025    Controlled, continue atenolol and valsartan    2. Chronic diastolic (congestive) heart failure  Overview:  Followed by Cardiology, continue current treatment plan     3. Diuretic-induced hypokalemia    Stable on supplement     4. Vitamin D deficiency  Overview:  Continue supplement     Orders:  -     Vitamin D; Future; Expected date:  08/11/2025      Assessment & Plan    VITAMIN D DEFICIENCY:   Noted significant improvement in the patient's vitamin D level, although it remains below the optimal range.   Continued vitamin D supplementation to further increase levels.    RIGHT ARM PAIN AND NUMBNESS:   Keep follow up with Neurology.    NECK PAIN:   Evaluated the patient's current neck pain, noting its location on the right side.   Considered referral to a spine specialist for further assessment and management. Patient declines at this time and plans to proceed with Neurology follow up.    PATIENT REFUSAL OF VACCINATION:   Offered pneumonia, shingles, and RSV vaccines to the patient, which were declined.    Patient expressed understanding and agreement with plan.    Visit today included increased complexity associated with the care of the episodic problem vitamin D deficiency, which was addressed while instituting co-management of the longitudinal care of the patient due to the serious and/or complex managed problem(s) .    I have evaluated and discussed management associated with medical care services that serve as the continuing focal point for all needed health care services and/or with medical care services that are part of ongoing care related to my patient's single, serious condition or a complex condition(s).    I am providing ongoing care and I am the primary care provider for this patient, and they are being managed, monitored, and/or observed for their chronic conditions over time.     I have addressed their ongoing health maintenance requirements and needs for all health care services and reviewed co-management plans provided by specialty providers when available.    Health Maintenance Due   Topic Date Due    High Dose Statin  Never done    Mammogram  01/12/2025     Health Maintenance reviewed/updated.    Follow up in about 6 months (around 8/11/2025), or if symptoms worsen or fail to improve, for annual with Joy TEMPLE, labs PTA.    This  note was generated with the assistance of ambient listening technology. Verbal consent was obtained by the patient and accompanying visitor(s) for the recording of patient appointment to facilitate this note. I attest to having reviewed and edited the generated note for accuracy, though some syntax or spelling errors may persist. Please contact the author of this note for any clarification.

## 2025-02-17 ENCOUNTER — PATIENT MESSAGE (OUTPATIENT)
Dept: PULMONOLOGY | Facility: CLINIC | Age: 65
End: 2025-02-17
Payer: COMMERCIAL

## 2025-03-20 ENCOUNTER — HOSPITAL ENCOUNTER (EMERGENCY)
Facility: HOSPITAL | Age: 65
Discharge: HOME | End: 2025-03-20
Attending: EMERGENCY MEDICINE
Payer: COMMERCIAL

## 2025-03-20 VITALS
WEIGHT: 170.63 LBS | TEMPERATURE: 99 F | DIASTOLIC BLOOD PRESSURE: 77 MMHG | OXYGEN SATURATION: 97 % | SYSTOLIC BLOOD PRESSURE: 166 MMHG | BODY MASS INDEX: 31.2 KG/M2 | HEART RATE: 58 BPM | RESPIRATION RATE: 16 BRPM

## 2025-03-20 DIAGNOSIS — R07.9 CHEST PAIN: Primary | ICD-10-CM

## 2025-03-20 LAB
ALBUMIN SERPL BCP-MCNC: 3.8 G/DL (ref 3.5–5.2)
ALP SERPL-CCNC: 33 U/L (ref 40–150)
ALT SERPL W/O P-5'-P-CCNC: 19 U/L (ref 10–44)
ANION GAP SERPL CALC-SCNC: 11 MMOL/L (ref 8–16)
AST SERPL-CCNC: 23 U/L (ref 10–40)
BASOPHILS # BLD AUTO: 0.04 K/UL (ref 0–0.2)
BASOPHILS NFR BLD: 1.1 % (ref 0–1.9)
BILIRUB SERPL-MCNC: 0.6 MG/DL (ref 0.1–1)
BILIRUB UR QL STRIP: NEGATIVE
BNP SERPL-MCNC: 103 PG/ML (ref 0–99)
BUN SERPL-MCNC: 11 MG/DL (ref 8–23)
CALCIUM SERPL-MCNC: 8.9 MG/DL (ref 8.7–10.5)
CHLORIDE SERPL-SCNC: 107 MMOL/L (ref 95–110)
CLARITY UR: CLEAR
CO2 SERPL-SCNC: 22 MMOL/L (ref 23–29)
COLOR UR: YELLOW
CREAT SERPL-MCNC: 0.8 MG/DL (ref 0.5–1.4)
DIFFERENTIAL METHOD BLD: ABNORMAL
EOSINOPHIL # BLD AUTO: 0.2 K/UL (ref 0–0.5)
EOSINOPHIL NFR BLD: 6 % (ref 0–8)
ERYTHROCYTE [DISTWIDTH] IN BLOOD BY AUTOMATED COUNT: 14.4 % (ref 11.5–14.5)
EST. GFR  (NO RACE VARIABLE): >60 ML/MIN/1.73 M^2
GLUCOSE SERPL-MCNC: 89 MG/DL (ref 70–110)
GLUCOSE UR QL STRIP: NEGATIVE
HCT VFR BLD AUTO: 37.8 % (ref 37–48.5)
HGB BLD-MCNC: 13.7 G/DL (ref 12–16)
HGB UR QL STRIP: NEGATIVE
IMM GRANULOCYTES # BLD AUTO: 0.01 K/UL (ref 0–0.04)
IMM GRANULOCYTES NFR BLD AUTO: 0.3 % (ref 0–0.5)
KETONES UR QL STRIP: NEGATIVE
LEUKOCYTE ESTERASE UR QL STRIP: NEGATIVE
LYMPHOCYTES # BLD AUTO: 1.5 K/UL (ref 1–4.8)
LYMPHOCYTES NFR BLD: 41.2 % (ref 18–48)
MAGNESIUM SERPL-MCNC: 1.8 MG/DL (ref 1.6–2.6)
MCH RBC QN AUTO: 27.8 PG (ref 27–31)
MCHC RBC AUTO-ENTMCNC: 36.2 G/DL (ref 32–36)
MCV RBC AUTO: 77 FL (ref 82–98)
MONOCYTES # BLD AUTO: 0.3 K/UL (ref 0.3–1)
MONOCYTES NFR BLD: 8.7 % (ref 4–15)
NEUTROPHILS # BLD AUTO: 1.6 K/UL (ref 1.8–7.7)
NEUTROPHILS NFR BLD: 42.7 % (ref 38–73)
NITRITE UR QL STRIP: NEGATIVE
NRBC BLD-RTO: 0 /100 WBC
OHS QRS DURATION: 74 MS
OHS QRS DURATION: 74 MS
OHS QTC CALCULATION: 396 MS
OHS QTC CALCULATION: 409 MS
PH UR STRIP: 7 [PH] (ref 5–8)
PLATELET # BLD AUTO: 142 K/UL (ref 150–450)
PMV BLD AUTO: 11.1 FL (ref 9.2–12.9)
POTASSIUM SERPL-SCNC: 3.4 MMOL/L (ref 3.5–5.1)
PROT SERPL-MCNC: 8.2 G/DL (ref 6–8.4)
PROT UR QL STRIP: NEGATIVE
RBC # BLD AUTO: 4.92 M/UL (ref 4–5.4)
SODIUM SERPL-SCNC: 140 MMOL/L (ref 136–145)
SP GR UR STRIP: 1.01 (ref 1–1.03)
TROPONIN I SERPL DL<=0.01 NG/ML-MCNC: <0.006 NG/ML (ref 0–0.03)
TROPONIN I SERPL DL<=0.01 NG/ML-MCNC: <0.006 NG/ML (ref 0–0.03)
URN SPEC COLLECT METH UR: NORMAL
UROBILINOGEN UR STRIP-ACNC: NEGATIVE EU/DL
WBC # BLD AUTO: 3.69 K/UL (ref 3.9–12.7)

## 2025-03-20 PROCEDURE — 93010 ELECTROCARDIOGRAM REPORT: CPT | Mod: ,,, | Performed by: INTERNAL MEDICINE

## 2025-03-20 PROCEDURE — 83735 ASSAY OF MAGNESIUM: CPT | Performed by: EMERGENCY MEDICINE

## 2025-03-20 PROCEDURE — 81003 URINALYSIS AUTO W/O SCOPE: CPT | Performed by: NURSE PRACTITIONER

## 2025-03-20 PROCEDURE — 84484 ASSAY OF TROPONIN QUANT: CPT | Mod: 91 | Performed by: EMERGENCY MEDICINE

## 2025-03-20 PROCEDURE — 83880 ASSAY OF NATRIURETIC PEPTIDE: CPT | Performed by: NURSE PRACTITIONER

## 2025-03-20 PROCEDURE — 99285 EMERGENCY DEPT VISIT HI MDM: CPT | Mod: 25

## 2025-03-20 PROCEDURE — 85025 COMPLETE CBC W/AUTO DIFF WBC: CPT | Performed by: NURSE PRACTITIONER

## 2025-03-20 PROCEDURE — 80053 COMPREHEN METABOLIC PANEL: CPT | Performed by: NURSE PRACTITIONER

## 2025-03-20 PROCEDURE — 84484 ASSAY OF TROPONIN QUANT: CPT | Performed by: NURSE PRACTITIONER

## 2025-03-20 PROCEDURE — 93005 ELECTROCARDIOGRAM TRACING: CPT

## 2025-03-20 NOTE — ED PROVIDER NOTES
"SCRIBE #1 NOTE: I, Coy Stanley, am scribing for, and in the presence of, El Carlin DO. I have scribed the entire note.       History     Chief Complaint   Patient presents with    Chest Pain    Shortness of Breath    Dizziness     SOB, waves of dizziness that worsens with and without movement, and midsternal chest pain that started yesterday. Hx of HTN with compliance with BP meds.      Review of patient's allergies indicates:  No Known Allergies      History of Present Illness     HPI    3/20/2025, 9:02 AM  History obtained from the patient and medical records      History of Present Illness: Clare Whitley is a 64 y.o. female patient with a PMHx of HTN, mitral valve prolapse, and mitral regurgitation who presents to the Emergency Department for evaluation of CP which onset at approximately 2:30 AM this morning. CP is associated with SOB. PT initially had SOB throughout the week without CP. Pt is on fluid pills. Pt's last stress test was over a year ago.  Symptoms are intermittent and moderate in severity. No mitigating or exacerbating factors reported. Associated sxs include cough. Patient denies any N/V.  No further complaints or concerns at this time.       Arrival mode: Personal Transportation    PCP: Chhaya Jennings MD        Past Medical History:  Past Medical History:   Diagnosis Date    Hypertension     Mitral regurgitation     "mild"; followed by Dr. Blackmon ([facility])    MVP (mitral valve prolapse)     "trivial"; followed by Dr. Blackmon ([facility])    Sickle cell trait        Past Surgical History:  Past Surgical History:   Procedure Laterality Date    CHOLECYSTECTOMY      laser SX Bilateral     LYMPH NODE BIOPSY Left     left neck     TUBAL LIGATION  9/2/1991    UTERINE FIBROID EMBOLIZATION  01/01/2010         Family History:  Family History   Problem Relation Name Age of Onset    Hypertension Mother Zainab Puente     Cataracts Mother Zainab Puente     Hypertension Father Brandt Mena     " Hypertension Sister Hazel Mena     Hypertension Brother Kp Mena     Stroke Cousin         Social History:  Social History     Tobacco Use    Smoking status: Never     Passive exposure: Yes    Smokeless tobacco: Never   Substance and Sexual Activity    Alcohol use: Never    Drug use: Never    Sexual activity: Yes     Partners: Male     Birth control/protection: Post-menopausal, None        Review of Systems     Review of Systems   Respiratory:  Positive for cough and shortness of breath.    Cardiovascular:  Positive for chest pain.   Gastrointestinal:  Negative for nausea and vomiting.        Physical Exam     Initial Vitals [03/20/25 0842]   BP Pulse Resp Temp SpO2   (!) 185/87 77 19 98.1 °F (36.7 °C) 98 %      MAP       --          Physical Exam  Constitutional:       General: She is not in acute distress.     Appearance: She is well-developed.   Cardiovascular:      Rate and Rhythm: Normal rate and regular rhythm.      Heart sounds: No murmur heard.  Pulmonary:      Effort: Pulmonary effort is normal.      Breath sounds: No wheezing.   Abdominal:      Palpations: Abdomen is soft.      Tenderness: There is no abdominal tenderness.   Musculoskeletal:         General: Normal range of motion.   Skin:     General: Skin is dry.      Findings: No rash.   Neurological:      Mental Status: She is alert and oriented to person, place, and time.       Nursing Notes and Vital Signs Reviewed.       ED Course   Procedures  ED Vital Signs:  Vitals:    03/20/25 0842 03/20/25 0930 03/20/25 1032 03/20/25 1102   BP: (!) 185/87 (!) 167/77 (!) 178/83 (!) 154/81   Pulse: 77 67 69 (!) 58   Resp: 19 16 16 15   Temp: 98.1 °F (36.7 °C)   98.6 °F (37 °C)   TempSrc: Oral   Oral   SpO2: 98% 100% 98% 98%   Weight: 77.4 kg (170 lb 9.6 oz)       03/20/25 1135 03/20/25 1232 03/20/25 1302 03/20/25 1400   BP:  (!) 157/74 (!) 157/71 (!) 166/77   Pulse:  (!) 59 (!) 59 (!) 58   Resp:   15 16   Temp:       TempSrc: Oral      SpO2:  97% 97% 97%    Weight:           Abnormal Lab Results:  Labs Reviewed   B-TYPE NATRIURETIC PEPTIDE - Abnormal       Result Value     (*)    CBC W/ AUTO DIFFERENTIAL - Abnormal    WBC 3.69 (*)     RBC 4.92      Hemoglobin 13.7      Hematocrit 37.8      MCV 77 (*)     MCH 27.8      MCHC 36.2 (*)     RDW 14.4      Platelets 142 (*)     MPV 11.1      Immature Granulocytes 0.3      Gran # (ANC) 1.6 (*)     Immature Grans (Abs) 0.01      Lymph # 1.5      Mono # 0.3      Eos # 0.2      Baso # 0.04      nRBC 0      Gran % 42.7      Lymph % 41.2      Mono % 8.7      Eosinophil % 6.0      Basophil % 1.1      Differential Method Automated     COMPREHENSIVE METABOLIC PANEL - Abnormal    Sodium 140      Potassium 3.4 (*)     Chloride 107      CO2 22 (*)     Glucose 89      BUN 11      Creatinine 0.8      Calcium 8.9      Total Protein 8.2      Albumin 3.8      Total Bilirubin 0.6      Alkaline Phosphatase 33 (*)     AST 23      ALT 19      eGFR >60      Anion Gap 11     TROPONIN I    Troponin I <0.006     URINALYSIS, REFLEX TO URINE CULTURE    Specimen UA Urine, Clean Catch      Color, UA Yellow      Appearance, UA Clear      pH, UA 7.0      Specific Gravity, UA 1.010      Protein, UA Negative      Glucose, UA Negative      Ketones, UA Negative      Bilirubin (UA) Negative      Occult Blood UA Negative      Nitrite, UA Negative      Urobilinogen, UA Negative      Leukocytes, UA Negative      Narrative:     Specimen Source->Urine   MAGNESIUM    Magnesium 1.8     TROPONIN I    Troponin I <0.006          All Lab Results:  Results for orders placed or performed during the hospital encounter of 03/20/25   EKG 12-lead    Collection Time: 03/20/25  8:42 AM   Result Value Ref Range    QRS Duration 74 ms    [facility] QTC Calculation 409 ms   Urinalysis, Reflex to Urine Culture Urine, Clean Catch    Collection Time: 03/20/25  9:03 AM    Specimen: Urine, Clean Catch   Result Value Ref Range    Specimen UA Urine, Clean Catch     Color, UA Yellow  Yellow, Straw, Ada    Appearance, UA Clear Clear    pH, UA 7.0 5.0 - 8.0    Specific Gravity, UA 1.010 1.005 - 1.030    Protein, UA Negative Negative    Glucose, UA Negative Negative    Ketones, UA Negative Negative    Bilirubin (UA) Negative Negative    Occult Blood UA Negative Negative    Nitrite, UA Negative Negative    Urobilinogen, UA Negative <2.0 EU/dL    Leukocytes, UA Negative Negative   Brain natriuretic peptide    Collection Time: 03/20/25  9:20 AM   Result Value Ref Range     (H) 0 - 99 pg/mL   CBC auto differential    Collection Time: 03/20/25  9:20 AM   Result Value Ref Range    WBC 3.69 (L) 3.90 - 12.70 K/uL    RBC 4.92 4.00 - 5.40 M/uL    Hemoglobin 13.7 12.0 - 16.0 g/dL    Hematocrit 37.8 37.0 - 48.5 %    MCV 77 (L) 82 - 98 fL    MCH 27.8 27.0 - 31.0 pg    MCHC 36.2 (H) 32.0 - 36.0 g/dL    RDW 14.4 11.5 - 14.5 %    Platelets 142 (L) 150 - 450 K/uL    MPV 11.1 9.2 - 12.9 fL    Immature Granulocytes 0.3 0.0 - 0.5 %    Gran # (ANC) 1.6 (L) 1.8 - 7.7 K/uL    Immature Grans (Abs) 0.01 0.00 - 0.04 K/uL    Lymph # 1.5 1.0 - 4.8 K/uL    Mono # 0.3 0.3 - 1.0 K/uL    Eos # 0.2 0.0 - 0.5 K/uL    Baso # 0.04 0.00 - 0.20 K/uL    nRBC 0 0 /100 WBC    Gran % 42.7 38.0 - 73.0 %    Lymph % 41.2 18.0 - 48.0 %    Mono % 8.7 4.0 - 15.0 %    Eosinophil % 6.0 0.0 - 8.0 %    Basophil % 1.1 0.0 - 1.9 %    Differential Method Automated    Comprehensive metabolic panel    Collection Time: 03/20/25  9:20 AM   Result Value Ref Range    Sodium 140 136 - 145 mmol/L    Potassium 3.4 (L) 3.5 - 5.1 mmol/L    Chloride 107 95 - 110 mmol/L    CO2 22 (L) 23 - 29 mmol/L    Glucose 89 70 - 110 mg/dL    BUN 11 8 - 23 mg/dL    Creatinine 0.8 0.5 - 1.4 mg/dL    Calcium 8.9 8.7 - 10.5 mg/dL    Total Protein 8.2 6.0 - 8.4 g/dL    Albumin 3.8 3.5 - 5.2 g/dL    Total Bilirubin 0.6 0.1 - 1.0 mg/dL    Alkaline Phosphatase 33 (L) 40 - 150 U/L    AST 23 10 - 40 U/L    ALT 19 10 - 44 U/L    eGFR >60 >60 mL/min/1.73 m^2    Anion Gap 11 8 -  16 mmol/L   Troponin I    Collection Time: 03/20/25  9:20 AM   Result Value Ref Range    Troponin I <0.006 0.000 - 0.026 ng/mL   Magnesium    Collection Time: 03/20/25  9:20 AM   Result Value Ref Range    Magnesium 1.8 1.6 - 2.6 mg/dL   Repeat EKG 12-lead    Collection Time: 03/20/25 12:36 PM   Result Value Ref Range    QRS Duration 74 ms    [facility] QTC Calculation 396 ms   Troponin I    Collection Time: 03/20/25 12:42 PM   Result Value Ref Range    Troponin I <0.006 0.000 - 0.026 ng/mL       Imaging Results:  Imaging Results              X-Ray Chest 1 View (Final result)  Result time 03/20/25 09:25:36      Final result by Rafiq PhoenixRajat), MD (03/20/25 09:25:36)                   Impression:      Negative single view chest x-ray.      Electronically signed by: Rafiq Phoenix MD  Date:    03/20/2025  Time:    09:25               Narrative:    EXAMINATION:  XR CHEST 1 VIEW    CLINICAL HISTORY:  Chest pain,    COMPARISON:  Chest, 07/26/2024    FINDINGS:  Heart size is normal. The lung fields are clear. No acute cardiopulmonary infiltrate.                                       The EKG was ordered, reviewed, and independently interpreted by the ED provider.  Interpretation time: 08:42  Rate: 69 BPM  Rhythm: normal sinus rhythm  Interpretation: No acute ST changes. No STEMI.    The EKG was ordered, reviewed, and independently interpreted by the ED provider.  Interpretation time: 12:36  Rate: 55 BPM  Rhythm: sinus bradycardia  Interpretation: No acute ST changes. No STEMI.               The Emergency Provider reviewed the vital signs and test results, which are outlined above.     ED Discussion     1:32 PM: Reassessed pt at this time. Discussed with patient and/or family/caretaker all pertinent ED information and results. Discussed pt dx and plan of tx. Gave the patient all f/u and return to the ED instructions. All questions and concerns were addressed at this time. Patient and/or family/caretaker expresses  understanding of information and instructions, and is comfortable with plan to discharge. Pt is stable for discharge.     I discussed with patient and/or family/caretaker that evaluation in the ED does not suggest any emergent or life threatening medical conditions requiring immediate intervention beyond what was provided in the ED, and I believe patient is safe for discharge.  Regardless, an unremarkable evaluation in the ED does not preclude the development or presence of a serious of life threatening condition. As such, I instructed that the patient is to return immediately for any worsening or change in current symptoms.        ED Course as of 03/24/25 1147   Thu Mar 20, 2025   1327 Troponin I  Troponin x2 is negative [CD]   1327 Magnesium  Within normal limits [CD]   1327 CBC auto differential(!)  Nonspecific finding [CD]      ED Course User Index  [CD] El Carlin, DO     Medical Decision Making  Workup unremarkable.  Patient instructed to return immediately for any new or worsening symptoms and she verbalized understanding.    Amount and/or Complexity of Data Reviewed  Labs: ordered. Decision-making details documented in ED Course.     Details: CMP is nonspecific, BNP shows mild elevation  Radiology: ordered. Decision-making details documented in ED Course.     Details: Chest x-ray shows no acute process  ECG/medicine tests: ordered and independent interpretation performed. Decision-making details documented in ED Course.    Risk  Risk Details: Differential diagnosis includes but is not limited to:  ACS, heart failure, dysrhythmia, electrolyte abnormality, pneumonia                ED Medication(s):  Medications - No data to display    Discharge Medication List as of 3/20/2025  1:30 PM           Follow-up Information       Schedule an appointment as soon as possible for a visit  with Garry Hand MD.    Specialty: Cardiology  Contact information:  [address] [address] [facility] [address]  [facility]  [address]  [phone]                                 Scribe Attestation:   Scribe #1: I performed the above scribed service and the documentation accurately describes the services I performed. I attest to the accuracy of the note.     Attending:   Physician Attestation Statement for Scribe #1: I, El Carlin DO, personally performed the services described in this documentation, as scribed by Coy Stanley, in my presence, and it is both accurate and complete.           Clinical Impression       ICD-10-CM ICD-9-CM   1. Chest pain  R07.9 786.50       Disposition:   Disposition: Discharged  Condition: Stable         El Carlin DO  03/24/25 1148

## 2025-03-25 ENCOUNTER — OFFICE VISIT (OUTPATIENT)
Dept: PULMONOLOGY | Facility: CLINIC | Age: 65
End: 2025-03-25
Payer: COMMERCIAL

## 2025-03-25 VITALS
RESPIRATION RATE: 20 BRPM | WEIGHT: 171.75 LBS | SYSTOLIC BLOOD PRESSURE: 114 MMHG | BODY MASS INDEX: 31.6 KG/M2 | HEART RATE: 70 BPM | DIASTOLIC BLOOD PRESSURE: 68 MMHG | OXYGEN SATURATION: 98 % | HEIGHT: 62 IN

## 2025-03-25 DIAGNOSIS — Z71.89 CPAP USE COUNSELING: ICD-10-CM

## 2025-03-25 DIAGNOSIS — R06.83 SNORING: ICD-10-CM

## 2025-03-25 DIAGNOSIS — G47.33 OSA ON CPAP: Primary | ICD-10-CM

## 2025-03-25 DIAGNOSIS — Z78.9 DIFFICULTY WITH CPAP NASAL MASK USE: ICD-10-CM

## 2025-03-25 LAB
CHOLEST SERPL-MCNC: 136 MG/DL (ref 100–200)
CHOLEST SERPL-MCNC: 52 MG/DL
HDL/CHOLESTEROL RATIO: 2 %
HDLC SERPL-MCNC: 67 MG/DL
LDLC SERPL CALC-MCNC: 59 MG/DL
VLDLC SERPL-MCNC: 10 MG/DL (ref 5–40)

## 2025-03-25 PROCEDURE — 3078F DIAST BP <80 MM HG: CPT | Mod: CPTII,S$GLB,, | Performed by: INTERNAL MEDICINE

## 2025-03-25 PROCEDURE — 3074F SYST BP LT 130 MM HG: CPT | Mod: CPTII,S$GLB,, | Performed by: INTERNAL MEDICINE

## 2025-03-25 PROCEDURE — 1159F MED LIST DOCD IN RCRD: CPT | Mod: CPTII,S$GLB,, | Performed by: INTERNAL MEDICINE

## 2025-03-25 PROCEDURE — 4010F ACE/ARB THERAPY RXD/TAKEN: CPT | Mod: CPTII,S$GLB,, | Performed by: INTERNAL MEDICINE

## 2025-03-25 PROCEDURE — 99214 OFFICE O/P EST MOD 30 MIN: CPT | Mod: S$GLB,,, | Performed by: INTERNAL MEDICINE

## 2025-03-25 PROCEDURE — 1160F RVW MEDS BY RX/DR IN RCRD: CPT | Mod: CPTII,S$GLB,, | Performed by: INTERNAL MEDICINE

## 2025-03-25 PROCEDURE — 99999 PR PBB SHADOW E&M-EST. PATIENT-LVL IV: CPT | Mod: PBBFAC,,, | Performed by: INTERNAL MEDICINE

## 2025-03-25 PROCEDURE — 3008F BODY MASS INDEX DOCD: CPT | Mod: CPTII,S$GLB,, | Performed by: INTERNAL MEDICINE

## 2025-03-25 NOTE — PROGRESS NOTES
"                                         Pulmonary Outpatient Follow Up Visit     Subjective:       Patient ID: Clare Whitley is a 64 y.o. female.    Chief Complaint: Sleep Apnea      HPI        64-year-old female patient presenting for follow-up.      03/25/2025 remains partially adherent to CPAP therapy.  Mainly due to discomfort on the treatment with current mask.  Requesting a new fitting.      Known with mild SINTIA on auto PAP.    Herman Sleepiness scale score 5    Nasal mask.     Using and benefitting but suboptimal adherence.    Review of Systems   Respiratory:  Positive for apnea and snoring.    Psychiatric/Behavioral:  Positive for sleep disturbance.        Outpatient Encounter Medications as of 3/25/2025   Medication Sig Dispense Refill    aspirin (ECOTRIN) 81 MG EC tablet Take 81 mg by mouth once daily.      atenoloL (TENORMIN) 100 MG tablet Take 50 mg by mouth once daily.      cholecalciferol, vitamin D3, (VITAMIN D3) 25 mcg (1,000 unit) capsule Take 1 capsule (1,000 Units total) by mouth once daily.  0    furosemide (LASIX) 40 MG tablet Take 40 mg by mouth once daily.      methocarbamoL (ROBAXIN) 750 MG Tab Take 1 tablet (750 mg total) by mouth every 8 (eight) hours as needed (muscle spasm). 20 tablet 0    potassium chloride (MICRO-K) 10 MEQ CpSR TAKE 1 CAPSULE(10 MEQ) BY MOUTH EVERY DAY 90 capsule 1    valsartan (DIOVAN) 160 MG tablet Take 2 tablets (320 mg total) by mouth once daily. 180 tablet 3     No facility-administered encounter medications on file as of 3/25/2025.       Objective:     Vital Signs (Most Recent)  Vital Signs  Pulse: 70  Resp: 20  SpO2: 98 %  BP: 114/68  Pain Score: 0-No pain  Height and Weight  Height: 5' 2" (157.5 cm)  Weight: 77.9 kg (171 lb 11.8 oz)  BSA (Calculated - sq m): 1.85 sq meters  BMI (Calculated): 31.4  Weight in (lb) to have BMI = 25: 136.4]  Wt Readings from Last 2 Encounters:   03/25/25 77.9 kg (171 lb 11.8 oz)   03/20/25 77.4 kg (170 lb 9.6 oz)       Physical " "Exam   Constitutional: She is oriented to person, place, and time. She appears well-developed and well-nourished.   Cardiovascular: Normal rate.   Pulmonary/Chest: Normal expansion and effort normal.   Neurological: She is alert and oriented to person, place, and time.   Psychiatric: Her behavior is normal.       Laboratory  Lab Results   Component Value Date    WBC 3.69 (L) 03/20/2025    RBC 4.92 03/20/2025    HGB 13.7 03/20/2025    HCT 37.8 03/20/2025    MCV 77 (L) 03/20/2025    MCH 27.8 03/20/2025    MCHC 36.2 (H) 03/20/2025    RDW 14.4 03/20/2025     (L) 03/20/2025    MPV 11.1 03/20/2025    GRAN 1.6 (L) 03/20/2025    GRAN 42.7 03/20/2025    LYMPH 1.5 03/20/2025    LYMPH 41.2 03/20/2025    MONO 0.3 03/20/2025    MONO 8.7 03/20/2025    EOS 0.2 03/20/2025    BASO 0.04 03/20/2025    EOSINOPHIL 6.0 03/20/2025    BASOPHIL 1.1 03/20/2025       BMP  Lab Results   Component Value Date     03/20/2025    K 3.4 (L) 03/20/2025     03/20/2025    CO2 22 (L) 03/20/2025    BUN 11 03/20/2025    CREATININE 0.8 03/20/2025    CALCIUM 8.9 03/20/2025    ANIONGAP 11 03/20/2025    ESTGFRAFRICA >60.0 07/25/2022    EGFRNONAA >60.0 07/25/2022    AST 23 03/20/2025    ALT 19 03/20/2025    PROT 8.2 03/20/2025       Lab Results   Component Value Date     (H) 03/20/2025    BNP 94 09/12/2024    BNP 12 07/26/2024     (H) 02/16/2024    BNP 72 01/19/2024    BNP 35 05/11/2023       Lab Results   Component Value Date    TSH 1.362 07/26/2024       No results found for: "SEDRATE"    No results found for: "CRP"  No results found for: "IGE"     No results found for: "ASPERGILLUS"  No results found for: "AFUMIGATUSCL"     No results found for: "ACE"     Diagnostic Results:  I have personally reviewed today the following studies:                  Assessment/Plan:   SINTIA on CPAP    CPAP use counseling    Snoring    Difficulty with CPAP nasal mask use        Positional therapy w CPAP improve adherence and usage, I messaged DME " to schedule the for a new fitting appointment.    Nasal mask     Has dentures not good candidate for OAT    Follow up in about 6 months (around 9/25/2025).    This note was prepared using voice recognition system and is likely to have sound alike errors that may have been overlooked even after proof reading.  Please call me with any questions    Discussed diagnosis, its evaluation, treatment and usual course. All questions answered.      Radha Wilder MD

## 2025-04-04 ENCOUNTER — PATIENT OUTREACH (OUTPATIENT)
Dept: ADMINISTRATIVE | Facility: HOSPITAL | Age: 65
End: 2025-04-04
Payer: COMMERCIAL

## 2025-05-16 NOTE — ED NOTES
Care Due:                  Date            Visit Type   Department     Provider  --------------------------------------------------------------------------------                                EP -                              [facility]  Last Visit: 12-      CARE ([facility])   ROSSANA Durbin                              EP -                              [facility]  Next Visit: 06-      CARE ([facility])   ROSSANA Durbin                                                            Last  Test          Frequency    Reason                     Performed    Due Date  --------------------------------------------------------------------------------    CBC.........  12 months..  diclofenac...............  07- 07-    [facility] Embedded Care Due Messages. Reference number: [id number].   5/16/2025 9:11:56 AM CDT   Pt in MRI.

## 2025-07-14 ENCOUNTER — HOSPITAL ENCOUNTER (EMERGENCY)
Facility: HOSPITAL | Age: 65
Discharge: HOME | End: 2025-07-14
Attending: EMERGENCY MEDICINE
Payer: COMMERCIAL

## 2025-07-14 VITALS
HEART RATE: 62 BPM | DIASTOLIC BLOOD PRESSURE: 72 MMHG | TEMPERATURE: 98 F | OXYGEN SATURATION: 100 % | RESPIRATION RATE: 14 BRPM | WEIGHT: 173.63 LBS | BODY MASS INDEX: 31.75 KG/M2 | SYSTOLIC BLOOD PRESSURE: 163 MMHG

## 2025-07-14 DIAGNOSIS — R06.02 SHORTNESS OF BREATH: Primary | ICD-10-CM

## 2025-07-14 LAB
ABSOLUTE EOSINOPHIL (OHS): 0.19 K/UL
ABSOLUTE MONOCYTE (OHS): 0.51 K/UL (ref 0.3–1)
ABSOLUTE NEUTROPHIL COUNT (OHS): 1.96 K/UL (ref 1.8–7.7)
ALBUMIN SERPL BCP-MCNC: 3.4 G/DL (ref 3.5–5.2)
ALP SERPL-CCNC: 43 UNIT/L (ref 40–150)
ALT SERPL W/O P-5'-P-CCNC: 13 UNIT/L (ref 10–44)
ANION GAP (OHS): 6 MMOL/L (ref 8–16)
AST SERPL-CCNC: 17 UNIT/L (ref 11–45)
BASOPHILS # BLD AUTO: 0.03 K/UL
BASOPHILS NFR BLD AUTO: 0.7 %
BILIRUB SERPL-MCNC: 0.4 MG/DL (ref 0.1–1)
BNP SERPL-MCNC: 14 PG/ML (ref 0–99)
BUN SERPL-MCNC: 18 MG/DL (ref 8–23)
CALCIUM SERPL-MCNC: 8.9 MG/DL (ref 8.7–10.5)
CHLORIDE SERPL-SCNC: 110 MMOL/L (ref 95–110)
CO2 SERPL-SCNC: 22 MMOL/L (ref 23–29)
CREAT SERPL-MCNC: 0.7 MG/DL (ref 0.5–1.4)
ERYTHROCYTE [DISTWIDTH] IN BLOOD BY AUTOMATED COUNT: 14.3 % (ref 11.5–14.5)
GFR SERPLBLD CREATININE-BSD FMLA CKD-EPI: >60 ML/MIN/1.73/M2
GLUCOSE SERPL-MCNC: 106 MG/DL (ref 70–110)
HCT VFR BLD AUTO: 37.7 % (ref 37–48.5)
HCV AB SERPL QL IA: NEGATIVE
HGB BLD-MCNC: 13.6 GM/DL (ref 12–16)
HIV 1+2 AB+HIV1 P24 AG SERPL QL IA: NEGATIVE
IMM GRANULOCYTES # BLD AUTO: 0.01 K/UL (ref 0–0.04)
IMM GRANULOCYTES NFR BLD AUTO: 0.2 % (ref 0–0.5)
LYMPHOCYTES # BLD AUTO: 1.69 K/UL (ref 1–4.8)
MCH RBC QN AUTO: 28 PG (ref 27–31)
MCHC RBC AUTO-ENTMCNC: 36.1 G/DL (ref 32–36)
MCV RBC AUTO: 78 FL (ref 82–98)
NUCLEATED RBC (/100WBC) (OHS): 0 /100 WBC
PLATELET # BLD AUTO: 175 K/UL (ref 150–450)
PMV BLD AUTO: 11.6 FL (ref 9.2–12.9)
POTASSIUM SERPL-SCNC: 3.7 MMOL/L (ref 3.5–5.1)
PROT SERPL-MCNC: 7.9 GM/DL (ref 6–8.4)
RBC # BLD AUTO: 4.86 M/UL (ref 4–5.4)
RELATIVE EOSINOPHIL (OHS): 4.3 %
RELATIVE LYMPHOCYTE (OHS): 38.5 % (ref 18–48)
RELATIVE MONOCYTE (OHS): 11.6 % (ref 4–15)
RELATIVE NEUTROPHIL (OHS): 44.7 % (ref 38–73)
SODIUM SERPL-SCNC: 138 MMOL/L (ref 136–145)
TROPONIN I SERPL DL<=0.01 NG/ML-MCNC: <0.006 NG/ML
WBC # BLD AUTO: 4.39 K/UL (ref 3.9–12.7)

## 2025-07-14 PROCEDURE — 82040 ASSAY OF SERUM ALBUMIN: CPT | Performed by: EMERGENCY MEDICINE

## 2025-07-14 PROCEDURE — 93010 ELECTROCARDIOGRAM REPORT: CPT | Mod: ,,, | Performed by: INTERNAL MEDICINE

## 2025-07-14 PROCEDURE — 85025 COMPLETE CBC W/AUTO DIFF WBC: CPT | Performed by: EMERGENCY MEDICINE

## 2025-07-14 PROCEDURE — 83880 ASSAY OF NATRIURETIC PEPTIDE: CPT | Performed by: EMERGENCY MEDICINE

## 2025-07-14 PROCEDURE — 87389 HIV-1 AG W/HIV-1&-2 AB AG IA: CPT | Performed by: EMERGENCY MEDICINE

## 2025-07-14 PROCEDURE — 86803 HEPATITIS C AB TEST: CPT | Performed by: EMERGENCY MEDICINE

## 2025-07-14 PROCEDURE — 99285 EMERGENCY DEPT VISIT HI MDM: CPT | Mod: 25

## 2025-07-14 PROCEDURE — 84484 ASSAY OF TROPONIN QUANT: CPT | Performed by: EMERGENCY MEDICINE

## 2025-07-14 PROCEDURE — 93005 ELECTROCARDIOGRAM TRACING: CPT

## 2025-07-14 NOTE — DISCHARGE INSTRUCTIONS
Your workup is benign.  You are stable in his safe for discharge.  Follow up with her doctor as needed.  Return as needed

## 2025-07-14 NOTE — ED PROVIDER NOTES
"SCRIBE #1 NOTE: I, Hayden Emell, am scribing for, and in the presence of, Lneo Lewis Jr., MD. I have scribed the entire note.       History     Chief Complaint   Patient presents with    Shortness of Breath     Shortness of breath, dizziness and nausea started yesterday, started sweating last night and this morning, at present only c/o nausea and burning to both lower legs. Denies chest pain at present. But had chest pain 2 days ago. Hx of CHF.     Review of patient's allergies indicates:  No Known Allergies      History of Present Illness     HPI    7/14/2025, 7:47 AM  History obtained from the patient and medical records      History of Present Illness: Clare Whitley is a 64 y.o. female patient with a PMHx of HTN and sickle cell trait who presents to the Emergency Department for evaluation of nausea which began yesterday. Pt c/o feeling dizzy yesterday, both legs feeling as though they're "burning", as well as having a left sided headache. Pt also stated she had mild chest pain yesterday and last night. Symptoms are constant and moderate in severity. No mitigating or exacerbating factors reported. Associated sxs include SOB and diaphoresis. Patient denies any dysuria. No prior Tx specified.  No further complaints or concerns at this time.       Arrival mode: Personal Transportation    PCP: Chhaya Jennings MD        Past Medical History:  Past Medical History:   Diagnosis Date    Hypertension     Mitral regurgitation     "mild"; followed by Dr. Blackmon ([facility])    MVP (mitral valve prolapse)     "trivial"; followed by Dr. Blackmon ([facility])    Sickle cell trait        Past Surgical History:  Past Surgical History:   Procedure Laterality Date    CHOLECYSTECTOMY      laser SX Bilateral     LYMPH NODE BIOPSY Left     left neck     TUBAL LIGATION  9/2/1991    UTERINE FIBROID EMBOLIZATION  01/01/2010         Family History:  Family History   Problem Relation Name Age of Onset    Hypertension Mother Zainab " "Hamlee     Cataracts Mother Zainab Puente     Hypertension Father Brandt Mena     Hypertension Sister Hazel Mena     Hypertension Brother Kp Mnea     Stroke Cousin         Social History:  Social History     Tobacco Use    Smoking status: Never     Passive exposure: Yes    Smokeless tobacco: Never   Substance and Sexual Activity    Alcohol use: Never    Drug use: Never    Sexual activity: Yes     Partners: Male     Birth control/protection: Post-menopausal, None        Review of Systems     Review of Systems   Constitutional:  Positive for diaphoresis. Negative for fever.   HENT:  Negative for sore throat.    Respiratory:  Positive for shortness of breath.    Cardiovascular:  Positive for chest pain (Mild).   Gastrointestinal:  Positive for nausea.   Genitourinary:  Negative for dysuria.   Musculoskeletal:  Negative for back pain.        (+) BLE "burning"   Skin:  Negative for rash.   Neurological:  Positive for dizziness and headaches (Left Sided). Negative for weakness.   Hematological:  Does not bruise/bleed easily.   All other systems reviewed and are negative.     Physical Exam     Initial Vitals [07/14/25 0718]   BP Pulse Resp Temp SpO2   (!) 166/86 90 20 98.3 °F (36.8 °C) 96 %      MAP       --          Physical Exam  Nursing Notes and Vital Signs Reviewed.  Constitutional: Patient is in no acute distress. Well-developed and well-nourished.  Head: Atraumatic. Normocephalic.  Eyes:  EOM intact.  No scleral icterus.  ENT: Mucous membranes are moist.  Nares clear   Neck:  Full ROM. No JVD.  Cardiovascular: Regular rate. Regular rhythm No murmurs, rubs, or gallops. Distal pulses are 2+ and symmetric  Pulmonary/Chest: No respiratory distress. Clear to auscultation bilaterally. No wheezing or rales.  Equal chest wall rise bilaterally  Abdominal: Soft and non-distended.  There is no tenderness.  No rebound, guarding, or rigidity. Good bowel sounds.  Genitourinary: No CVA tenderness.  No suprapubic " tenderness  Musculoskeletal: Moves all extremities. No obvious deformities.  5 x 5 strength in all extremities .  No calf tenderness or swelling.  No palpable cord.  No reproducible chest wall tenderness.  No pleuritic symptoms  Skin: Warm and dry.  Neurological:  Alert, awake, and appropriate.  Normal speech.  No acute focal neurological deficits are appreciated.  Two through 12 intact bilaterally.  Psychiatric: Normal affect. Good eye contact. Appropriate in content.     ED Course   Procedures  ED Vital Signs:  Vitals:    07/14/25 0718 07/14/25 0746 07/14/25 0747 07/14/25 0815   BP: (!) 166/86  (!) 151/70 (!) 141/68   Pulse: 90 96 81 78   Resp: 20  18 20   Temp: 98.3 °F (36.8 °C)      TempSrc: Oral      SpO2: 96%  100% 100%   Weight: 78.7 kg (173 lb 9.6 oz)       07/14/25 0915 07/14/25 0932 07/14/25 0933   BP: 133/67  (!) 163/72   Pulse: 73 62    Resp: 20 14    Temp:      TempSrc:      SpO2: 100% 100%    Weight:          Abnormal Lab Results:  Labs Reviewed   COMPREHENSIVE METABOLIC PANEL - Abnormal       Result Value    Sodium 138      Potassium 3.7      Chloride 110      CO2 22 (*)     Glucose 106      BUN 18      Creatinine 0.7      Calcium 8.9      Protein Total 7.9      Albumin 3.4 (*)     Bilirubin Total 0.4      ALP 43      AST 17      ALT 13      Anion Gap 6 (*)     eGFR >60     CBC WITH DIFFERENTIAL - Abnormal    WBC 4.39      RBC 4.86      HGB 13.6      HCT 37.7      MCV 78 (*)     MCH 28.0      MCHC 36.1 (*)     RDW 14.3      Platelet Count 175      MPV 11.6      Nucleated RBC 0      Neut % 44.7      Lymph % 38.5      Mono % 11.6      Eos % 4.3      Basophil % 0.7      Imm Grans % 0.2      Neut # 1.96      Lymph # 1.69      Mono # 0.51      Eos # 0.19      Baso # 0.03      Imm Grans # 0.01      Narrative:     This is an appended report.  These results have been appended to a previously verified report.   HEPATITIS C ANTIBODY - Normal    Hep C Ab Interp Negative     HIV 1 / 2 ANTIBODY - Normal    HIV 1/2  Ag/Ab Negative     B-TYPE NATRIURETIC PEPTIDE - Normal    BNP 14     TROPONIN I - Normal    Troponin-I <0.006     CBC W/ AUTO DIFFERENTIAL    Narrative:     The following orders were created for panel order CBC auto differential.  Procedure                               Abnormality         Status                     ---------                               -----------         ------                     CBC with Differential[[id number]]       Abnormal            Final result                 Please view results for these tests on the individual orders.   HEP C VIRUS HOLD SPECIMEN        All Lab Results:  Results for orders placed or performed during the hospital encounter of 07/14/25   EKG 12-lead    Collection Time: 07/14/25  7:17 AM   Result Value Ref Range    QRS Duration 78 ms    [facility] QTC Calculation 435 ms   Brain natriuretic peptide    Collection Time: 07/14/25  7:42 AM   Result Value Ref Range    BNP 14 0 - 99 pg/mL   CBC with Differential    Collection Time: 07/14/25  7:42 AM   Result Value Ref Range    WBC 4.39 3.90 - 12.70 K/uL    RBC 4.86 4.00 - 5.40 M/uL    HGB 13.6 12.0 - 16.0 gm/dL    HCT 37.7 37.0 - 48.5 %    MCV 78 (L) 82 - 98 fL    MCH 28.0 27.0 - 31.0 pg    MCHC 36.1 (H) 32.0 - 36.0 g/dL    RDW 14.3 11.5 - 14.5 %    Platelet Count 175 150 - 450 K/uL    MPV 11.6 9.2 - 12.9 fL    Nucleated RBC 0 <=0 /100 WBC    Neut % 44.7 38 - 73 %    Lymph % 38.5 18 - 48 %    Mono % 11.6 4 - 15 %    Eos % 4.3 <=8 %    Basophil % 0.7 <=1.9 %    Imm Grans % 0.2 0.0 - 0.5 %    Neut # 1.96 1.8 - 7.7 K/uL    Lymph # 1.69 1 - 4.8 K/uL    Mono # 0.51 0.3 - 1 K/uL    Eos # 0.19 <=0.5 K/uL    Baso # 0.03 <=0.2 K/uL    Imm Grans # 0.01 0.00 - 0.04 K/uL   Hepatitis C Antibody    Collection Time: 07/14/25  8:22 AM   Result Value Ref Range    Hep C Ab Interp Negative Negative   HIV 1/2 Ag/Ab (4th Gen)    Collection Time: 07/14/25  8:22 AM   Result Value Ref Range    HIV 1/2 Ag/Ab Negative Negative   Comprehensive metabolic panel     Collection Time: 07/14/25  8:22 AM   Result Value Ref Range    Sodium 138 136 - 145 mmol/L    Potassium 3.7 3.5 - 5.1 mmol/L    Chloride 110 95 - 110 mmol/L    CO2 22 (L) 23 - 29 mmol/L    Glucose 106 70 - 110 mg/dL    BUN 18 8 - 23 mg/dL    Creatinine 0.7 0.5 - 1.4 mg/dL    Calcium 8.9 8.7 - 10.5 mg/dL    Protein Total 7.9 6.0 - 8.4 gm/dL    Albumin 3.4 (L) 3.5 - 5.2 g/dL    Bilirubin Total 0.4 0.1 - 1.0 mg/dL    ALP 43 40 - 150 unit/L    AST 17 11 - 45 unit/L    ALT 13 10 - 44 unit/L    Anion Gap 6 (L) 8 - 16 mmol/L    eGFR >60 >60 mL/min/1.73/m2   Troponin I    Collection Time: 07/14/25  8:22 AM   Result Value Ref Range    Troponin-I <0.006 <=0.026 ng/mL       Imaging Results:  Imaging Results              X-Ray Chest AP Portable (Final result)  Result time 07/14/25 07:42:36      Final result by Clive Cook MD (07/14/25 07:42:36)                   Impression:      No acute process seen.      Electronically signed by: Clive Cook MD  Date:    07/14/2025  Time:    07:42               Narrative:    EXAMINATION:  XR CHEST AP PORTABLE    CLINICAL HISTORY:  chest pain;    FINDINGS:  Single view of the chest.  Comparison 03/20/2025    Cardiac silhouette is normal.  The lungs demonstrate no evidence of active disease.  No evidence of pleural effusion or pneumothorax.  Bones appear intact.                                       The EKG was ordered, reviewed, and independently interpreted by the ED provider.  Interpretation time: 07:17  Rate: 88 BPM  Rhythm: normal sinus rhythm  Interpretation: Possible Left atrial enlargement. No STEMI.           The Emergency Provider reviewed the vital signs and test results, which are outlined above.     ED Discussion     9:48 AM: Reassessed pt at this time. Discussed with patient and/or family/caretaker all pertinent ED information and results. Discussed pt dx and plan of tx. Gave the patient all f/u and return to the ED instructions. All questions and concerns were addressed  at this time. Patient and/or family/caretaker expresses understanding of information and instructions, and is comfortable with plan to discharge. Pt is stable for discharge.     I discussed with patient and/or family/caretaker that evaluation in the ED does not suggest any emergent or life threatening medical conditions requiring immediate intervention beyond what was provided in the ED, and I believe patient is safe for discharge. Regardless, an unremarkable evaluation in the ED does not preclude the development or presence of a serious or life threatening condition. As such, I instructed that the patient is to return immediately for any worsening or change in current symptoms.      ED Course as of 07/14/25 1012   Mon Jul 14, 2025   0840 Cardiac monitor interpretation  Independent interpretation  Indication: Shortness of breath  Normal sinus rhythm.  Rate 74.  No STEMI [RT]      ED Course User Index  [RT] Leno Lewis Jr., MD     Medical Decision Making  Differential diagnosis:  CHF, COPD, pneumonia, shortness a breath, dizziness, dehydration, anemia    The patient is evaluated history physical examination.  The patient has had some vague complaints from yesterday that has all resolved.  That has has a mild chest tightness and shortness a breath yesterday but no pain per se.  EKG is negative as is her troponin.  Her workup is benign.  The patient is has a x-ray is clear.  The patient is feeling very well and seemed to benefit from reassurance.  The patient is stable safe for discharge in my opinion.  I have discussed with her all findings as well as plan of care to which she verbalized agreement and understanding    Amount and/or Complexity of Data Reviewed  Labs: ordered. Decision-making details documented in ED Course.     Details: BNP troponin CBC CMP is benign  Radiology: ordered. Decision-making details documented in ED Course.     Details: Clear chest  ECG/medicine tests: ordered and independent  interpretation performed. Decision-making details documented in ED Course.     Details: Normal    Risk  OTC drugs.  Prescription drug management.  Decision regarding hospitalization.                ED Medication(s):  Medications - No data to display    Discharge Medication List as of 7/14/2025  9:39 AM           Follow-up Information       Chhaya Jennings MD.    Specialty: Family Medicine  Contact information:  [address] [place] [address] [address]ry TORO [address]  [phone]                                 Scribe Attestation:   Scribe #1: I performed the above scribed service and the documentation accurately describes the services I performed. I attest to the accuracy of the note.     Attending:   Physician Attestation Statement for Scribe #1: I, Leno Lewis Jr., MD, personally performed the services described in this documentation, as scribed by Hayden Blandon, in my presence, and it is both accurate and complete.           Clinical Impression       ICD-10-CM ICD-9-CM   1. Shortness of breath  R06.02 786.05       Disposition:   Disposition: Discharged  Condition: Stable       Leno Lewis Jr., MD  07/14/25 1012

## 2025-07-15 LAB
OHS QRS DURATION: 78 MS
OHS QTC CALCULATION: 435 MS

## 2025-07-17 LAB — HOLD SPECIMEN: NORMAL

## 2025-08-11 ENCOUNTER — OFFICE VISIT (OUTPATIENT)
Dept: INTERNAL MEDICINE | Facility: CLINIC | Age: 65
End: 2025-08-11
Payer: COMMERCIAL

## 2025-08-11 ENCOUNTER — LAB VISIT (OUTPATIENT)
Dept: LAB | Facility: HOSPITAL | Age: 65
End: 2025-08-11
Attending: FAMILY MEDICINE
Payer: COMMERCIAL

## 2025-08-11 VITALS
HEART RATE: 90 BPM | DIASTOLIC BLOOD PRESSURE: 68 MMHG | BODY MASS INDEX: 31.24 KG/M2 | WEIGHT: 169.75 LBS | SYSTOLIC BLOOD PRESSURE: 138 MMHG | OXYGEN SATURATION: 96 % | TEMPERATURE: 98 F | HEIGHT: 62 IN

## 2025-08-11 DIAGNOSIS — E55.9 VITAMIN D DEFICIENCY: ICD-10-CM

## 2025-08-11 DIAGNOSIS — I10 ESSENTIAL HYPERTENSION: Chronic | ICD-10-CM

## 2025-08-11 DIAGNOSIS — Z79.899 ENCOUNTER FOR LONG-TERM (CURRENT) USE OF MEDICATIONS: ICD-10-CM

## 2025-08-11 DIAGNOSIS — I10 ESSENTIAL HYPERTENSION: ICD-10-CM

## 2025-08-11 DIAGNOSIS — Z00.00 ROUTINE GENERAL MEDICAL EXAMINATION AT A HEALTH CARE FACILITY: ICD-10-CM

## 2025-08-11 DIAGNOSIS — G47.33 OSA ON CPAP: ICD-10-CM

## 2025-08-11 DIAGNOSIS — I50.32 CHRONIC DIASTOLIC (CONGESTIVE) HEART FAILURE: ICD-10-CM

## 2025-08-11 DIAGNOSIS — G43.009 MIGRAINE WITHOUT AURA AND WITHOUT STATUS MIGRAINOSUS, NOT INTRACTABLE: Primary | ICD-10-CM

## 2025-08-11 LAB
25(OH)D3+25(OH)D2 SERPL-MCNC: 27 NG/ML (ref 30–96)
ABSOLUTE EOSINOPHIL (OHS): 0.24 K/UL
ABSOLUTE MONOCYTE (OHS): 0.37 K/UL (ref 0.3–1)
ABSOLUTE NEUTROPHIL COUNT (OHS): 1.46 K/UL (ref 1.8–7.7)
BASOPHILS # BLD AUTO: 0.05 K/UL
BASOPHILS NFR BLD AUTO: 1.4 %
CHOLEST SERPL-MCNC: 140 MG/DL (ref 120–199)
CHOLEST/HDLC SERPL: 2.4 {RATIO} (ref 2–5)
ERYTHROCYTE [DISTWIDTH] IN BLOOD BY AUTOMATED COUNT: 14.6 % (ref 11.5–14.5)
HCT VFR BLD AUTO: 38.4 % (ref 37–48.5)
HDLC SERPL-MCNC: 58 MG/DL (ref 40–75)
HDLC SERPL: 41.4 % (ref 20–50)
HGB BLD-MCNC: 14 GM/DL (ref 12–16)
IMM GRANULOCYTES # BLD AUTO: 0.01 K/UL (ref 0–0.04)
IMM GRANULOCYTES NFR BLD AUTO: 0.3 % (ref 0–0.5)
LDLC SERPL CALC-MCNC: 73 MG/DL (ref 63–159)
LYMPHOCYTES # BLD AUTO: 1.49 K/UL (ref 1–4.8)
MCH RBC QN AUTO: 28.3 PG (ref 27–31)
MCHC RBC AUTO-ENTMCNC: 36.5 G/DL (ref 32–36)
MCV RBC AUTO: 78 FL (ref 82–98)
NONHDLC SERPL-MCNC: 82 MG/DL
NUCLEATED RBC (/100WBC) (OHS): 0 /100 WBC
PLATELET # BLD AUTO: 169 K/UL (ref 150–450)
PMV BLD AUTO: 12.8 FL (ref 9.2–12.9)
RBC # BLD AUTO: 4.94 M/UL (ref 4–5.4)
RELATIVE EOSINOPHIL (OHS): 6.6 %
RELATIVE LYMPHOCYTE (OHS): 41.2 % (ref 18–48)
RELATIVE MONOCYTE (OHS): 10.2 % (ref 4–15)
RELATIVE NEUTROPHIL (OHS): 40.3 % (ref 38–73)
TRIGL SERPL-MCNC: 45 MG/DL (ref 30–150)
TSH SERPL-ACNC: 0.69 UIU/ML (ref 0.4–4)
WBC # BLD AUTO: 3.62 K/UL (ref 3.9–12.7)

## 2025-08-11 PROCEDURE — 3075F SYST BP GE 130 - 139MM HG: CPT | Mod: CPTII,S$GLB,, | Performed by: PHYSICIAN ASSISTANT

## 2025-08-11 PROCEDURE — 1160F RVW MEDS BY RX/DR IN RCRD: CPT | Mod: CPTII,S$GLB,, | Performed by: PHYSICIAN ASSISTANT

## 2025-08-11 PROCEDURE — 82306 VITAMIN D 25 HYDROXY: CPT

## 2025-08-11 PROCEDURE — 3008F BODY MASS INDEX DOCD: CPT | Mod: CPTII,S$GLB,, | Performed by: PHYSICIAN ASSISTANT

## 2025-08-11 PROCEDURE — 4010F ACE/ARB THERAPY RXD/TAKEN: CPT | Mod: CPTII,S$GLB,, | Performed by: PHYSICIAN ASSISTANT

## 2025-08-11 PROCEDURE — 36415 COLL VENOUS BLD VENIPUNCTURE: CPT

## 2025-08-11 PROCEDURE — 84443 ASSAY THYROID STIM HORMONE: CPT

## 2025-08-11 PROCEDURE — 99999 PR PBB SHADOW E&M-EST. PATIENT-LVL V: CPT | Mod: PBBFAC,,, | Performed by: PHYSICIAN ASSISTANT

## 2025-08-11 PROCEDURE — 85025 COMPLETE CBC W/AUTO DIFF WBC: CPT

## 2025-08-11 PROCEDURE — 80061 LIPID PANEL: CPT

## 2025-08-11 PROCEDURE — 99396 PREV VISIT EST AGE 40-64: CPT | Mod: S$GLB,,, | Performed by: PHYSICIAN ASSISTANT

## 2025-08-11 PROCEDURE — 3078F DIAST BP <80 MM HG: CPT | Mod: CPTII,S$GLB,, | Performed by: PHYSICIAN ASSISTANT

## 2025-08-11 PROCEDURE — 1159F MED LIST DOCD IN RCRD: CPT | Mod: CPTII,S$GLB,, | Performed by: PHYSICIAN ASSISTANT

## (undated) DEVICE — COVER OVERHEAD SURG LT BLUE

## (undated) DEVICE — SEE MEDLINE ITEM 157117

## (undated) DEVICE — HANDLE PISTOL GRIP HAND CNTRL

## (undated) DEVICE — ELECTRODE REM PLYHSV RETURN 9

## (undated) DEVICE — NDL PNEUMO INSUFFLATI 120MM

## (undated) DEVICE — SOL NS 1000CC

## (undated) DEVICE — SYS IRRIG PRESSURIZED SPIKE

## (undated) DEVICE — SUT MONOCRYL 4.0 PS2 CP496G

## (undated) DEVICE — HEMOSTAT SURGICEL 4X8IN

## (undated) DEVICE — SEE MEDLINE ITEM 152739

## (undated) DEVICE — NDL SAFETY 25G X 1.5 ECLIPSE

## (undated) DEVICE — SEE MEDLINE ITEM 152622

## (undated) DEVICE — SEE MEDLINE ITEM 157027

## (undated) DEVICE — CLOSURE SKIN STERI STRIP 1/2X4

## (undated) DEVICE — SEE MEDLINE ITEM 146420

## (undated) DEVICE — TROCAR ENDOPATH XCEL 5X100MM

## (undated) DEVICE — CONTAINER SPECIMEN STRL 4OZ

## (undated) DEVICE — CUTTER PROXIMATE BLUE 75MM

## (undated) DEVICE — ELECTRODE ENDOPATH + II 5X34

## (undated) DEVICE — SYR 10CC LUER LOCK

## (undated) DEVICE — SUPPORT ULNA NERVE PROTECTOR

## (undated) DEVICE — ADHESIVE MASTISOL VIAL 48/BX

## (undated) DEVICE — CANNULA ENDOPATH XCEL 5X100MM

## (undated) DEVICE — DRAPE ABDOMINAL TIBURON 14X11

## (undated) DEVICE — DECANTER VIAL ASEPTIC TRANSFER

## (undated) DEVICE — SEAL SCOPE WARMER 20/BX

## (undated) DEVICE — POSITIONER HEAD DONUT 9IN FOAM

## (undated) DEVICE — GAUZE SPONGE 4X4 12PLY

## (undated) DEVICE — KIT ANTIFOG

## (undated) DEVICE — SOL 9P NACL IRR PIC IL

## (undated) DEVICE — MANIFOLD 4 PORT

## (undated) DEVICE — APPLIER LIGACLIP LG 13IN

## (undated) DEVICE — SYR 3CC LUER LOC

## (undated) DEVICE — APPLICATOR CHLORAPREP ORN 26ML

## (undated) DEVICE — TUBING HEATED INSUFFLATOR

## (undated) DEVICE — GLOVE SURG BIOGEL LATEX SZ 7.5

## (undated) DEVICE — SEE MEDLINE ITEM 146372

## (undated) DEVICE — CORD LAP 10 DISP

## (undated) DEVICE — SCISSOR TIPS METAENBAUM LAP